# Patient Record
Sex: MALE
[De-identification: names, ages, dates, MRNs, and addresses within clinical notes are randomized per-mention and may not be internally consistent; named-entity substitution may affect disease eponyms.]

---

## 2017-10-07 NOTE — RAD
PROCEDURE:  Right small finger radiographs.



HISTORY:

pain s/p fall



COMPARISON:

None.



TECHNIQUE:

AP radiograph of the right hand, as well as spot oblique and lateral 

images of small finger were obtained.



FINDINGS:



RIGHT SMALL FINGER:

Age-indeterminate fracture deformity of the metacarpal, suspected 

chronic. Remainder of the right hand (as seen on the AP view) grossly 

unremarkable.



JOINTS:

No dislocation. 



SOFT TISSUES:

Mild soft tissue swelling.  No evidence of radiopaque foreign body. 



OTHER FINDINGS:

None.



IMPRESSION:

Age-indeterminate fracture deformity of the 5th metacarpal, suspected 

chronic.  Correlate with physical exam. 



Mild soft tissue swelling. 



Study has been marked for PA review.

## 2018-04-13 NOTE — ED PDOC
HPI: Abdomen


Time Seen by Provider: 04/13/18 18:32


Chief Complaint (Nursing): GI Problem


Chief Complaint (Provider): HEMATEMESIS


History Per: Patient (36 Y/O MALE HERE FOR EVALUATION OF DARK STOOLS X 2 DAYS 

ASSOCIATED WITH HEMATEMESIS WITH BLOOD CLOTS TODAY.  PARTNER NOTES H/O DAILY 

BEER DRINKING.  PATIENT DENIES ANY ULCER HX BUT HAS HAD H/O ABD PAIN 

INTERMITTENTLT)





Past Medical History


Reviewed: Historical Data, Nursing Documentation, Vital Signs


Vital Signs: 


 Last Vital Signs











Temp  97.7 F   04/13/18 18:00


 


Pulse  120 H  04/13/18 19:14


 


Resp  16   04/13/18 18:00


 


BP  87/52 L  04/13/18 18:00


 


Pulse Ox  100   04/13/18 19:18














- Medical History


PMH: Back Problems





- Family History


Family History: States: Unknown Family Hx





- Immunization History


Hx Tetanus Toxoid Vaccination: No


Hx Influenza Vaccination: No


Hx Pneumococcal Vaccination: No





- Allergies


Allergies/Adverse Reactions: 


 Allergies











Allergy/AdvReac Type Severity Reaction Status Date / Time


 


Penicillins Allergy  RASH Verified 10/06/17 17:48














Review of Systems


ROS Statement: Except As Marked, All Systems Reviewed And Found Negative





Physical Exam





- Reviewed


Nursing Documentation Reviewed: Yes


Vital Signs Reviewed: Yes





- Physical Exam


Appears: Positive for: Well, Non-toxic, No Acute Distress


Head Exam: Positive for: ATRAUMATIC, NORMAL INSPECTION, NORMOCEPHALIC


Skin: Positive for: Normal Color, Warm, DRY


Eye Exam: Positive for: Normal appearance, EOMI, PERRL, Other (CONJUNCTIVAL 

PALLOR)


ENT: Positive for: Normal ENT Inspection


Neck: Positive for: Normal, Painless ROM


Cardiovascular/Chest: Positive for: Regular Rate, Rhythm


Respiratory: Positive for: CNT, Normal Breath Sounds


Gastrointestinal/Abdominal: Positive for: Normal Exam, Soft


Back: Positive for: Normal Inspection


Rectal: Positive for: Black Stool


Extremity: Positive for: Normal ROM


Neurologic/Psych: Positive for: Alert, Oriented





- Laboratory Results


Result Diagrams: 


 04/13/18 18:40





 04/13/18 18:40





- ECG


O2 Sat by Pulse Oximetry: 100





- Progress


ED Course And Treament: 





PROTONIX 80 MG IV X 1 DOSE


ZOFRAN 4 MG IV X 1 DOSE





NS 2 LITERS WIDE OPEN





TYPE AND CROSS 2 UNITS PACKED RBCS





HGB 6.1





D/W DR. MURRAY





D/W DR. CRUZ ICU





2 UNITS FFP/ 20 MG VITAMIN K IV X 1 DOSE





D/W DR. PRETTY. DDAVP 21 MCG IV X 1 DOSE; PROTONIX 8 MG/HR RECOMMENDED BY HIM











Disposition





- Clinical Impression


Clinical Impression: 


 GI bleed








- Disposition


Disposition Time: 19:37


Condition: FAIR


Forms:  [facility] (English)





- Pt Status Changed To:


Hospital Disposition Of: Inpatient





- Admit Certification


Admit to Inpatient:: After my assessment, the patient will require 

hospitalization for at least two midnights.  This is because of the severity of 

symptoms shown, intensity of services needed, and/or the medical risk in this 

patient being treated as an outpatient.

## 2018-04-13 NOTE — CP.PCM.CON
History of Present Illness





- History of Present Illness


History of Present Illness: 





CC: vomiting clots, black stool





HPI: 37 year old male PMH daily ETOH use presents to the ED today with a 

several day history of moderate black stools, associated with several episodes 

of emesis with blood clots. Patient was anorexic for two days. Afebrile, 

initial BP 87/52, -135, saturating 100% RA. No acute distress or further 

episodes of bleeding in ED. INR 1.8, H/H 6.1/20.0, pt received 2 liters NS, vit 

K 20 x1, ddavp x1, 2 prbc/2ffp (still transfusing), octreotide drip, and 

protonix drip. GI consulted Dr. Owen. Pt stable at this time, admit to ICU 

for close monitoring, HD stability. 





ROS: per HPI all other systems reviewed and negative.





PMSH: daily ETOH


FH: Denies


SH: daily ETOH 1-2 beers, cigarettes 1/2 ppd 17 years


No home meds


PCN - adverse rxn, diarrhea.





Past Patient History





- Past Social History


Smoking Status: Never Smoked





- PSYCHIATRIC


Hx Substance Use: No





- SURGICAL HISTORY


Hx Surgeries: No





Meds


Allergies/Adverse Reactions: 


 Allergies











Allergy/AdvReac Type Severity Reaction Status Date / Time


 


Penicillins Allergy  RASH Verified 10/06/17 17:48














- Medications


Medications: 


 Current Medications





Pantoprazole Sodium 40 mg/ (Sodium Chloride)  100 mls @ 20 mls/hr IVPB Q5H JUAN LUIS


   PRN Reason: 8 MG/HR


   Last Admin: 04/13/18 21:15 Dose:  20 mls/hr


Octreotide Acetate (Sandostatin)  50 mcg SC Q8 [facility]











Physical Exam





- Constitutional


Appears: Non-toxic, No Acute Distress





- Head Exam


Head Exam: ATRAUMATIC, NORMOCEPHALIC





- Eye Exam


Eye Exam: EOMI, Normal appearance, PERRL


Pupil Exam: NORMAL ACCOMODATION





- ENT Exam


ENT Exam: Mucous Membranes Moist, Normal Exam





- Neck Exam


Neck exam: Positive for: Full Rom, Normal Inspection





- Respiratory Exam


Respiratory Exam: Clear to Auscultation Bilateral, NORMAL BREATHING PATTERN





- Cardiovascular Exam


Cardiovascular Exam: RRR, +S1, +S2





- GI/Abdominal Exam


GI & Abdominal Exam: Normal Bowel Sounds, Soft.  absent: Mass, Tenderness





- Extremities Exam


Extremities exam: Positive for: normal capillary refill, pedal pulses present





- Back Exam


Back exam: absent: CVA tenderness (L), CVA tenderness (R)





- Neurological Exam


Neurological exam: Alert, Oriented x3





- Psychiatric Exam


Psychiatric exam: Normal Affect, Normal Mood





- Skin


Skin Exam: Dry, Warm





Results





- Vital Signs


Recent Vital Signs: 


 Last Vital Signs











Temp  97.7 F   04/13/18 18:00


 


Pulse  127 H  04/13/18 19:58


 


Resp  18   04/13/18 19:58


 


BP  118/60   04/13/18 19:58


 


Pulse Ox  100   04/13/18 20:06














- Labs


Result Diagrams: 


 04/13/18 18:40





 04/13/18 18:40


Labs: 


 Laboratory Results - last 24 hr











  04/13/18 04/13/18 04/13/18





  18:40 18:40 18:40


 


WBC    16.6 H


 


RBC    2.29 L


 


Hgb    6.1 L*


 


Hct    20.0 L


 


MCV    87.4


 


MCH    26.6 L


 


MCHC    30.5 L


 


RDW    15.1 H


 


Plt Count    187


 


MPV    10.4


 


Neut % (Auto)    69.4


 


Lymph % (Auto)    25.2


 


Mono % (Auto)    4.7


 


Eos % (Auto)    0.0


 


Baso % (Auto)    0.7


 


Neut # (Auto)    11.5 H


 


Lymph # (Auto)    4.2


 


Mono # (Auto)    0.8


 


Eos # (Auto)    0.0


 


Baso # (Auto)    0.1


 


PT   


 


INR   


 


APTT   


 


Sodium   144 


 


Potassium   4.5 


 


Chloride   105 


 


Carbon Dioxide   15 L 


 


Anion Gap   29 H 


 


BUN   37 H 


 


Creatinine   1.2 


 


Est GFR ( Amer)   > 60 


 


Est GFR (Non-Af Amer)   > 60 


 


Random Glucose   161 H 


 


Calcium   8.6 


 


Total Bilirubin   1.0 


 


AST   81 H 


 


ALT   40 


 


Alkaline Phosphatase   66 


 


Total Protein   7.6 


 


Albumin   3.6 


 


Globulin   4.1 H 


 


Albumin/Globulin Ratio   0.9 L 


 


Lipase   78 


 


Stool Occult Blood   


 


Alcohol, Quantitative   < 10 


 


Blood Type  O POSITIVE  


 


Blood Type Confirm   


 


Antibody Screen  Negative  


 


Crossmatch  See Detail  


 


BBK History Checked  No verified bt  














  04/13/18 04/13/18 04/13/18





  18:40 18:40 19:27


 


WBC   


 


RBC   


 


Hgb   


 


Hct   


 


MCV   


 


MCH   


 


MCHC   


 


RDW   


 


Plt Count   


 


MPV   


 


Neut % (Auto)   


 


Lymph % (Auto)   


 


Mono % (Auto)   


 


Eos % (Auto)   


 


Baso % (Auto)   


 


Neut # (Auto)   


 


Lymph # (Auto)   


 


Mono # (Auto)   


 


Eos # (Auto)   


 


Baso # (Auto)   


 


PT   20.6 H 


 


INR   1.8 H 


 


APTT   32.0 


 


Sodium   


 


Potassium   


 


Chloride   


 


Carbon Dioxide   


 


Anion Gap   


 


BUN   


 


Creatinine   


 


Est GFR ( Amer)   


 


Est GFR (Non-Af Amer)   


 


Random Glucose   


 


Calcium   


 


Total Bilirubin   


 


AST   


 


ALT   


 


Alkaline Phosphatase   


 


Total Protein   


 


Albumin   


 


Globulin   


 


Albumin/Globulin Ratio   


 


Lipase   


 


Stool Occult Blood  Positive H  


 


Alcohol, Quantitative   


 


Blood Type   


 


Blood Type Confirm    O POSITIVE


 


Antibody Screen   


 


Crossmatch   


 


BBK History Checked   














Assessment & Plan





- Assessment and Plan (Free Text)


Plan: 





37 year old male PMH daily ETOH use presents to the ED today with a several day 

history of moderate black stools, associated with several episodes of emesis 

with blood clots. Patient was anorexic for two days. Afebrile, initial BP 87/52

, -135, saturating 100% RA. No acute distress or further episodes of 

bleeding in ED. INR 1.8, H/H 6.1/20.0, pt received 2 liters NS, vit K 20 x1, 

ddavp x1, 2 prbc/2ffp (still transfusing), octreotide drip, and protonix drip. 

GI consulted Dr. Owen. Pt stable at this time, admit to ICU for close 

monitoring, HD stability. 








GIB


Acute Blood loss anemia


Daily ETOH


Coagulopathy, elevated INR


Leukocytosis, likely reactive to bleed





- currently HD stable, BP now 115/63, however tachycardic 108-127, continue 

close monitoring


- if continues to be stable tomorrow, consider downgrading to tele


- receiving 2 prbc/2ffp


- received DDAVP 


- received Vit K x1


- continue protonix drip and octreotide


- GI consult appreciated with Dr. Owen, likely EGD when stable


- counseling on etoh cessation, states he drinks 1-2 beers daily, AST mild 

elevation, otherwise nl transaminases


- hold LOVENOX VTE, SCDs

## 2018-04-14 NOTE — CP.PCM.CON
History of Present Illness





- History of Present Illness


History of Present Illness: 





38 yo male with h/o daily Etoh use admitted with melena, hematemesis, and 

anemia. Patient denies ASA or NSAID use. Noticed black stools at home for 2-3 

days. Currently without evidence of active bleed.





Review of Systems





- Constitutional


Constitutional: absent: Chills





- EENT


Eyes: absent: Blurred Vision


Ears: absent: Ear Pain


Nose/Mouth/Throat: absent: Epistaxis





- Cardiovascular


Cardiovascular: absent: Chest Pain





- Respiratory


Respiratory: absent: Cough





- Gastrointestinal


Gastrointestinal: absent: Abdominal Pain





- Genitourinary


Genitourinary: absent: Change in Urinary Stream





Past Patient History





- Past Social History


Smoking Status: Never Smoked





- CARDIAC


Hx Cardiac Disorders: No





- PULMONARY


Hx Respiratory Disorders: No





- NEUROLOGICAL


Hx Neurological Disorder: No





- HEENT


Hx HEENT Problems: No





- RENAL


Hx Chronic Kidney Disease: No





- ENDOCRINE/METABOLIC


Hx Endocrine Disorders: No





- HEMATOLOGICAL/ONCOLOGICAL


Hx Blood Disorders: No





- INTEGUMENTARY


Hx Dermatological Problems: No





- MUSCULOSKELETAL/RHEUMATOLOGICAL


Hx Musculoskeletal Disorders: Yes


Hx Back Pain: Yes


Hx Falls: No





- GASTROINTESTINAL


Hx Gastrointestinal Disorders: No





- GENITOURINARY/GYNECOLOGICAL


Hx Genitourinary Disorders: No





- PSYCHIATRIC


Hx Substance Use: No





- SURGICAL HISTORY


Hx Surgeries: No





- ANESTHESIA


Hx Anesthesia: No


Hx Anesthesia Reactions: No


Hx Malignant Hyperthermia: No


Has any member of the family had a problem w/ anesthesia?: No





Meds


Allergies/Adverse Reactions: 


 Allergies











Allergy/AdvReac Type Severity Reaction Status Date / Time


 


Penicillins Allergy  RASH Verified 10/06/17 17:48


 


shellfish derived Allergy  ITCHING Verified 04/14/18 18:20














- Medications


Medications: 


 Current Medications





Pantoprazole Sodium 40 mg/ (Sodium Chloride)  100 mls @ 20 mls/hr IVPB Q5H JUAN LUIS


   PRN Reason: 8 MG/HR


   Last Admin: 04/14/18 21:42 Dose:  20 mls/hr











Physical Exam





- Constitutional


Appears: No Acute Distress





- Head Exam


Head Exam: ATRAUMATIC





- Eye Exam


Eye Exam: Normal appearance





- ENT Exam


ENT Exam: Mucous Membranes Moist





- Respiratory Exam


Respiratory Exam: Clear to Auscultation Bilateral, NORMAL BREATHING PATTERN





- Cardiovascular Exam


Cardiovascular Exam: REGULAR RHYTHM, +S1, +S2





- GI/Abdominal Exam


GI & Abdominal Exam: Normal Bowel Sounds, Soft.  absent: Tenderness





Results





- Vital Signs


Recent Vital Signs: 


 Last Vital Signs











Temp  98.8 F   04/14/18 20:00


 


Pulse  80   04/14/18 22:00


 


Resp  25 H  04/14/18 22:00


 


BP  125/75   04/14/18 22:00


 


Pulse Ox  100   04/14/18 22:00














- Labs


Result Diagrams: 


 04/14/18 15:16





 04/14/18 15:16


Labs: 


 Laboratory Results - last 24 hr











  04/13/18 04/14/18 04/14/18





  18:40 15:16 15:16


 


WBC   17.9 H 


 


RBC   2.08 L 


 


Hgb   5.8 L* 


 


Hct   17.9 L 


 


MCV   86.4 


 


MCH   27.8 


 


MCHC   32.2 L 


 


RDW   15.3 H 


 


Plt Count   106 L D 


 


Sodium    144


 


Potassium    3.5 L


 


Chloride    109 H


 


Carbon Dioxide    23


 


Anion Gap    16


 


BUN    33 H


 


Creatinine    0.9


 


Est GFR ( Amer)    > 60


 


Est GFR (Non-Af Amer)    > 60


 


Random Glucose    104


 


Calcium    8.2 L


 


Phosphorus    3.5


 


Magnesium    1.9


 


Blood Type  O POSITIVE  


 


Antibody Screen  Negative  


 


Crossmatch  See Detail  


 


BBK History Checked  No verified bt  














Assessment & Plan


(1) GI bleed


Assessment and Plan: 


Likely upper bleed r/o  PUD, Neeta Campos, gastritis, and esophagitis. . 

Currently patient feeling well, BP and pulse have stabilized, and no further 

BMs or vomiting. Transfuse to Hgb 8 . Continue IV protonix. Upper endoscopy 

Monday. So far patient has received FFP, PRBC, DDAVP, and octreotide 


Status: Acute

## 2018-04-14 NOTE — CT
PROCEDURE:  CT Abdomen and Pelvis without intravenous contrast



HISTORY:

GI BLEED



COMPARISON:

None.



TECHNIQUE:

Technique. 



Contrast Dose: 



Radiation dose: 



Total exam DLP = 



Total exam DLP =  mGy-cm.



This CT exam was performed using one or more of the following dose 

reduction techniques: Automated exposure control, adjustment of the 

mA and/or kV according to patient size, and/or use of iterative 

reconstruction technique.



FINDINGS:



LOWER THORAX:

Unremarkable. 



LIVER:

Unremarkable. No gross lesion or ductal dilatation.  



GALLBLADDER AND BILE DUCTS:

Unremarkable. 



PANCREAS:

Unremarkable. No gross lesion or ductal dilatation.



SPLEEN:

Unremarkable. 



ADRENALS:

Unremarkable. No mass. 



KIDNEYS AND URETERS:

Unremarkable. No hydronephrosis. No solid mass. 



VASCULATURE:

Unremarkable. No aortic aneurysm. 



BOWEL:

Unremarkable. No obstruction. No gross mural thickening. 



APPENDIX:

Not identified. 



PERITONEUM:

Extensive infiltration in the right lower quadrant mesenteric fat 

extending along the right pericolic gutter possibly representing an 

inflammatory process. 



LYMPH NODES:

Unremarkable. No enlarged lymph nodes. 



BLADDER:

Unremarkable. 



REPRODUCTIVE:

Unremarkable. 



BONES:

No acute fracture. 



OTHER FINDINGS:

None.



IMPRESSION:

Extensive infiltration in the right lower quadrant mesenteric fat 

extending along the right pericolic gutter possibly representing an 

inflammatory process. Appendix not identified. Pancreatitis is not 

excluded.

## 2018-04-14 NOTE — PN
DATE:  04/14/2018



CRITICAL CARE PROGRESS NOTE



LOCATION:  The patient in ICU, bed 432.



Time spent 35 minutes.



SUBJECTIVE:  The patient is seen and evaluated at the bedside side.  Past

medical, surgical, family, and social history reviewed.  A 37-year-old male

with history of EtOH dependence as per patient's partner, admitted with

hematemesis and melena.  Initial hemoglobin 6.1.  Received 2 liters normal

saline, vitamin K 20 mg x2, DDAVP 21 mcg x1, 2 units of packed red blood

cells.  Remains alert, awake with no further episodes of vomiting. 

Hemodynamically stable overnight.



PHYSICAL EXAMINATION:

GENERAL:  This morning, alert and awake, follows commands appropriate.

VITAL SIGNS:  Temperature 98.9, heart rate 80 regular, blood pressure

125/76, mean arterial pressure 92, respiratory rate of 17, saturating 100%.

Intake 800, output 700.  Positive balance of 100.  Weight 180 pounds.

HEAD, EYE, EAR, NOSE AND THROAT:  Pupils are reactive.  Conjunctivae pale. 

Sclerae are white.

NECK:  Supple.  Trachea central.

CHEST:  Bilateral breath sounds, clear to auscultation.

HEART:  Rhythm regular.  S1 and S2 normal.  No audible murmur.

ABDOMEN:  Bowel sounds present.  Soft.  Liver and spleen not palpable. 

Bladder not distended.

EXTREMITIES:  No clubbing, cyanosis or edema.

NEUROLOGIC EXAMINATION:  Nonfocal.



CURRENT MEDICATIONS:  Protonix 40 mg in 100 mL IV q. 5 hours., Sandostatin

50 mcg subcu q. 8 hours.



LABORATORY DATA:  WBC 16.6, hemoglobin 6.1, hematocrit 20, platelet count

of 187.  PT 20.6, INR 1.8, PTT 32.0.  SMA-7:  Sodium 144, potassium 4.5,

chloride 105, CO2 15, blood urea nitrogen 37, creatinine 1.2, random

glucose 161, calcium 8.6, total bilirubin 1, AST 81, ALT 40, alkaline

phosphatase 66, total protein 7.6, albumin 3.6, lipase 78.  Stool occult

blood positive.  Alcohol level less than 10.  Microbiology, none reported. 

Chest x-ray:  No acute infiltrate.  EKG shows normal sinus rhythm, normal

electrical axis, nonspecific ST abnormality.





IMPRESSION:

Neuro:  Alert and oriented to name, place and time.  History of EtOH

dependence, but no encephalopathy or withdrawal noted.

Pulmonary:  Stable.  No acute issues.

Cardiac:  Sinus tachycardia secondary to hypovolemia from acute blood loss.

Gastrointestinal:  GI bleeding probably related to gastritis, EtOH

dependence, rule out other causes.

Renal:  No mild prerenal azotemia secondary to hypovolemia and/or due to

protein load in the colon.

Infectious Disease:  No acute issues and awaiting for gastrointestinal

evaluation.  Hold anticoagulation.  Monitor hemoglobin.  Transfuse as

needed to maintain hemoglobin close to 10.  Continue Sandostatin and the

proton pump inhibitor.





__________________________________________

Gilbert Mckeon MD



DD:  04/14/2018 13:13:25

DT:  04/14/2018 13:20:54

Job # [id number]

MTDD

## 2018-04-14 NOTE — HP
HISTORY OF PRESENT ILLNESS:  Mr. Davila is a 37-year-old male who was

admitted via the emergency room because of dark tarry stools associated

with several episodes of emesis and blood clots.  He has not been able to

eat food for about 2 days prior to presentation.  His blood pressure was

found to be 87/52 and was tachycardic in the emergency room. His

wife indicates that he drinks alcohol heavily at home (beer).  Denies

smoking.  Denies drug use, even though the wife indicates he smokes

half-a-pack-a-day for about 17 years.



FAMILY HISTORY:  Noncontributory.



SOCIAL HISTORY:  He drinks alcohol heavily and smoke cigarettes.



REVIEW OF SYSTEMS:  Remarkable for dark tarry stools for several weeks.



PHYSICAL EXAMINATION:

GENERAL:  The patient is alert, oriented, and appears to be much more

comfortable at present.

VITAL SIGNS:  Blood pressure on admission is 87/52 with a pulse of 135,

respiratory rate 18, he is afebrile, and O2 sat 100% on room air.

SKIN:  Shows fair turgor.

HEENT:  Pupils are equal and reactive to light and accommodation.  Mouth

shows fair hygiene.

NECK:  JVP flat.

LUNGS:  Clear.

HEART:  Regular.  No murmurs or gallops.

ABDOMEN:  Soft with mild midepigastric tenderness.

EXTREMITIES:  Shows no edema or cyanosis.  Central nervous system exam

grossly intact.



LABORATORY DATA:  WBC 16.6, hemoglobin 6.1, and platelet count of 157,000. 

Sodium 144, potassium 4.5, BUN 37, creatinine 1.2, AST 81, and ALT 40. 

Lipase 78.  EKG, sinus tachycardia, nonspecific ST-T changes.



IMPRESSION:  Acute gastrointestinal bleeding, one has to rule out peptic

ulcer disease, alcoholic liver disease, severe anemia, and history of

chronic alcohol use.



PLAN:  Gastroenterology evaluation, transfusion of packed red blood cells,

IV hydration, monitor the patient in the ICU, _____ H2 blockers, and

further therapy will depend on findings.  The patient advised to stop

drinking alcohol.





__________________________________________

Ap Dukes MD





DD:  04/14/2018 10:41:03

DT:  04/14/2018 10:45:43

Job # [id number]

MTDD

## 2018-04-14 NOTE — RAD
PROCEDURE:  CHEST RADIOGRAPH, 1 VIEW



HISTORY:

TACHYCARDIA



COMPARISON:

None available.



FINDINGS:



LUNGS:

Clear.



PLEURA:

No pneumothorax or pleural fluid seen.



CARDIOVASCULAR:

Normal.



OSSEOUS STRUCTURES:

No significant abnormalities.



VISUALIZED UPPER ABDOMEN:

Normal.



OTHER FINDINGS:

None. 



IMPRESSION:

No active disease.

## 2018-04-14 NOTE — CARD
--------------- APPROVED REPORT --------------





EKG Measurement

Heart [id number]

KS [id number]

[id number]

[id number]

[id number]



<Conclusion>

Sinus tachycardia

Nonspecific ST abnormality

Abnormal ECG

## 2018-04-15 NOTE — PN
DATE:  04/15/2018



SUBJECTIVE:  The patient in ICU, bed 432.  Time spent 35 minutes.  The

patient is seen and evaluated at the bedside.



Past medical, surgical, family and social history reviewed.



A 37-year-old male with history of EtOH dependence as per the patient's

partner, admitted with hematemesis and melena.  Initial hemoglobin 6.1,

received 3 units of packed red blood cells overnight.  Vitamin D 20 mg x2. 

DDAVP 21 mcg x1.  Repeat hemoglobin increased to 7, hematocrit of 20.7. 

Overnight normotensive, afebrile.  This morning alert, awake, follows

commands appropriate.  Denies further hematemesis.  Has not had any active

melanotic bowel movement.



OBJECTIVE:

VITAL SIGNS:  Temperature 98.6, heart rate 69-80 and regular, blood

pressure 115/65 to 106/58, respiratory rate 17, saturating 100% on room

air.

INTAKE AND OUTPUT:  Intake 1305, output 1200, positive balance 105.  Weight

190 pounds.

HEENT:  Pupils are reactive.  Conjunctivae pale.  Sclerae are white.

NECK:  Supple.  Trachea central.

CHEST:  Bilateral breath sounds clear to auscultation.

HEART:  Rhythm regular.  S1 and S2 normal.

ABDOMEN:  Bowel sounds present.  Soft.  Liver and spleen not palpable. 

Bladder not distended.

EXTREMITIES:  No tremor.

NEUROLOGIC:  Nonfocal.



CURRENT MEDICATIONS:  Protonix 40 at 8 mg per hour.



LABORATORY DATA:  WBC 8.5, hemoglobin 7, hematocrit 20.7, and platelet

count 91,000.  PT 20.6 and INR 1.8.  SMA-7; sodium 143, potassium 3.7,

chloride 107, CO2 24, blood urea nitrogen 23, creatinine 0.8, random

glucose 118, and calcium 8.1.  Stool occult blood positive.  Alcohol level

less than 10.  Microbiology, nasal smear MRSA negative.



DIAGNOSTIC STUDIES:  Chest x-ray:  No active disease noted.  CAT scan of

the abdomen and pelvis extensive infiltration in the right lower quadrant,

eccentric fat extending along the right pericolic gutter possibly

representing inflammatory process.  Appendix is not identified. 

Pancreatitis is not excluded.









IMPRESSION AND PLAN:

1.  Neuro:  Alert and oriented to name, place, and time.  Sumeet Coma

Scale 15.

2.  Pulmonary:  Stable.  No acute issues.  Chest x-ray normal.

3.  Cardiac:  Sinus tachycardia, resolved secondary to hypovolemia and

acute blood loss.

4.  Gastrointestinal:  Possibly related to gastritis and EtOH dependence. 

CT abdomen shows inflammatory changes involving the right lower quadrant. 

We will start on Cipro .

5.  Infectious Disease:  Suspected colitis right lower quadrant.  Hold

anticoagulation.  Monitor hemoglobin.  Transfuse.  Initiate Venofer

injection.  Continue Protonix drip.  Hold the anticoagulation.





__________________________________________

Gilbert Mckeon MD





DD:  04/15/2018 11:47:45

DT:  04/15/2018 11:52:28

Job # [id number]



MTDD

## 2018-04-15 NOTE — CP.PCM.PN
Subjective





- Date & Time of Evaluation


Date of Evaluation: 04/15/18


Time of Evaluation: 12:37





- Subjective


Subjective: 





DENIES ABDOMINAL PAINS/NAUSEA/VOMITING


TOLERATING CLEAR LIQUIDS


NO BM





Objective





- Vital Signs/Intake and Output


Vital Signs (last 24 hours): 


 











Temp Pulse Resp BP Pulse Ox


 


 98.6 F   80   17   106/58 L  100 


 


 04/15/18 08:00  04/15/18 10:00  04/15/18 08:00  04/15/18 10:00  04/15/18 08:00








Intake and Output: 


 











 04/15/18 04/15/18





 06:59 18:59


 


Intake Total 765 640


 


Output Total  600


 


Balance 765 40














- Medications


Medications: 


 Current Medications





Pantoprazole Sodium 40 mg/ (Sodium Chloride)  100 mls @ 20 mls/hr IVPB Q5H [facility]UAN LUIS


   PRN Reason: 8 MG/HR


   Last Admin: 04/15/18 11:04 Dose:  20 mls/hr


Ciprofloxacin (Cipro 400mg/200ml Dsw)  400 mg in 200 mls @ 200 mls/hr IVPB Q12 

JUAN LUIS


   PRN Reason: Protocol


Iron Sucrose 100 mg/ Sodium (Chloride)  105 mls @ 105 mls/hr IVPB DAILY JUAN LUIS











- Labs


Labs: 


 





 04/15/18 09:00 





 04/15/18 09:00 





 











PT  20.6 Seconds (9.8-13.1)  H  04/13/18  18:40    


 


INR  1.8  (0.9-1.2)  H  04/13/18  18:40    


 


APTT  32.0 Seconds (25.6-37.1)   04/13/18  18:40    














- Constitutional


Appears: No Acute Distress





- Head Exam


Head Exam: ATRAUMATIC, NORMAL INSPECTION, NORMOCEPHALIC





- Eye Exam


Eye Exam: EOMI, Normal appearance, PERRL


Pupil Exam: NORMAL ACCOMODATION, PERRL





- ENT Exam


ENT Exam: Mucous Membranes Moist, Normal Exam





- Neck Exam


Neck Exam: Full ROM, Normal Inspection.  absent: Lymphadenopathy





- Respiratory Exam


Respiratory Exam: Clear to Ausculation Bilateral, NORMAL BREATHING PATTERN





- Cardiovascular Exam


Cardiovascular Exam: REGULAR RHYTHM, +S1, +S2.  absent: Murmur





- GI/Abdominal Exam


GI & Abdominal Exam: Soft, Normal Bowel Sounds.  absent: Tenderness





- Rectal Exam


Rectal Exam: NORMAL INSPECTION





- Extremities Exam


Extremities Exam: Full ROM, Normal Capillary Refill, Normal Inspection.  absent

: Joint Swelling, Pedal Edema





- Back Exam


Back Exam: NORMAL INSPECTION





- Neurological Exam


Neurological Exam: Alert, Awake, CN II-XII Intact, Normal Gait, Oriented x3





- Psychiatric Exam


Psychiatric exam: Normal Affect, Normal Mood





- Skin


Skin Exam: Dry, Intact, Normal Color, Warm


Additional comments: 





CT SCAN OF ABD RESULTS REVIEWED--?PANCREATITIS/INFLAMATION





Assessment and Plan





- Assessment and Plan (Free Text)


Assessment: 





SEVERE ANEMIA


INTRA-ABDOMINAL INFLAMATION


GI BLEED


Plan: 





TRANSFUSE 2 MORE UNITS OF PRBS


FOR EGD IN AM

## 2018-04-15 NOTE — CP.PCM.PN
Subjective





- Date & Time of Evaluation


Date of Evaluation: 04/15/18


Time of Evaluation: 19:01





- Subjective


Subjective: 





Patient somewhat restless today and given ativan. No evidence of bleeding and 

no bowel movements.





Objective





- Vital Signs/Intake and Output


Vital Signs (last 24 hours): 


 











Temp Pulse Resp BP Pulse Ox


 


 97.4 F L  72   12   111/56 L  100 


 


 04/15/18 17:53  04/15/18 17:53  04/15/18 16:00  04/15/18 17:53  04/15/18 16:00








Intake and Output: 


 











 04/15/18 04/16/18





 18:59 06:59


 


Intake Total 1555 


 


Output Total 1100 


 


Balance 455 














- Medications


Medications: 


 Current Medications





Folic Acid (Folic Acid)  1 mg PO DAILY [facility]


   Last Admin: 04/15/18 17:00 Dose:  1 mg


Pantoprazole Sodium 40 mg/ (Sodium Chloride)  100 mls @ 20 mls/hr IVPB Q5H [facility]UAN LUIS


   PRN Reason: 8 MG/HR


   Last Admin: 04/15/18 15:43 Dose:  20 mls/hr


Ciprofloxacin (Cipro 400mg/200ml Dsw)  400 mg in 200 mls @ 200 mls/hr IVPB Q12 

JUAN LUIS


   PRN Reason: Protocol


   Last Admin: 04/15/18 15:33 Dose:  Not Given


Iron Sucrose 100 mg/ Sodium (Chloride)  105 mls @ 105 mls/hr IVPB DAILY JUAN LUIS


Thiamine HCl (Vitamin B1 Tab)  100 mg PO DAILY [facility]


   Last Admin: 04/15/18 17:00 Dose:  100 mg











- Labs


Labs: 


 





 04/15/18 09:00 





 04/15/18 09:00 





 











PT  20.6 Seconds (9.8-13.1)  H  04/13/18  18:40    


 


INR  1.8  (0.9-1.2)  H  04/13/18  18:40    


 


APTT  32.0 Seconds (25.6-37.1)   04/13/18  18:40    














- Head Exam


Head Exam: ATRAUMATIC





- Eye Exam


Eye Exam: Normal appearance





- ENT Exam


ENT Exam: Normal Exam





- Neck Exam


Neck Exam: Full ROM





- Respiratory Exam


Respiratory Exam: Clear to Ausculation Bilateral





- Cardiovascular Exam


Cardiovascular Exam: +S1, +S2





- GI/Abdominal Exam


GI & Abdominal Exam: Soft, Normal Bowel Sounds.  absent: Tenderness





Assessment and Plan


(1) GI bleed


Assessment & Plan: 


No evidence of ongoing bleeding. Upper endoscopy tomorrow.


Status: Acute

## 2018-04-16 NOTE — CP.CCUPN
CCU Subjective





- Physician Review


Events Since Last Encounter (Free Text): 





04/16/18 


The patient was Seen/interviewed and examined by me at the bedside during ICU 

round, 


Medical records reviewed and Management issues were discussed and formulated 

with the house staff.


Events reviewed 


Pt AAO x3. Alert, follows some commands


Denies any chest pain, SOB or Palpitations  


Afebrile, NSR on the monitor 


No Evidance of active bleeding


Scheduled fop Endoscopy today 

















CCU Objective





- Vital Signs / Intake & Output


Vital Signs (Last 4 hours): 


Vital Signs











  Temp Pulse Resp BP Pulse Ox


 


 04/16/18 06:00   60  16  114/77  99


 


 04/16/18 04:00  98.7 F  69  17  110/77  100











Intake and Output (Last 8hrs): 


 Intake & Output











 04/15/18 04/16/18 04/16/18





 22:59 06:59 14:59


 


Intake Total 785 160 


 


Output Total 400  


 


Balance 385 160 


 


Intake:   


 


   160 


 


  Intake, Piggyback 300  


 


  Blood Product 325  


 


Output:   


 


  Urine 400  


 


    Urine, Voided 400  


 


Other:   


 


  # Voids   


 


    Urine, Voided 1  














- Physical Exam


Head: Positive for: Atraumatic, Normocephalic


Pupils: Positive for: PERRL


Extroacular Muscles: Positive for: EOMI


Conjunctiva: Positive for: Normal


Mouth: Positive for: Moist Mucous Membranes


Neck: Positive for: Normal Range of Motion, Trachea Midline.  Negative for: 

Meningeal Signs, MIDLINE TENDERNESS, Paraspinal Tenderness, JVD, Lymphadenopathy

, Bruit, Other


Respiratory/Chest: Positive for: Clear to Auscultation, Good Air Exchange.  

Negative for: Respiratory Distress, Accessory Muscle Use, Wheezes, Decreased 

Breath Sounds, Rales, Rhonchi


Cardiovascular: Positive for: Regular Rate and Rhythm, Normal S1, S2, Peripheal 

Pulses Present.  Negative for: Murmurs, Irregular Rhythm, Tachycardic, 

Bradycardic


Abdomen: Positive for: Distention, Normal Bowel Sounds.  Negative for: 

Tenderness


Neurological: Positive for: GCS=15, CN II-XII Intact, Speech Normal, Motor Func 

Grossly Intact, Normal Sensory Function


Psychiatric: Positive for: Alert, Oriented x 3





- Medications


Active Medications: 


Active Medications











Generic Name Dose Route Start Last Admin





  Trade Name Freq  PRN Reason Stop Dose Admin


 


Chlordiazepoxide  25 mg  04/15/18 22:00  04/16/18 04:50





  Librium  PO   Not Given





  Q6 JUAN LUIS   


 


Folic Acid  1 mg  04/15/18 15:30  04/15/18 17:00





  Folic Acid  PO   1 mg





  DAILY JUAN LUIS   Administration


 


Pantoprazole Sodium 40 mg/  100 mls @ 20 mls/hr  04/13/18 19:45  04/16/18 02:16





  Sodium Chloride  IVPB   20 mls/hr





  Q5H JUAN LUIS   Administration





  8 MG/HR   


 


Ciprofloxacin  400 mg in 200 mls @ 200 mls/hr  04/15/18 11:45  04/15/18 21:28





  Cipro 400mg/200ml Dsw  IVPB   200 mls/hr





  Q12 JUAN LUIS   Administration





  Protocol   


 


Iron Sucrose 100 mg/ Sodium  105 mls @ 105 mls/hr  04/15/18 12:00  04/15/18 20:

56





  Chloride  IVPB   105 mls/hr





  DAILY JUAN LUIS   Administration


 


Thiamine HCl  100 mg  04/15/18 15:30  04/15/18 17:00





  Vitamin B1 Tab  PO   100 mg





  DAILY JUAN LUIS   Administration














- Patient Studies


Lab Studies: 


 Microbiology Studies











 04/13/18 10:30 MRSA Culture (Admit) - Final





 Naris    MRSA NOT DETECTED








 Lab Studies











  04/16/18 04/16/18 04/15/18 Range/Units





  04:20 04:20 20:29 


 


WBC   6.4  7.7  (4.8-10.8)  K/uL


 


RBC   2.92 L  2.98 L  (4.40-5.90)  Mil/uL


 


Hgb   8.7 L  9.0 L D  (12.0-18.0)  g/dL


 


Hct   26.3 L  26.7 L  (35.0-51.0)  %


 


MCV   90.0  89.4  (80.0-94.0)  fl


 


MCH   29.9  30.3  (27.0-31.0)  pg


 


MCHC   33.2  33.9  (33.0-37.0)  g/dL


 


RDW   14.4  14.4  (11.5-14.5)  %


 


Plt Count   85 L  89 L  (130-400)  K/uL


 


Sodium  145    (132-148)  mmol/l


 


Potassium  3.6    (3.6-5.0)  MMOL/L


 


Chloride  109 H    ()  mmol/L


 


Carbon Dioxide  20 L    (22-30)  mmol/L


 


Anion Gap  20    (10-20)  


 


BUN  16    (9-20)  mg/dl


 


Creatinine  0.7 L    (0.8-1.5)  mg/dl


 


Est GFR (African Amer)  > 60    


 


Est GFR (Non-Af Amer)  > 60    


 


Random Glucose  92    ()  mg/dL


 


Calcium  8.3 L    (8.4-10.2)  mg/dL


 


Blood Type     


 


Antibody Screen     


 


Crossmatch     


 


BBK History Checked     














  04/15/18 04/15/18 04/13/18 Range/Units





  09:00 09:00 18:40 


 


WBC   8.5  D   (4.8-10.8)  K/uL


 


RBC   2.35 L   (4.40-5.90)  Mil/uL


 


Hgb   7.0 L   (12.0-18.0)  g/dL


 


Hct   20.7 L   (35.0-51.0)  %


 


MCV   88.1   (80.0-94.0)  fl


 


MCH   29.9   (27.0-31.0)  pg


 


MCHC   33.9   (33.0-37.0)  g/dL


 


RDW   14.9 H   (11.5-14.5)  %


 


Plt Count   91 L   (130-400)  K/uL


 


Sodium  143    (132-148)  mmol/l


 


Potassium  3.7    (3.6-5.0)  MMOL/L


 


Chloride  107    ()  mmol/L


 


Carbon Dioxide  24    (22-30)  mmol/L


 


Anion Gap  16    (10-20)  


 


BUN  23 H    (9-20)  mg/dl


 


Creatinine  0.8    (0.8-1.5)  mg/dl


 


Est GFR (African Amer)  > 60    


 


Est GFR (Non-Af Amer)  > 60    


 


Random Glucose  118 H    ()  mg/dL


 


Calcium  8.1 L    (8.4-10.2)  mg/dL


 


Blood Type    O POSITIVE  


 


Antibody Screen    Negative  


 


Crossmatch    See Detail  


 


BBK History Checked    No verified bt  








 Laboratory Results - last 24 hr











  04/13/18 04/15/18 04/15/18





  18:40 09:00 09:00


 


WBC   8.5  D 


 


RBC   2.35 L 


 


Hgb   7.0 L 


 


Hct   20.7 L 


 


MCV   88.1 


 


MCH   29.9 


 


MCHC   33.9 


 


RDW   14.9 H 


 


Plt Count   91 L 


 


Sodium    143


 


Potassium    3.7


 


Chloride    107


 


Carbon Dioxide    24


 


Anion Gap    16


 


BUN    23 H


 


Creatinine    0.8


 


Est GFR ( Amer)    > 60


 


Est GFR (Non-Af Amer)    > 60


 


Random Glucose    118 H


 


Calcium    8.1 L


 


Blood Type  O POSITIVE  


 


Antibody Screen  Negative  


 


Crossmatch  See Detail  


 


BBK History Checked  No verified bt  














  04/15/18 04/16/18 04/16/18





  20:29 04:20 04:20


 


WBC  7.7  6.4 


 


RBC  2.98 L  2.92 L 


 


Hgb  9.0 L D  8.7 L 


 


Hct  26.7 L  26.3 L 


 


MCV  89.4  90.0 


 


MCH  30.3  29.9 


 


MCHC  33.9  33.2 


 


RDW  14.4  14.4 


 


Plt Count  89 L  85 L 


 


Sodium    145


 


Potassium    3.6


 


Chloride    109 H


 


Carbon Dioxide    20 L


 


Anion Gap    20


 


BUN    16


 


Creatinine    0.7 L


 


Est GFR ( Amer)    > 60


 


Est GFR (Non-Af Amer)    > 60


 


Random Glucose    92


 


Calcium    8.3 L


 


Blood Type   


 


Antibody Screen   


 


Crossmatch   


 


BBK History Checked   














Review of Systems





- Cardiovascular


Cardiovascular: absent: Chest Pain, Chest Pain at Rest, Chest Pain with Activity

, Claudication





- Respiratory


Respiratory: absent: Cough, Dyspnea, Hemoptysis, Dyspnea on Exertion, Wheezing, 

Excessive Mucous Production





- Gastrointestinal


Gastrointestinal: Abdominal Pain.  absent: Change in Stool Character, Coffee 

Ground Emesis, Melena, Nausea, Vomiting





Critical Care Progress Note





- Nutrition


Nutrition: 


 Nutrition











 Category Date Time Status


 


 NPO Diet [DIET] Diets  04/15/18 Dinner Active














Assessment/Plan


(1) GI bleed


Current Visit: Yes   Status: Acute   Comment: 


Scheduled fop Endoscopy today 


Two large bore peripheral catheters


PANTOPRAZOLE drip


OCTREOTIDE drip


Suplemental O2


NPO


Volume resuscitation   





(2) Acute blood loss anemia


Current Visit: Yes   Status: Acute   Comment: 


Serial CBCs q 8 /HR


Receiving 6U pRBC and 2U FFP


Received DDAVP and Vit K   





(3) Alcohol withdrawal


Current Visit: Yes   Status: Acute   





(4) Chronic alcoholism


Current Visit: Yes   Status: Acute

## 2018-04-16 NOTE — CP.PCM.PN
Subjective





- Date & Time of Evaluation


Date of Evaluation: 04/16/18


Time of Evaluation: 08:33





- Subjective


Subjective: 





EVENTS OF LAST NIGHT NOTED


NO RECURRENCE OF MELENA


DROWSY FROM MEDS


VSS





Objective





- Vital Signs/Intake and Output


Vital Signs (last 24 hours): 


 











Temp Pulse Resp BP Pulse Ox


 


 98.7 F   60   16   114/77   99 


 


 04/16/18 04:00  04/16/18 06:00  04/16/18 06:00  04/16/18 06:00  04/16/18 06:00








Intake and Output: 


 











 04/16/18 04/16/18





 06:59 18:59


 


Intake Total 540 20


 


Output Total 400 


 


Balance 140 20














- Medications


Medications: 


 Current Medications





Chlordiazepoxide (Librium)  25 mg PO Q6 [facility]


   Last Admin: 04/16/18 04:50 Dose:  Not Given


Folic Acid (Folic Acid)  1 mg PO DAILY [facility]


   Last Admin: 04/16/18 08:08 Dose:  Not Given


Pantoprazole Sodium 40 mg/ (Sodium Chloride)  100 mls @ 20 mls/hr IVPB Q5H [facility]


   PRN Reason: 8 MG/HR


   Last Admin: 04/16/18 08:08 Dose:  20 mls/hr


Ciprofloxacin (Cipro 400mg/200ml Dsw)  400 mg in 200 mls @ 200 mls/hr IVPB Q12 

JUAN LUIS


   PRN Reason: Protocol


   Last Admin: 04/16/18 08:10 Dose:  200 mls/hr


Iron Sucrose 100 mg/ Sodium (Chloride)  105 mls @ 105 mls/hr IVPB [facility]ILY [facility]


   Last Admin: 04/15/18 20:56 Dose:  105 mls/hr


Thiamine HCl (Vitamin B1 Tab)  100 mg PO DAILY [facility]


   Last Admin: 04/16/18 08:09 Dose:  Not Given











- Labs


Labs: 


 





 04/16/18 04:20 





 04/16/18 04:20 





 











PT  20.6 Seconds (9.8-13.1)  H  04/13/18  18:40    


 


INR  1.8  (0.9-1.2)  H  04/13/18  18:40    


 


APTT  32.0 Seconds (25.6-37.1)   04/13/18  18:40    














- Constitutional


Appears: No Acute Distress





- Head Exam


Head Exam: ATRAUMATIC, NORMAL INSPECTION, NORMOCEPHALIC





- Eye Exam


Eye Exam: EOMI, Normal appearance, PERRL


Pupil Exam: NORMAL ACCOMODATION, PERRL





- ENT Exam


ENT Exam: Mucous Membranes Moist, Normal Exam





- Neck Exam


Neck Exam: Full ROM, Normal Inspection.  absent: Lymphadenopathy





- Respiratory Exam


Respiratory Exam: Clear to Ausculation Bilateral, NORMAL BREATHING PATTERN





- Cardiovascular Exam


Cardiovascular Exam: REGULAR RHYTHM, +S1, +S2.  absent: Murmur





- GI/Abdominal Exam


GI & Abdominal Exam: Soft, Normal Bowel Sounds.  absent: Tenderness





- Rectal Exam


Rectal Exam: NORMAL INSPECTION





- Extremities Exam


Extremities Exam: Full ROM, Normal Capillary Refill, Normal Inspection.  absent

: Joint Swelling, Pedal Edema





- Back Exam


Back Exam: NORMAL INSPECTION





- Neurological Exam


Neurological Exam: Alert, CN II-XII Intact, Normal Gait, Oriented x3


Additional comments: 





EASILY AROUSABLE





- Psychiatric Exam


Psychiatric exam: Normal Affect, Normal Mood





- Skin


Skin Exam: Dry, Intact, Normal Color, Warm





Assessment and Plan





- Assessment and Plan (Free Text)


Assessment: 





ACUTE GASTROINTESTINAL BLEEDING


R/O PEPTIC ULCER DZ


SEVERE ANEMIA--DUE TO ACUTE BLOOD LOSS


ALCOHOL WITHDRAWAL SYNDROME


CHRONIC ALCOHOLISM


INTRA-ABDOMINAL INFLAMMATION





Plan: 





ALCOHOL WITHDRAWAL PROTOCOL


FOR ENDOSCOPY TODAY

## 2018-04-17 NOTE — CP.PCM.DIS
Provider





- Provider


Date of Admission: 


04/13/18 19:07





Attending physician: 


Ap Dukes MD





Time Spent in preparation of Discharge (in minutes): 30





Diagnosis





- Discharge Diagnosis


(1) Acute blood loss anemia


Status: Acute   





(2) Alcohol withdrawal


Status: Acute   





(3) Chronic alcoholism


Status: Acute   





(4) GI bleed


Status: Acute   





Hospital Course





- Lab Results


Lab Results: 


 Micro Results





04/13/18 10:30   Naris   MRSA Culture (Admit) - Final


                            MRSA NOT DETECTED





 Most Recent Lab Values











WBC  7.4 K/uL (4.8-10.8)   04/17/18  04:30    


 


RBC  2.97 Mil/uL (4.40-5.90)  L  04/17/18  04:30    


 


Hgb  8.6 g/dL (12.0-18.0)  L  04/17/18  04:30    


 


Hct  26.4 % (35.0-51.0)  L  04/17/18  04:30    


 


MCV  89.2 fl (80.0-94.0)   04/17/18  04:30    


 


MCH  29.1 pg (27.0-31.0)   04/17/18  04:30    


 


MCHC  32.7 g/dL (33.0-37.0)  L  04/17/18  04:30    


 


RDW  14.7 % (11.5-14.5)  H  04/17/18  04:30    


 


Plt Count  93 K/uL (130-400)  L  04/17/18  04:30    


 


MPV  10.4 fl (7.2-11.7)   04/13/18  18:40    


 


Neut % (Auto)  69.4 % (50.0-75.0)   04/13/18  18:40    


 


Lymph % (Auto)  25.2 % (20.0-40.0)   04/13/18  18:40    


 


Mono % (Auto)  4.7 % (0.0-10.0)   04/13/18  18:40    


 


Eos % (Auto)  0.0 % (0.0-4.0)   04/13/18  18:40    


 


Baso % (Auto)  0.7 % (0.0-2.0)   04/13/18  18:40    


 


Neut # (Auto)  11.5 K/uL (1.8-7.0)  H  04/13/18  18:40    


 


Lymph # (Auto)  4.2 K/uL (1.0-4.3)   04/13/18  18:40    


 


Mono # (Auto)  0.8 K/uL (0.0-0.8)   04/13/18  18:40    


 


Eos # (Auto)  0.0 K/uL (0.0-0.7)   04/13/18  18:40    


 


Baso # (Auto)  0.1 K/uL (0.0-0.2)   04/13/18  18:40    


 


PT  20.6 Seconds (9.8-13.1)  H  04/13/18  18:40    


 


INR  1.8  (0.9-1.2)  H  04/13/18  18:40    


 


APTT  32.0 Seconds (25.6-37.1)   04/13/18  18:40    


 


Sodium  143 mmol/l (132-148)   04/17/18  04:30    


 


Potassium  3.5 MMOL/L (3.6-5.0)  L  04/17/18  04:30    


 


Chloride  105 mmol/L ()   04/17/18  04:30    


 


Carbon Dioxide  23 mmol/L (22-30)   04/17/18  04:30    


 


Anion Gap  19  (10-20)   04/17/18  04:30    


 


BUN  11 mg/dl (9-20)   04/17/18  04:30    


 


Creatinine  0.8 mg/dl (0.8-1.5)   04/17/18  04:30    


 


Est GFR ( Amer)  > 60   04/17/18  04:30    


 


Est GFR (Non-Af Amer)  > 60   04/17/18  04:30    


 


Random Glucose  113 mg/dL ()  H  04/17/18  04:30    


 


Calcium  8.4 mg/dL (8.4-10.2)   04/17/18  04:30    


 


Phosphorus  3.5 mg/dl (2.5-4.5)   04/14/18  15:16    


 


Magnesium  1.9 MG/DL (1.6-2.3)   04/14/18  15:16    


 


Total Bilirubin  1.0 mg/dl (0.2-1.3)   04/13/18  18:40    


 


AST  81 U/L (17-59)  H  04/13/18  18:40    


 


ALT  40 U/L (21-72)   04/13/18  18:40    


 


Alkaline Phosphatase  66 U/L ()   04/13/18  18:40    


 


Total Protein  7.6 G/DL (6.3-8.2)   04/13/18  18:40    


 


Albumin  3.6 g/dL (3.5-5.0)   04/13/18  18:40    


 


Globulin  4.1 gm/dL (2.2-3.9)  H  04/13/18  18:40    


 


Albumin/Globulin Ratio  0.9  (1.0-2.1)  L  04/13/18  18:40    


 


Lipase  78 U/L ()   04/13/18  18:40    


 


Stool Occult Blood  Positive  (NEGATIVE)  H  04/13/18  18:40    


 


Alcohol, Quantitative  < 10 mg/dl (0-10)   04/13/18  18:40    


 


Blood Type  O POSITIVE   04/13/18  18:40    


 


Blood Type Confirm  O POSITIVE   04/13/18  19:27    


 


Antibody Screen  Negative   04/13/18  18:40    


 


Crossmatch  See Detail   04/13/18  18:40    


 


BBK History Checked  No verified bt   04/13/18  18:40    














- Hospital Course


Hospital Course: 





CLINICALLY IMPROVED


GI BLEED RESOLVED


VSS





Discharge Exam





- Head Exam


Head Exam: ATRAUMATIC, NORMAL INSPECTION, NORMOCEPHALIC





- Eye Exam


Eye Exam: EOMI, Normal appearance, PERRL


Pupil Exam: NORMAL ACCOMODATION, PERRL





- GI/Abdominal Exam


GI & Abdominal Exam: Normal Bowel Sounds





- Rectal Exam


Rectal Exam: NORMAL INSPECTION





- Neurological Exam


Neurological exam: Alert, CN II-XII Intact, Normal Gait, Oriented x3, Reflexes 

Normal





- Psychiatric Exam


Psychiatric exam: Normal Affect, Normal Mood





- Skin


Skin Exam: Dry, Intact, Normal Color, Warm





Discharge Plan





- Follow Up Plan


Condition: FAIR


Disposition: HOME/ ROUTINE


Patient education suggested?: Yes


Additional Instructions: 


DISCHARGE TODAY


FOLLOWUP WITH PMD


ALCOHOL AVOIDANCE


SMOKING CESATION

## 2018-04-17 NOTE — CP.CCUPN
CCU Subjective





- Physician Review


Subjective (Free Text): 





04/17/18 12:22


The patient was Seen/interviewed and examined by me at the bedside during ICU 

round, 


Medical records reviewed and Management issues were discussed and formulated 

with the house staff.


Events reviewed 


Pt AAO x3. Alert, follows some commands


Denies any chest pain, SOB or Palpitations  


Afebrile, NSR on the monitor 


No Evidance of active bleeding


S/P Endoscopy yesterday, acute gastritis 


Tolerating PO diet 





CCU Objective





- Vital Signs / Intake & Output


Vital Signs (Last 4 hours): 


Vital Signs











  Temp Pulse Resp BP Pulse Ox


 


 04/17/18 06:00   73  11 L  112/69  99


 


 04/17/18 04:00  97.7 F  88  19  122/70  95











Intake and Output (Last 8hrs): 


 Intake & Output











 04/16/18 04/17/18 04/17/18





 22:59 06:59 14:59


 


Intake Total 900  


 


Output Total 500 1000 


 


Balance 400 -1000 


 


Intake:   


 


  Intake, Piggyback 200  


 


  Oral 700  


 


Output:   


 


  Urine 500 1000 


 


    Urine, Voided 500 1000 














- Physical Exam


Head: Positive for: Atraumatic, Normocephalic


Pupils: Positive for: PERRL


Extroacular Muscles: Positive for: EOMI


Conjunctiva: Positive for: Normal


Mouth: Positive for: Moist Mucous Membranes


Neck: Positive for: Normal Range of Motion, Trachea Midline.  Negative for: 

Meningeal Signs, MIDLINE TENDERNESS, Paraspinal Tenderness, JVD, Lymphadenopathy

, Bruit, Other


Respiratory/Chest: Positive for: Clear to Auscultation, Good Air Exchange.  

Negative for: Respiratory Distress, Accessory Muscle Use, Wheezes, Decreased 

Breath Sounds, Rales, Rhonchi


Cardiovascular: Positive for: Regular Rate and Rhythm, Normal S1, S2, Peripheal 

Pulses Present.  Negative for: Murmurs, Irregular Rhythm, Tachycardic, 

Bradycardic


Abdomen: Positive for: Distention, Normal Bowel Sounds.  Negative for: 

Tenderness


Neurological: Positive for: GCS=15, CN II-XII Intact, Speech Normal, Motor Func 

Grossly Intact, Normal Sensory Function


Psychiatric: Positive for: Alert, Oriented x 3





- Medications


Active Medications: 


Active Medications











Generic Name Dose Route Start Last Admin





  Trade Name Freq  PRN Reason Stop Dose Admin


 


Chlordiazepoxide  25 mg  04/15/18 22:00  04/17/18 04:28





  Librium  PO   25 mg





  Q6 JUAN LUIS   Administration


 


Folic Acid  1 mg  04/15/18 15:30  04/16/18 08:08





  Folic Acid  PO   Not Given





  DAILY JUAN LUIS   


 


Ciprofloxacin  400 mg in 200 mls @ 200 mls/hr  04/15/18 11:45  04/16/18 21:23





  Cipro 400mg/200ml Dsw  IVPB   200 mls/hr





  Q12 JUAN LUIS   Administration





  Protocol   


 


Iron Sucrose 100 mg/ Sodium  105 mls @ 105 mls/hr  04/15/18 12:00  04/16/18 11:

15





  Chloride  IVPB   105 mls/hr





  DAILY JUAN LUIS   Administration


 


Pantoprazole Sodium  40 mg  04/16/18 13:15  04/16/18 16:07





  Protonix Ec Tab  PO   40 mg





  DAILY JUAN LUIS   Administration


 


Thiamine HCl  100 mg  04/15/18 15:30  04/16/18 08:09





  Vitamin B1 Tab  PO   Not Given





  DAILY JUAN LUIS   














- Patient Studies


Lab Studies: 


 Lab Studies











  04/17/18 04/17/18 Range/Units





  04:30 04:30 


 


WBC   7.4  (4.8-10.8)  K/uL


 


RBC   2.97 L  (4.40-5.90)  Mil/uL


 


Hgb   8.6 L  (12.0-18.0)  g/dL


 


Hct   26.4 L  (35.0-51.0)  %


 


MCV   89.2  (80.0-94.0)  fl


 


MCH   29.1  (27.0-31.0)  pg


 


MCHC   32.7 L  (33.0-37.0)  g/dL


 


RDW   14.7 H  (11.5-14.5)  %


 


Plt Count   93 L  (130-400)  K/uL


 


Sodium  143   (132-148)  mmol/l


 


Potassium  3.5 L   (3.6-5.0)  MMOL/L


 


Chloride  105   ()  mmol/L


 


Carbon Dioxide  23   (22-30)  mmol/L


 


Anion Gap  19   (10-20)  


 


BUN  11   (9-20)  mg/dl


 


Creatinine  0.8   (0.8-1.5)  mg/dl


 


Est GFR (African Amer)  > 60   


 


Est GFR (Non-Af Amer)  > 60   


 


Random Glucose  113 H   ()  mg/dL


 


Calcium  8.4   (8.4-10.2)  mg/dL








 Laboratory Results - last 24 hr











  04/17/18 04/17/18





  04:30 04:30


 


WBC  7.4 


 


RBC  2.97 L 


 


Hgb  8.6 L 


 


Hct  26.4 L 


 


MCV  89.2 


 


MCH  29.1 


 


MCHC  32.7 L 


 


RDW  14.7 H 


 


Plt Count  93 L 


 


Sodium   143


 


Potassium   3.5 L


 


Chloride   105


 


Carbon Dioxide   23


 


Anion Gap   19


 


BUN   11


 


Creatinine   0.8


 


Est GFR ( Amer)   > 60


 


Est GFR (Non-Af Amer)   > 60


 


Random Glucose   113 H


 


Calcium   8.4














Critical Care Progress Note





- Extremities/Vascular


Does the Patient have a Central Venous Catheter?: No


Does the Patient need a Central Venous Catheter?: No


Does the Patient have a Burger Catheter?: No


Does the Patient need a Burger Catheter?: No





- Nutrition


Nutrition: 


 Nutrition











 Category Date Time Status


 


 Regular Diet [DIET] Diets  04/16/18 Lunch Active














Assessment/Plan


(1) GI bleed


Status: Acute   Comment: 


S/P Endoscopy yesterday, acute gastritis 


Two large bore peripheral catheters


Switched PANTOPRAZOLE drip to PO


Discontinued OCTREOTIDE drip


Suplemental O2


Advance diet as tolerate   





(2) Acute blood loss anemia


Status: Acute   Comment: 


CBCs Stable


Receiving 6U pRBC and 2U FFP


Received DDAVP and Vit K   





(3) Alcohol withdrawal


Status: Acute   Comment: 


PO Librium   





(4) Chronic alcoholism


Status: Acute   Comment: 


Couselling performed

## 2018-04-19 NOTE — CP.PCM.HP
History of Present Illness





- History of Present Illness


History of Present Illness: 


CC: UGIB





HPI: This is a 38 y/o male with MHx significant for EtOH abuse who presents to 

the ED acutely intoxicated and having a large volume of hematemesis. Apparently 

patient's family at home called police when he started vomiting blood earlier 

this evening. Patient was agitated and combative and did had previously pulled 

out IV. Patient received sedation, and he is currently calm, but unable to 

provide much history. He was discharged from the hospital just 2 days ago after 

coming in with intoxication and agitation.





ROS: Cannot obtain, patient sedated





MHx/SHx: EtOH abuse; otherwise cannot obtain





Allergies: PCN, shellfish





Medications: Per med rec





Family Hx: Cannot obtain





Social Hx: Lives with or near family, heavy EtOH use, unknown tobacco





Present on Admission





- Present on Admission


Any Indicators Present on Admission: No





Past Patient History





- Past Social History


Alcohol: > 2 Drinks/Day





- CARDIAC


Hx Cardiac Disorders: No





- PULMONARY


Hx Respiratory Disorders: No





- NEUROLOGICAL


Hx Neurological Disorder: No





- HEENT


Hx HEENT Problems: No





- RENAL


Hx Chronic Kidney Disease: Yes





- ENDOCRINE/METABOLIC


Hx Endocrine Disorders: No





- HEMATOLOGICAL/ONCOLOGICAL


Hx Blood Disorders: No





- INTEGUMENTARY


Hx Dermatological Problems: No





- MUSCULOSKELETAL/RHEUMATOLOGICAL


Hx Musculoskeletal Disorders: Yes


Hx Back Pain: Yes


Hx Falls: No





- GASTROINTESTINAL


Hx Gastrointestinal Disorders: No





- GENITOURINARY/GYNECOLOGICAL


Hx Genitourinary Disorders: No





- PSYCHIATRIC


Hx Psychophysiologic Disorder: No


Hx Substance Use: No





- SURGICAL HISTORY


Hx Surgeries: No





- ANESTHESIA


Hx Anesthesia: No


Hx Anesthesia Reactions: No


Hx Malignant Hyperthermia: No





Meds


Allergies/Adverse Reactions: 


 Allergies











Allergy/AdvReac Type Severity Reaction Status Date / Time


 


Penicillins Allergy  RASH Verified 10/06/17 17:48


 


shellfish derived Allergy  ITCHING Verified 04/14/18 18:20














Physical Exam





- Constitutional


Appears: Confused





- Head Exam


Head Exam: ATRAUMATIC, NORMOCEPHALIC





- Eye Exam


Eye Exam: EOMI, PERRL





- ENT Exam


ENT Exam: Mucous Membranes Dry





- Neck Exam


Neck exam: Positive for: Full Rom





- Respiratory Exam


Respiratory Exam: Clear to Auscultation Bilateral, NORMAL BREATHING PATTERN





- Cardiovascular Exam


Cardiovascular Exam: REGULAR RHYTHM, +S1, +S2





- GI/Abdominal Exam


GI & Abdominal Exam: Normal Bowel Sounds, Soft





- Extremities Exam


Extremities exam: Positive for: full ROM, normal inspection





- Neurological Exam


Neurological exam: CN II-XII Intact


Additional comments: 





sedated, but rousable





- Skin


Skin Exam: Dry, Warm





Results





- Vital Signs


Recent Vital Signs: 





 Last Vital Signs











Temp  96.8 F L  04/19/18 21:13


 


Pulse  130 H  04/19/18 21:13


 


Resp  18   04/19/18 21:13


 


BP  103/73   04/19/18 21:13


 


Pulse Ox  100   04/19/18 22:57














- Labs


Result Diagrams: 


 04/19/18 22:15





 04/19/18 22:15


Labs: 





 Laboratory Results - last 24 hr











  04/19/18 04/19/18 04/19/18





  22:15 22:15 22:15


 


WBC  24.7 H D  


 


RBC  2.07 L  


 


Hgb  6.1 L* D  


 


Hct  19.8 L  


 


MCV  95.7 H D  


 


MCH  29.7  


 


MCHC  31.1 L  


 


RDW  18.4 H  


 


Plt Count  225  D  


 


MPV  11.1  


 


Neut % (Auto)  67.1  


 


Lymph % (Auto)  22.6  


 


Mono % (Auto)  8.9  


 


Eos % (Auto)  0.2  


 


Baso % (Auto)  1.2  


 


Neut # (Auto)  16.5 H  


 


Lymph # (Auto)  5.6 H  


 


Mono # (Auto)  2.2 H  


 


Eos # (Auto)  0.1  


 


Baso # (Auto)  0.3 H  


 


PT    19.4 H


 


INR    1.7 H


 


APTT    32.8


 


POC Glucose (mg/dL)   


 


Troponin I   0.0200 














  04/19/18





  22:18


 


WBC 


 


RBC 


 


Hgb 


 


Hct 


 


MCV 


 


MCH 


 


MCHC 


 


RDW 


 


Plt Count 


 


MPV 


 


Neut % (Auto) 


 


Lymph % (Auto) 


 


Mono % (Auto) 


 


Eos % (Auto) 


 


Baso % (Auto) 


 


Neut # (Auto) 


 


Lymph # (Auto) 


 


Mono # (Auto) 


 


Eos # (Auto) 


 


Baso # (Auto) 


 


PT 


 


INR 


 


APTT 


 


POC Glucose (mg/dL)  158 H


 


Troponin I 














Assessment & Plan


(1) Acute blood loss anemia


Assessment and Plan: 


38 y/o male p/w acute blood loss anemia in setting of UGIB related to EtOH 

abuse.


-Admit ICU


-NPO, IVF


-Protonix gtt started


-Will start cipro  mg q12h for ppx in setting of hematemesis due to (

possible) variceal bleeding


-Reglan IV PRN for n/v


-Patient to receive PRBCs per ER


-GI consult in AM


-SCDs for DVT PPx


Status: Acute   





(2) Chronic alcoholism


Status: Acute   





(3) GI bleed


Status: Acute

## 2018-04-19 NOTE — ED PDOC
HPI: General Adult





<Markie Holloway III - Last Filed: 04/19/18 23:44>


Chief Complaint (Provider): etoh, vomiting blood 


History Per: EMS





<Lynn Chavez - Last Filed: 04/19/18 23:59>


Time Seen by Provider: 04/19/18 21:22


Chief Complaint (Nursing): GI Problem


Additional Complaint(s): 


37-year-old male with history of alcohol abuse presents to emergency department 

hematemesis and altered mental status. Patient's family called police when they 

noticed that he started to vomit blood at home. Medics administered saline and 

Zofran but patient ripped out IV prior to arrival to ED. Upon arrival patient 

is combative and as per EMS also used PCP this evening.  Patient was discharged 

from this hospital 2 days ago after being admitted for GI bleed.   (Lynn Chavez)





Past Medical History





<Markie Holloway III - Last Filed: 04/19/18 23:44>


Reviewed: Historical Data, Nursing Documentation, Vital Signs





- Medical History


PMH: Back Problems, Chronic Kidney Disease





- Family History


Family History: States: No Known Family Hx





- Living Arrangements


Living Arrangements: With Family





- Social History


Alcohol: > 2 Drinks/Day





<Lynn Chavez - Last Filed: 04/19/18 23:59>


Vital Signs: 


 Last Vital Signs











Temp  97.2 F L  04/19/18 23:49


 


Pulse  115 H  04/19/18 23:49


 


Resp  16   04/19/18 23:49


 


BP  113/62   04/19/18 23:49


 


Pulse Ox  100   04/19/18 23:51














- Home Medications


Home Medications: 


 Ambulatory Orders











 Medication  Instructions  Recorded


 


Ferrous Sulfate [Feosol] 325 mg PO BID #60 tab 04/17/18


 


Folic Acid 1 mg PO DAILY #30 tab 04/17/18


 


Pantoprazole [Protonix EC Tab] 40 mg PO DAILY #30 ect 04/17/18


 


Thiamine [Vitamin B1 Tab] 100 mg PO DAILY #30 tab 04/17/18














- Allergies


Allergies/Adverse Reactions: 


 Allergies











Allergy/AdvReac Type Severity Reaction Status Date / Time


 


Penicillins Allergy  RASH Verified 10/06/17 17:48


 


shellfish derived Allergy  ITCHING Verified 04/14/18 18:20














Review of Systems


ROS Statement: Except As Marked, All Systems Reviewed And Found Negative


Gastrointestinal: Positive for: Hematemesis


Psych: Positive for: Other (etoh and substance abuse)





<Lynn Chavez - Last Filed: 04/19/18 23:59>





Physical Exam





- Reviewed


Nursing Documentation Reviewed: Yes


Vital Signs Reviewed: Yes





- Physical Exam


Appears: Positive for: Well, Non-toxic, No Acute Distress


Skin: Negative for: Rash


Eye Exam: Positive for: Normal appearance


ENT: Positive for: Other (oral mucosa very dry)


Cardiovascular/Chest: Positive for: Regular Rate, Rhythm, Tachycardia


Respiratory: Positive for: Normal Breath Sounds.  Negative for: Wheezing, 

Respiratory Distress


Gastrointestinal/Abdominal: Positive for: Tenderness (diffuse).  Negative for: 

Distended, Guarding, Rebound


Extremity: Positive for: Normal ROM


Neurologic/Psych: Positive for: Other (intoxicated, combative)





<Lynn Chavez - Last Filed: 04/19/18 23:59>





- Laboratory Results


Result Diagrams: 


 04/19/18 22:15





 04/19/18 22:15





<Markie Holloway III - Last Filed: 04/19/18 23:44>





- Laboratory Results


Result Diagrams: 


 04/19/18 22:15





 04/19/18 22:15





- ECG


O2 Sat by Pulse Oximetry: 100


Pulse Ox Interpretation: Normal





- Other Rad


  ** CXR


X-Ray: Interpreted by Me, Viewed By Me


X-Ray Interpretation: no acute finding, no interval change





- Critical Care


Total Time (In Min): 45





<Lynn Chavez - Last Filed: 04/19/18 23:59>





- ECG


Interpretation Of ECG: 


Sinus tach 144, LVH, reviewed by PA and ED attending.   (Lynn Chvaez)





- Critical Care


Comments: 


Acute anemia, HB of 6.1, 2 units packed RBC ordered emergently as patient 

unable to sign for consent.  Immediate transfusion necessary secondary to 

acutely low HB and tachycardia, protonix drip, Vit K, ICU admit (Lynn Chavez)





Medical Decision Making





<Markie Holloway III - Last Filed: 04/19/18 23:44>





<Lynn Chavez - Last Filed: 04/19/18 23:59>


Medical Decision Making: 





attending note-


pt seen/examined


38yo male recently discharged after GI bleed, now returns w agitation possible 

PCP use, tachycardia and pallor


Patient required medications for relief of agitation and ability to obtain 

diagnostics and stabilization


labs reveal worsening anemia from prior, elev WBC


BP maintaining but tachycardia on arrival- possibly multifactorial- blood loss/ 

PCP intoxication


ONeg blood x1, PRBC crossmatch and Vit K ordered, additional IVF to be obtained


PRBC is potentially life saving and patient unable to give consent for PRBC 

transfusion given AMS/ agitation but did receive blood transfusion last 

admission. 


Additional IVF ordered


D/w Brayden who scoped patient last visit, found to have gastritis on EGD, no 

varicies per Dr Owen hence hold octreotide


Admitted ICU Dr Smalls aware 








critical care time 45min 


 (Markie Holloway III)


37-year-old male with hematemesis and AMS





9:30 pm - Patient is acutely combative upon arrival, will not allow nurse to 

insert IV. Patient was placed in 4 point restraints for his safety and safety 

of ED staff. He was medicated with 2 mg IM Ativan and 5 mg IM Haldol in order 

to stabilize patient and obtain diagnostic tests. 





Plan:


1:1 observation


CBC


CMP


PT/PTT


BAL


UDS


UA


CXR


EKG


IVF 


T&S





10:45 pm: Hemoglobin is 6.1, 2 units packed red blood cells ordered along with 

type and cross. Case discussed with Dr. Holloway and hospitalist Dr. Smalls. Patient 

will be admitted to ICU. Protonix drip ordered, Vit K.  Dr. Holloway spoke with 

Dr. Owen, GI.  He states he will see patient in AM and states to hold 

octreotide at this time.   (Lynn Chavez)





Disposition





<Markie Holloway III - Last Filed: 04/19/18 23:44>





- Patient ED Disposition


Is Patient to be Admitted: Yes





- Disposition


Disposition Time: 22:48





- Pt Status Changed To:


Hospital Disposition Of: Inpatient





- Admit Certification


Admit to Inpatient:: After my assessment, the patient will require 

hospitalization for at least two midnights.  This is because of the severity of 

symptoms shown, intensity of services needed, and/or the medical risk in this 

patient being treated as an outpatient.





- POA


Present On Arrival: None





<Lynn Chavez - Last Filed: 04/19/18 23:59>





- Clinical Impression


Clinical Impression: 


 Acute blood loss anemia, GI bleed, Alcohol abuse








- Disposition


Condition: CRITICAL


Forms:  [facility] (English)





Results





<Markie Holloway III - Last Filed: 04/19/18 23:44>





<Lynn Chavez - Last Filed: 04/19/18 23:59>





- Lab Results


Lab Results: 














  04/19/18 04/19/18 04/19/18





  22:40 22:40 22:18


 


WBC   


 


RBC   


 


Hgb   


 


Hct   


 


MCV   


 


MCH   


 


MCHC   


 


RDW   


 


Plt Count   


 


MPV   


 


Neut % (Auto)   


 


Lymph % (Auto)   


 


Mono % (Auto)   


 


Eos % (Auto)   


 


Baso % (Auto)   


 


Neut # (Auto)   


 


Lymph # (Auto)   


 


Mono # (Auto)   


 


Eos # (Auto)   


 


Baso # (Auto)   


 


PT   


 


INR   


 


APTT   


 


Sodium   


 


Potassium   


 


Chloride   


 


Carbon Dioxide   


 


Anion Gap   


 


BUN   


 


Creatinine   


 


Est GFR ( Amer)   


 


Est GFR (Non-Af Amer)   


 


POC Glucose (mg/dL)    158 H


 


Random Glucose   


 


Calcium   


 


Total Bilirubin   


 


AST   


 


ALT   


 


Alkaline Phosphatase   


 


Troponin I   


 


Total Protein   


 


Albumin   


 


Globulin   


 


Albumin/Globulin Ratio   


 


Lipase   216 


 


Alcohol, Quantitative   


 


Blood Type  Pending  


 


Antibody Screen  Pending  


 


BBK History Checked  Patient has bt  














  04/19/18 04/19/18 04/19/18





  22:15 22:15 22:15


 


WBC    24.7 H D


 


RBC    2.07 L


 


Hgb    6.1 L* D


 


Hct    19.8 L


 


MCV    95.7 H D


 


MCH    29.7


 


MCHC    31.1 L


 


RDW    18.4 H


 


Plt Count    225  D


 


MPV    11.1


 


Neut % (Auto)    67.1


 


Lymph % (Auto)    22.6


 


Mono % (Auto)    8.9


 


Eos % (Auto)    0.2


 


Baso % (Auto)    1.2


 


Neut # (Auto)    16.5 H


 


Lymph # (Auto)    5.6 H


 


Mono # (Auto)    2.2 H


 


Eos # (Auto)    0.1


 


Baso # (Auto)    0.3 H


 


PT  19.4 H  


 


INR  1.7 H  


 


APTT  32.8  


 


Sodium   144 


 


Potassium   4.3 


 


Chloride   109 H 


 


Carbon Dioxide   15 L 


 


Anion Gap   24 H 


 


BUN   23 H 


 


Creatinine   0.8 


 


Est GFR ( Amer)   > 60 


 


Est GFR (Non-Af Amer)   > 60 


 


POC Glucose (mg/dL)   


 


Random Glucose   156 H 


 


Calcium   8.5 


 


Total Bilirubin   0.9 


 


AST   102 H D 


 


ALT   66 


 


Alkaline Phosphatase   75 


 


Troponin I   0.0200 


 


Total Protein   7.0 


 


Albumin   3.2 L 


 


Globulin   3.8 


 


Albumin/Globulin Ratio   0.9 L 


 


Lipase   


 


Alcohol, Quantitative   < 10 


 


Blood Type   


 


Antibody Screen   


 


BBK History Checked

## 2018-04-20 NOTE — CP.CCUPN
CCU Subjective





- Physician Review


Subjective (Free Text): 





37M re-admitted to ICU after being discharged from hospital 2 days ago for UGIB 

and found to have diffuse gastric inflammation with friability on EGD done 4/16/ 18.  Had recurrence of bloody emesis, no shock state, ETOH and UDS negative, 

but agitated and trying to get OOB and appears confused. On 1: 1 supervision, 

asking for food. He did receive 2 units of PRBCs with admission HGB 6.1. He is 

an unreliable historian, but denies any new abdominal symptoms now, nor any 

recent fevers or chills. Seen by GI, for repeat endoscopy when hgb more stable. 





Other VS and I/Os reviewed. No fever spikes on admission nor overnight.  





ROS:  No other pertinent negs or positives on 10+  system review





Allergies: Penicillin, shell fish





Home Meds: Feosol, Folate, Protonix, Thiamine





PMSFH:    All other Nursing and physician documentation reviewed to date; no 

new pertinent info noted relevant to current medical problems.








EXAM-


HEENT: no icterus, no gaze preference, pupils equal and reactive, no icterus


NECK: No JVD, supple, carotids equal upstroke bilat/no bruits


CHEST: decreased BS bases, no wheezes audible


HEART:  regular tachy, distant, S1S2, no rubs.


ABD:  soft, no distention, no tympany, no palp tenderness, BS hypoactive.


EXT: No peripheral/ digital cyanosis, no calf tenderness or palpable cords, 

distal pulses intact and symmetrical. RUE PICC. 


NEURO:  no focal motor deficits, no obvious tremors / uncooperative, unable to 

assess for asterixes


SKIN:   no rashes,  warm and dry.











LABS:  admission -





WBC= 24.7


HGB= 6.1


PLTs=  225 K





INR= 1.7





Na= 144


K= 4.3


CL= 109


HCO3= 15


BUN/Cr= 23/0.8


BS= 156





Lipase = 216








IMPRESSION / MAJOR PROBLEMS NOW:


1. Recurrent UGIB, r/o gastritis, PUD, other Variceal bleeding


2.  Acute Anemia 2 blood loss; and mild Coagulopathy


3.  AKA: suspect binge ETOH use after last hospital discharge. 


4.  ETOH Withdrawal / Delirium


5.  Azotemia / Dehydration








PLAN:


1.  Repeat CBC pending.  FFP ordered now. 


2.  PPI drip.


3.  Librium ATC, Ativan IV Prn.


4.  Timing of endoscopy as per GI.


5.  IVF hydration; check serum ketones. 


6.  Thiamine / Folate supplements.


7.  Empiric Cipro, follow WBCs.


8.  If continued gross red, hematemesis, would give Vit K and more FFP. 








CCU Objective





- Vital Signs / Intake & Output


Intake and Output (Last 8hrs): 


 Intake & Output











 04/19/18 04/20/18 04/20/18





 22:59 06:59 14:59


 


Intake Total  3775 


 


Output Total  1350 


 


Balance  2425 


 


Weight 170 lb  


 


Intake:   


 


  IV  3375 


 


  Intake, Piggyback  250 


 


  Blood Product  150 


 


Output:   


 


  Urine  1350 


 


    Urethral (Burger)  1350 


 


Other:   


 


  # Voids   


 


    Urethral (Burger)  0

## 2018-04-20 NOTE — CP.PCM.PN
Subjective





- Date & Time of Evaluation


Date of Evaluation: 04/20/18


Time of Evaluation: 09:15





- Subjective


Subjective: 





Pt is   confused, agitated - tries to get out of bed


1:1 obs ordered for pt's safety


no further hematemesis


no melena


noted to be tremulous








Objective





- Vital Signs/Intake and Output


Vital Signs (last 24 hours): 


 











Temp Pulse Resp BP Pulse Ox


 


 97.8 F   116 H  20   104/64   100 


 


 04/20/18 08:00  04/20/18 08:00  04/20/18 08:00  04/20/18 08:00  04/20/18 08:00








Intake and Output: 


 











 04/20/18 04/20/18





 06:59 18:59


 


Intake Total 3775 


 


Output Total 1350 


 


Balance 2425 














- Medications


Medications: 


 Current Medications





Pantoprazole Sodium 40 mg/ (Sodium Chloride)  100 mls @ 20 mls/hr IVPB Q5H [facility]


   PRN Reason: 8 MG/HR


   Last Admin: 04/20/18 08:47 Dose:  20 mls/hr


Ciprofloxacin (Cipro 400mg/200ml Dsw)  400 mg in 200 mls @ 200 mls/hr IVPB Q12 

[facility] LUIS


   PRN Reason: Protocol


   Last Admin: 04/19/18 23:59 Dose:  200 mls/hr


Lactated Ringer's (Lactated Ringer's)  1,000 mls @ 125 mls/hr IV .Q8H [facility] LUIS


   Stop: 04/20/18 14:59


   Last Admin: 04/20/18 08:46 Dose:  125 mls/hr


Lorazepam (Ativan)  2 mg IVP Q6 PRN


   PRN Reason: withdrawal symptoms


Metoclopramide HCl (Reglan)  10 mg IVP Q6 PRN


   PRN Reason: Nausea/Vomiting


   Last Admin: 04/20/18 08:49 Dose:  10 mg











- Labs


Labs: 


 





 04/19/18 22:15 





 04/19/18 22:15 





 











PT  19.4 Seconds (9.8-13.1)  H  04/19/18  22:15    


 


INR  1.7  (0.9-1.2)  H  04/19/18  22:15    


 


APTT  32.8 Seconds (25.6-37.1)   04/19/18  22:15    














- Constitutional


Appears: Unkempt, Older Than Stated Age, Agitated, Confused





- Head Exam


Head Exam: NORMAL INSPECTION, NORMOCEPHALIC





- Eye Exam


Eye Exam: EOMI, Normal appearance


Pupil Exam: NORMAL ACCOMODATION





- ENT Exam


ENT Exam: Mucous Membranes Dry, Normal External Ear Exam





- Neck Exam


Neck Exam: Full ROM.  absent: Meningismus





- Respiratory Exam


Respiratory Exam: NORMAL BREATHING PATTERN.  absent: Respiratory Distress





- Cardiovascular Exam


Cardiovascular Exam: REGULAR RHYTHM, +S1, +S2





- GI/Abdominal Exam


GI & Abdominal Exam: Soft, Normal Bowel Sounds.  absent: Tenderness





- Extremities Exam


Extremities Exam: Full ROM, Normal Capillary Refill.  absent: Pedal Edema





- Back Exam


Back Exam: Full ROM





- Neurological Exam


Additional comments: 





sedated


oriented to person








- Psychiatric Exam


Psychiatric exam: Agitated





- Skin


Skin Exam: Dry, Warm





Assessment and Plan


(1) Acute blood loss anemia


Status: Acute   





(2) GI bleed


Status: Acute   





(3) Alcohol withdrawal


Status: Acute   





(4) Alcohol abuse


Status: Chronic   





(5) Coagulopathy


Status: Acute   





(6) Transaminitis


Status: Acute   





- Assessment and Plan (Free Text)


Assessment: 








36 y/o gent , known hx of Alcoholism and recent GI bleed, was brought in 

because of hematemesis and alteration in   mental status.


Pt was recently discharged from this hospital  after an episode of GI bleed .  

EGD done on 4/16 did not show any active bleed.  The patient was discharge home 

and again started drinking Alcohol.   On day of admission, he again had 

hematemesis and was noted to be confused and agitated.





(1) Acute blood loss anemia sec to GI Bleed


Status: Acute   


Pt admitted to ICU for close monitoring


Hgb=6.1 - transfused 2units PRBC


cont Protonix drip


GI consulted- Dr Owen - pt had recent EGD  w/c did not show any active 

bleeding, no plan for rpt EGD for now 


cont IV Cipro








(2) GI bleed


Status: Acute   


as above





(3) Alcohol withdrawal


Status: Acute   


Pt is confused


start 1:1 Obs for safety as he is trying to get out of bed


start RTC Librium, Ativan prn


cont Thiamine and FA


IVF hydration





(4) Alcohol abuse


Status: Chronic   








5. Coagulopathy prob sec to Liver dis from alcoholism


Pt received Vit K





6. Transaminitis sec to ETOH


momitor





DVT proph


- SCD


no antcoag sec to GIB and coagulopathy

## 2018-04-20 NOTE — CP.PCM.CON
<Viktoria Pichardo - Last Filed: 04/20/18 10:23>





History of Present Illness





- History of Present Illness


History of Present Illness: 


GI Fellow PGY 4 Consult Note





This is a 37y male with pmhx of EtOH abuse who presents to the ED acutely 

intoxicated with complaints of hematemesis. Patient's family called 911 when he 

started vomiting blood. Per records, patient was agitated and combative and 

pulled out his IV in the ER. Patient received ativan and haldol in the ER. He 

was discharged from the hospital just 2 days ago after coming in with 

intoxication, anemia and complaints of black stool. Pt underwent an EGD with 

Dr. Owen 4/16/2018 which was negative for any clear source of bleed, maybe 

2/2 Neeta jain tear vs bleeding ulcer that resolved while on PPI drip. On 

discharge this week pt's Hgb was 8.6 and on this admission Hgb 6.1 on 

presentation. At the time of evaluation in ER, pt is drowsy, not answering 

questions appropriately and getting out of bed. Per nursing received 2U PRBCs 

with no active bleeding noted. 





ROS: A 12pt ROS was unable to be obtained 2/2 AMS 


PMHx: unable to be obtained 2/2 AMS 


PSHx: unable to be obtained 2/2 AMS 


FHx: unable to be obtained 2/2 AMS 


SHx: heavy EtOH use, unknown tobacco











Past Patient History





- Past Medical History & Family History


Past Medical History?: Yes





- Past Social History


Smoking Status: Unknown If Ever Smoked





- CARDIAC


Hx Cardiac Disorders: No





- PULMONARY


Hx Respiratory Disorders: No





- NEUROLOGICAL


Hx Neurological Disorder: No





- HEENT


Hx HEENT Problems: No


Hx Blind: No


Hx Cataracts: No


Hx Deafness: No


Hx Difficulty Chewing: No


Hx Epistaxis: No


Hx Glaucoma: No


Hx Macular Degeneration: No


Hx Sinusitis: No





- RENAL


Hx Chronic Kidney Disease: Yes


Hx Dialysis: No


Hx Kidney Stones: No


Hx Neurogenic Bladder: No


Hx Pyelonephritis: No


Hx Renal (Kidney) Cancer: No


Hx Renal Failure: Yes


Other/Comment: Chronic kidney disease





- ENDOCRINE/METABOLIC


Hx Endocrine Disorders: No


Hx Adrenal Cancer: No


Hx Diabetes Insipidus: No


Hx Diabetes Mellitus Type 1: No


Hx Diabetes Mellitus Type 2: No


Hx Hyperthyroidism: No


Hx Hypothyroidism: No


Hx Systemic Lupus Erythematosus: No





- HEMATOLOGICAL/ONCOLOGICAL


Hx Blood Disorders: Yes


Hx AIDS: No (unknown)


Hx Human Immunodeficiency Virus (HIV): No ([place])





- INTEGUMENTARY


Hx Dermatological Problems: No





- MUSCULOSKELETAL/RHEUMATOLOGICAL


Hx Musculoskeletal Disorders: Yes


Hx Back Pain: Yes


Hx Falls: No





- GASTROINTESTINAL


Hx Gastrointestinal Disorders: No





- GENITOURINARY/GYNECOLOGICAL


Hx Genitourinary Disorders: No





- PSYCHIATRIC


Hx Psychophysiologic Disorder: No


Hx Substance Use: No





- SURGICAL HISTORY


Hx Surgeries: No


Hx Herniorrhaphy: No


Hx Hysterectomy: No


Hx Joint Replacement: No


Hx Kidney Transplant: No


Hx Liver Transplant: No


Hx Mastectomy: No


Hx Musculoskeletal Surgery: No


Hx Open Heart Surgery: No


Hx Open Reduction Internal Fixation: No


Hx Orthopedic Surgery: No


Hx Parathyroidectomy: No


Hx Penile Implant: No


Hx Pulmonary Surgery: No


Hx Splenectomy: No


Hx Thyroidectomy: No


Hx Tonsillectomy: No


Hx Tubal Ligation: No


Hx Valve Replacement: No


Hx Vascular Surgery: No


Hx Vascular Access Device: No





- ANESTHESIA


Hx Anesthesia: No


Hx Anesthesia Reactions: No


Hx Malignant Hyperthermia: No


Has any member of the family had a problem w/ anesthesia?:  (Unknown)





Meds


Allergies/Adverse Reactions: 


 Allergies











Allergy/AdvReac Type Severity Reaction Status Date / Time


 


Penicillins Allergy  RASH Verified 10/06/17 17:48


 


shellfish derived Allergy  ITCHING Verified 04/14/18 18:20














- Medications


Medications: 


 Current Medications





Pantoprazole Sodium 40 mg/ (Sodium Chloride)  100 mls @ 20 mls/hr IVPB Q5H [facility]


   PRN Reason: 8 MG/HR


   Last Admin: 04/20/18 08:47 Dose:  20 mls/hr


Ciprofloxacin (Cipro 400mg/200ml Dsw)  400 mg in 200 mls @ 200 mls/hr IVPB Q12 

[facility] LUIS


   PRN Reason: Protocol


   Last Admin: 04/19/18 23:59 Dose:  200 mls/hr


Lactated Ringer's (Lactated Ringer's)  1,000 mls @ 125 mls/hr IV .Q8H [facility]


   Stop: 04/20/18 14:59


   Last Admin: 04/20/18 08:46 Dose:  125 mls/hr


Lorazepam (Ativan)  2 mg IVP Q6 PRN


   PRN Reason: withdrawal symptoms


Metoclopramide HCl (Reglan)  10 mg IVP Q6 PRN


   PRN Reason: Nausea/Vomiting


   Last Admin: 04/20/18 08:49 Dose:  10 mg











Physical Exam





- Constitutional


Appears: Confused





- Head Exam


Head Exam: ATRAUMATIC, NORMAL INSPECTION, NORMOCEPHALIC





- Eye Exam


Eye Exam: EOMI





- ENT Exam


ENT Exam: Mucous Membranes Dry





- Neck Exam


Neck exam: Positive for: Normal Inspection





- Respiratory Exam


Respiratory Exam: Clear to Auscultation Bilateral, NORMAL BREATHING PATTERN





- Cardiovascular Exam


Cardiovascular Exam: Tachycardia





- GI/Abdominal Exam


GI & Abdominal Exam: Normal Bowel Sounds, Soft.  absent: Distended, Firm, 

Guarding, Organomegaly, Tenderness





- Rectal Exam


Rectal Exam: Deferred





- Extremities Exam


Extremities exam: Positive for: full ROM, normal inspection





- Back Exam


Back exam: NORMAL INSPECTION





- Psychiatric Exam


Psychiatric exam: Depressed





- Skin


Skin Exam: Dry, Intact, Normal Color, Warm





Results





- Vital Signs


Recent Vital Signs: 


 Last Vital Signs











Temp  97.8 F   04/20/18 08:00


 


Pulse  116 H  04/20/18 08:00


 


Resp  20   04/20/18 08:00


 


BP  104/64   04/20/18 08:00


 


Pulse Ox  100   04/20/18 08:00














- Labs


Result Diagrams: 


 04/19/18 22:15





 04/19/18 22:15


Labs: 


 Laboratory Results - last 24 hr











  04/19/18 04/19/18 04/19/18





  22:15 22:15 22:15


 


WBC  24.7 H D  


 


RBC  2.07 L  


 


Hgb  6.1 L* D  


 


Hct  19.8 L  


 


MCV  95.7 H D  


 


MCH  29.7  


 


MCHC  31.1 L  


 


RDW  18.4 H  


 


Plt Count  225  D  


 


MPV  11.1  


 


Neut % (Auto)  67.1  


 


Lymph % (Auto)  22.6  


 


Mono % (Auto)  8.9  


 


Eos % (Auto)  0.2  


 


Baso % (Auto)  1.2  


 


Neut # (Auto)  16.5 H  


 


Lymph # (Auto)  5.6 H  


 


Mono # (Auto)  2.2 H  


 


Eos # (Auto)  0.1  


 


Baso # (Auto)  0.3 H  


 


PT    19.4 H


 


INR    1.7 H


 


APTT    32.8


 


Sodium   144 


 


Potassium   4.3 


 


Chloride   109 H 


 


Carbon Dioxide   15 L 


 


Anion Gap   24 H 


 


BUN   23 H 


 


Creatinine   0.8 


 


Est GFR ( Amer)   > 60 


 


Est GFR (Non-Af Amer)   > 60 


 


POC Glucose (mg/dL)   


 


Random Glucose   156 H 


 


Calcium   8.5 


 


Total Bilirubin   0.9 


 


AST   102 H D 


 


ALT   66 


 


Alkaline Phosphatase   75 


 


Troponin I   0.0200 


 


Total Protein   7.0 


 


Albumin   3.2 L 


 


Globulin   3.8 


 


Albumin/Globulin Ratio   0.9 L 


 


Lipase   


 


Urine Color   


 


Urine Clarity   


 


Urine pH   


 


Ur Specific Gravity   


 


Urine Protein   


 


Urine Glucose (UA)   


 


Urine Ketones   


 


Urine Blood   


 


Urine Nitrate   


 


Urine Bilirubin   


 


Urine Urobilinogen   


 


Ur Leukocyte Esterase   


 


Urine RBC (Auto)   


 


Urine Microscopic WBC   


 


Urine Bacteria   


 


Urine Opiates Screen   


 


Urine Methadone Screen   


 


Ur Barbiturates Screen   


 


Ur Phencyclidine Scrn   


 


Ur Amphetamines Screen   


 


U Benzodiazepines Scrn   


 


U Oth Cocaine Metabols   


 


U Cannabinoids Screen   


 


Alcohol, Quantitative   < 10 


 


Blood Type   


 


Antibody Screen   


 


Crossmatch   


 


BBK History Checked   














  04/19/18 04/19/18 04/19/18





  22:18 22:40 22:40


 


WBC   


 


RBC   


 


Hgb   


 


Hct   


 


MCV   


 


MCH   


 


MCHC   


 


RDW   


 


Plt Count   


 


MPV   


 


Neut % (Auto)   


 


Lymph % (Auto)   


 


Mono % (Auto)   


 


Eos % (Auto)   


 


Baso % (Auto)   


 


Neut # (Auto)   


 


Lymph # (Auto)   


 


Mono # (Auto)   


 


Eos # (Auto)   


 


Baso # (Auto)   


 


PT   


 


INR   


 


APTT   


 


Sodium   


 


Potassium   


 


Chloride   


 


Carbon Dioxide   


 


Anion Gap   


 


BUN   


 


Creatinine   


 


Est GFR ( Amer)   


 


Est GFR (Non-Af Amer)   


 


POC Glucose (mg/dL)  158 H  


 


Random Glucose   


 


Calcium   


 


Total Bilirubin   


 


AST   


 


ALT   


 


Alkaline Phosphatase   


 


Troponin I   


 


Total Protein   


 


Albumin   


 


Globulin   


 


Albumin/Globulin Ratio   


 


Lipase   216 


 


Urine Color   


 


Urine Clarity   


 


Urine pH   


 


Ur Specific Gravity   


 


Urine Protein   


 


Urine Glucose (UA)   


 


Urine Ketones   


 


Urine Blood   


 


Urine Nitrate   


 


Urine Bilirubin   


 


Urine Urobilinogen   


 


Ur Leukocyte Esterase   


 


Urine RBC (Auto)   


 


Urine Microscopic WBC   


 


Urine Bacteria   


 


Urine Opiates Screen   


 


Urine Methadone Screen   


 


Ur Barbiturates Screen   


 


Ur Phencyclidine Scrn   


 


Ur Amphetamines Screen   


 


U Benzodiazepines Scrn   


 


U Oth Cocaine Metabols   


 


U Cannabinoids Screen   


 


Alcohol, Quantitative   


 


Blood Type    O POSITIVE


 


Antibody Screen    Negative


 


Crossmatch    See Detail


 


BBK History Checked    Patient has bt














  04/19/18 04/19/18





  23:58 23:58


 


WBC  


 


RBC  


 


Hgb  


 


Hct  


 


MCV  


 


MCH  


 


MCHC  


 


RDW  


 


Plt Count  


 


MPV  


 


Neut % (Auto)  


 


Lymph % (Auto)  


 


Mono % (Auto)  


 


Eos % (Auto)  


 


Baso % (Auto)  


 


Neut # (Auto)  


 


Lymph # (Auto)  


 


Mono # (Auto)  


 


Eos # (Auto)  


 


Baso # (Auto)  


 


PT  


 


INR  


 


APTT  


 


Sodium  


 


Potassium  


 


Chloride  


 


Carbon Dioxide  


 


Anion Gap  


 


BUN  


 


Creatinine  


 


Est GFR ( Amer)  


 


Est GFR (Non-Af Amer)  


 


POC Glucose (mg/dL)  


 


Random Glucose  


 


Calcium  


 


Total Bilirubin  


 


AST  


 


ALT  


 


Alkaline Phosphatase  


 


Troponin I  


 


Total Protein  


 


Albumin  


 


Globulin  


 


Albumin/Globulin Ratio  


 


Lipase  


 


Urine Color   Yellow


 


Urine Clarity   Slighty-cloudy


 


Urine pH   5.0


 


Ur Specific Gravity   1.020


 


Urine Protein   Negative


 


Urine Glucose (UA)   Neg


 


Urine Ketones   Negative


 


Urine Blood   Negative


 


Urine Nitrate   Negative


 


Urine Bilirubin   Negative


 


Urine Urobilinogen   4.0


 


Ur Leukocyte Esterase   Neg


 


Urine RBC (Auto)   1


 


Urine Microscopic WBC   1


 


Urine Bacteria   Rare


 


Urine Opiates Screen  Negative 


 


Urine Methadone Screen  Negative 


 


Ur Barbiturates Screen  Negative 


 


Ur Phencyclidine Scrn  Negative 


 


Ur Amphetamines Screen  Negative 


 


U Benzodiazepines Scrn  Positive 


 


U Oth Cocaine Metabols  Negative 


 


U Cannabinoids Screen  Negative 


 


Alcohol, Quantitative  


 


Blood Type  


 


Antibody Screen  


 


Crossmatch  


 


BBK History Checked  














Assessment & Plan





- Assessment and Plan (Free Text)


Assessment: 


This is a 37yM presenitng intixicated with complaints of hematemesis. 


1. Acute Anemia


2. Hematemesis


3. Etoh intoxication


4. Hx of etoh abuse 





Plan: 


-Continue supportive care


-Monitor for withdrawal symptoms


-Thiamine and folate supplementation


-Acute anemia with no active bleeding at this time, hemodynamically stable, s/p 

2U PRBCs, monitor H/H and transfuse as needed


-IV PPI BID


-s/p EGD 4/16 with no varices, no PUD-this maybe 2/2 neeta jain tear, path 

positive for gastritis neg H.pylori


-Continue to monitor, if starts acute G bleeding will consider repeat 

endoscopic evaluation


-Will continue to follow closely


Case discussed with  and he agrees with above mentioned plan of 

care. 








<Dwight Owen - Last Filed: 04/20/18 13:59>





Meds





- Medications


Medications: 


 Current Medications





Chlordiazepoxide (Librium)  50 mg PO Q8 [facility]


   Last Admin: 04/20/18 10:54 Dose:  50 mg


Folic Acid (Folic Acid)  1 mg PO DAILY [facility] LUIS


   Last Admin: 04/20/18 12:44 Dose:  1 mg


Pantoprazole Sodium 40 mg/ (Sodium Chloride)  100 mls @ 20 mls/hr IVPB Q5H [facility] LUIS


   PRN Reason: 8 MG/HR


   Last Admin: 04/20/18 08:47 Dose:  20 mls/hr


Ciprofloxacin (Cipro 400mg/200ml Dsw)  400 mg in 200 mls @ 200 mls/hr IVPB Q12 

JUAN LUIS


   PRN Reason: Protocol


   Last Admin: 04/20/18 10:54 Dose:  200 mls/hr


Lactated Ringer's (Lactated Ringer's)  1,000 mls @ 125 mls/hr IV .Q8H [facility]UAN LUIS


   Stop: 04/20/18 14:59


   Last Admin: 04/20/18 08:46 Dose:  125 mls/hr


Lorazepam (Ativan)  2 mg IVP Q6 PRN


   PRN Reason: withdrawal symptoms


   Last Admin: 04/20/18 12:43 Dose:  2 mg


Metoclopramide HCl (Reglan)  10 mg IVP Q6 PRN


   PRN Reason: Nausea/Vomiting


   Last Admin: 04/20/18 08:49 Dose:  10 mg


Thiamine HCl (Vitamin B1 Tab)  100 mg PO DAILY [facility]


   Last Admin: 04/20/18 12:44 Dose:  100 mg











Results





- Vital Signs


Recent Vital Signs: 


 Last Vital Signs











Temp  97.8 F   04/20/18 08:00


 


Pulse  116 H  04/20/18 08:00


 


Resp  20   04/20/18 08:00


 


BP  104/64   04/20/18 08:00


 


Pulse Ox  100   04/20/18 08:00














- Labs


Result Diagrams: 


 04/20/18 12:27





 04/19/18 22:15


Labs: 


 Laboratory Results - last 24 hr











  04/19/18 04/19/18 04/19/18





  22:15 22:15 22:15


 


WBC  24.7 H D  


 


RBC  2.07 L  


 


Hgb  6.1 L* D  


 


Hct  19.8 L  


 


MCV  95.7 H D  


 


MCH  29.7  


 


MCHC  31.1 L  


 


RDW  18.4 H  


 


Plt Count  225  D  


 


MPV  11.1  


 


Neut % (Auto)  67.1  


 


Lymph % (Auto)  22.6  


 


Mono % (Auto)  8.9  


 


Eos % (Auto)  0.2  


 


Baso % (Auto)  1.2  


 


Neut # (Auto)  16.5 H  


 


Lymph # (Auto)  5.6 H  


 


Mono # (Auto)  2.2 H  


 


Eos # (Auto)  0.1  


 


Baso # (Auto)  0.3 H  


 


PT    19.4 H


 


INR    1.7 H


 


APTT    32.8


 


Sodium   144 


 


Potassium   4.3 


 


Chloride   109 H 


 


Carbon Dioxide   15 L 


 


Anion Gap   24 H 


 


BUN   23 H 


 


Creatinine   0.8 


 


Est GFR ( Amer)   > 60 


 


Est GFR (Non-Af Amer)   > 60 


 


POC Glucose (mg/dL)   


 


Random Glucose   156 H 


 


Calcium   8.5 


 


Total Bilirubin   0.9 


 


AST   102 H D 


 


ALT   66 


 


Alkaline Phosphatase   75 


 


Troponin I   0.0200 


 


Total Protein   7.0 


 


Albumin   3.2 L 


 


Globulin   3.8 


 


Albumin/Globulin Ratio   0.9 L 


 


Lipase   


 


Urine Color   


 


Urine Clarity   


 


Urine pH   


 


Ur Specific Gravity   


 


Urine Protein   


 


Urine Glucose (UA)   


 


Urine Ketones   


 


Urine Blood   


 


Urine Nitrate   


 


Urine Bilirubin   


 


Urine Urobilinogen   


 


Ur Leukocyte Esterase   


 


Urine RBC (Auto)   


 


Urine Microscopic WBC   


 


Urine Bacteria   


 


Urine Opiates Screen   


 


Urine Methadone Screen   


 


Ur Barbiturates Screen   


 


Ur Phencyclidine Scrn   


 


Ur Amphetamines Screen   


 


U Benzodiazepines Scrn   


 


U Oth Cocaine Metabols   


 


U Cannabinoids Screen   


 


Alcohol, Quantitative   < 10 


 


Blood Type   


 


Antibody Screen   


 


Crossmatch   


 


BBK History Checked   














  04/19/18 04/19/18 04/19/18





  22:18 22:40 22:40


 


WBC   


 


RBC   


 


Hgb   


 


Hct   


 


MCV   


 


MCH   


 


MCHC   


 


RDW   


 


Plt Count   


 


MPV   


 


Neut % (Auto)   


 


Lymph % (Auto)   


 


Mono % (Auto)   


 


Eos % (Auto)   


 


Baso % (Auto)   


 


Neut # (Auto)   


 


Lymph # (Auto)   


 


Mono # (Auto)   


 


Eos # (Auto)   


 


Baso # (Auto)   


 


PT   


 


INR   


 


APTT   


 


Sodium   


 


Potassium   


 


Chloride   


 


Carbon Dioxide   


 


Anion Gap   


 


BUN   


 


Creatinine   


 


Est GFR ( Amer)   


 


Est GFR (Non-Af Amer)   


 


POC Glucose (mg/dL)  158 H  


 


Random Glucose   


 


Calcium   


 


Total Bilirubin   


 


AST   


 


ALT   


 


Alkaline Phosphatase   


 


Troponin I   


 


Total Protein   


 


Albumin   


 


Globulin   


 


Albumin/Globulin Ratio   


 


Lipase   216 


 


Urine Color   


 


Urine Clarity   


 


Urine pH   


 


Ur Specific Gravity   


 


Urine Protein   


 


Urine Glucose (UA)   


 


Urine Ketones   


 


Urine Blood   


 


Urine Nitrate   


 


Urine Bilirubin   


 


Urine Urobilinogen   


 


Ur Leukocyte Esterase   


 


Urine RBC (Auto)   


 


Urine Microscopic WBC   


 


Urine Bacteria   


 


Urine Opiates Screen   


 


Urine Methadone Screen   


 


Ur Barbiturates Screen   


 


Ur Phencyclidine Scrn   


 


Ur Amphetamines Screen   


 


U Benzodiazepines Scrn   


 


U Oth Cocaine Metabols   


 


U Cannabinoids Screen   


 


Alcohol, Quantitative   


 


Blood Type    O POSITIVE


 


Antibody Screen    Negative


 


Crossmatch    See Detail


 


BBK History Checked    Patient has bt














  04/19/18 04/19/18 04/20/18





  23:58 23:58 12:27


 


WBC    12.5 H


 


RBC    2.09 L


 


Hgb    6.1 L*


 


Hct    18.8 L


 


MCV    90.0  D


 


MCH    29.3


 


MCHC    32.6 L


 


RDW    17.1 H


 


Plt Count    115 L D


 


MPV   


 


Neut % (Auto)   


 


Lymph % (Auto)   


 


Mono % (Auto)   


 


Eos % (Auto)   


 


Baso % (Auto)   


 


Neut # (Auto)   


 


Lymph # (Auto)   


 


Mono # (Auto)   


 


Eos # (Auto)   


 


Baso # (Auto)   


 


PT   


 


INR   


 


APTT   


 


Sodium   


 


Potassium   


 


Chloride   


 


Carbon Dioxide   


 


Anion Gap   


 


BUN   


 


Creatinine   


 


Est GFR ( Amer)   


 


Est GFR (Non-Af Amer)   


 


POC Glucose (mg/dL)   


 


Random Glucose   


 


Calcium   


 


Total Bilirubin   


 


AST   


 


ALT   


 


Alkaline Phosphatase   


 


Troponin I   


 


Total Protein   


 


Albumin   


 


Globulin   


 


Albumin/Globulin Ratio   


 


Lipase   


 


Urine Color   Yellow 


 


Urine Clarity   Slighty-cloudy 


 


Urine pH   5.0 


 


Ur Specific Gravity   1.020 


 


Urine Protein   Negative 


 


Urine Glucose (UA)   Neg 


 


Urine Ketones   Negative 


 


Urine Blood   Negative 


 


Urine Nitrate   Negative 


 


Urine Bilirubin   Negative 


 


Urine Urobilinogen   4.0 


 


Ur Leukocyte Esterase   Neg 


 


Urine RBC (Auto)   1 


 


Urine Microscopic WBC   1 


 


Urine Bacteria   Rare 


 


Urine Opiates Screen  Negative  


 


Urine Methadone Screen  Negative  


 


Ur Barbiturates Screen  Negative  


 


Ur Phencyclidine Scrn  Negative  


 


Ur Amphetamines Screen  Negative  


 


U Benzodiazepines Scrn  Positive  


 


U Oth Cocaine Metabols  Negative  


 


U Cannabinoids Screen  Negative  


 


Alcohol, Quantitative   


 


Blood Type   


 


Antibody Screen   


 


Crossmatch   


 


BBK History Checked   














Assessment & Plan





- Assessment and Plan (Free Text)


Plan: 





Patient seen by me and discussed with Dr. Pichardo. Agree with assessment and plan

## 2018-04-20 NOTE — RAD
HISTORY:

clearance  



COMPARISON:

Chest radiograph dated 04/14/2018 



FINDINGS:



LUNGS:

Increased prominence of the interstitial markings.  No focal 

consolidation.



PLEURA:

No significant pleural effusion identified, no pneumothorax apparent.



CARDIOVASCULAR:

Normal.



OSSEOUS STRUCTURES:

No significant abnormalities.



VISUALIZED UPPER ABDOMEN:

Normal.



OTHER FINDINGS:

None.



IMPRESSION:

Increased prominence of interstitial markings. No focal 

consolidation.

## 2018-04-21 NOTE — CP.CCUPN
CCU Subjective





- Physician Review


Events Since Last Encounter (Free Text): 





04/21/18 08:08


Patient awake, no distress, no fever, no vomiting, no pressors, events reviewed





CCU Objective





- Vital Signs / Intake & Output


Vital Signs (Last 4 hours): 


Vital Signs











  Pulse Resp BP Pulse Ox


 


 04/21/18 06:00  98 H  22  129/78  100











Intake and Output (Last 8hrs): 


 Intake & Output











 04/20/18 04/21/18 04/21/18





 22:59 06:59 14:59


 


Intake Total 2770 1195 


 


Output Total 850 300 


 


Balance 1920 895 


 


Intake:   


 


   275 


 


  Intake, Piggyback 570  


 


  Oral 650 270 


 


  Blood Product 1190 650 


 


Output:   


 


  Urine 850 300 


 


    Urethral (Burger) 850 300 














- Physical Exam


Head: Positive for: Atraumatic, Normocephalic


Pupils: Positive for: PERRL


Extroacular Muscles: Positive for: EOMI


Conjunctiva: Positive for: Normal


Mouth: Positive for: Moist Mucous Membranes


Neck: Positive for: Normal Range of Motion


Respiratory/Chest: Positive for: Clear to Auscultation


Cardiovascular: Positive for: Regular Rate and Rhythm


Abdomen: Positive for: Normal Bowel Sounds


Upper Extremity: Positive for: Normal Inspection


Lower Extremity: Positive for: Normal Inspection


Neurological: Positive for: Speech Normal


Psychiatric: Positive for: Alert





- Medications


Active Medications: 


Active Medications











Generic Name Dose Route Start Last Admin





  Trade Name Freq  PRN Reason Stop Dose Admin


 


Chlordiazepoxide  50 mg  04/20/18 10:45  04/21/18 01:18





  Librium  PO   50 mg





  Q8 JUAN LUIS   Administration


 


Folic Acid  1 mg  04/20/18 11:15  04/20/18 12:44





  Folic Acid  PO   1 mg





  DAILY JUAN LUIS   Administration


 


Pantoprazole Sodium 40 mg/  100 mls @ 20 mls/hr  04/19/18 23:00  04/21/18 06:46





  Sodium Chloride  IVPB   20 mls/hr





  Q5H JUAN LUIS   Administration





  8 MG/HR   


 


Ciprofloxacin  400 mg in 200 mls @ 200 mls/hr  04/19/18 23:00  04/20/18 20:52





  Cipro 400mg/200ml Dsw  IVPB   200 mls/hr





  Q12 JUAN LUIS   Administration





  Protocol   


 


Lorazepam  2 mg  04/19/18 22:50  04/21/18 07:52





  Ativan  IVP   2 mg





  Q6 PRN   Administration





  withdrawal symptoms   


 


Metoclopramide HCl  10 mg  04/19/18 22:52  04/20/18 08:49





  Reglan  IVP   10 mg





  Q6 PRN   Administration





  Nausea/Vomiting   


 


Thiamine HCl  100 mg  04/20/18 11:15  04/20/18 12:44





  Vitamin B1 Tab  PO   100 mg





  DAILY JUAN LUIS   Administration














- Patient Studies


Lab Studies: 


 Lab Studies











  04/21/18 04/21/18 04/20/18 Range/Units





  05:30 05:30 13:40 


 


WBC   8.3   (4.8-10.8)  K/uL


 


RBC   2.56 L   (4.40-5.90)  Mil/uL


 


Hgb   7.8 L   (12.0-18.0)  g/dL


 


Hct   23.2 L   (35.0-51.0)  %


 


MCV   90.5   (80.0-94.0)  fl


 


MCH   30.3   (27.0-31.0)  pg


 


MCHC   33.5   (33.0-37.0)  g/dL


 


RDW   16.8 H   (11.5-14.5)  %


 


Plt Count   107 L   (130-400)  K/uL


 


Sodium  146   146  (132-148)  mmol/l


 


Potassium  3.5 L   3.7  (3.6-5.0)  MMOL/L


 


Chloride  113 H   113 H  ()  mmol/L


 


Carbon Dioxide  22   20 L  (22-30)  mmol/L


 


Anion Gap  15   17  (10-20)  


 


BUN  15   17  (9-20)  mg/dl


 


Creatinine  0.8   0.8  (0.8-1.5)  mg/dl


 


Est GFR (African Amer)  > 60   > 60  


 


Est GFR (Non-Af Amer)  > 60   > 60  


 


Random Glucose  94   135 H  ()  mg/dL


 


Calcium  8.0 L   7.7 L  (8.4-10.2)  mg/dL


 


Total Bilirubin  1.6 H   1.1  (0.2-1.3)  mg/dl


 


AST  75 H   77 H D  (17-59)  U/L


 


ALT  56   48  (21-72)  U/L


 


Alkaline Phosphatase  70   51  ()  U/L


 


Total Protein  6.0 L   5.5 L  (6.3-8.2)  G/DL


 


Albumin  2.8 L   2.4 L D  (3.5-5.0)  g/dL


 


Globulin  3.3   3.1  (2.2-3.9)  gm/dL


 


Albumin/Globulin Ratio  0.8 L   0.8 L  (1.0-2.1)  


 


Serum Ketones    Negative  (NEGATIVE)  


 


Blood Type     


 


Antibody Screen     


 


Crossmatch     


 


BBK History Checked     














  04/20/18 04/19/18 Range/Units





  12:27 22:40 


 


WBC  12.5 H   (4.8-10.8)  K/uL


 


RBC  2.09 L   (4.40-5.90)  Mil/uL


 


Hgb  6.1 L*   (12.0-18.0)  g/dL


 


Hct  18.8 L   (35.0-51.0)  %


 


MCV  90.0  D   (80.0-94.0)  fl


 


MCH  29.3   (27.0-31.0)  pg


 


MCHC  32.6 L   (33.0-37.0)  g/dL


 


RDW  17.1 H   (11.5-14.5)  %


 


Plt Count  115 L D   (130-400)  K/uL


 


Sodium    (132-148)  mmol/l


 


Potassium    (3.6-5.0)  MMOL/L


 


Chloride    ()  mmol/L


 


Carbon Dioxide    (22-30)  mmol/L


 


Anion Gap    (10-20)  


 


BUN    (9-20)  mg/dl


 


Creatinine    (0.8-1.5)  mg/dl


 


Est GFR (African Amer)    


 


Est GFR (Non-Af Amer)    


 


Random Glucose    ()  mg/dL


 


Calcium    (8.4-10.2)  mg/dL


 


Total Bilirubin    (0.2-1.3)  mg/dl


 


AST    (17-59)  U/L


 


ALT    (21-72)  U/L


 


Alkaline Phosphatase    ()  U/L


 


Total Protein    (6.3-8.2)  G/DL


 


Albumin    (3.5-5.0)  g/dL


 


Globulin    (2.2-3.9)  gm/dL


 


Albumin/Globulin Ratio    (1.0-2.1)  


 


Serum Ketones    (NEGATIVE)  


 


Blood Type   O POSITIVE  


 


Antibody Screen   Negative  


 


Crossmatch   See Detail  


 


BBK History Checked   Patient has bt  








 Laboratory Results - last 24 hr











  04/19/18 04/20/18 04/20/18





  22:40 12:27 13:40


 


WBC   12.5 H 


 


RBC   2.09 L 


 


Hgb   6.1 L* 


 


Hct   18.8 L 


 


MCV   90.0  D 


 


MCH   29.3 


 


MCHC   32.6 L 


 


RDW   17.1 H 


 


Plt Count   115 L D 


 


Sodium    146


 


Potassium    3.7


 


Chloride    113 H


 


Carbon Dioxide    20 L


 


Anion Gap    17


 


BUN    17


 


Creatinine    0.8


 


Est GFR ( Amer)    > 60


 


Est GFR (Non-Af Amer)    > 60


 


Random Glucose    135 H


 


Calcium    7.7 L


 


Total Bilirubin    1.1


 


AST    77 H D


 


ALT    48


 


Alkaline Phosphatase    51


 


Total Protein    5.5 L


 


Albumin    2.4 L D


 


Globulin    3.1


 


Albumin/Globulin Ratio    0.8 L


 


Serum Ketones    Negative


 


Blood Type  O POSITIVE  


 


Antibody Screen  Negative  


 


Crossmatch  See Detail  


 


BBK History Checked  Patient has bt  














  04/21/18 04/21/18





  05:30 05:30


 


WBC  8.3 


 


RBC  2.56 L 


 


Hgb  7.8 L 


 


Hct  23.2 L 


 


MCV  90.5 


 


MCH  30.3 


 


MCHC  33.5 


 


RDW  16.8 H 


 


Plt Count  107 L 


 


Sodium   146


 


Potassium   3.5 L


 


Chloride   113 H


 


Carbon Dioxide   22


 


Anion Gap   15


 


BUN   15


 


Creatinine   0.8


 


Est GFR ( Amer)   > 60


 


Est GFR (Non-Af Amer)   > 60


 


Random Glucose   94


 


Calcium   8.0 L


 


Total Bilirubin   1.6 H


 


AST   75 H


 


ALT   56


 


Alkaline Phosphatase   70


 


Total Protein   6.0 L


 


Albumin   2.8 L


 


Globulin   3.3


 


Albumin/Globulin Ratio   0.8 L


 


Serum Ketones  


 


Blood Type  


 


Antibody Screen  


 


Crossmatch  


 


BBK History Checked  











Fingerstick Blood Sugar Results: 158





Critical Care Progress Note





- Nutrition


Nutrition: 


 Nutrition











 Category Date Time Status


 


 Liquid Diet [DIET] Diets  04/20/18 Breakfast Active














Assessment/Plan





- Assessment and Plan (Free Text)


Assessment: 





A/p





GI bleeding, anemia, ETOH withdrawal/delirium, renal insufficiency





- Follow CBC


- Transfusion as needed


- GI follow up


- Continue meds

## 2018-04-21 NOTE — CARD
--------------- APPROVED REPORT --------------





EKG Measurement

Heart [id number]

WA [id number]

[id number]

[id number]

[id number]



<Conclusion>

Sinus tachycardia

Possible Left atrial enlargement

Left ventricular hypertrophy

Nonspecific ST abnormality

Abnormal ECG

## 2018-04-22 NOTE — CP.CCUPN
CCU Subjective





- Physician Review


Events Since Last Encounter (Free Text): 





04/22/18 07:47


No distress, no fever, no vomiting, no pressors, events reviewed








CCU Objective





- Vital Signs / Intake & Output


Vital Signs (Last 4 hours): 


Vital Signs











  Temp Pulse Resp BP Pulse Ox


 


 04/22/18 04:00  98.1 F  101 H  22  116/61  100











Intake and Output (Last 8hrs): 


 Intake & Output











 04/21/18 04/22/18 04/22/18





 22:59 06:59 14:59


 


Intake Total 830 620 


 


Output Total 700 150 


 


Balance 130 470 


 


Intake:   


 


   420 


 


  Oral 20 200 


 


  Blood Product 330  


 


Output:   


 


  Urine 700 150 


 


    Urethral (Burger) 700 150 














- Physical Exam


Head: Positive for: Atraumatic, Normocephalic


Pupils: Positive for: PERRL


Extroacular Muscles: Positive for: EOMI


Conjunctiva: Positive for: Normal


Mouth: Positive for: Moist Mucous Membranes


Neck: Positive for: Normal Range of Motion


Respiratory/Chest: Positive for: Clear to Auscultation


Cardiovascular: Positive for: Regular Rate and Rhythm


Abdomen: Positive for: Normal Bowel Sounds


Upper Extremity: Positive for: Normal Inspection


Lower Extremity: Positive for: Normal Inspection


Neurological: Positive for: Speech Normal


Psychiatric: Positive for: Alert





- Medications


Active Medications: 


Active Medications











Generic Name Dose Route Start Last Admin





  Trade Name Freq  PRN Reason Stop Dose Admin


 


Chlordiazepoxide  50 mg  04/20/18 10:45  04/22/18 01:11





  Librium  PO   50 mg





  Q8 JUAN LUIS   Administration


 


Folic Acid  1 mg  04/20/18 11:15  04/21/18 09:26





  Folic Acid  PO   1 mg





  DAILY JUAN LUIS   Administration


 


Pantoprazole Sodium 40 mg/  100 mls @ 20 mls/hr  04/19/18 23:00  04/22/18 06:53





  Sodium Chloride  IVPB   20 mls/hr





  Q5H JUAN LUIS   Administration





  8 MG/HR   


 


Ciprofloxacin  400 mg in 200 mls @ 200 mls/hr  04/19/18 23:00  04/21/18 20:34





  Cipro 400mg/200ml Dsw  IVPB   200 mls/hr





  Q12 JUAN LUIS   Administration





  Protocol   


 


Sodium Chloride  1,000 mls @ 50 mls/hr  04/21/18 08:30  04/21/18 08:20





  Sodium Chloride 0.9%  IV  04/22/18 08:16  50 mls/hr





  .Q20H JUAN LUIS   Administration


 


Lorazepam  2 mg  04/19/18 22:50  04/21/18 20:31





  Ativan  IVP   2 mg





  Q6 PRN   Administration





  withdrawal symptoms   


 


Metoclopramide HCl  10 mg  04/19/18 22:52  04/20/18 08:49





  Reglan  IVP   10 mg





  Q6 PRN   Administration





  Nausea/Vomiting   


 


Thiamine HCl  100 mg  04/20/18 11:15  04/21/18 09:27





  Vitamin B1 Tab  PO   100 mg





  DAILY JUAN LUIS   Administration














- Patient Studies


Lab Studies: 


 Microbiology Studies











 04/20/18 06:16 MRSA Culture (Admit) - Final





 Nose    MRSA NOT DETECTED








 Lab Studies











  04/22/18 04/19/18 Range/Units





  05:20 22:40 


 


Sodium  145   (132-148)  mmol/l


 


Potassium  4.0   (3.6-5.0)  MMOL/L


 


Chloride  114 H   ()  mmol/L


 


Carbon Dioxide  18 L   (22-30)  mmol/L


 


Anion Gap  17   (10-20)  


 


BUN  15   (9-20)  mg/dl


 


Creatinine  0.9   (0.8-1.5)  mg/dl


 


Est GFR (African Amer)  > 60   


 


Est GFR (Non-Af Amer)  > 60   


 


Random Glucose  145 H   ()  mg/dL


 


Calcium  7.7 L   (8.4-10.2)  mg/dL


 


Blood Type   O POSITIVE  


 


Antibody Screen   Negative  


 


Crossmatch   See Detail  


 


BBK History Checked   Patient has bt  








 Laboratory Results - last 24 hr











  04/19/18 04/22/18





  22:40 05:20


 


Sodium   145


 


Potassium   4.0


 


Chloride   114 H


 


Carbon Dioxide   18 L


 


Anion Gap   17


 


BUN   15


 


Creatinine   0.9


 


Est GFR ( Amer)   > 60


 


Est GFR (Non-Af Amer)   > 60


 


Random Glucose   145 H


 


Calcium   7.7 L


 


Blood Type  O POSITIVE 


 


Antibody Screen  Negative 


 


Crossmatch  See Detail 


 


BBK History Checked  Patient has bt 











Fingerstick Blood Sugar Results: 158





Critical Care Progress Note





- Nutrition


Nutrition: 


 Nutrition











 Category Date Time Status


 


 Liquid Diet [DIET] Diets  04/20/18 Breakfast Active














Assessment/Plan





- Assessment and Plan (Free Text)


Assessment: 





A/p





GI bleeding, anemia, ETOH withdrawal/delirium, renal insufficiency





- Follow CBC


- Transfusion as needed


- GI follow up


- Continue meds

## 2018-04-22 NOTE — CP.PCM.PN
Subjective





- Date & Time of Evaluation


Date of Evaluation: 04/22/18


Time of Evaluation: 10:15





- Subjective


Subjective: 





Patient had episode of red then maroon bloody stools with drop in Hgb and 

tachycardia this morning. Over the past hour and a half no further bowel 

movements seen.





Objective





- Vital Signs/Intake and Output


Vital Signs (last 24 hours): 


 











Temp Pulse Resp BP Pulse Ox


 


 98.2 F   131 H  27 H  111/68   100 


 


 04/22/18 08:00  04/22/18 08:00  04/22/18 08:00  04/22/18 08:00  04/22/18 08:00








Intake and Output: 


 











 04/22/18 04/22/18





 06:59 18:59


 


Intake Total 900 140


 


Output Total 250 


 


Balance 650 140














- Medications


Medications: 


 Current Medications





Chlordiazepoxide (Librium)  50 mg PO Q8 [facility]


   Last Admin: 04/22/18 01:11 Dose:  50 mg


Folic Acid (Folic Acid)  1 mg PO DAILY [facility]


   Last Admin: 04/21/18 09:26 Dose:  1 mg


Pantoprazole Sodium 40 mg/ (Sodium Chloride)  100 mls @ 20 mls/hr IVPB Q5H [facility]


   PRN Reason: 8 MG/HR


   Last Admin: 04/22/18 06:53 Dose:  20 mls/hr


Desmopressin Acetate 21 mcg/ (Sodium Chloride)  55.25 mls @ 110.5 mls/hr IV 

ONCE ONE


   Stop: 04/22/18 12:29


Ciprofloxacin (Cipro 400mg/200ml Dsw)  400 mg in 200 mls @ 200 mls/hr IVPB Q12 

JUAN LUIS


   PRN Reason: Protocol


Lorazepam (Ativan)  2 mg IVP Q6 PRN


   PRN Reason: withdrawal symptoms


   Last Admin: 04/21/18 20:31 Dose:  2 mg


Metoclopramide HCl (Reglan)  10 mg IVP Q6 PRN


   PRN Reason: Nausea/Vomiting


   Last Admin: 04/20/18 08:49 Dose:  10 mg


Thiamine HCl (Vitamin B1 Tab)  100 mg PO DAILY [facility]


   Last Admin: 04/21/18 09:27 Dose:  100 mg











- Labs


Labs: 


 





 04/22/18 07:00 





 04/22/18 05:20 





 











PT  19.4 Seconds (9.8-13.1)  H  04/19/18  22:15    


 


INR  1.7  (0.9-1.2)  H  04/19/18  22:15    


 


APTT  32.8 Seconds (25.6-37.1)   04/19/18  22:15    














- Head Exam


Head Exam: ATRAUMATIC





- Eye Exam


Eye Exam: EOMI


Pupil Exam: PERRL





- Respiratory Exam


Respiratory Exam: Clear to Ausculation Bilateral





- Cardiovascular Exam


Cardiovascular Exam: +S1, +S2





- GI/Abdominal Exam


GI & Abdominal Exam: Distended, Soft


Additional comments: 





Bowel sounds present





Assessment and Plan


(1) GI bleed


Assessment & Plan: 


Patient deteriorated clinically  this morning with bloody stools , drop in Hgb, 

distended abdomen , and diminished mental status. Will transfuse 2 units PRBC 

and FFP, DDAVP given, stat ammonia level, and KUB/cxr. If no metabolic cause of 

altered mental status found he will need neurological w/u. Possible endoscopy 

if bleeding continues.


Status: Acute

## 2018-04-22 NOTE — RAD
HISTORY:

post intubation  



COMPARISON:

Chest radiograph performed approximately 5 hours prior 



FINDINGS:



LUNGS:

No active pulmonary disease.



PLEURA:

No significant pleural effusion identified, no pneumothorax apparent.



CARDIOVASCULAR:

Normal.



OSSEOUS STRUCTURES:

Unchanged.



VISUALIZED UPPER ABDOMEN:

Normal.



OTHER FINDINGS:

New endotracheal tube with tip at the level of the clavicular heads. 

New enteric tube with tip in the stomach.



IMPRESSION:

New endotracheal and enteric tubes in satisfactory position.  No 

other significant interval change.

## 2018-04-22 NOTE — CT
PROCEDURE:  CT HEAD WITHOUT CONTRAST.



HISTORY:

r/o bleed



COMPARISON:

None available. 



TECHNIQUE:

Axial computed tomography images were obtained through the head/brain 

without intravenous contrast.  



Radiation dose:



Total exam DLP = 792.3 mGy-cm.



This CT exam was performed using one or more of the following dose 

reduction techniques: Automated exposure control, adjustment of the 

mA and/or kV according to patient size, and/or use of iterative 

reconstruction technique.



FINDINGS:



HEMORRHAGE:

No intracranial hemorrhage. 



BRAIN:

No mass effect or edema.  No atrophy or chronic microvascular 

ischemic changes.



VENTRICLES:

Unremarkable. No hydrocephalus. 



CALVARIUM:

Unremarkable.



PARANASAL SINUSES:

Unremarkable as visualized. No significant inflammatory changes.



MASTOID AIR CELLS:

Unremarkable as visualized. No inflammatory changes.



OTHER FINDINGS:

None.



IMPRESSION:

No acute intracranial pathology.

## 2018-04-22 NOTE — CP.PCM.PN
Subjective





- Date & Time of Evaluation


Date of Evaluation: 04/22/18


Time of Evaluation: 11:00





- Subjective


Subjective: 





Pt is obtunded today


not responsive to pain, moves all extremities


VS stable and saturation normal


noted some melena with clots after Lactulose 


also noted to vomit blood    during intubation while being suctioned








Objective





- Vital Signs/Intake and Output


Vital Signs (last 24 hours): 


 











Temp Pulse Resp BP Pulse Ox


 


 98.2 F   127 H  26 H  114/63   100 


 


 04/22/18 08:00  04/22/18 10:00  04/22/18 10:00  04/22/18 10:00  04/22/18 10:00








Intake and Output: 


 











 04/22/18 04/22/18





 06:59 18:59


 


Intake Total 900 240


 


Output Total 250 


 


Balance 650 240














- Medications


Medications: 


 Current Medications





Chlordiazepoxide (Librium)  50 mg PO Q8 [facility]


   Last Admin: 04/22/18 01:11 Dose:  50 mg


Folic Acid (Folic Acid)  1 mg PO DAILY [facility]


   Last Admin: 04/21/18 09:26 Dose:  1 mg


Pantoprazole Sodium 40 mg/ (Sodium Chloride)  100 mls @ 20 mls/hr IVPB Q5H JUAN LUIS


   PRN Reason: 8 MG/HR


   Last Admin: 04/22/18 06:53 Dose:  20 mls/hr


Desmopressin Acetate 21 mcg/ (Sodium Chloride)  55.25 mls @ 110.5 mls/hr IV 

ONCE ONE


   Stop: 04/22/18 12:29


Ciprofloxacin (Cipro 400mg/200ml Dsw)  400 mg in 200 mls @ 200 mls/hr IVPB Q12 

JUAN LUIS


   PRN Reason: Protocol


Lorazepam (Ativan)  2 mg IVP Q6 PRN


   PRN Reason: withdrawal symptoms


   Last Admin: 04/21/18 20:31 Dose:  2 mg


Metoclopramide HCl (Reglan)  10 mg IVP Q6 PRN


   PRN Reason: Nausea/Vomiting


   Last Admin: 04/20/18 08:49 Dose:  10 mg


Thiamine HCl (Vitamin B1 Tab)  100 mg PO DAILY [facility]


   Last Admin: 04/21/18 09:27 Dose:  100 mg











- Labs


Labs: 


 





 04/22/18 07:00 





 04/22/18 05:20 





 











PT  19.4 Seconds (9.8-13.1)  H  04/19/18  22:15    


 


INR  1.7  (0.9-1.2)  H  04/19/18  22:15    


 


APTT  32.8 Seconds (25.6-37.1)   04/19/18  22:15    














- Constitutional


Appears: Unkempt, Older Than Stated Age, unresponsive even to pain





- Head Exam


Head Exam: NORMAL INSPECTION, NORMOCEPHALIC





- Eye Exam


Eye Exam: EOMI, Normal appearance


Pupil Exam: NORMAL ACCOMODATION





- ENT Exam


ENT Exam: Mucous Membranes Dry, Normal External Ear Exam





- Neck Exam


Neck Exam: Full ROM.  absent: Meningismus





- Respiratory Exam


Respiratory Exam: NORMAL BREATHING PATTERN.  absent: Respiratory Distress





- Cardiovascular Exam


Cardiovascular Exam: REGULAR RHYTHM, +S1, +S2





- GI/Abdominal Exam


GI & Abdominal Exam: Soft, Normal Bowel Sounds.  absent: Tenderness





- Extremities Exam


Extremities Exam: moves all extremities, Normal Capillary Refill.  absent: 

Pedal Edema





- Back Exam


Back Exam: Full ROM





- Neurological Exam


unresponsive even to pain  however moves all extremities








- Psychiatric Exam


Psychiatric exam:  unresponsive 





- Skin


Skin Exam: Dry, Warm








Assessment and Plan


(1) Acute blood loss anemia


Status: Acute   





(2) GI bleed


Status: Acute   





(3) Alcohol withdrawal


Status: Acute   





(4) Alcohol abuse


Status: Chronic   





(5) Coagulopathy


Status: Acute   





(6) Transaminitis


Status: Acute   





- Assessment and Plan (Free Text)


Assessment: 

















38 y/o gent , known hx of Alcoholism and recent GI bleed, was brought in 

because of hematemesis and alteration in   mental status.


Pt was recently discharged from this hospital  after an episode of GI bleed .  

EGD done on 4/16 did not show any active bleed.  The patient was discharge home 

and again started drinking Alcohol.   On day of admission, he again had 

hematemesis and was noted to be confused and agitated.


4/22: pt noted to be obtunded, Ammonia level elevated.  Pt intubated for airway 

protection








(1) Acute blood loss anemia sec to GI Bleed


Status: Acute   


Pt admitted to ICU for close monitoring


Hgb=6.1 - transfused 6 units PRBC- Hgb pos transfusion is still 6.8 


FFP also transfused


cont Protonix drip


GI consulted- Dr Owen - pt had recent EGD  w/c  w/c did not show active 

bleeding


cont IV Cipro


Abd Xray : neg








(2) GI bleed


Status: Acute   


as above





(3) Alcohol withdrawal


Status: Acute   


Pt is confused however better today, tremulous 


cont  1:1 Obs for safety as he is trying to get out of bed


start RTC Librium, Ativan prn


cont Thiamine and FA


IVF hydration





(4) Alcohol abuse


Status: Chronic   








5.  Hepatic Encephalopathy


Ammonia level markedly elevated 


Lactulose enema started


CT of the head : no bleed








6. Coagulopathy prob sec to Liver dis from alcoholism


Pt received Vit K


FFP transfusion given however pt developed rigors after transfusion- will check 

for transfusion reaction








7.  Transaminitis sec to ETOH


monitor





8.  Thrombocytopenia sec to ETOH liver dis


Plt normal today





DVT proph


- SCD


no anticoag sec to GIB and coagulopathy & low Platelet

## 2018-04-22 NOTE — CP.CCUPN
CCU Subjective





- Physician Review


Events Since Last Encounter (Free Text): 





04/22/18 15:20


Patient more lethargic, no response to verbal stimuli, was given lactulose  for 

elevated ammonia level, seen by GI. Patient intubated for airway protection and 

started on mechanical ventilation GI called and informed. Repeat CBC ordered








CCU Objective





- Vital Signs / Intake & Output


Intake and Output (Last 8hrs): 


 Intake & Output











 04/22/18 04/22/18 04/22/18





 06:59 14:59 22:59


 


Intake Total 620 240 


 


Output Total 150  


 


Balance 470 240 


 


Intake:   


 


   200 


 


  Intake, Piggyback  40 


 


  Oral 200  


 


Output:   


 


  Urine 150  


 


    Urethral (Burger) 150  


 


Other:   


 


  # Bowel Movements  1 














- Physical Exam


Head: Positive for: Atraumatic, Normocephalic


Pupils: Positive for: PERRL


Extroacular Muscles: Positive for: EOMI


Conjunctiva: Positive for: Normal


Mouth: Positive for: Moist Mucous Membranes


Neck: Positive for: Normal Range of Motion


Respiratory/Chest: Positive for: Clear to Auscultation


Cardiovascular: Positive for: Regular Rate and Rhythm


Abdomen: Positive for: Normal Bowel Sounds


Upper Extremity: Positive for: Normal Inspection


Lower Extremity: Positive for: Normal Inspection


Neurological: Positive for: Speech Normal


Psychiatric: Positive for: Alert





- Medications


Active Medications: 


Active Medications











Generic Name Dose Route Start Last Admin





  Trade Name Freq  PRN Reason Stop Dose Admin


 


Chlordiazepoxide  10 mg  04/22/18 16:00  





  Librium  PO   





  Q6 JUAN LUIS   


 


Folic Acid  1 mg  04/20/18 11:15  04/22/18 09:00





  Folic Acid  PO   Not Given





  DAILY JUAN LUIS   


 


Pantoprazole Sodium 40 mg/  100 mls @ 20 mls/hr  04/19/18 23:00  04/22/18 06:53





  Sodium Chloride  IVPB   20 mls/hr





  Q5H JUAN LUIS   Administration





  8 MG/HR   


 


Ciprofloxacin  400 mg in 200 mls @ 200 mls/hr  04/22/18 09:45  





  Cipro 400mg/200ml Dsw  IVPB   





  Q12 JUAN LUIS   





  Protocol   


 


Lactulose  200 gm  04/22/18 16:00  





  Generlac  MI   





  Q6 JUAN LUIS   


 


Lorazepam  2 mg  04/19/18 22:50  04/21/18 20:31





  Ativan  IVP   2 mg





  Q6 PRN   Administration





  withdrawal symptoms   


 


Metoclopramide HCl  10 mg  04/19/18 22:52  04/20/18 08:49





  Reglan  IVP   10 mg





  Q6 PRN   Administration





  Nausea/Vomiting   


 


Thiamine HCl  100 mg  04/20/18 11:15  04/22/18 09:00





  Vitamin B1 Tab  PO   Not Given





  DAILY JUAN LUIS   














- Patient Studies


Lab Studies: 


 Microbiology Studies











 04/20/18 06:16 MRSA Culture (Admit) - Final





 Nose    MRSA NOT DETECTED








 Lab Studies











  04/22/18 04/22/18 04/22/18 Range/Units





  12:17 11:00 07:30 


 


WBC     (4.8-10.8)  K/uL


 


RBC     (4.40-5.90)  Mil/uL


 


Hgb     (12.0-18.0)  g/dL


 


Hct     (35.0-51.0)  %


 


MCV     (80.0-94.0)  fl


 


MCH     (27.0-31.0)  pg


 


MCHC     (33.0-37.0)  g/dL


 


RDW     (11.5-14.5)  %


 


Plt Count     (130-400)  K/uL


 


pCO2  29 L    (35-45)  mm/Hg


 


pO2  84    ()  mm/Hg


 


HCO3  23.7    (21-28)  mmol/L


 


ABG pH  7.48 H    (7.35-7.45)  


 


ABG Total CO2  22.5    (22-28)  mmol/L


 


ABG O2 Saturation  99.3 H    (95-98)  %


 


ABG O2 Content  9.3 L    (15-23)  ML/dL


 


ABG Base Excess  -1.6    (-2.0-3.0)  mmol/L


 


ABG Hemoglobin  6.7 L    (11.7-17.4)  g/dL


 


ABG Carboxyhemoglobin  1.8 H    (0.5-1.5)  %


 


POC ABG HHb (Measured)  0.7    (0.0-5.0)  %


 


ABG Methemoglobin  0.8    (0.0-3.0)  %


 


ABG O2 Capacity  9.4 L    (16-24)  mL/dL


 


Anthony Test  Yes    


 


A-a O2 Difference  29.0    mm/Hg


 


Hgb O2 Saturation  96.7    (95.0-98.0)  %


 


FiO2  21.0    %


 


Sodium     (132-148)  mmol/l


 


Potassium     (3.6-5.0)  MMOL/L


 


Chloride     ()  mmol/L


 


Carbon Dioxide     (22-30)  mmol/L


 


Anion Gap     (10-20)  


 


BUN     (9-20)  mg/dl


 


Creatinine     (0.8-1.5)  mg/dl


 


Est GFR (African Amer)     


 


Est GFR (Non-Af Amer)     


 


Random Glucose     ()  mg/dL


 


Calcium     (8.4-10.2)  mg/dL


 


Ammonia   235 H*   (16-60)  umo/L


 


Blood Type    O POSITIVE  


 


Antibody Screen    Negative  


 


Crossmatch    See Detail  


 


BBK History Checked    Patient has bt  














  04/22/18 04/22/18 04/19/18 Range/Units





  07:00 05:20 22:40 


 


WBC  18.4 H D    (4.8-10.8)  K/uL


 


RBC  2.16 L    (4.40-5.90)  Mil/uL


 


Hgb  6.7 L    (12.0-18.0)  g/dL


 


Hct  20.1 L    (35.0-51.0)  %


 


MCV  93.0  D    (80.0-94.0)  fl


 


MCH  31.1 H    (27.0-31.0)  pg


 


MCHC  33.5    (33.0-37.0)  g/dL


 


RDW  16.3 H    (11.5-14.5)  %


 


Plt Count  190    (130-400)  K/uL


 


pCO2     (35-45)  mm/Hg


 


pO2     ()  mm/Hg


 


HCO3     (21-28)  mmol/L


 


ABG pH     (7.35-7.45)  


 


ABG Total CO2     (22-28)  mmol/L


 


ABG O2 Saturation     (95-98)  %


 


ABG O2 Content     (15-23)  ML/dL


 


ABG Base Excess     (-2.0-3.0)  mmol/L


 


ABG Hemoglobin     (11.7-17.4)  g/dL


 


ABG Carboxyhemoglobin     (0.5-1.5)  %


 


POC ABG HHb (Measured)     (0.0-5.0)  %


 


ABG Methemoglobin     (0.0-3.0)  %


 


ABG O2 Capacity     (16-24)  mL/dL


 


Anthony Test     


 


A-a O2 Difference     mm/Hg


 


Hgb O2 Saturation     (95.0-98.0)  %


 


FiO2     %


 


Sodium   145   (132-148)  mmol/l


 


Potassium   4.0   (3.6-5.0)  MMOL/L


 


Chloride   114 H   ()  mmol/L


 


Carbon Dioxide   18 L   (22-30)  mmol/L


 


Anion Gap   17   (10-20)  


 


BUN   15   (9-20)  mg/dl


 


Creatinine   0.9   (0.8-1.5)  mg/dl


 


Est GFR (African Amer)   > 60   


 


Est GFR (Non-Af Amer)   > 60   


 


Random Glucose   145 H   ()  mg/dL


 


Calcium   7.7 L   (8.4-10.2)  mg/dL


 


Ammonia     (16-60)  umo/L


 


Blood Type    O POSITIVE  


 


Antibody Screen    Negative  


 


Crossmatch    See Detail  


 


BBK History Checked    Patient has bt  








 Laboratory Results - last 24 hr











  04/19/18 04/22/18 04/22/18





  22:40 05:20 07:00


 


WBC    18.4 H D


 


RBC    2.16 L


 


Hgb    6.7 L


 


Hct    20.1 L


 


MCV    93.0  D


 


MCH    31.1 H


 


MCHC    33.5


 


RDW    16.3 H


 


Plt Count    190


 


pCO2   


 


pO2   


 


HCO3   


 


ABG pH   


 


ABG Total CO2   


 


ABG O2 Saturation   


 


ABG O2 Content   


 


ABG Base Excess   


 


ABG Hemoglobin   


 


ABG Carboxyhemoglobin   


 


POC ABG HHb (Measured)   


 


ABG Methemoglobin   


 


ABG O2 Capacity   


 


Anthony Test   


 


A-a O2 Difference   


 


Hgb O2 Saturation   


 


FiO2   


 


Sodium   145 


 


Potassium   4.0 


 


Chloride   114 H 


 


Carbon Dioxide   18 L 


 


Anion Gap   17 


 


BUN   15 


 


Creatinine   0.9 


 


Est GFR ( Amer)   > 60 


 


Est GFR (Non-Af Amer)   > 60 


 


Random Glucose   145 H 


 


Calcium   7.7 L 


 


Ammonia   


 


Blood Type  O POSITIVE  


 


Antibody Screen  Negative  


 


Crossmatch  See Detail  


 


BBK History Checked  Patient has bt  














  04/22/18 04/22/18 04/22/18





  07:30 11:00 12:17


 


WBC   


 


RBC   


 


Hgb   


 


Hct   


 


MCV   


 


MCH   


 


MCHC   


 


RDW   


 


Plt Count   


 


pCO2    29 L


 


pO2    84


 


HCO3    23.7


 


ABG pH    7.48 H


 


ABG Total CO2    22.5


 


ABG O2 Saturation    99.3 H


 


ABG O2 Content    9.3 L


 


ABG Base Excess    -1.6


 


ABG Hemoglobin    6.7 L


 


ABG Carboxyhemoglobin    1.8 H


 


POC ABG HHb (Measured)    0.7


 


ABG Methemoglobin    0.8


 


ABG O2 Capacity    9.4 L


 


Anthony Test    Yes


 


A-a O2 Difference    29.0


 


Hgb O2 Saturation    96.7


 


FiO2    21.0


 


Sodium   


 


Potassium   


 


Chloride   


 


Carbon Dioxide   


 


Anion Gap   


 


BUN   


 


Creatinine   


 


Est GFR ( Amer)   


 


Est GFR (Non-Af Amer)   


 


Random Glucose   


 


Calcium   


 


Ammonia   235 H* 


 


Blood Type  O POSITIVE  


 


Antibody Screen  Negative  


 


Crossmatch  See Detail  


 


BBK History Checked  Patient has bt  











Fingerstick Blood Sugar Results: 158





Critical Care Progress Note





- Nutrition


Nutrition: 


 Nutrition











 Category Date Time Status


 


 Liquid Diet [DIET] Diets  04/20/18 Breakfast Active

## 2018-04-22 NOTE — RAD
HISTORY:

GI bleed/abdominal distention  



COMPARISON:

CT scan of the abdomen and pelvis dated 04/14/2018.



FINDINGS:



BOWEL:

No obstruction. No gross intraperitoneal free air. 



BONES:

Normal.



OTHER FINDINGS:

None.



IMPRESSION:

No active disease.

## 2018-04-22 NOTE — RAD
PROCEDURE:  CHEST RADIOGRAPH, 1 VIEW



HISTORY:

r/o Pneumonia



COMPARISON:

Chest radiograph dated 04/19/2018.



FINDINGS:

Patient rotation limits evaluation. 



LUNGS:

Clear.



PLEURA:

No pneumothorax or pleural fluid seen.



CARDIOVASCULAR:

Normal.



OSSEOUS STRUCTURES:

No significant abnormalities.



VISUALIZED UPPER ABDOMEN:

Normal.



OTHER FINDINGS:

None. 



IMPRESSION:

No active disease within the study limitations.

## 2018-04-22 NOTE — CP.PCM.PN
Subjective





- Date & Time of Evaluation


Date of Evaluation: 04/22/18


Time of Evaluation: 17:31





- Subjective


Subjective: 





Patient intubated due to altered mental status and blood was seen during 

intubation. Blood was also seen when patient given lactulose enemas. NG tube 

was placed and just small amount of blood was seen.





Objective





- Vital Signs/Intake and Output


Vital Signs (last 24 hours): 


 











Temp Pulse Resp BP Pulse Ox


 


 101.8 F H  122 H  12   93/52 L  100 


 


 04/22/18 16:00  04/22/18 16:00  04/22/18 16:00  04/22/18 16:00  04/22/18 16:00








Intake and Output: 


 











 04/22/18 04/22/18





 06:59 18:59


 


Intake Total 900 240


 


Output Total 250 


 


Balance 650 240














- Medications


Medications: 


 Current Medications





Chlordiazepoxide (Librium)  10 mg PO Q6 [facility]


   Last Admin: 04/22/18 17:04 Dose:  Not Given


Folic Acid (Folic Acid)  1 mg PO DAILY [facility]


   Last Admin: 04/22/18 09:00 Dose:  Not Given


Pantoprazole Sodium 40 mg/ (Sodium Chloride)  100 mls @ 20 mls/hr IVPB Q5H JUAN LUIS


   PRN Reason: 8 MG/HR


   Last Admin: 04/22/18 06:53 Dose:  20 mls/hr


Ciprofloxacin (Cipro 400mg/200ml Dsw)  400 mg in 200 mls @ 200 mls/hr IVPB Q12 

JUAN LUIS


   PRN Reason: Protocol


   Last Admin: 04/22/18 10:00 Dose:  200 mls/hr


Lactulose (Generlac)  200 gm GA Q6 JUAN LUIS


Lorazepam (Ativan)  2 mg IVP Q6 PRN


   PRN Reason: withdrawal symptoms


   Last Admin: 04/21/18 20:31 Dose:  2 mg


Metoclopramide HCl (Reglan)  10 mg IVP Q6 PRN


   PRN Reason: Nausea/Vomiting


   Last Admin: 04/20/18 08:49 Dose:  10 mg


Thiamine HCl (Vitamin B1 Tab)  100 mg PO DAILY [facility]


   Last Admin: 04/22/18 09:00 Dose:  Not Given











- Labs


Labs: 


 





 04/22/18 15:34 





 04/22/18 05:20 





 











PT  18.8 Seconds (9.8-13.1)  H  04/22/18  15:34    


 


INR  1.7  (0.9-1.2)  H  04/22/18  15:34    


 


APTT  32.8 Seconds (25.6-37.1)   04/19/18  22:15    














- Head Exam


Head Exam: ATRAUMATIC





- Eye Exam


Pupil Exam: PERRL





- Neck Exam


Neck Exam: Full ROM





- Respiratory Exam


Respiratory Exam: Clear to Ausculation Bilateral





- Cardiovascular Exam


Cardiovascular Exam: REGULAR RHYTHM





- GI/Abdominal Exam


GI & Abdominal Exam: Distended





Assessment and Plan


(1) GI bleed


Assessment & Plan: 


Patient received 1 unit of FFP. 2nd unit not given due to chills during first 

unit. 1 unit PRBC given so far. Patient now intubated and sedated and benefits 

of upper endoscopy of identifying source of bleeding outweigh the risks. This 

was discussed with patient's father and he agreed to the procedure. Will do 

endoscopy today.


Status: Acute

## 2018-04-23 NOTE — NM
PROCEDURE:  Nuclear medicine gastrointestinal bleeding scan.



Celexa.  Music



HISTORY:

active GIB



COMPARISON:

None available. 



TECHNIQUE:

4 cc of patient blood was withdrawn and mixed with 21 mCi of 

technetium ultra tagged. Images of the abdomen and pelvis were 

obtained in the anterior and posterior projection at 1 min intervals 

over a period of 45 min.



FINDINGS:

No abnormal extravasation of tracer was observed throughout the exam 

to indicate active bleeding within or outside the gastrointestinal 

tract.   



Physiologic activity was seen in the heart, liver, spleen and blood 

vessels.



IMPRESSION:

No evidence of active gastrointestinal bleeding.

## 2018-04-23 NOTE — CP.PCM.PN
Subjective





- Date & Time of Evaluation


Date of Evaluation: 04/23/18


Time of Evaluation: 11:34





- Subjective


Subjective: 





pt itnubated and sedated, stable


does not appear to be in any distress


received 2 units PRBC overnight


EGD yesterday no findings


no hematochezia or melena from flexiseal


hd stable


nad





Objective





- Vital Signs/Intake and Output


Vital Signs (last 24 hours): 


 











Temp Pulse Resp BP Pulse Ox


 


 98.5 F   77   16   100/51 L  100 


 


 04/23/18 08:00  04/23/18 08:15  04/23/18 08:15  04/23/18 08:15  04/23/18 08:15








Intake and Output: 


 











 04/23/18 04/23/18





 06:59 18:59


 


Intake Total 1730 25


 


Output Total 600 


 


Balance 1130 25














- Medications


Medications: 


 Current Medications





Chlordiazepoxide (Librium)  10 mg PO Q6 JUAN LUIS


   Last Admin: 04/23/18 10:32 Dose:  Not Given


Folic Acid (Folic Acid)  1 mg PO DAILY JUAN LUIS


   Last Admin: 04/23/18 10:32 Dose:  Not Given


Pantoprazole Sodium 40 mg/ (Sodium Chloride)  100 mls @ 20 mls/hr IVPB Q5H JUAN LUIS


   PRN Reason: 8 MG/HR


   Last Admin: 04/23/18 10:33 Dose:  20 mls/hr


Ciprofloxacin (Cipro 400mg/200ml Dsw)  400 mg in 200 mls @ 200 mls/hr IVPB Q12 

JUAN LUIS


   PRN Reason: Protocol


   Last Admin: 04/22/18 21:00 Dose:  200 mls/hr


Octreotide Acetate 1,250 mcg/ (Sodium Chloride)  252.5 mls @ 10.1 mls/hr IV 

.Q24H JUAN LUIS


   PRN Reason: 50 MCG/HR


   Last Admin: 04/22/18 21:54 Dose:  10.1 mls/hr


Propofol (Diprivan)  1,000 mg in 100 mls @ 2.313 mls/hr IV .Q24H JUAN LUIS; 5 MCG/KG/

MIN


   PRN Reason: Protocol


   Stop: 04/23/18 20:10


   Last Titration: 04/23/18 10:30 Dose:  35 mcg/kg/min, 16.193 mls/hr


Multivitamins/Vitamin C 10 ml/Thiamine HCl 100 mg/ Folic Acid 1 mg/ Dextrose/

Sodium Chloride  1,011.2 mls @ 100 mls/hr IV .Q10H7M ONE


   Stop: 04/23/18 21:09


Lactulose (Generlac)  200 gm SD Q6 JUAN LUIS


   Last Admin: 04/23/18 05:01 Dose:  Not Given


Lorazepam (Ativan)  2 mg IVP Q6 PRN


   PRN Reason: withdrawal symptoms


   Last Admin: 04/21/18 20:31 Dose:  2 mg


Metoclopramide HCl (Reglan)  10 mg IVP Q6 PRN


   PRN Reason: Nausea/Vomiting


   Last Admin: 04/20/18 08:49 Dose:  10 mg


Thiamine HCl (Vitamin B1 Tab)  100 mg PO DAILY JUAN LUIS


   Last Admin: 04/22/18 09:00 Dose:  Not Given











- Labs


Labs: 


 





 04/23/18 06:25 





 04/23/18 06:25 





 











PT  17.1 Seconds (9.8-13.1)  H  04/23/18  06:25    


 


INR  1.5  (0.9-1.2)  H  04/23/18  06:25    


 


APTT  35.3 Seconds (25.6-37.1)   04/23/18  06:25    














- Additional Findings


Additional findings: 





General: intubated, sedated


HEENT: NCAT, PERRL, EOMI


HEART: RRR, S1, S2 no MRG


LUNG: CTAB, no WRR


ABD: soft, NT, ND, no mass, no HSM


EXT: warm, well perfused


NEURO: reflexes normal


SKIN: warm, dry


PSYCH: sedated 





Assessment and Plan





- Assessment and Plan (Free Text)


Plan: 








38 y/o gent , known hx of Alcoholism and recent GI bleed, was brought in 

because of hematemesis and alteration in   mental status.


Pt was recently discharged from this hospital  after an episode of GI bleed .  

EGD done on 4/16 did not show any active bleed.  The patient was discharge home 

and again started drinking Alcohol.   On day of admission, he again had 

hematemesis and was noted to be confused and agitated.


4/22: pt noted to be obtunded, Ammonia level elevated.  Pt intubated for airway 

protection. 








(1) Acute blood loss anemia sec to GI Bleed


Status: Acute   


Pt admitted to ICU for close monitoring


Hgb=6.1 - transfused 6 units PRBC- Hgb pos transfusion 8.1 this morning


FFP also transfused


cont Protonix drip


GI consulted- Dr Owen - pt had recent EGD  w/c  w/c did not show active 

bleeding, given ddavp


cont IV Cipro


Abd Xray : neg








(2) GI bleed


Status: Acute   


as above





(3) Alcohol withdrawal


Status: Acute   


Pt is confused however better today, tremulous 


cont  1:1 Obs for safety as he is trying to get out of bed


start RTC Librium, Ativan prn


cont Thiamine and FA


IVF hydration





(4) Alcohol abuse


Status: Chronic   








5.  Hepatic Encephalopathy


Ammonia level markedly elevated 


Lactulose enema started


CT of the head : no bleed








6. Coagulopathy prob sec to Liver dis from alcoholism


Pt received Vit K


FFP transfusion given however pt developed rigors after transfusion- will check 

for transfusion reaction








7.  Transaminitis sec to ETOH


monitor





8.  Thrombocytopenia sec to ETOH liver dis


Plt normal today





DVT proph


- SCD


no anticoag sec to GIB and coagulopathy & low Platelet

## 2018-04-23 NOTE — RAD
HISTORY:

intubated reevaluate  



COMPARISON:

Portable chest 04/22/2018. 



FINDINGS:

Endotracheal and nasogastric tubes are unchanged in position. 



LUNGS:

Limited airspace disease in the right apex with linear atelectasis at 

the left apex. Cardiac silhouette stable. No pulmonary vascular 

derangement appreciated.



PLEURA:

No significant pleural effusion identified, no pneumothorax apparent.



CARDIOVASCULAR:

Normal.



OSSEOUS STRUCTURES:

No significant abnormalities.



VISUALIZED UPPER ABDOMEN:

Normal.



OTHER FINDINGS:

None.



IMPRESSION:

Limited airspace disease right apex medially. Stable ET and 

nasogastric tube placements.

## 2018-04-23 NOTE — CP.CCUPN
CCU Subjective





- Physician Review


Events Since Last Encounter (Free Text): 





04/23/18 19:03


The patient was Seen/interviewed and examined by me at the bedside during ICU 

round, 


Medical records reviewed and Management issues were discussed and formulated 

with the house staff.


Events reviewed 


Pt Sedaed and orally intubated


Afebrile, NSR on the monitor 


No Evidance of active bleeding thta we see, However Continue to have H/H drop 

despite multiple transfusion


S/P Endoscopy 4/15 (on prioe admission) revealed only acute gastritis 


Schedule for bleeding scan


Critical Care Time Spent (in minutes): 42





CCU Objective





- Vital Signs / Intake & Output


Vital Signs (Last 4 hours): 


Vital Signs











  Pulse Resp BP Pulse Ox


 


 04/23/18 16:30  69  16  116/63  99


 


 04/23/18 15:00  83  17  117/78  98


 


 04/23/18 14:30  69  16  103/58 L  100


 


 04/23/18 14:00  70  16  101/50 L  98











Intake and Output (Last 8hrs): 


 Intake & Output











 04/23/18 04/23/18 04/23/18





 06:59 14:59 22:59


 


Intake Total 1655 1120 


 


Output Total 600  440


 


Balance 1055 1120 -440


 


Intake:   


 


   1000 


 


  Intake, Piggyback 200 120 


 


  Blood Product 650  


 


Output:   


 


  Urine 300  440


 


    Urethral (Burger) 300  440


 


  Stool 300  














- Physical Exam


Head: Positive for: Atraumatic, Normocephalic


Pupils: Positive for: PERRL.  Negative for: Sluggish, Non-Reactive, Pinpoint


Extroacular Muscles: Positive for: EOMI


Conjunctiva: Positive for: Normal.  Negative for: Injected, Icteric


Mouth: Positive for: Moist Mucous Membranes, Dry


Pharnyx: Positive for: Normal.  Negative for: ERYTHEMA


Neck: Positive for: Normal Range of Motion


Respiratory/Chest: Positive for: Clear to Auscultation


Cardiovascular: Positive for: Regular Rate and Rhythm


Abdomen: Positive for: Distention, Normal Bowel Sounds.  Negative for: 

Tenderness


Upper Extremity: Positive for: Normal Inspection


Lower Extremity: Positive for: Normal Inspection


Neurological: Positive for: Speech Normal


Psychiatric: Positive for: Alert





- Medications


Active Medications: 


Active Medications











Generic Name Dose Route Start Last Admin





  Trade Name Freq  PRN Reason Stop Dose Admin


 


Chlordiazepoxide  10 mg  04/22/18 16:00  04/23/18 15:07





  Librium  PO   Not Given





  Q6 JUAN LUIS   


 


Folic Acid  1 mg  04/20/18 11:15  04/23/18 10:32





  Folic Acid  PO   Not Given





  DAILY JUAN LUIS   


 


Pantoprazole Sodium 40 mg/  100 mls @ 20 mls/hr  04/19/18 23:00  04/23/18 15:07





  Sodium Chloride  IVPB   20 mls/hr





  Q5H JUAN LUIS   Administration





  8 MG/HR   


 


Ciprofloxacin  400 mg in 200 mls @ 200 mls/hr  04/22/18 09:45  04/23/18 12:43





  Cipro 400mg/200ml Dsw  IVPB   200 mls/hr





  Q12 JUAN LUIS   Administration





  Protocol   


 


Octreotide Acetate 1,250 mcg/  252.5 mls @ 10.1 mls/hr  04/22/18 18:45  04/22/ 18 21:54





  Sodium Chloride  IV   10.1 mls/hr





  .Q24H JUAN LUIS   Administration





  50 MCG/HR   


 


Propofol  1,000 mg in 100 mls @ 2.313 mls/hr  04/22/18 15:10  04/23/18 15:10





  Diprivan  IV  04/23/18 20:10  30 mcg/kg/min





  .Q24H JUAN LUIS   13.88 mls/hr





  Protocol   Administration





  5 MCG/KG/MIN   


 


Multivitamins/Vitamin C 10 ml/  1,011.2 mls @ 100 mls/hr  04/23/18 11:03  04/23/ 18 12:45





Thiamine HCl 100 mg/ Folic  IV  04/23/18 21:09  100 mls/hr





Acid 1 mg/ Dextrose/Sodium  .Q10H7M ONE   Administration





Chloride     


 


Lactulose  200 gm  04/22/18 16:00  04/23/18 12:46





  Generlac  PA   Not Given





  Q6 JUAN LUIS   


 


Lorazepam  2 mg  04/19/18 22:50  04/21/18 20:31





  Ativan  IVP   2 mg





  Q6 PRN   Administration





  withdrawal symptoms   


 


Metoclopramide HCl  10 mg  04/19/18 22:52  04/20/18 08:49





  Reglan  IVP   10 mg





  Q6 PRN   Administration





  Nausea/Vomiting   


 


Thiamine HCl  100 mg  04/20/18 11:15  04/22/18 09:00





  Vitamin B1 Tab  PO   Not Given





  DAILY JUAN LUIS   














- Patient Studies


Lab Studies: 


 Lab Studies











  04/23/18 04/23/18 04/23/18 Range/Units





  13:02 07:00 06:25 


 


WBC  10.8    (4.8-10.8)  K/uL


 


RBC  2.49 L    (4.40-5.90)  Mil/uL


 


Hgb  7.6 L    (12.0-18.0)  g/dL


 


Hct  22.3 L    (35.0-51.0)  %


 


MCV  89.6  D    (80.0-94.0)  fl


 


MCH  30.6    (27.0-31.0)  pg


 


MCHC  34.1    (33.0-37.0)  g/dL


 


RDW  16.8 H    (11.5-14.5)  %


 


Plt Count  110 L    (130-400)  K/uL


 


PT     (9.8-13.1)  Seconds


 


INR     (0.9-1.2)  


 


APTT     (25.6-37.1)  Seconds


 


pCO2     (35-45)  mm/Hg


 


pO2     ()  mm/Hg


 


HCO3     (21-28)  mmol/L


 


ABG pH     (7.35-7.45)  


 


ABG Total CO2     (22-28)  mmol/L


 


ABG O2 Saturation     (95-98)  %


 


ABG Base Excess     (-2.0-3.0)  mmol/L


 


Anthony Test     


 


ABG Potassium     (3.6-5.2)  mmol/L


 


A-a O2 Difference     mm/Hg


 


Sodium    148  (132-148)  mmol/L


 


Chloride    116 H  ()  mmol/L


 


Glucose     ()  mg/dL


 


Lactate     (0.7-2.1)  mmol/L


 


Vent Mode     


 


Mechanical Rate     


 


FiO2     %


 


Tidal Volume     


 


PEEP     


 


Potassium    3.8  (3.6-5.0)  MMOL/L


 


Carbon Dioxide    19 L  (22-30)  mmol/L


 


Anion Gap    17  (10-20)  


 


BUN    22 H  (9-20)  mg/dl


 


Creatinine    1.3  (0.8-1.5)  mg/dl


 


Est GFR (African Amer)    > 60  


 


Est GFR (Non-Af Amer)    > 60  


 


Random Glucose    101  ()  mg/dL


 


Calcium    7.7 L  (8.4-10.2)  mg/dL


 


Total Bilirubin    1.1  (0.2-1.3)  mg/dl


 


AST    71 H  (17-59)  U/L


 


ALT    46  (21-72)  U/L


 


Alkaline Phosphatase    64  ()  U/L


 


Ammonia   42  D   (16-60)  umo/L


 


Total Protein    5.6 L  (6.3-8.2)  G/DL


 


Albumin    2.4 L  (3.5-5.0)  g/dL


 


Globulin    3.3  (2.2-3.9)  gm/dL


 


Albumin/Globulin Ratio    0.7 L  (1.0-2.1)  


 


Arterial Blood Potassium     (3.6-5.2)  mmol/L


 


Blood Type     


 


Antibody Screen     


 


Crossmatch     


 


Reaction Clerical Check     (NO DISCREPA)  


 


Pre-Trans Blood Type     


 


Pre-Trans ELVIA     (NEGATIVE)  


 


Post-Trans Blood Type     


 


Post-Trans ELVIA     (NEGATIVE)  


 


BBK History Checked     














  04/23/18 04/23/18 04/23/18 Range/Units





  06:25 06:25 04:42 


 


WBC   11.6 H   (4.8-10.8)  K/uL


 


RBC   2.65 L   (4.40-5.90)  Mil/uL


 


Hgb   8.1 L   (12.0-18.0)  g/dL


 


Hct   24.4 L   (35.0-51.0)  %


 


MCV   92.2   (80.0-94.0)  fl


 


MCH   30.7   (27.0-31.0)  pg


 


MCHC   33.3   (33.0-37.0)  g/dL


 


RDW   16.3 H   (11.5-14.5)  %


 


Plt Count   103 L   (130-400)  K/uL


 


PT  17.1 H    (9.8-13.1)  Seconds


 


INR  1.5 H    (0.9-1.2)  


 


APTT  35.3    (25.6-37.1)  Seconds


 


pCO2    28 L  (35-45)  mm/Hg


 


pO2    206 H  ()  mm/Hg


 


HCO3    22.5  (21-28)  mmol/L


 


ABG pH    7.45  (7.35-7.45)  


 


ABG Total CO2    20.4 L  (22-28)  mmol/L


 


ABG O2 Saturation    100.1 H  (95-98)  %


 


ABG Base Excess    -3.2 L  (-2.0-3.0)  mmol/L


 


Anthony Test    Yes  


 


ABG Potassium    3.8  (3.6-5.2)  mmol/L


 


A-a O2 Difference    116.0  mm/Hg


 


Sodium    143.0  (132-148)  mmol/L


 


Chloride    119.0 H  ()  mmol/L


 


Glucose    102  ()  mg/dL


 


Lactate    1.3  (0.7-2.1)  mmol/L


 


Vent Mode    A/c  


 


Mechanical Rate    16  


 


FiO2    50.0  %


 


Tidal Volume    500  


 


PEEP    5  


 


Potassium     (3.6-5.0)  MMOL/L


 


Carbon Dioxide     (22-30)  mmol/L


 


Anion Gap     (10-20)  


 


BUN     (9-20)  mg/dl


 


Creatinine     (0.8-1.5)  mg/dl


 


Est GFR (African Amer)     


 


Est GFR (Non-Af Amer)     


 


Random Glucose     ()  mg/dL


 


Calcium     (8.4-10.2)  mg/dL


 


Total Bilirubin     (0.2-1.3)  mg/dl


 


AST     (17-59)  U/L


 


ALT     (21-72)  U/L


 


Alkaline Phosphatase     ()  U/L


 


Ammonia     (16-60)  umo/L


 


Total Protein     (6.3-8.2)  G/DL


 


Albumin     (3.5-5.0)  g/dL


 


Globulin     (2.2-3.9)  gm/dL


 


Albumin/Globulin Ratio     (1.0-2.1)  


 


Arterial Blood Potassium    3.8  (3.6-5.2)  mmol/L


 


Blood Type     


 


Antibody Screen     


 


Crossmatch     


 


Reaction Clerical Check     (NO DISCREPA)  


 


Pre-Trans Blood Type     


 


Pre-Trans ELVIA     (NEGATIVE)  


 


Post-Trans Blood Type     


 


Post-Trans ELVIA     (NEGATIVE)  


 


BBK History Checked     














  04/23/18 04/22/18 04/22/18 Range/Units





  01:35 16:31 15:34 


 


WBC  12.4 H    (4.8-10.8)  K/uL


 


RBC  2.09 L    (4.40-5.90)  Mil/uL


 


Hgb  6.2 L*    (12.0-18.0)  g/dL


 


Hct  19.3 L    (35.0-51.0)  %


 


MCV  92.4  D    (80.0-94.0)  fl


 


MCH  29.9    (27.0-31.0)  pg


 


MCHC  32.4 L    (33.0-37.0)  g/dL


 


RDW  16.7 H    (11.5-14.5)  %


 


Plt Count  116 L   119 L D  (130-400)  K/uL


 


PT     (9.8-13.1)  Seconds


 


INR     (0.9-1.2)  


 


APTT     (25.6-37.1)  Seconds


 


pCO2     (35-45)  mm/Hg


 


pO2     ()  mm/Hg


 


HCO3     (21-28)  mmol/L


 


ABG pH     (7.35-7.45)  


 


ABG Total CO2     (22-28)  mmol/L


 


ABG O2 Saturation     (95-98)  %


 


ABG Base Excess     (-2.0-3.0)  mmol/L


 


Anthony Test     


 


ABG Potassium     (3.6-5.2)  mmol/L


 


A-a O2 Difference     mm/Hg


 


Sodium     (132-148)  mmol/L


 


Chloride     ()  mmol/L


 


Glucose     ()  mg/dL


 


Lactate     (0.7-2.1)  mmol/L


 


Vent Mode     


 


Mechanical Rate     


 


FiO2     %


 


Tidal Volume     


 


PEEP     


 


Potassium     (3.6-5.0)  MMOL/L


 


Carbon Dioxide     (22-30)  mmol/L


 


Anion Gap     (10-20)  


 


BUN     (9-20)  mg/dl


 


Creatinine     (0.8-1.5)  mg/dl


 


Est GFR (African Amer)     


 


Est GFR (Non-Af Amer)     


 


Random Glucose     ()  mg/dL


 


Calcium     (8.4-10.2)  mg/dL


 


Total Bilirubin     (0.2-1.3)  mg/dl


 


AST     (17-59)  U/L


 


ALT     (21-72)  U/L


 


Alkaline Phosphatase     ()  U/L


 


Ammonia     (16-60)  umo/L


 


Total Protein     (6.3-8.2)  G/DL


 


Albumin     (3.5-5.0)  g/dL


 


Globulin     (2.2-3.9)  gm/dL


 


Albumin/Globulin Ratio     (1.0-2.1)  


 


Arterial Blood Potassium     (3.6-5.2)  mmol/L


 


Blood Type     


 


Antibody Screen     


 


Crossmatch     


 


Reaction Clerical Check   No discrepancy   (NO DISCREPA)  


 


Pre-Trans Blood Type   O POSITIVE   


 


Pre-Trans ELVIA   Negative   (NEGATIVE)  


 


Post-Trans Blood Type   O POSITIVE   


 


Post-Trans ELVIA   Negative   (NEGATIVE)  


 


BBK History Checked     














  04/22/18 Range/Units





  07:30 


 


WBC   (4.8-10.8)  K/uL


 


RBC   (4.40-5.90)  Mil/uL


 


Hgb   (12.0-18.0)  g/dL


 


Hct   (35.0-51.0)  %


 


MCV   (80.0-94.0)  fl


 


MCH   (27.0-31.0)  pg


 


MCHC   (33.0-37.0)  g/dL


 


RDW   (11.5-14.5)  %


 


Plt Count   (130-400)  K/uL


 


PT   (9.8-13.1)  Seconds


 


INR   (0.9-1.2)  


 


APTT   (25.6-37.1)  Seconds


 


pCO2   (35-45)  mm/Hg


 


pO2   ()  mm/Hg


 


HCO3   (21-28)  mmol/L


 


ABG pH   (7.35-7.45)  


 


ABG Total CO2   (22-28)  mmol/L


 


ABG O2 Saturation   (95-98)  %


 


ABG Base Excess   (-2.0-3.0)  mmol/L


 


Anthony Test   


 


ABG Potassium   (3.6-5.2)  mmol/L


 


A-a O2 Difference   mm/Hg


 


Sodium   (132-148)  mmol/L


 


Chloride   ()  mmol/L


 


Glucose   ()  mg/dL


 


Lactate   (0.7-2.1)  mmol/L


 


Vent Mode   


 


Mechanical Rate   


 


FiO2   %


 


Tidal Volume   


 


PEEP   


 


Potassium   (3.6-5.0)  MMOL/L


 


Carbon Dioxide   (22-30)  mmol/L


 


Anion Gap   (10-20)  


 


BUN   (9-20)  mg/dl


 


Creatinine   (0.8-1.5)  mg/dl


 


Est GFR (African Amer)   


 


Est GFR (Non-Af Amer)   


 


Random Glucose   ()  mg/dL


 


Calcium   (8.4-10.2)  mg/dL


 


Total Bilirubin   (0.2-1.3)  mg/dl


 


AST   (17-59)  U/L


 


ALT   (21-72)  U/L


 


Alkaline Phosphatase   ()  U/L


 


Ammonia   (16-60)  umo/L


 


Total Protein   (6.3-8.2)  G/DL


 


Albumin   (3.5-5.0)  g/dL


 


Globulin   (2.2-3.9)  gm/dL


 


Albumin/Globulin Ratio   (1.0-2.1)  


 


Arterial Blood Potassium   (3.6-5.2)  mmol/L


 


Blood Type  O POSITIVE  


 


Antibody Screen  Negative  


 


Crossmatch  See Detail  


 


Reaction Clerical Check   (NO DISCREPA)  


 


Pre-Trans Blood Type   


 


Pre-Trans ELVIA   (NEGATIVE)  


 


Post-Trans Blood Type   


 


Post-Trans ELVIA   (NEGATIVE)  


 


BBK History Checked  Patient has bt  








 Laboratory Results - last 24 hr











  04/22/18 04/22/18 04/22/18





  07:30 15:34 16:31


 


WBC   


 


RBC   


 


Hgb   


 


Hct   


 


MCV   


 


MCH   


 


MCHC   


 


RDW   


 


Plt Count   119 L D 


 


PT   


 


INR   


 


APTT   


 


pCO2   


 


pO2   


 


HCO3   


 


ABG pH   


 


ABG Total CO2   


 


ABG O2 Saturation   


 


ABG Base Excess   


 


Anthony Test   


 


ABG Potassium   


 


A-a O2 Difference   


 


Sodium   


 


Chloride   


 


Glucose   


 


Lactate   


 


Vent Mode   


 


Mechanical Rate   


 


FiO2   


 


Tidal Volume   


 


PEEP   


 


Potassium   


 


Carbon Dioxide   


 


Anion Gap   


 


BUN   


 


Creatinine   


 


Est GFR ( Amer)   


 


Est GFR (Non-Af Amer)   


 


Random Glucose   


 


Calcium   


 


Total Bilirubin   


 


AST   


 


ALT   


 


Alkaline Phosphatase   


 


Ammonia   


 


Total Protein   


 


Albumin   


 


Globulin   


 


Albumin/Globulin Ratio   


 


Arterial Blood Potassium   


 


Blood Type  O POSITIVE  


 


Antibody Screen  Negative  


 


Crossmatch  See Detail  


 


Reaction Clerical Check    No discrepancy


 


Pre-Trans Blood Type    O POSITIVE


 


Pre-Trans ELVIA    Negative


 


Post-Trans Blood Type    O POSITIVE


 


Post-Trans ELVIA    Negative


 


BBK History Checked  Patient has bt  














  04/23/18 04/23/18 04/23/18





  01:35 04:42 06:25


 


WBC  12.4 H   11.6 H


 


RBC  2.09 L   2.65 L


 


Hgb  6.2 L*   8.1 L


 


Hct  19.3 L   24.4 L


 


MCV  92.4  D   92.2


 


MCH  29.9   30.7


 


MCHC  32.4 L   33.3


 


RDW  16.7 H   16.3 H


 


Plt Count  116 L   103 L


 


PT   


 


INR   


 


APTT   


 


pCO2   28 L 


 


pO2   206 H 


 


HCO3   22.5 


 


ABG pH   7.45 


 


ABG Total CO2   20.4 L 


 


ABG O2 Saturation   100.1 H 


 


ABG Base Excess   -3.2 L 


 


Anthony Test   Yes 


 


ABG Potassium   3.8 


 


A-a O2 Difference   116.0 


 


Sodium   143.0 


 


Chloride   119.0 H 


 


Glucose   102 


 


Lactate   1.3 


 


Vent Mode   A/c 


 


Mechanical Rate   16 


 


FiO2   50.0 


 


Tidal Volume   500 


 


PEEP   5 


 


Potassium   


 


Carbon Dioxide   


 


Anion Gap   


 


BUN   


 


Creatinine   


 


Est GFR ( Amer)   


 


Est GFR (Non-Af Amer)   


 


Random Glucose   


 


Calcium   


 


Total Bilirubin   


 


AST   


 


ALT   


 


Alkaline Phosphatase   


 


Ammonia   


 


Total Protein   


 


Albumin   


 


Globulin   


 


Albumin/Globulin Ratio   


 


Arterial Blood Potassium   3.8 


 


Blood Type   


 


Antibody Screen   


 


Crossmatch   


 


Reaction Clerical Check   


 


Pre-Trans Blood Type   


 


Pre-Trans ELVIA   


 


Post-Trans Blood Type   


 


Post-Trans ELVIA   


 


BBK History Checked   














  04/23/18 04/23/18 04/23/18





  06:25 06:25 07:00


 


WBC   


 


RBC   


 


Hgb   


 


Hct   


 


MCV   


 


MCH   


 


MCHC   


 


RDW   


 


Plt Count   


 


PT  17.1 H  


 


INR  1.5 H  


 


APTT  35.3  


 


pCO2   


 


pO2   


 


HCO3   


 


ABG pH   


 


ABG Total CO2   


 


ABG O2 Saturation   


 


ABG Base Excess   


 


Anthony Test   


 


ABG Potassium   


 


A-a O2 Difference   


 


Sodium   148 


 


Chloride   116 H 


 


Glucose   


 


Lactate   


 


Vent Mode   


 


Mechanical Rate   


 


FiO2   


 


Tidal Volume   


 


PEEP   


 


Potassium   3.8 


 


Carbon Dioxide   19 L 


 


Anion Gap   17 


 


BUN   22 H 


 


Creatinine   1.3 


 


Est GFR ( Amer)   > 60 


 


Est GFR (Non-Af Amer)   > 60 


 


Random Glucose   101 


 


Calcium   7.7 L 


 


Total Bilirubin   1.1 


 


AST   71 H 


 


ALT   46 


 


Alkaline Phosphatase   64 


 


Ammonia    42  D


 


Total Protein   5.6 L 


 


Albumin   2.4 L 


 


Globulin   3.3 


 


Albumin/Globulin Ratio   0.7 L 


 


Arterial Blood Potassium   


 


Blood Type   


 


Antibody Screen   


 


Crossmatch   


 


Reaction Clerical Check   


 


Pre-Trans Blood Type   


 


Pre-Trans ELVIA   


 


Post-Trans Blood Type   


 


Post-Trans ELVIA   


 


BBK History Checked   














  04/23/18





  13:02


 


WBC  10.8


 


RBC  2.49 L


 


Hgb  7.6 L


 


Hct  22.3 L


 


MCV  89.6  D


 


MCH  30.6


 


MCHC  34.1


 


RDW  16.8 H


 


Plt Count  110 L


 


PT 


 


INR 


 


APTT 


 


pCO2 


 


pO2 


 


HCO3 


 


ABG pH 


 


ABG Total CO2 


 


ABG O2 Saturation 


 


ABG Base Excess 


 


Anthony Test 


 


ABG Potassium 


 


A-a O2 Difference 


 


Sodium 


 


Chloride 


 


Glucose 


 


Lactate 


 


Vent Mode 


 


Mechanical Rate 


 


FiO2 


 


Tidal Volume 


 


PEEP 


 


Potassium 


 


Carbon Dioxide 


 


Anion Gap 


 


BUN 


 


Creatinine 


 


Est GFR ( Amer) 


 


Est GFR (Non-Af Amer) 


 


Random Glucose 


 


Calcium 


 


Total Bilirubin 


 


AST 


 


ALT 


 


Alkaline Phosphatase 


 


Ammonia 


 


Total Protein 


 


Albumin 


 


Globulin 


 


Albumin/Globulin Ratio 


 


Arterial Blood Potassium 


 


Blood Type 


 


Antibody Screen 


 


Crossmatch 


 


Reaction Clerical Check 


 


Pre-Trans Blood Type 


 


Pre-Trans ELVIA 


 


Post-Trans Blood Type 


 


Post-Trans ELVIA 


 


BBK History Checked 











Fingerstick Blood Sugar Results: 158





Review of Systems





- Review of Systems


Systems not reviewed;Unavailable: Intubated





Critical Care Progress Note





- Ventilator Checklist


Head of Bed 30 Degrees: Yes


Daily Sedation Vacation: Yes


Daily Assessment of Readiness to Wean: Yes


Daily Spontaneous Breathing Trial: Yes


PUD Prophalyxis: Yes


DVT Prophylaxis: Yes


Oral Care with Chlorhexidine Gluconate {CHG}: Yes





- Nutrition


Nutrition: 


 Nutrition











 Category Date Time Status


 


 NPO Diet [DIET] Diets  04/22/18 Breakfast Active














Assessment/Plan


(1) Acute respiratory failure


Current Visit: Yes   Status: Acute   Comment: 


Full vent support for now till bleeding under control


PRN Ativan


fall/aspiration/seizure precautions     





(2) Acute blood loss anemia


Current Visit: Yes   Status: Acute   Comment: 


CBCs unstable


Receiving pRBC and FFP


Received DDAVP and Vit K


Scheduled for bleeding scsn   





(3) GI bleed


Current Visit: Yes   Status: Acute   Comment: 


S/P Endoscopy yesterday, no acute bleeding


Two large bore peripheral catheters


PANTOPRAZOLE drip 


OCTREOTIDE drip


Vent support


NPO   





(4) Hepatic encephalopathy


Current Visit: Yes   Status: Acute   Comment: 


Ammonia level trending down


Continue Lactulose, switch enema to OG tube


CT of the head: no bleed   





(5) Chronic alcoholism


Current Visit: No   Status: Acute   Comment: 


Banana Bag then Intravenous thiamine and Folate


PRN Ativan


fall/aspiration/seizure precautions

## 2018-04-24 NOTE — CP.CCUPN
CCU Subjective





- Physician Review


Events Since Last Encounter (Free Text): 





04/24/18 13:07


The patient was Seen/interviewed and examined by me at the bedside during ICU 

round, 


Medical records reviewed and Management issues were discussed and formulated 

with the house staff.


Events reviewed 


Pt Sedaed and orally intubated


Afebrile, NSR on the monitor Received 2 more unites packed RBC and 1U FFP 

overnight 


No Evidance of active bleeding overnight





Afebrile 


NSR on the monitor, BP stable 


Remains NPO, on IV Fluids 


Remains on pantoprazole and octreotide drip


Bleeding scan done yesterday 04/23 and was negative


Received total of 11U packed RBC and 6U FFP since admission 


However Continue to have H/H drop despite multiple transfusion


S/P Endoscopy 4/15 (on prior admission) and revealed only acute gastritis 


OG tube placement placed to suction with light pink color return, patient is 

not tachcardic or hypotensive. 


If any further bleeding with repeat bleeding scan





Critical Care Time Spent (in minutes): 39





CCU Objective





- Vital Signs / Intake & Output


Vital Signs (Last 4 hours): 


Vital Signs











  Temp Pulse Resp BP Pulse Ox


 


 04/24/18 12:00  98.2 F  58 L  16  116/73  100


 


 04/24/18 10:00   68  13  112/72  98











Intake and Output (Last 8hrs): 


 Intake & Output











 04/23/18 04/24/18 04/24/18





 22:59 06:59 14:59


 


Intake Total 460 1050 580


 


Output Total 440  120


 


Balance 20 1050 460


 


Intake:   


 


   200 480


 


  Intake, Piggyback 60 200 100


 


  Blood Product  650 


 


Output:   


 


  Urine 440  120


 


    Urethral (Burger) 440  120














- Physical Exam


Head: Positive for: Atraumatic, Normocephalic


Pupils: Positive for: PERRL.  Negative for: Sluggish, Non-Reactive, Pinpoint


Extroacular Muscles: Positive for: EOMI


Conjunctiva: Positive for: Normal.  Negative for: Injected, Icteric


Mouth: Positive for: Moist Mucous Membranes, Dry


Pharnyx: Positive for: Normal.  Negative for: ERYTHEMA


Neck: Positive for: Normal Range of Motion


Respiratory/Chest: Positive for: Clear to Auscultation, Good Air Exchange.  

Negative for: Respiratory Distress, Accessory Muscle Use, Wheezes, Rhonchi


Cardiovascular: Positive for: Regular Rate and Rhythm, Normal S1, S2, Peripheal 

Pulses Present.  Negative for: Murmurs, Irregular Rhythm, Tachycardic, 

Bradycardic


Abdomen: Positive for: Distention, Normal Bowel Sounds.  Negative for: 

Tenderness


Upper Extremity: Positive for: Normal Inspection.  Negative for: Edema


Lower Extremity: Positive for: Normal Inspection.  Negative for: Edema


Neurological: Positive for: Speech Normal


Psychiatric: Positive for: Alert





- Medications


Active Medications: 


Active Medications











Generic Name Dose Route Start Last Admin





  Trade Name Freq  PRN Reason Stop Dose Admin


 


Chlordiazepoxide  10 mg  04/22/18 16:00  04/24/18 09:00





  Librium  PO   10 mg





  Q6 JUAN LUIS   Administration


 


Folic Acid  1 mg  04/20/18 11:15  04/24/18 08:48





  Folic Acid  PO   1 mg





  DAILY JUAN LUIS   Administration


 


Pantoprazole Sodium 40 mg/  100 mls @ 20 mls/hr  04/19/18 23:00  04/24/18 12:14





  Sodium Chloride  IVPB   20 mls/hr





  Q5H JUAN LUIS   Administration





  8 MG/HR   


 


Ciprofloxacin  400 mg in 200 mls @ 200 mls/hr  04/22/18 09:45  04/24/18 08:36





  Cipro 400mg/200ml Dsw  IVPB   200 mls/hr





  Q12 JUAN LUIS   Administration





  Protocol   


 


Octreotide Acetate 1,250 mcg/  252.5 mls @ 10.1 mls/hr  04/22/18 18:45  04/24/ 18 00:52





  Sodium Chloride  IV   10.1 mls/hr





  .Q24H JUAN LUIS   Administration





  50 MCG/HR   


 


Propofol  1,000 mg in 100 mls @ 13.88 mls/hr  04/24/18 00:28  04/24/18 07:21





  Diprivan  IV  04/24/18 23:28  20 mcg/kg/min





  .Q7H13M JUAN LUIS   9.253 mls/hr





  Protocol   Administration





  30 MCG/KG/MIN   


 


Lactulose  20 gm  04/23/18 22:00  04/24/18 09:00





  Enulose  PO   20 gm





  Q6 JUAN LUIS   Administration


 


Metoclopramide HCl  10 mg  04/19/18 22:52  04/20/18 08:49





  Reglan  IVP   10 mg





  Q6 PRN   Administration





  Nausea/Vomiting   


 


Thiamine HCl  100 mg  04/20/18 11:15  04/24/18 08:47





  Vitamin B1 Tab  PO   100 mg





  DAILY JUAN LUIS   Administration














- Patient Studies


Lab Studies: 


 Lab Studies











  04/24/18 04/23/18 04/22/18 Range/Units





  05:24 13:02 07:30 


 


WBC   10.8   (4.8-10.8)  K/uL


 


RBC   2.49 L   (4.40-5.90)  Mil/uL


 


Hgb   7.6 L   (12.0-18.0)  g/dL


 


Hct   22.3 L   (35.0-51.0)  %


 


MCV   89.6  D   (80.0-94.0)  fl


 


MCH   30.6   (27.0-31.0)  pg


 


MCHC   34.1   (33.0-37.0)  g/dL


 


RDW   16.8 H   (11.5-14.5)  %


 


Plt Count   110 L   (130-400)  K/uL


 


pCO2  33 L    (35-45)  mm/Hg


 


pO2  110 H    ()  mm/Hg


 


HCO3  22.9    (21-28)  mmol/L


 


ABG pH  7.42    (7.35-7.45)  


 


ABG Total CO2  22.4    (22-28)  mmol/L


 


ABG O2 Saturation  99.5 H    (95-98)  %


 


ABG O2 Content  13.1 L    (15-23)  ML/dL


 


ABG Base Excess  -2.6 L    (-2.0-3.0)  mmol/L


 


ABG Hemoglobin  9.4 L    (11.7-17.4)  g/dL


 


ABG Carboxyhemoglobin  1.4    (0.5-1.5)  %


 


POC ABG HHb (Measured)  0.5    (0.0-5.0)  %


 


ABG Methemoglobin  0.6    (0.0-3.0)  %


 


ABG O2 Capacity  13.2 L    (16-24)  mL/dL


 


Anthony Test  Yes    


 


A-a O2 Difference  134.0    mm/Hg


 


Hgb O2 Saturation  97.4    (95.0-98.0)  %


 


Vent Mode  A/c    


 


Mechanical Rate  16    


 


FiO2  40.0    %


 


Tidal Volume  500    


 


PEEP  5    


 


Blood Type    O POSITIVE  


 


Antibody Screen    Negative  


 


Crossmatch    See Detail  


 


BBK History Checked    Patient has bt  








 Laboratory Results - last 24 hr











  04/22/18 04/23/18 04/24/18





  07:30 13:02 05:24


 


WBC   10.8 


 


RBC   2.49 L 


 


Hgb   7.6 L 


 


Hct   22.3 L 


 


MCV   89.6  D 


 


MCH   30.6 


 


MCHC   34.1 


 


RDW   16.8 H 


 


Plt Count   110 L 


 


pCO2    33 L


 


pO2    110 H


 


HCO3    22.9


 


ABG pH    7.42


 


ABG Total CO2    22.4


 


ABG O2 Saturation    99.5 H


 


ABG O2 Content    13.1 L


 


ABG Base Excess    -2.6 L


 


ABG Hemoglobin    9.4 L


 


ABG Carboxyhemoglobin    1.4


 


POC ABG HHb (Measured)    0.5


 


ABG Methemoglobin    0.6


 


ABG O2 Capacity    13.2 L


 


Anthony Test    Yes


 


A-a O2 Difference    134.0


 


Hgb O2 Saturation    97.4


 


Vent Mode    A/c


 


Mechanical Rate    16


 


FiO2    40.0


 


Tidal Volume    500


 


PEEP    5


 


Blood Type  O POSITIVE  


 


Antibody Screen  Negative  


 


Crossmatch  See Detail  


 


BBK History Checked  Patient has bt  











Fingerstick Blood Sugar Results: 158





Review of Systems





- Review of Systems


Systems not reviewed;Unavailable: Intubated





Critical Care Progress Note





- Ventilator Checklist


Head of Bed 30 Degrees: Yes


Daily Sedation Vacation: Yes


Daily Assessment of Readiness to Wean: Yes


Daily Spontaneous Breathing Trial: Yes


PUD Prophalyxis: Yes


DVT Prophylaxis: Yes


Oral Care with Chlorhexidine Gluconate {CHG}: Yes





- Nutrition


Nutrition: 


 Nutrition











 Category Date Time Status


 


 NPO Diet [DIET] Diets  04/22/18 Breakfast Active














Assessment/Plan


(1) Acute respiratory failure


Current Visit: Yes   Status: Acute   Comment: 


Full vent support for now till bleeding under control


PRN Ativan, Librium 


fall/aspiration/seizure precautions  


Wean off FIO2   





(2) Acute blood loss anemia


Current Visit: Yes   Status: Acute   Comment: 


CBCs unstable


Receiving pRBC and FFP


Received DDAVP and Vit K


Scheduled for bleeding scsn   





(3) GI bleed


Current Visit: Yes   Status: Acute   Comment: 


S/P Endoscopy yesterday, no acute bleeding


Two large bore peripheral catheters


PANTOPRAZOLE drip 


OCTREOTIDE drip


Vent support


NPO   





(4) Hepatic encephalopathy


Current Visit: Yes   Status: Acute   Comment: 


Ammonia level trending down


Continue Lactulose, switch enema to OG tube


CT of the head: no bleed   





(5) Chronic alcoholism


Current Visit: No   Status: Acute   Comment: 


Banana Bag then Intravenous thiamine and Folate


Continue Librium 


PRN Ativan


fall/aspiration/seizure precautions

## 2018-04-24 NOTE — CP.PCM.PN
Subjective





- Date & Time of Evaluation


Date of Evaluation: 04/24/18


Time of Evaluation: 10:53





- Subjective


Subjective: 





pt intubated sedating


hd stable , sedation vacation+


receiving ffp this morning, completed 2 units PRBC overnight


repeat H/H improved 9.1 / 28.9 


will repeat this evening for eval of bleeding


also changed Lactulose to OGT to encourage BM none yesterday, unk if bleeding








Objective





- Vital Signs/Intake and Output


Vital Signs (last 24 hours): 


 











Temp Pulse Resp BP Pulse Ox


 


 98.2 F   68   13   112/72   98 


 


 04/24/18 09:00  04/24/18 10:00  04/24/18 10:00  04/24/18 10:00  04/24/18 10:00








Intake and Output: 


 











 04/24/18 04/24/18





 06:59 18:59


 


Intake Total 1050 580


 


Output Total  120


 


Balance 1050 460








General: intubated sedated


HEENT: NCAT, PERRL, EOMI


HEART: RRR, S1, S2 no MRG


LUNG: CTAB, no WRR


ABD: soft, NT, ND, no mass, no HSM


EXT: warm, well perfused


NEURO: sedated, reflexes normal


SKIN: warm, dry


PSYCH: sedated, unable to assess





- Medications


Medications: 


 Current Medications





Chlordiazepoxide (Librium)  10 mg PO Q6 [facility]


   Last Admin: 04/24/18 09:00 Dose:  10 mg


Folic Acid (Folic Acid)  1 mg PO DAILY JUAN LUIS


   Last Admin: 04/24/18 08:48 Dose:  1 mg


Pantoprazole Sodium 40 mg/ (Sodium Chloride)  100 mls @ 20 mls/hr IVPB Q5H JUAN LUIS


   PRN Reason: 8 MG/HR


   Last Admin: 04/24/18 05:22 Dose:  20 mls/hr


Ciprofloxacin (Cipro 400mg/200ml Dsw)  400 mg in 200 mls @ 200 mls/hr IVPB Q12 

JUAN LUIS


   PRN Reason: Protocol


   Last Admin: 04/24/18 08:36 Dose:  200 mls/hr


Octreotide Acetate 1,250 mcg/ (Sodium Chloride)  252.5 mls @ 10.1 mls/hr IV 

.Q24H JUAN LUIS


   PRN Reason: 50 MCG/HR


   Last Admin: 04/24/18 00:52 Dose:  10.1 mls/hr


Propofol (Diprivan)  1,000 mg in 100 mls @ 13.88 mls/hr IV .Q7H13M JUAN LUIS; 30 MCG/

KG/MIN


   PRN Reason: Protocol


   Stop: 04/24/18 23:28


   Last Admin: 04/24/18 07:21 Dose:  20 mcg/kg/min, 9.253 mls/hr


Lactulose (Enulose)  20 gm PO Q6 [facility]


   Last Admin: 04/24/18 09:00 Dose:  20 gm


Metoclopramide HCl (Reglan)  10 mg IVP Q6 PRN


   PRN Reason: Nausea/Vomiting


   Last Admin: 04/20/18 08:49 Dose:  10 mg


Thiamine HCl (Vitamin B1 Tab)  100 mg PO DAILY [facility]


   Last Admin: 04/24/18 08:47 Dose:  100 mg











- Labs


Labs: 


 





 04/23/18 13:02 





 04/23/18 06:25 





 











PT  17.1 Seconds (9.8-13.1)  H  04/23/18  06:25    


 


INR  1.5  (0.9-1.2)  H  04/23/18  06:25    


 


APTT  35.3 Seconds (25.6-37.1)   04/23/18  06:25    














Assessment and Plan





- Assessment and Plan (Free Text)


Plan: 





8 y/o gent , known hx of Alcoholism and recent GI bleed, was brought in because 

of hematemesis and alteration in   mental status.


Pt was recently discharged from this hospital  after an episode of GI bleed .  

EGD done on 4/16 did not show any active bleed.  The patient was discharge home 

and again started drinking Alcohol.   On day of admission, he again had 

hematemesis and was noted to be confused and agitated.


4/22: pt noted to be obtunded, Ammonia level elevated.  Pt intubated for airway 

protection. 


4/23: pt post transfusion H/H was stable however 4 hour rpeat showed a drop in H

/H. Bleeding scan ordered and 2 units PRBC and 1 FFP ordered. Lactulose changed 

to OGT, no output from flexiseal. 


4/24: transfusions were completed this morning, Bleeding Scan was negative. 

Post transfusion H/H 9.1/28.9. Repeat in evening to evaluate. 





(1) Acute blood loss anemia sec to GI Bleed


Status: Acute   


Pt admitted to ICU for close monitoring


Total 11 units PRBC 6 FFP transfused (2 prbc/1 ffp overnight)


hematocrit up to 28.9 post transfusion, will repeat H/H this evening to monitor 

for bleeding, lactulose to encourage BM


cont Protonix drip


GI consulted- Dr Owen - pt had recent EGD  w/c  w/c did not show active 

bleeding, given ddavp


Bleeding scan was negative


cont IV Cipro


Abd Xray : neg








(2) GI bleed


Status: Acute   


as above





(3) Alcohol withdrawal


Status: Acute   


Pt is confused however better today, tremulous 


cont  1:1 Obs for safety as he is trying to get out of bed


start RTC Librium, Ativan prn


cont Thiamine and FA


IVF hydration





(4) Alcohol abuse


Status: Chronic   








5.  Hepatic Encephalopathy


Ammonia level markedly elevated 


Lactulose enema started


CT of the head : no bleed








6. Coagulopathy prob sec to Liver dis from alcoholism


Pt received Vit K


FFP transfusion given however pt developed rigors after transfusion- will check 

for transfusion reaction








7.  Transaminitis sec to ETOH


monitor





8.  Thrombocytopenia sec to ETOH liver dis


Plt normal today





DVT proph


- SCD


no anticoag sec to GIB and coagulopathy & low Platelet

## 2018-04-24 NOTE — CP.PCM.PN
Subjective





- Date & Time of Evaluation


Date of Evaluation: 04/24/18


Time of Evaluation: 09:00





- Subjective


Subjective: 





Patient with stable BP and heart rate. Bleeding scan yesterday negative.





Objective





- Vital Signs/Intake and Output


Vital Signs (last 24 hours): 


 











Temp Pulse Resp BP Pulse Ox


 


 98.2 F   58 L  16   116/73   100 


 


 04/24/18 12:00  04/24/18 12:00  04/24/18 12:00  04/24/18 12:00  04/24/18 12:00








Intake and Output: 


 











 04/24/18 04/24/18





 06:59 18:59


 


Intake Total 1050 580


 


Output Total  120


 


Balance 1050 460














- Medications


Medications: 


 Current Medications





Chlordiazepoxide (Librium)  10 mg PO Q6 JUAN LUIS


   Last Admin: 04/24/18 09:00 Dose:  10 mg


Folic Acid (Folic Acid)  1 mg PO DAILY JUAN LUIS


   Last Admin: 04/24/18 08:48 Dose:  1 mg


Pantoprazole Sodium 40 mg/ (Sodium Chloride)  100 mls @ 20 mls/hr IVPB Q5H JUAN LUIS


   PRN Reason: 8 MG/HR


   Last Admin: 04/24/18 12:14 Dose:  20 mls/hr


Ciprofloxacin (Cipro 400mg/200ml Dsw)  400 mg in 200 mls @ 200 mls/hr IVPB Q12 

JUAN LUIS


   PRN Reason: Protocol


   Last Admin: 04/24/18 08:36 Dose:  200 mls/hr


Octreotide Acetate 1,250 mcg/ (Sodium Chloride)  252.5 mls @ 10.1 mls/hr IV 

.Q24H JUAN LUIS


   PRN Reason: 50 MCG/HR


   Last Admin: 04/24/18 00:52 Dose:  10.1 mls/hr


Propofol (Diprivan)  1,000 mg in 100 mls @ 13.88 mls/hr IV .Q7H13M JUAN LUIS; 30 MCG/

KG/MIN


   PRN Reason: Protocol


   Stop: 04/24/18 23:28


   Last Admin: 04/24/18 07:21 Dose:  20 mcg/kg/min, 9.253 mls/hr


Lactulose (Enulose)  20 gm PO Q6 JUAN LUIS


   Last Admin: 04/24/18 09:00 Dose:  20 gm


Metoclopramide HCl (Reglan)  10 mg IVP Q6 PRN


   PRN Reason: Nausea/Vomiting


   Last Admin: 04/20/18 08:49 Dose:  10 mg


Thiamine HCl (Vitamin B1 Tab)  100 mg PO DAILY JUAN LUIS


   Last Admin: 04/24/18 08:47 Dose:  100 mg











- Labs


Labs: 


 





 04/24/18 13:17 





 04/24/18 13:17 





 











PT  17.1 Seconds (9.8-13.1)  H  04/23/18  06:25    


 


INR  1.5  (0.9-1.2)  H  04/23/18  06:25    


 


APTT  35.3 Seconds (25.6-37.1)   04/23/18  06:25    














- Head Exam


Head Exam: ATRAUMATIC





- Eye Exam


Eye Exam: Normal appearance


Pupil Exam: PERRL





- ENT Exam


ENT Exam: Normal Exam





- Neck Exam


Neck Exam: Normal Inspection





- Respiratory Exam


Respiratory Exam: Clear to Ausculation Bilateral





- Cardiovascular Exam


Cardiovascular Exam: REGULAR RHYTHM





- GI/Abdominal Exam


GI & Abdominal Exam: Distended, Soft





Assessment and Plan


(1) GI bleed


Assessment & Plan: 


No evidence of ongoing active bleeding at this time. Patient over the past 3 

days has received DDAVP, FFP and vitamin K as well as PRBC. Ammonia level now 

normal. Continue current treatment. 


Status: Acute

## 2018-04-24 NOTE — RAD
HISTORY:

Intubated.  



COMPARISON:

04/23/2018 



FINDINGS:

The endotracheal tube terminates 2.2 cm proximal to the carole.  The 

nasogastric tube terminates in the stomach. 



LUNGS:

There are low lung volumes. The lungs are clear.



PLEURA:

No significant pleural effusion identified, no pneumothorax apparent.



CARDIOVASCULAR:

Normal.



OSSEOUS STRUCTURES:

No significant abnormalities.



VISUALIZED UPPER ABDOMEN:

Normal.



OTHER FINDINGS:

None.



IMPRESSION:

No acute findings. 



Stable position of endotracheal and nasogastric tubes.

## 2018-04-25 NOTE — CP.PCM.PN
Subjective





- Date & Time of Evaluation


Date of Evaluation: 04/25/18


Time of Evaluation: 13:00





- Subjective


Subjective: 





Still with small amount of dark red blood suction from NGT


no melena


Sedated on Propofol


[facility]


accdg to RN, pt became restless when sedation dose was decreased


on NGT feeding








Objective





- Vital Signs/Intake and Output


Vital Signs (last 24 hours): 


 











Temp Pulse Resp BP Pulse Ox


 


 98.1 F   590 H  14   110/65   100 


 


 04/25/18 12:00  04/25/18 12:00  04/25/18 12:00  04/25/18 12:00  04/25/18 12:00








Intake and Output: 


 











 04/25/18 04/25/18





 06:59 18:59


 


Intake Total 100 560


 


Output Total 700 200


 


Balance -600 360














- Medications


Medications: 


 Current Medications





Chlordiazepoxide (Librium)  25 mg PO Q6 [facility]


   Last Admin: 04/25/18 10:12 Dose:  25 mg


Folic Acid (Folic Acid)  1 mg PO DAILY [facility]


   Last Admin: 04/25/18 10:13 Dose:  1 mg


Ciprofloxacin (Cipro 400mg/200ml Dsw)  400 mg in 200 mls @ 200 mls/hr IVPB Q12 

JUAN LUIS


   PRN Reason: Protocol


   Last Admin: 04/25/18 08:16 Dose:  200 mls/hr


Propofol (Diprivan)  1,000 mg in 100 mls @ 16.193 mls/hr IV .Q6H11M JUAN LUIS; 35 MCG/

KG/MIN


   PRN Reason: Protocol


   Stop: 04/26/18 02:46


   Last Admin: 04/25/18 08:10 Dose:  35 mcg/kg/min, 16.193 mls/hr


Potassium Chloride/Dextrose/Sod Cl (Potassium Chl 20 Meq In D5-1/2ns)  1,000 

mls @ 100 mls/hr IV .Q10H [facility]


   Stop: 04/26/18 08:42


   Last Admin: 04/25/18 10:58 Dose:  100 mls/hr


Metoclopramide HCl (Reglan)  10 mg IVP Q6 PRN


   PRN Reason: Nausea/Vomiting


   Last Admin: 04/20/18 08:49 Dose:  10 mg


Pantoprazole Sodium (Protonix Inj)  40 mg IVP DAILY [facility]


Thiamine HCl (Vitamin B1 Tab)  100 mg PO DAILY [facility]


   Last Admin: 04/25/18 10:13 Dose:  100 mg











- Labs


Labs: 


 





 04/25/18 04:35 





 04/25/18 04:35 





 











PT  14.7 Seconds (9.8-13.1)  H  04/24/18  16:49    


 


INR  1.3  (0.9-1.2)  H  04/24/18  16:49    


 


APTT  37.9 Seconds (25.6-37.1)  H  04/24/18  16:49    














- Constitutional


Appears: Other (Intubated on [facility])





- Head Exam


Head Exam: NORMAL INSPECTION, NORMOCEPHALIC





- Eye Exam


Eye Exam: Normal appearance, PERRL





- ENT Exam


ENT Exam: Mucous Membranes Dry, Normal External Ear Exam


Additional comments: 





NGT in place





- Respiratory Exam


Respiratory Exam: Rales.  absent: Wheezes


Additional comments: 





Intubated on Vent





- Cardiovascular Exam


Cardiovascular Exam: REGULAR RHYTHM, +S1, +S2





- GI/Abdominal Exam


GI & Abdominal Exam: Distended, Normal Bowel Sounds





- Rectal Exam


Additional comments: 





Rectal Tube in place





- Extremities Exam


Extremities Exam: Normal Capillary Refill





- Neurological Exam


Additional comments: 





Sedated





- Skin


Skin Exam: Dry, Normal Color, Warm





Assessment and Plan


(1) Acute blood loss anemia


Status: Acute   





(2) GI bleed


Status: Acute   





(3) Alcohol withdrawal


Status: Acute   





(4) Alcohol abuse


Status: Chronic   





(5) Coagulopathy


Status: Acute   





(6) Transaminitis


Status: Acute   





- Assessment and Plan (Free Text)


Assessment: 





8 y/o gent , known hx of Alcoholism and recent GI bleed, was brought in because 

of hematemesis and alteration in   mental status.


Pt was recently discharged from this hospital  after an episode of GI bleed .  

EGD done on 4/16 did not show any active bleed.  The patient was discharge home 

and again started drinking Alcohol.   On day of admission, he again had 

hematemesis and was noted to be confused and agitated.


4/22: pt noted to be obtunded, Ammonia level elevated.  Pt intubated for airway 

protection. 


4/24: Bleeding Scan was negative. 





(1) Acute blood loss anemia sec to GI Bleed prob sec to  Gastritis or 

intermittent bleed form  Esophageal Varices


Status: Acute   


Pt admitted to ICU for close monitoring


Total 11 units PRBC 6 FFP transfused (2 prbc/1 ffp overnight)


 Protonix drip changed today to 40mg IV daily


GI consulted- Dr Owen - emergent EGD  done  w/c  w/c did not show active 

bleeding, given ddavp


Bleeding scan was negative


cont IV Cipro


Abd Xray : neg


Octreotide drip d/c 


hgb=6.1 on admission - now 9.4








2. Intubated for Airway Protection


pt Intubated , on Mech Vent








(3) Alcohol withdrawal


Status: Acute  


Pt became obtunded   and was intubated for airway protection


RTC Librium


Pt on Propofiol drip 


cont Thiamine and FA


IVF hydration





(4) Alcohol abuse


Status: Chronic   








5.  Hepatic Encephalopathy


Ammonia level markedly elevated - now normalized


Lactulose enema started - now d/c 


CT of the head : no bleed








6. Coagulopathy prob sec to Liver dis from alcoholism


Pt received Vit K


FFP transfusion x 6 


pt developed rigors after transfusion of 1sr FFP - checked for transfusion 

reaction


DDAVP given








7.  Transaminitis sec to ETOH


monitor





8.  Thrombocytopenia sec to ETOH liver dis








DVT proph


- SCD


no anticoag sec to GIB and coagulopathy & low Platelet

## 2018-04-25 NOTE — CP.CCUPN
CCU Subjective





- Physician Review


Subjective (Free Text): 


Sedated on Propofol, RASS-3, OGT on regular suction, no new bleeding noted, 

remains intubated since 4/22. Breathing 20 on AC 16, SPO2 100% on 40% oxygen. 





Other VS and I/Os reviewed. No fever spikes since 4/22 at 101.8F. 





ROS:  No other pertinent negs or positives on 10+  system review obtainable 

from intubated and sedated patient. 





Allergies: Penicillin, shell fish





PMSFH:    All other Nursing and physician documentation reviewed to date; no 

new pertinent info noted relevant to current medical problems.








EXAM-


HEENT: no icterus, no gaze preference, pupils equal and reactive, no icterus


NECK: No JVD, supple, carotids equal upstroke bilat/no bruits


CHEST: decreased BS bases, no wheezes audible


HEART:  regular tachy, distant, S1S2, no rubs.


ABD:  soft, ++ distention, no tympany, no palp tenderness, BS hypoactive.


EXT: No peripheral/ digital cyanosis, no calf tenderness or palpable cords, 

distal pulses intact and symmetrical. 


NEURO:  no focal motor deficits, withdraws to deep pain.


SKIN:   no rashes,  warm and dry.











LABS:  admission -





WBC= 8.7


HGB= 9.4


PLTs=  104 K





INR= 1.3 yesterday





Na= 142


K= 3.6


CL= 113


HCO3= 20


BUN/Cr= 11/0.9


BS= 84





7.47/32/122


CXR: ETT and OGT positions OK, decreased R lung volume, R hilar interstitial 

changes (my interp). 





IMPRESSION / MAJOR PROBLEMS NOW:


1. Recurrent UGIB, r/o gastritis, PUD, other occult, intermittent Variceal 

bleeding?


2.  Acute Anemia 2 blood loss; and mild Coagulopathy


3.  AKA: suspect binge ETOH use after last hospital discharge. 


4.  ETOH Withdrawal / Delirium


5.  Azotemia / Dehydration








PLAN:


1.  IVF hydration if patient is not ready for tube feeds.


2.  Sputum C&S (none since intubation), had fever spike on 4/22.  On empiric 

Cipro.


3.  Decrease Propofol, increase Librium.


4.  Decrease AC to 12.


5.  Check AXR, r/o ileus


6.  NGT / OGT to LIS for now.


7.  Stop Octreotide drip, discussed with GI.  Cancel any Bleeding Scan for 

today unless obvious bleeding is evident.


8.  See orders. 











CCU Objective





- Vital Signs / Intake & Output


Vital Signs (Last 4 hours): 





Afebrile, HR 90, /76, RR 20 on MV, SPO2 99% on 40% oxygen.


Intake and Output (Last 8hrs): 


 Intake & Output











 04/24/18 04/25/18 04/25/18





 22:59 06:59 14:59


 


Intake Total 260 100 100


 


Output Total 620 700 


 


Balance -360 -600 100


 


Intake:   


 


   100 100


 


  Intake, Piggyback 80  


 


Output:   


 


  Urine 320 400 


 


    Urethral (Burger) 320 400 


 


  Stool 300 300

## 2018-04-25 NOTE — RAD
HISTORY:

Intubated.  



COMPARISON:

Portable chest 04/24/2018, 4:26 a.m.. 



FINDINGS:

Endotracheal and nasogastric tubes are unchanged in position. 



LUNGS:

Right apical airspace disease is nearly completely resolved resolved 

likely reflecting atelectasis previously. Trace right apical pleural 

thickening or atelectasis remains.



PLEURA:

No significant pleural effusion identified, no pneumothorax apparent.



CARDIOVASCULAR:

Cardiac size appears stable. No definite pulmonary vascular 

derangement appreciated.



OSSEOUS STRUCTURES:

No significant abnormalities.



VISUALIZED UPPER ABDOMEN:

Normal.



OTHER FINDINGS:

None.



IMPRESSION:

Near-complete resolution right apical airspace disease. No pleural 

effusion bilaterally.  Tubes and catheters unchanged in position.

## 2018-04-25 NOTE — CP.PCM.PN
Subjective





- Date & Time of Evaluation


Date of Evaluation: 04/25/18


Time of Evaluation: 09:42





- Subjective


Subjective: 





Patient had some bleeding yesterday with reddish NG output and maroon stool. 

During this time BP and puse were normal and this morning's Hgb just slightly 

lower than yesterday. NG aspirate now contains just small amount of dark fluid.





Objective





- Vital Signs/Intake and Output


Vital Signs (last 24 hours): 


 











Temp Pulse Resp BP Pulse Ox


 


 98.9 F   61   16   110/71   100 


 


 04/25/18 04:00  04/25/18 06:00  04/25/18 06:00  04/25/18 06:00  04/25/18 06:00








Intake and Output: 


 











 04/25/18 04/25/18





 06:59 18:59


 


Intake Total 100 100


 


Output Total 700 


 


Balance -600 100














- Medications


Medications: 


 Current Medications





Chlordiazepoxide (Librium)  25 mg PO Q6 JUAN LUIS


Folic Acid (Folic Acid)  1 mg PO DAILY [facility]


   Last Admin: 04/24/18 08:48 Dose:  1 mg


Ciprofloxacin (Cipro 400mg/200ml Dsw)  400 mg in 200 mls @ 200 mls/hr IVPB Q12 

JUAN LUIS


   PRN Reason: Protocol


   Last Admin: 04/25/18 08:16 Dose:  200 mls/hr


Propofol (Diprivan)  1,000 mg in 100 mls @ 16.193 mls/hr IV .Q6H11M JUAN LUIS; 35 MCG/

KG/MIN


   PRN Reason: Protocol


   Stop: 04/26/18 02:46


   Last Admin: 04/25/18 08:10 Dose:  35 mcg/kg/min, 16.193 mls/hr


Potassium Chloride/Dextrose/Sod Cl (Potassium Chl 20 Meq In D5-1/2ns)  1,000 

mls @ 100 mls/hr IV .Q10H [facility]


   Stop: 04/26/18 08:42


Metoclopramide HCl (Reglan)  10 mg IVP Q6 PRN


   PRN Reason: Nausea/Vomiting


   Last Admin: 04/20/18 08:49 Dose:  10 mg


Pantoprazole Sodium (Protonix Inj)  40 mg IVP DAILY JUAN LUIS


Thiamine HCl (Vitamin B1 Tab)  100 mg PO DAILY [facility]


   Last Admin: 04/24/18 08:47 Dose:  100 mg











- Labs


Labs: 


 





 04/25/18 04:35 





 04/25/18 04:35 





 











PT  14.7 Seconds (9.8-13.1)  H  04/24/18  16:49    


 


INR  1.3  (0.9-1.2)  H  04/24/18  16:49    


 


APTT  37.9 Seconds (25.6-37.1)  H  04/24/18  16:49    














- Head Exam


Head Exam: ATRAUMATIC





- Neck Exam


Neck Exam: Full ROM





- Respiratory Exam


Respiratory Exam: Clear to Ausculation Bilateral





- Cardiovascular Exam


Cardiovascular Exam: REGULAR RHYTHM, +S1, +S2





- GI/Abdominal Exam


GI & Abdominal Exam: Distended, Normal Bowel Sounds





Assessment and Plan


(1) GI bleed


Assessment & Plan: 


Minor bleeding yesterday possibly from  NG tube irritation. Will stop 

octreotide and change protonix to 40 mg IVP daily. Ammonia level went up Sunday 

during bleed and since has been normal so will stop lactulose.


Status: Acute

## 2018-04-25 NOTE — RAD
HISTORY:

r/o ileus / obstruction  



COMPARISON:

Comparison made with concurrent chest radiograph and prior abdominal 

radiograph dated 04/22/2018.



FINDINGS:

In situ NGT which is coiled upon itself in the left upper quadrant of 

the abdomen with tip located seen on earlier chest x-ray located near 

the fundus of the stomach seen on the prior chest radiograph 



BOWEL:

Normal. No obstruction. No free air. 



BONES:

Normal.



OTHER FINDINGS:

None.



IMPRESSION:

No evidence of acute mechanical bowel obstruction.  In situ NGT as 

described above.

## 2018-04-26 NOTE — RAD
HISTORY:

INTUBATED  



COMPARISON:

Portable chest 04/25/2018. 



FINDINGS:

 Endotracheal and nasogastric tubes do not appear significantly 

changed in position.



LUNGS:

Right pleural effusion is increased at the right apex and unchanged 

at the right base with none identified at the left.  There is no 

pneumothorax bilaterally. Limited patchy atelectasis or infiltrate is 

seen at the right infrahilar space with none on the left. Cardiac 

silhouette appears stable. No definite pulmonary vascular derangement 

identified.



PLEURA:

As above.



CARDIOVASCULAR:

As above peer



OSSEOUS STRUCTURES:

No significant abnormalities.



VISUALIZED UPPER ABDOMEN:

Normal.



OTHER FINDINGS:

None.



IMPRESSION:

Interval increased right apical pleural effusion with trace right 

pleural effusion evident.  Limited right infrahilar infiltrate or 

atelectasis.  Left chest remains clear.

## 2018-04-26 NOTE — CP.PCM.PN
Subjective





- Date & Time of Evaluation


Date of Evaluation: 04/26/18


Time of Evaluation: 13:00





- Subjective


Subjective: 





Pt remains intubated on Vent - AC $0% FiO2, 12/500/5


Failed weaning trial


about 100 ml of soft dark stool in Rectal tube bag


Sedated on Propofol


no fever


spouse at bedside , treatment plan discussed








Objective





- Vital Signs/Intake and Output


Vital Signs (last 24 hours): 


 











Temp Pulse Resp BP Pulse Ox


 


 99.0 F   97 H  21   114/68   100 


 


 04/26/18 12:00  04/26/18 12:00  04/26/18 12:00  04/26/18 12:00  04/26/18 12:00








Intake and Output: 


 











 04/26/18 04/26/18





 06:59 18:59


 


Intake Total 2453 350


 


Output Total 800 375


 


Balance 1653 -25














- Medications


Medications: 


 Current Medications





Chlordiazepoxide (Librium)  50 mg PO Q8 JUAN LUIS


Folic Acid (Folic Acid)  1 mg PO DAILY [facility]


   Last Admin: 04/26/18 09:14 Dose:  1 mg


Ciprofloxacin (Cipro 400mg/200ml Dsw)  400 mg in 200 mls @ 200 mls/hr IVPB Q12 

JUAN LUIS


   PRN Reason: Protocol


   Last Admin: 04/26/18 09:04 Dose:  200 mls/hr


Potassium Chloride/Dextrose/Sod Cl (Potassium Chl 20 Meq In D5-1/2ns)  1,000 

mls @ 100 mls/hr IV .Q10H [facility]


   Last Admin: 04/26/18 08:15 Dose:  100 mls/hr


Propofol (Diprivan)  1,000 mg in 100 mls @ 16.193 mls/hr IV .Q6H11M JUAN LUIS; 35 MCG/

KG/MIN


   PRN Reason: Protocol


   Stop: 04/27/18 05:08


   Last Titration: 04/26/18 08:00 Dose:  17.5 mcg/kg/min, 8.097 mls/hr


Lactulose (Enulose)  20 gm PO BID PRN


   PRN Reason: Constipation


   Last Admin: 04/26/18 09:24 Dose:  20 gm


Metoclopramide HCl (Reglan)  10 mg IVP Q6 PRN


   PRN Reason: Nausea/Vomiting


   Last Admin: 04/20/18 08:49 Dose:  10 mg


Pantoprazole Sodium (Protonix Inj)  40 mg IVP DAILY [facility]


   Last Admin: 04/26/18 09:06 Dose:  40 mg


Thiamine HCl (Vitamin B1 Tab)  100 mg PO DAILY JUAN LUIS


   Last Admin: 04/26/18 09:14 Dose:  100 mg











- Labs


Labs: 


 





 04/26/18 04:40 





 04/26/18 04:40 





 











PT  15.6 Seconds (9.8-13.1)  H  04/26/18  04:40    


 


INR  1.4  (0.9-1.2)  H  04/26/18  04:40    


 


APTT  41.8 Seconds (25.6-37.1)  H  04/26/18  04:40    

















- Constitutional


Appears: Other (Intubated on Mech Vent)





- Head Exam


Head Exam: NORMAL INSPECTION, NORMOCEPHALIC





- Eye Exam


Eye Exam: Normal appearance, PERRL





- ENT Exam


ENT Exam: Mucous Membranes Dry, Normal External Ear Exam


Additional comments: 


NGT in place





- Respiratory Exam


Respiratory Exam: Rales.  absent: Wheezes


Additional comments: 


Intubated on Vent





- Cardiovascular Exam


Cardiovascular Exam: REGULAR RHYTHM, +S1, +S2





- GI/Abdominal Exam


GI & Abdominal Exam: Distended, Normal Bowel Sounds





- Rectal Exam


Additional comments: 


Rectal Tube in place- soft dark stool in bag





- Extremities Exam


Extremities Exam: Normal Capillary Refill





- Neurological Exam


Additional comments: 


Sedated





- Skin


Skin Exam: Dry, Normal Color, Warm





Assessment and Plan


(1) Acute blood loss anemia


Status: Acute   





(2) GI bleed


Status: Acute   





(3) Alcohol withdrawal


Status: Acute   





(4) Alcohol abuse


Status: Chronic   





(5) Coagulopathy


Status: Acute   





(6) Transaminitis


Status: Acute   





- Assessment and Plan (Free Text)


Assessment: 








38 y/o gent , known hx of Alcoholism and recent GI bleed, was brought in 

because of hematemesis and alteration in   mental status.


Pt was recently discharged from this hospital  after an episode of GI bleed .  

EGD done on 4/16 did not show any active bleed.  The patient was discharge home 

and again started drinking Alcohol.   On day of admission, he again had 

hematemesis and was noted to be confused and agitated.


4/22: pt noted to be obtunded, Ammonia level elevated.  Pt intubated for airway 

protection. 


4/24: Bleeding Scan was negative. 





(1) Acute blood loss anemia sec to GI Bleed prob sec to  Gastritis or 

intermittent bleed form  Esophageal Varices


Status: Acute   


Pt admitted to ICU for close monitoring


Total 11 units PRBC 6 FFP transfused 


Pt also received DDAVP


 Protonix drip changed  to 40mg IV daily


GI consulted- Dr Owen - emergent EGD  done  w/c  did not show active 

bleeding


Bleeding scan was negative


cont IV Cipro


Abd Xray : neg


Octreotide drip d/c 


hgb=6.1 on admission - now 9.5








2. Intubated for Airway Protection


pt Intubated , [facility]


attempted weaning today however failed , will continue to attempt daily 





(3) Alcohol withdrawal


Status: Acute  


Pt became obtunded   and was intubated for airway protection


RTC Librium- dose increased


Pt on Propofol drip - will slowly taper 


cont Thiamine and FA


IVF hydration





(4) Alcohol abuse


Status: Chronic   








5.  Hepatic Encephalopathy


Ammonia level markedly elevated - now normalized


Lactulose enema started - now d/c 


CT of the head : no bleed


discussed with Dr Owen - start Lactulose once daily if Ammonia level goes 

up








6. Coagulopathy prob sec to Liver dis from alcoholism


Pt received Vit K


FFP transfusion x 6 


pt developed rigors after transfusion of 1sr FFP - checked for transfusion 

reaction


DDAVP given








7.  Transaminitis sec to ETOH


monitor





8.  Thrombocytopenia sec to ETOH liver dis








DVT proph


- SCD


no anticoag sec to GIB and coagulopathy & low Platelet

## 2018-04-26 NOTE — CP.CCUPN
CCU Subjective





- Physician Review


Subjective (Free Text): 


Still sedated on Propfol, Nursing reports difficulty in weaning Propofol off, 

no new bleeding noted, remains intubated since 4/22. Breathing 20 on AC 16, 

SPO2 100% on 40% oxygen. Tolerated initiation of tube feeds. 





Other VS and I/Os reviewed. No fever spikes since 4/22 at 101.8F. 





ROS:  No other pertinent negs or positives on 10+  system review obtainable 

from intubated and sedated patient. 











PMSFH:    All other Nursing and physician documentation reviewed to date; no 

new pertinent info noted relevant to current medical problems.








EXAM-


HEENT: no icterus, no gaze preference, pupils equal and reactive, no icterus


NECK: No JVD, supple, carotids equal upstroke bilat/no bruits


CHEST: decreased BS bases, no wheezes audible


HEART:  regular tachy, distant, S1S2, no rubs.


ABD:  soft, ++ distention, no tympany, no palp tenderness, BS hypoactive.


EXT: No peripheral/ digital cyanosis, no calf tenderness or palpable cords, 

distal pulses intact and symmetrical. 


NEURO:  no focal motor deficits, withdraws to deep pain.


SKIN:   no rashes,  warm and dry.











LABS: 





WBC= 9.6


HGB= 9.5


PLTs=  118 K





INR= 1.4, PTT elevated. 





Na= 141


K= 3.9


CL= 110


HCO3= 21


BUN/Cr= 8/0.8


BS= 128





7.43/35/78





CXR: ETT and OGT positions OK, more loss of R lung volume due to atelectasis (

my interp). 





IMPRESSION / MAJOR PROBLEMS NOW:


1. Recurrent UGIB, r/o gastritis, PUD, other occult, intermittent Variceal 

bleeding?


2.  Acute Anemia 2 blood loss; and mild Coagulopathy


3.  AKA: suspect binge ETOH use after last hospital discharge. 


4.  ETOH Withdrawal / Delirium


5.  Azotemia / Dehydration








PLAN:


1.  Librium increased. 


2.  On empiric Cipro; sputum C&S sent / ordered yesterday.


3.  Resume Lactulose


4.  HGB levels stable, on IV PPi.


5.  MV day # 4, more aggressive suctioning, try SBT if Propofol can be weaned 

further. 


6.  See orders for other supportive care. 











CCU Objective





- Vital Signs / Intake & Output


Vital Signs (Last 4 hours): 


Vital Signs











  Temp Pulse Resp BP Pulse Ox


 


 04/26/18 06:39   86  14  112/57 L  100


 


 04/26/18 06:00   86  14  99/43 L  100


 


 04/26/18 05:00   82  14  120/70  100


 


 04/26/18 04:00  98 F  84  22  109/69  100











Intake and Output (Last 8hrs): 


 Intake & Output











 04/25/18 04/26/18 04/26/18





 22:59 06:59 14:59


 


Intake Total 1406 1587 


 


Output Total 250 800 


 


Balance 1156 787 


 


Weight  203 lb 


 


Intake:   


 


   1057 


 


  Intake, Piggyback 240  


 


  Oral 100  


 


  Tube Feeding 70 380 


 


  Free Water Flush 150 150 


 


Output:   


 


  Urine 200 800 


 


    Urethral (Burger) 200 800 


 


  Stool 50  


 


Other:   


 


  # Bowel Movements  100 














Critical Care Progress Note





- Nutrition


Nutrition: 


 Nutrition











 Category Date Time Status


 


 NPO Diet [DIET] Diets  04/22/18 Breakfast Active

## 2018-04-27 NOTE — CT
PROCEDURE:  CT Angiography Abdomen, Pelvis and Lower Extremity with 

Contrast



HISTORY:

GI Bleed



COMPARISON:

None.



TECHNIQUE:

Technique: CT angiography of the abdomen, pelvis and bilateral lower 

extremities performed in the arterial phase of enhancement. Coronal 

and sagittal reformats, and well as rotating MIP images of the 

vessels generated at the workstation.



Intravenous contrast dose: 99 mL Visipaque 320



Radiation dose:



Total exam DLP = 2436.5 mGy-cm.



This CT exam was performed using one or more of the following dose 

reduction techniques: Automated exposure control, adjustment of the 

mA and/or kV according to patient size, and/or use of iterative 

reconstruction technique.



FINDINGS:



LOWER THORAX:

Small bilateral pleural effusions, right slightly larger than left, 

with subjacent atelectasis. 



LIVER:

Unremarkable. No gross lesion or ductal dilatation. 



GALLBLADDER AND BILE DUCTS:

Unremarkable. 



PANCREAS:

Unremarkable. No gross lesion or ductal dilatation.



SPLEEN:

Unremarkable. 



ADRENALS:

Unremarkable. No mass. 



KIDNEYS AND URETERS:

Unremarkable. No hydronephrosis. No solid mass. 



STOMACH AND BOWEL:

Enteric tube with tip in the stomach. Nonspecific high-density fluid 

within the small and large bowel which progresses from the pre to 

postcontrast enhanced phases. Rectal tube in place. No obstruction. 

No gross mural thickening. 



APPENDIX:

Not visualized. 



PERITONEUM:

Moderate ascites. Nonspecific rounded area of high density in the 

pelvis measuring approximately 5.9 x 4.8 cm, anterior to the sigmoid. 

No free air. 



LYMPH NODES:

Unremarkable. No enlarged lymph nodes. 



BLADDER:

Relatively collapsed around a Burger catheter with a small amount of 

anti dependent air, likely related to Burger insertion. 



REPRODUCTIVE:

Unremarkable. 



BONES:

No acute fracture. 



OTHER FINDINGS:

Small varices seen in the gastrohepatic ligament. No active arterial 

contrast extravasation. Right common femoral vein central venous 

catheter with tip in the right common femoral vein.



IMPRESSION:

No evidence of active arterial contrast extravasation.



Nonspecific high-density fluid within the small and large bowel which 

progresses from the pre to post contrast-enhanced phases.  This may 

be related to a subacute small bowel hemorrhage versus recent 

ingested high-density liquid.



Nonspecific rounded area of high density in the pelvis measuring 

approximately 5.9 x 4.8 cm, anterior to the sigmoid. This may 

represent an area of focal, contained hemorrhage persists collapsed 

small bowel.



Moderate simple fluid density ascites.



Additional findings as above.

## 2018-04-27 NOTE — CP.PCM.CON
History of Present Illness





- History of Present Illness


History of Present Illness: 





Patient seen and examined. Chart reviewed. Full consult to follow. 





Repeat ABG in am, vent changes were made in FiO2 only. O2 Sat remained 100% 














Past Patient History





- Past Medical History & Family History


Past Medical History?: Yes





- Past Social History


Smoking Status: Unknown If Ever Smoked





- CARDIAC


Hx Cardiac Disorders: No





- PULMONARY


Hx Respiratory Disorders: No





- NEUROLOGICAL


Hx Neurological Disorder: No





- HEENT


Hx HEENT Problems: No


Hx Blind: No


Hx Cataracts: No


Hx Deafness: No


Hx Difficulty Chewing: No


Hx Epistaxis: No


Hx Glaucoma: No


Hx Macular Degeneration: No


Hx Sinusitis: No





- RENAL


Hx Chronic Kidney Disease: Yes


Hx Dialysis: No


Hx Kidney Stones: No


Hx Neurogenic Bladder: No


Hx Pyelonephritis: No


Hx Renal (Kidney) Cancer: No


Hx Renal Failure: Yes


Other/Comment: Chronic kidney disease





- ENDOCRINE/METABOLIC


Hx Endocrine Disorders: No


Hx Adrenal Cancer: No


Hx Diabetes Insipidus: No


Hx Diabetes Mellitus Type 1: No


Hx Diabetes Mellitus Type 2: No


Hx Hyperthyroidism: No


Hx Hypothyroidism: No


Hx Systemic Lupus Erythematosus: No





- HEMATOLOGICAL/ONCOLOGICAL


Hx Blood Disorders: Yes


Hx AIDS: No (unknown)


Hx Human Immunodeficiency Virus (HIV): No ([place])





- INTEGUMENTARY


Hx Dermatological Problems: No





- MUSCULOSKELETAL/RHEUMATOLOGICAL


Hx Musculoskeletal Disorders: Yes


Hx Back Pain: Yes


Hx Falls: No





- GASTROINTESTINAL


Hx Gastrointestinal Disorders: No





- GENITOURINARY/GYNECOLOGICAL


Hx Genitourinary Disorders: No





- PSYCHIATRIC


Hx Psychophysiologic Disorder: No


Hx Substance Use: No





- SURGICAL HISTORY


Hx Surgeries: No


Hx Herniorrhaphy: No


Hx Hysterectomy: No


Hx Joint Replacement: No


Hx Kidney Transplant: No


Hx Liver Transplant: No


Hx Mastectomy: No


Hx Musculoskeletal Surgery: No


Hx Open Heart Surgery: No


Hx Open Reduction Internal Fixation: No


Hx Orthopedic Surgery: No


Hx Parathyroidectomy: No


Hx Penile Implant: No


Hx Pulmonary Surgery: No


Hx Splenectomy: No


Hx Thyroidectomy: No


Hx Tonsillectomy: No


Hx Tubal Ligation: No


Hx Valve Replacement: No


Hx Vascular Surgery: No


Hx Vascular Access Device: No





- ANESTHESIA


Hx Anesthesia: No


Hx Anesthesia Reactions: No


Hx Malignant Hyperthermia: No


Has any member of the family had a problem w/ anesthesia?:  (Unknown)





Meds


Allergies/Adverse Reactions: 


 Allergies











Allergy/AdvReac Type Severity Reaction Status Date / Time


 


Penicillins Allergy  RASH Verified 10/06/17 17:48


 


shellfish derived Allergy  ITCHING Verified 04/14/18 18:20














- Medications


Medications: 


 Current Medications





Acetaminophen (Tylenol 325mg/10.15ml Ud)  325 mg NG Q6 PRN


   PRN Reason: Temperature


   Last Admin: 04/27/18 10:43 Dose:  325 mg


Chlordiazepoxide (Librium)  50 mg PO Q8 [facility]


   Last Admin: 04/27/18 08:49 Dose:  50 mg


Folic Acid (Folic Acid)  1 mg PO DAILY [facility]


   Last Admin: 04/27/18 08:09 Dose:  1 mg


Hydromorphone HCl (Dilaudid)  1 mg IVP Q4 PRN


   PRN Reason: Pain, moderate (4-7)


   Last Admin: 04/27/18 11:04 Dose:  0.5 mg


Ciprofloxacin (Cipro 400mg/200ml Dsw)  400 mg in 200 mls @ 200 mls/hr IVPB Q12 

[facility] LUIS


   PRN Reason: Protocol


   Last Admin: 04/27/18 08:07 Dose:  200 mls/hr


Vancomycin HCl 1 gm/ Sodium (Chloride)  250 mls @ 125 mls/hr IVPB DAILY [facility]


   PRN Reason: Protocol


   Last Admin: 04/27/18 10:18 Dose:  125 mls/hr


Pantoprazole Sodium 40 mg/ (Sodium Chloride)  100 mls @ 20 mls/hr IVPB Q5H [facility]


   PRN Reason: 8 MG/HR


   Last Admin: 04/27/18 22:47 Dose:  20 mls/hr


Octreotide Acetate 1,250 mcg/ (Sodium Chloride)  252.5 mls @ 10.1 mls/hr IV 

.Q24H JUAN LUIS


   PRN Reason: 50 MCG/HR


   Last Admin: 04/27/18 13:40 Dose:  10.1 mls/hr


Norepinephrine Bitartrate 4 mg (/ Dextrose)  254 mls @ 9.52 mls/hr IV .Q24H JUAN LUIS

; 2.5 MCG/MIN


   PRN Reason: Protocol


   Last Admin: 04/27/18 15:05 Dose:  2.5 mcg/min, 9.52 mls/hr


Dexmedetomidine HCl 400 mcg/ (Sodium Chloride)  100 mls @ 11.16 mls/hr IV 

.Q8H58M JUAN LUIS; 0.5 MCG/KG/HR


   PRN Reason: Protocol


   Last Admin: 04/27/18 22:24 Dose:  0.5 mcg/kg/hr, 11.16 mls/hr


Lorazepam (Ativan)  2 mg IVP Q6 PRN


   PRN Reason: Agitation


   Last Admin: 04/27/18 23:08 Dose:  2 mg


Metoclopramide HCl (Reglan)  10 mg IVP Q6 PRN


   PRN Reason: Nausea/Vomiting


   Last Admin: 04/20/18 08:49 Dose:  10 mg


Thiamine HCl (Vitamin B1 Tab)  100 mg PO DAILY [facility]


   Last Admin: 04/27/18 08:09 Dose:  100 mg











Results





- Vital Signs


Recent Vital Signs: 


 Last Vital Signs











Temp  99.5 F   04/27/18 20:00


 


Pulse  76   04/27/18 22:00


 


Resp  15   04/27/18 22:00


 


BP  107/74   04/27/18 22:00


 


Pulse Ox  96   04/27/18 22:00














- Labs


Result Diagrams: 


 04/27/18 14:43





 04/27/18 04:50


Labs: 


 Laboratory Results - last 24 hr











  04/27/18 04/27/18 04/27/18





  04:50 04:50 04:50


 


WBC  13.2 H  


 


RBC  2.39 L  


 


Hgb  7.0 L D  


 


Hct  21.6 L  


 


MCV  90.4  


 


MCH  29.5  


 


MCHC  32.6 L  


 


RDW  15.4 H  


 


Plt Count  127 L  


 


MPV  10.1  


 


Neut % (Auto)  61.3  


 


Lymph % (Auto)  24.3  


 


Mono % (Auto)  12.1 H  


 


Eos % (Auto)  1.2  


 


Baso % (Auto)  1.1  


 


Neut # (Auto)  8.1 H  


 


Lymph # (Auto)  3.2  


 


Mono # (Auto)  1.6 H  


 


Eos # (Auto)  0.2  


 


Baso # (Auto)  0.1  


 


PT   


 


INR   


 


APTT   


 


pCO2   


 


pO2   


 


HCO3   


 


ABG pH   


 


ABG Total CO2   


 


ABG O2 Saturation   


 


ABG O2 Content   


 


ABG Base Excess   


 


ABG Hemoglobin   


 


ABG Carboxyhemoglobin   


 


POC ABG HHb (Measured)   


 


ABG Methemoglobin   


 


ABG O2 Capacity   


 


Anthony Test   


 


VBG pH   


 


VBG pCO2   


 


VBG HCO3   


 


VBG Total CO2   


 


VBG O2 Sat (Calc)   


 


VBG Base Excess   


 


VBG Potassium   


 


A-a O2 Difference   


 


Hgb O2 Saturation   


 


Glucose   


 


Lactate   


 


Vent Mode   


 


Mechanical Rate   


 


FiO2   


 


Tidal Volume   


 


PEEP   


 


Sodium    142


 


Potassium    4.2


 


Chloride    109 H


 


Carbon Dioxide    23


 


Anion Gap    14


 


BUN    13


 


Creatinine    0.9


 


Est GFR ( Amer)    > 60


 


Est GFR (Non-Af Amer)    > 60


 


Random Glucose    120 H


 


Lactic Acid   


 


Calcium    7.6 L


 


Total Bilirubin    1.5 H


 


AST    160 H D


 


ALT    71


 


Alkaline Phosphatase    105


 


Ammonia   


 


Total Protein    5.8 L


 


Albumin    2.4 L


 


Globulin    3.4


 


Albumin/Globulin Ratio    0.7 L


 


Lipase    182


 


Procalcitonin   1.75 H 


 


Venous Blood Potassium   


 


Blood Type   


 


Antibody Screen   


 


Crossmatch   


 


BBK History Checked   














  04/27/18 04/27/18 04/27/18





  04:50 04:50 05:43


 


WBC   


 


RBC   


 


Hgb   


 


Hct   


 


MCV   


 


MCH   


 


MCHC   


 


RDW   


 


Plt Count   


 


MPV   


 


Neut % (Auto)   


 


Lymph % (Auto)   


 


Mono % (Auto)   


 


Eos % (Auto)   


 


Baso % (Auto)   


 


Neut # (Auto)   


 


Lymph # (Auto)   


 


Mono # (Auto)   


 


Eos # (Auto)   


 


Baso # (Auto)   


 


PT   


 


INR   


 


APTT   


 


pCO2    35


 


pO2    120 H


 


HCO3    25.3


 


ABG pH    7.45


 


ABG Total CO2    25.4


 


ABG O2 Saturation    100.5 H


 


ABG O2 Content    10.4 L


 


ABG Base Excess    0.4


 


ABG Hemoglobin    7.4 L


 


ABG Carboxyhemoglobin    1.9 H


 


POC ABG HHb (Measured)    -0.5 L


 


ABG Methemoglobin    1.0


 


ABG O2 Capacity    10.3 L


 


Anthony Test    Yes


 


VBG pH   


 


VBG pCO2   


 


VBG HCO3   


 


VBG Total CO2   


 


VBG O2 Sat (Calc)   


 


VBG Base Excess   


 


VBG Potassium   


 


A-a O2 Difference    121.0


 


Hgb O2 Saturation    97.7


 


Glucose   


 


Lactate   


 


Vent Mode    A/c


 


Mechanical Rate    12


 


FiO2    40.0


 


Tidal Volume    500


 


PEEP    5


 


Sodium   


 


Potassium   


 


Chloride   


 


Carbon Dioxide   


 


Anion Gap   


 


BUN   


 


Creatinine   


 


Est GFR ( Amer)   


 


Est GFR (Non-Af Amer)   


 


Random Glucose   


 


Lactic Acid  1.0  


 


Calcium   


 


Total Bilirubin   


 


AST   


 


ALT   


 


Alkaline Phosphatase   


 


Ammonia   73 H 


 


Total Protein   


 


Albumin   


 


Globulin   


 


Albumin/Globulin Ratio   


 


Lipase   


 


Procalcitonin   


 


Venous Blood Potassium   


 


Blood Type   


 


Antibody Screen   


 


Crossmatch   


 


BBK History Checked   














  04/27/18 04/27/18 04/27/18





  07:10 07:10 14:43


 


WBC   13.3 H  11.5 H


 


RBC   2.44 L  2.52 L


 


Hgb   7.0 L  7.5 L


 


Hct   21.9 L  22.5 L


 


MCV   90.0  89.1


 


MCH   28.8  29.8


 


MCHC   32.0 L  33.5


 


RDW   15.7 H  15.4 H


 


Plt Count   128 L  125 L


 


MPV   9.4 


 


Neut % (Auto)   64.0 


 


Lymph % (Auto)   22.2 


 


Mono % (Auto)   11.2 H 


 


Eos % (Auto)   1.4 


 


Baso % (Auto)   1.2 


 


Neut # (Auto)   8.5 H 


 


Lymph # (Auto)   2.9 


 


Mono # (Auto)   1.5 H 


 


Eos # (Auto)   0.2 


 


Baso # (Auto)   0.2 


 


PT   


 


INR   


 


APTT   


 


pCO2   


 


pO2   


 


HCO3   


 


ABG pH   


 


ABG Total CO2   


 


ABG O2 Saturation   


 


ABG O2 Content   


 


ABG Base Excess   


 


ABG Hemoglobin   


 


ABG Carboxyhemoglobin   


 


POC ABG HHb (Measured)   


 


ABG Methemoglobin   


 


ABG O2 Capacity   


 


Anthony Test   


 


VBG pH   


 


VBG pCO2   


 


VBG HCO3   


 


VBG Total CO2   


 


VBG O2 Sat (Calc)   


 


VBG Base Excess   


 


VBG Potassium   


 


A-a O2 Difference   


 


Hgb O2 Saturation   


 


Glucose   


 


Lactate   


 


Vent Mode   


 


Mechanical Rate   


 


FiO2   


 


Tidal Volume   


 


PEEP   


 


Sodium   


 


Potassium   


 


Chloride   


 


Carbon Dioxide   


 


Anion Gap   


 


BUN   


 


Creatinine   


 


Est GFR ( Amer)   


 


Est GFR (Non-Af Amer)   


 


Random Glucose   


 


Lactic Acid   


 


Calcium   


 


Total Bilirubin   


 


AST   


 


ALT   


 


Alkaline Phosphatase   


 


Ammonia   


 


Total Protein   


 


Albumin   


 


Globulin   


 


Albumin/Globulin Ratio   


 


Lipase   


 


Procalcitonin   


 


Venous Blood Potassium   


 


Blood Type  O POSITIVE  


 


Antibody Screen  Negative  


 


Crossmatch  See Detail  


 


BBK History Checked  Patient has bt  














  04/27/18 04/27/18 04/27/18





  14:43 14:43 15:21


 


WBC   


 


RBC   


 


Hgb   


 


Hct   


 


MCV   


 


MCH   


 


MCHC   


 


RDW   


 


Plt Count   


 


MPV   


 


Neut % (Auto)   


 


Lymph % (Auto)   


 


Mono % (Auto)   


 


Eos % (Auto)   


 


Baso % (Auto)   


 


Neut # (Auto)   


 


Lymph # (Auto)   


 


Mono # (Auto)   


 


Eos # (Auto)   


 


Baso # (Auto)   


 


PT   19.9 H 


 


INR   1.8 H 


 


APTT   39.1 H 


 


pCO2   


 


pO2    31


 


HCO3   


 


ABG pH   


 


ABG Total CO2   


 


ABG O2 Saturation   


 


ABG O2 Content   


 


ABG Base Excess   


 


ABG Hemoglobin   


 


ABG Carboxyhemoglobin   


 


POC ABG HHb (Measured)   


 


ABG Methemoglobin   


 


ABG O2 Capacity   


 


Anthony Test   


 


VBG pH    7.33


 


VBG pCO2    49


 


VBG HCO3    23.2


 


VBG Total CO2    27.3


 


VBG O2 Sat (Calc)    65.2 H


 


VBG Base Excess    -0.7 L


 


VBG Potassium    5.3 H


 


A-a O2 Difference   


 


Hgb O2 Saturation   


 


Glucose    109


 


Lactate    1.5


 


Vent Mode   


 


Mechanical Rate   


 


FiO2    21.0


 


Tidal Volume   


 


PEEP   


 


Sodium    137.0


 


Potassium   


 


Chloride    113.0 H


 


Carbon Dioxide   


 


Anion Gap   


 


BUN   


 


Creatinine   


 


Est GFR ( Amer)   


 


Est GFR (Non-Af Amer)   


 


Random Glucose   


 


Lactic Acid  1.5  


 


Calcium   


 


Total Bilirubin   


 


AST   


 


ALT   


 


Alkaline Phosphatase   


 


Ammonia   


 


Total Protein   


 


Albumin   


 


Globulin   


 


Albumin/Globulin Ratio   


 


Lipase   


 


Procalcitonin   


 


Venous Blood Potassium    5.3 H


 


Blood Type   


 


Antibody Screen   


 


Crossmatch   


 


BBK History Checked

## 2018-04-27 NOTE — CP.PCM.PN
Subjective





- Date & Time of Evaluation


Date of Evaluation: 04/27/18


Time of Evaluation: 09:38





- Subjective


Subjective: 





Overnight had 2.4  point drop in Hgb. Maroon output via rectal tube. NG 

aspirate is clear





Objective





- Vital Signs/Intake and Output


Vital Signs (last 24 hours): 


 











Temp Pulse Resp BP Pulse Ox


 


 99.5 F   98 H  17   113/56 L  100 


 


 04/27/18 08:00  04/27/18 08:00  04/27/18 08:00  04/27/18 08:00  04/27/18 08:00








Intake and Output: 


 











 04/27/18 04/27/18





 06:59 18:59


 


Intake Total 1215 


 


Output Total 800 


 


Balance 415 














- Medications


Medications: 


 Current Medications





Chlordiazepoxide (Librium)  50 mg PO Q8 [facility]


   Last Admin: 04/27/18 08:49 Dose:  50 mg


Folic Acid (Folic Acid)  1 mg PO DAILY [facility]


   Last Admin: 04/27/18 08:09 Dose:  1 mg


Ciprofloxacin (Cipro 400mg/200ml Dsw)  400 mg in 200 mls @ 200 mls/hr IVPB Q12 

JUAN LUIS


   PRN Reason: Protocol


   Last Admin: 04/27/18 08:07 Dose:  200 mls/hr


Vancomycin HCl 1 gm/ Sodium (Chloride)  250 mls @ 125 mls/hr IVPB DAILY JUAN LUIS


   PRN Reason: Protocol


Lactulose (Enulose)  20 gm NG Q8 [facility]UAN LUIS


Lorazepam (Ativan)  2 mg IVP Q6 PRN


   PRN Reason: Agitation


Metoclopramide HCl (Reglan)  10 mg IVP Q6 PRN


   PRN Reason: Nausea/Vomiting


   Last Admin: 04/20/18 08:49 Dose:  10 mg


Pantoprazole Sodium (Protonix Inj)  40 mg IVP DAILY [facility]UAN [facility]IS


   Last Admin: 04/27/18 08:09 Dose:  40 mg


Thiamine HCl (Vitamin B1 Tab)  100 mg PO DAILY JUAN LUIS


   Last Admin: 04/27/18 08:09 Dose:  100 mg











- Labs


Labs: 


 





 04/27/18 07:10 





 04/27/18 04:50 





 











PT  15.6 Seconds (9.8-13.1)  H  04/26/18  04:40    


 


INR  1.4  (0.9-1.2)  H  04/26/18  04:40    


 


APTT  41.8 Seconds (25.6-37.1)  H  04/26/18  04:40    














- Head Exam


Head Exam: ATRAUMATIC





- Eye Exam


Eye Exam: Normal appearance


Pupil Exam: PERRL





- Neck Exam


Neck Exam: Full ROM





- Cardiovascular Exam


Cardiovascular Exam: REGULAR RHYTHM, +S1, +S2





- GI/Abdominal Exam


GI & Abdominal Exam: Distended





Assessment and Plan


(1) GI bleed


Assessment & Plan: 


Acutely bleeding now. NG aspirate is clear. Bleeding scan now. Tranfuse 2 units 

PRBC, and increase lactulose.


Status: Acute

## 2018-04-27 NOTE — RAD
HISTORY:

r/o ileus  



COMPARISON:

Abdominal radiograph dated 04/25/2018



FINDINGS:



BOWEL:

No evidence of obstruction. Gaseous distention of colon. 



BONES:

Normal.



OTHER FINDINGS:

Partially imaged enteric tube, unchanged.



IMPRESSION:

No evidence of obstruction.  Gaseous distention of colon.

## 2018-04-27 NOTE — PCM.PROC
Procedures


Attestation:: I certify that I have explained the specified Operation(s) or 

Procedure(s), risks, benefits and reasonable alternatives to the Patient and/or 

other person responsible. The opportunity was given to ask questions and all 

questions answered





- Central Line Placement


  ** Right Femoral Triple Lumen Catheter


Aseptic technique was employed throughout the procedure: Full sterile barriers (

mask, hair cover, sterile gown, sterile gloves), Chloraprep Antiseptic: 2 

minute prep for Femoral


CVP Time Out Performed: Yes


Pt. Placed on Pulse Ox Monitor: Yes


Central Line Prep: Chlorhexidine-Alcohol Combination


Local Anesthesia Used: Lidocaine 1%


Amount of Anesthesia Used (mls): 5


Ultrasound Used for Placement: No


Central Line Lumen Inserted: triple


Central Line Length: 20 cm


Post Procedure: Sutured in Place, Good Blood Return, All Ports Aspirated, 

Flushed, Capped, Sterile Dressing Applied


Secured by: Suture


Post procedure dressing: Chlorhexidine disc (Biopatch)


Post Procedure X-Ray: No


Patient Tolerated Procedure: Well


Immediate Complications: None


Additional Comments: 





Procedure done under emergent conditions in ICU for infusion of blood products 

and poor peripheral IV access die to local extremity edema and swelling.





R Femoral site chosen due to existing coagulopathy and low platelets

## 2018-04-27 NOTE — CP.PCM.CON
History of Present Illness





- History of Present Illness


History of Present Illness: 





General Surgery Consult for Dr. Bell


Reason for Consult: GI bleed





37 M with PMH of EtOH Abuse originially was brought in by EMS to [facility] for 

hematemesis. Patient was admitted on 4/20 and found to have anemia with Hgb of 

6.1. At that time, patient was intoxicated and agitated. GI was consulted and 

patient was transfused multiple units of PRBC. He was discharged from the 

hospital just 2 days prior for GI bleed, anemia and melena. Patient had EGD 

with Dr. Owen 4/16 which did not show any source of bleeding but did show 

gastritis. Patient was intubated on 4/23. That same day, patient had a negative 

bleeding scan. The patient's Hgb remained stable until 4/27 when it dropped 

from 9.5 to 7.0. CT angiography was completed and showed no active arterial 

contrast extravasation but showed fluid filled bowel, ascites and fluid 

collection near sigmoid colon (see full report). During entire admission, 

patient received 13 units of PRBC and 8 units of FFP. He is currently still 

intubated and on protonix, octreotide, desmopressin, levophed, and sedation. 

ROS unobtainable due to intubation and clinical condition.








PMH: EtOH abuse


Meds: As per EMR


Allergy: PCN, shellfish


PSH: unknown


FH: unknown


Social: heavy EtOH use, unknown tobacco/illicit drug use, lives with wife





Review of Systems





- Review of Systems


Systems not reviewed;Unavailable: Acuity of Condition, Altered Mental Status, 

Intubated





Past Patient History





- Past Medical History & Family History


Past Medical History?: Yes





- Past Social History


Smoking Status: Unknown If Ever Smoked





- CARDIAC


Hx Cardiac Disorders: No





- PULMONARY


Hx Respiratory Disorders: No





- NEUROLOGICAL


Hx Neurological Disorder: No





- HEENT


Hx HEENT Problems: No


Hx Blind: No


Hx Cataracts: No


Hx Deafness: No


Hx Difficulty Chewing: No


Hx Epistaxis: No


Hx Glaucoma: No


Hx Macular Degeneration: No


Hx Sinusitis: No





- RENAL


Hx Chronic Kidney Disease: Yes


Hx Dialysis: No


Hx Kidney Stones: No


Hx Neurogenic Bladder: No


Hx Pyelonephritis: No


Hx Renal (Kidney) Cancer: No


Hx Renal Failure: Yes


Other/Comment: Chronic kidney disease





- ENDOCRINE/METABOLIC


Hx Endocrine Disorders: No


Hx Adrenal Cancer: No


Hx Diabetes Insipidus: No


Hx Diabetes Mellitus Type 1: No


Hx Diabetes Mellitus Type 2: No


Hx Hyperthyroidism: No


Hx Hypothyroidism: No


Hx Systemic Lupus Erythematosus: No





- HEMATOLOGICAL/ONCOLOGICAL


Hx Blood Disorders: Yes


Hx AIDS: No (unknown)


Hx Human Immunodeficiency Virus (HIV): No ([place])





- INTEGUMENTARY


Hx Dermatological Problems: No





- MUSCULOSKELETAL/RHEUMATOLOGICAL


Hx Musculoskeletal Disorders: Yes


Hx Back Pain: Yes


Hx Falls: No





- GASTROINTESTINAL


Hx Gastrointestinal Disorders: No





- GENITOURINARY/GYNECOLOGICAL


Hx Genitourinary Disorders: No





- PSYCHIATRIC


Hx Psychophysiologic Disorder: No


Hx Substance Use: No





- SURGICAL HISTORY


Hx Surgeries: No


Hx Herniorrhaphy: No


Hx Hysterectomy: No


Hx Joint Replacement: No


Hx Kidney Transplant: No


Hx Liver Transplant: No


Hx Mastectomy: No


Hx Musculoskeletal Surgery: No


Hx Open Heart Surgery: No


Hx Open Reduction Internal Fixation: No


Hx Orthopedic Surgery: No


Hx Parathyroidectomy: No


Hx Penile Implant: No


Hx Pulmonary Surgery: No


Hx Splenectomy: No


Hx Thyroidectomy: No


Hx Tonsillectomy: No


Hx Tubal Ligation: No


Hx Valve Replacement: No


Hx Vascular Surgery: No


Hx Vascular Access Device: No





- ANESTHESIA


Hx Anesthesia: No


Hx Anesthesia Reactions: No


Hx Malignant Hyperthermia: No


Has any member of the family had a problem w/ anesthesia?:  (Unknown)





Meds


Allergies/Adverse Reactions: 


 Allergies











Allergy/AdvReac Type Severity Reaction Status Date / Time


 


Penicillins Allergy  RASH Verified 10/06/17 17:48


 


shellfish derived Allergy  ITCHING Verified 04/14/18 18:20














- Medications


Medications: 


 Current Medications





Acetaminophen (Tylenol 325mg/10.15ml Ud)  325 mg NG Q6 PRN


   PRN Reason: Temperature


   Last Admin: 04/27/18 10:43 Dose:  325 mg


Chlordiazepoxide (Librium)  50 mg PO Q8 [facility]


   Last Admin: 04/27/18 08:49 Dose:  50 mg


Folic Acid (Folic Acid)  1 mg PO DAILY [facility]


   Last Admin: 04/27/18 08:09 Dose:  1 mg


Hydromorphone HCl (Dilaudid)  1 mg IVP Q4 PRN


   PRN Reason: Pain, moderate (4-7)


   Last Admin: 04/27/18 11:04 Dose:  0.5 mg


Ciprofloxacin (Cipro 400mg/200ml Dsw)  400 mg in 200 mls @ 200 mls/hr IVPB Q12 

[facility]


   PRN Reason: Protocol


   Last Admin: 04/27/18 08:07 Dose:  200 mls/hr


Vancomycin HCl 1 gm/ Sodium (Chloride)  250 mls @ 125 mls/hr IVPB DAILY [facility]


   PRN Reason: Protocol


   Last Admin: 04/27/18 10:18 Dose:  125 mls/hr


Pantoprazole Sodium 40 mg/ (Sodium Chloride)  100 mls @ 20 mls/hr IVPB Q5H [facility]


   PRN Reason: 8 MG/HR


   Last Admin: 04/27/18 13:36 Dose:  20 mls/hr


Octreotide Acetate 1,250 mcg/ (Sodium Chloride)  252.5 mls @ 10.1 mls/hr IV 

.Q24H [facility]


   PRN Reason: 50 MCG/HR


   Last Admin: 04/27/18 13:40 Dose:  10.1 mls/hr


Norepinephrine Bitartrate 4 mg (/ Dextrose)  254 mls @ 9.52 mls/hr IV .Q24H [facility]

; 2.5 MCG/MIN


   PRN Reason: Protocol


Lactulose (Enulose)  20 gm NG Q8 [facility]


Lorazepam (Ativan)  2 mg IVP Q6 PRN


   PRN Reason: Agitation


   Last Admin: 04/27/18 09:30 Dose:  2 mg


Metoclopramide HCl (Reglan)  10 mg IVP Q6 PRN


   PRN Reason: Nausea/Vomiting


   Last Admin: 04/20/18 08:49 Dose:  10 mg


Thiamine HCl (Vitamin B1 Tab)  100 mg PO DAILY [facility]


   Last Admin: 04/27/18 08:09 Dose:  100 mg











Physical Exam





- Constitutional


Appears: No Acute Distress





- Head Exam


Head Exam: ATRAUMATIC, NORMOCEPHALIC





- Eye Exam


Eye Exam: PERRL





- ENT Exam


ENT Exam: Mucous Membranes Moist


Additional comments: 





ET tube in place


NG Tube in place





- Respiratory Exam


Additional comments: 





intubated and on vent support (500, 5, , )





- Cardiovascular Exam


Cardiovascular Exam: REGULAR RHYTHM





- GI/Abdominal Exam


GI & Abdominal Exam: Distended, Hypoactive Bowel Sounds.  absent: Rigid





- Rectal Exam


Additional comments: 





rectal tube place





-  Exam


Additional comments: 





dunn catheter in place





- Extremities Exam


Extremities exam: Positive for: normal capillary refill, pedal pulses present


Additional comments: 





R femoral Central line





- Neurological Exam


Neurological exam: Altered





- Psychiatric Exam


Psychiatric exam: Flat Affect





- Skin


Skin Exam: Dry, Intact, Warm





Results





- Vital Signs


Recent Vital Signs: 


 Last Vital Signs











Temp  100.5 F H  04/27/18 12:00


 


Pulse  78   04/27/18 13:00


 


Resp  27 H  04/27/18 13:00


 


BP  102/44 L  04/27/18 13:00


 


Pulse Ox  94 L  04/27/18 13:00














- Labs


Result Diagrams: 


 04/27/18 14:43





 04/27/18 04:50


Labs: 


 Laboratory Results - last 24 hr











  04/27/18 04/27/18 04/27/18





  04:50 04:50 04:50


 


WBC  13.2 H  


 


RBC  2.39 L  


 


Hgb  7.0 L D  


 


Hct  21.6 L  


 


MCV  90.4  


 


MCH  29.5  


 


MCHC  32.6 L  


 


RDW  15.4 H  


 


Plt Count  127 L  


 


MPV  10.1  


 


Neut % (Auto)  61.3  


 


Lymph % (Auto)  24.3  


 


Mono % (Auto)  12.1 H  


 


Eos % (Auto)  1.2  


 


Baso % (Auto)  1.1  


 


Neut # (Auto)  8.1 H  


 


Lymph # (Auto)  3.2  


 


Mono # (Auto)  1.6 H  


 


Eos # (Auto)  0.2  


 


Baso # (Auto)  0.1  


 


pCO2   


 


pO2   


 


HCO3   


 


ABG pH   


 


ABG Total CO2   


 


ABG O2 Saturation   


 


ABG O2 Content   


 


ABG Base Excess   


 


ABG Hemoglobin   


 


ABG Carboxyhemoglobin   


 


POC ABG HHb (Measured)   


 


ABG Methemoglobin   


 


ABG O2 Capacity   


 


Anthony Test   


 


A-a O2 Difference   


 


Hgb O2 Saturation   


 


Vent Mode   


 


Mechanical Rate   


 


FiO2   


 


Tidal Volume   


 


PEEP   


 


Sodium    142


 


Potassium    4.2


 


Chloride    109 H


 


Carbon Dioxide    23


 


Anion Gap    14


 


BUN    13


 


Creatinine    0.9


 


Est GFR ( Amer)    > 60


 


Est GFR (Non-Af Amer)    > 60


 


Random Glucose    120 H


 


Lactic Acid   


 


Calcium    7.6 L


 


Total Bilirubin    1.5 H


 


AST    160 H D


 


ALT    71


 


Alkaline Phosphatase    105


 


Ammonia   


 


Total Protein    5.8 L


 


Albumin    2.4 L


 


Globulin    3.4


 


Albumin/Globulin Ratio    0.7 L


 


Lipase    182


 


Procalcitonin   1.75 H 


 


Blood Type   


 


Antibody Screen   


 


Crossmatch   


 


BBK History Checked   














  04/27/18 04/27/18 04/27/18





  04:50 04:50 05:43


 


WBC   


 


RBC   


 


Hgb   


 


Hct   


 


MCV   


 


MCH   


 


MCHC   


 


RDW   


 


Plt Count   


 


MPV   


 


Neut % (Auto)   


 


Lymph % (Auto)   


 


Mono % (Auto)   


 


Eos % (Auto)   


 


Baso % (Auto)   


 


Neut # (Auto)   


 


Lymph # (Auto)   


 


Mono # (Auto)   


 


Eos # (Auto)   


 


Baso # (Auto)   


 


pCO2    35


 


pO2    120 H


 


HCO3    25.3


 


ABG pH    7.45


 


ABG Total CO2    25.4


 


ABG O2 Saturation    100.5 H


 


ABG O2 Content    10.4 L


 


ABG Base Excess    0.4


 


ABG Hemoglobin    7.4 L


 


ABG Carboxyhemoglobin    1.9 H


 


POC ABG HHb (Measured)    -0.5 L


 


ABG Methemoglobin    1.0


 


ABG O2 Capacity    10.3 L


 


Anthony Test    Yes


 


A-a O2 Difference    121.0


 


Hgb O2 Saturation    97.7


 


Vent Mode    A/c


 


Mechanical Rate    12


 


FiO2    40.0


 


Tidal Volume    500


 


PEEP    5


 


Sodium   


 


Potassium   


 


Chloride   


 


Carbon Dioxide   


 


Anion Gap   


 


BUN   


 


Creatinine   


 


Est GFR ( Amer)   


 


Est GFR (Non-Af Amer)   


 


Random Glucose   


 


Lactic Acid  1.0  


 


Calcium   


 


Total Bilirubin   


 


AST   


 


ALT   


 


Alkaline Phosphatase   


 


Ammonia   73 H 


 


Total Protein   


 


Albumin   


 


Globulin   


 


Albumin/Globulin Ratio   


 


Lipase   


 


Procalcitonin   


 


Blood Type   


 


Antibody Screen   


 


Crossmatch   


 


BBK History Checked   














  04/27/18 04/27/18





  07:10 07:10


 


WBC   13.3 H


 


RBC   2.44 L


 


Hgb   7.0 L


 


Hct   21.9 L


 


MCV   90.0


 


MCH   28.8


 


MCHC   32.0 L


 


RDW   15.7 H


 


Plt Count   128 L


 


MPV   9.4


 


Neut % (Auto)   64.0


 


Lymph % (Auto)   22.2


 


Mono % (Auto)   11.2 H


 


Eos % (Auto)   1.4


 


Baso % (Auto)   1.2


 


Neut # (Auto)   8.5 H


 


Lymph # (Auto)   2.9


 


Mono # (Auto)   1.5 H


 


Eos # (Auto)   0.2


 


Baso # (Auto)   0.2


 


pCO2  


 


pO2  


 


HCO3  


 


ABG pH  


 


ABG Total CO2  


 


ABG O2 Saturation  


 


ABG O2 Content  


 


ABG Base Excess  


 


ABG Hemoglobin  


 


ABG Carboxyhemoglobin  


 


POC ABG HHb (Measured)  


 


ABG Methemoglobin  


 


ABG O2 Capacity  


 


Anthony Test  


 


A-a O2 Difference  


 


Hgb O2 Saturation  


 


Vent Mode  


 


Mechanical Rate  


 


FiO2  


 


Tidal Volume  


 


PEEP  


 


Sodium  


 


Potassium  


 


Chloride  


 


Carbon Dioxide  


 


Anion Gap  


 


BUN  


 


Creatinine  


 


Est GFR ( Amer)  


 


Est GFR (Non-Af Amer)  


 


Random Glucose  


 


Lactic Acid  


 


Calcium  


 


Total Bilirubin  


 


AST  


 


ALT  


 


Alkaline Phosphatase  


 


Ammonia  


 


Total Protein  


 


Albumin  


 


Globulin  


 


Albumin/Globulin Ratio  


 


Lipase  


 


Procalcitonin  


 


Blood Type  O POSITIVE 


 


Antibody Screen  Negative 


 


Crossmatch  See Detail 


 


BBK History Checked  Patient has bt 














Assessment & Plan





- Assessment and Plan (Free Text)


Assessment: 





37 M with GI bleed


Plan: 





-Monitor Hgb


-Transfuse PRN


-Calcium supplementation due to number of transfusions


-Consider TXA


-Strict I's & O's


-Recommend EGD/Colonoscopy


-Recommend Mesenteric angiography


-Medical management as per ICU


-Will follow closely


-Discussed with Dr. Arabella Reeves PGY1





- Date & Time


Date: 04/27/18


Time: 14:00

## 2018-04-27 NOTE — CP.PCM.PN
Subjective





- Date & Time of Evaluation


Date of Evaluation: 04/27/18


Time of Evaluation: 09:00





- Subjective


Subjective: 





Pt noted to have a 2.5 drop in Hgb


Melena from rectal tube approx 400-500 ml


RN aspirated NGT tube for residuals and noted some bright red blood.


Pt now hypotensive


Remains intubated on Mech Vent


On Precedex drip





Objective





- Vital Signs/Intake and Output


Vital Signs (last 24 hours): 


 











Temp Pulse Resp BP Pulse Ox


 


 99.5 F   98 H  17   113/56 L  100 


 


 04/27/18 08:00  04/27/18 08:00  04/27/18 08:00  04/27/18 08:00  04/27/18 08:00








Intake and Output: 


 











 04/27/18 04/27/18





 06:59 18:59


 


Intake Total 1215 


 


Output Total 800 


 


Balance 415 














- Medications


Medications: 


 Current Medications





Chlordiazepoxide (Librium)  50 mg PO Q8 [facility]


   Last Admin: 04/27/18 08:49 Dose:  50 mg


Folic Acid (Folic Acid)  1 mg PO DAILY [facility]


   Last Admin: 04/27/18 08:09 Dose:  1 mg


Ciprofloxacin (Cipro 400mg/200ml Dsw)  400 mg in 200 mls @ 200 mls/hr IVPB Q12 

[facility]


   PRN Reason: Protocol


   Last Admin: 04/27/18 08:07 Dose:  200 mls/hr


Vancomycin HCl 1 gm/ Sodium (Chloride)  250 mls @ 166.667 mls/hr IVPB DAILY [facility]


   PRN Reason: Protocol


Lactulose (Enulose)  20 gm NG DAILY [facility]


   Last Admin: 04/27/18 08:08 Dose:  20 gm


Metoclopramide HCl (Reglan)  10 mg IVP Q6 PRN


   PRN Reason: Nausea/Vomiting


   Last Admin: 04/20/18 08:49 Dose:  10 mg


Pantoprazole Sodium (Protonix Inj)  40 mg IVP DAILY [facility]


   Last Admin: 04/27/18 08:09 Dose:  40 mg


Thiamine HCl (Vitamin B1 Tab)  100 mg PO DAILY [facility]


   Last Admin: 04/27/18 08:09 Dose:  100 mg











- Labs


Labs: 


 





 04/27/18 07:10 





 04/27/18 04:50 





 











PT  15.6 Seconds (9.8-13.1)  H  04/26/18  04:40    


 


INR  1.4  (0.9-1.2)  H  04/26/18  04:40    


 


APTT  41.8 Seconds (25.6-37.1)  H  04/26/18  04:40    























- Constitutional


Appears: Other (Intubated on Mech Vent)





- Head Exam


Head Exam: NORMAL INSPECTION, NORMOCEPHALIC





- Eye Exam


Eye Exam: Normal appearance, PERRL





- ENT Exam


ENT Exam: Mucous Membranes Dry, Normal External Ear Exam


Additional comments: 


NGT in place





- Respiratory Exam


Respiratory Exam: Rales.  absent: Wheezes


Additional comments: 


Intubated on Vent





- Cardiovascular Exam


Cardiovascular Exam: REGULAR RHYTHM, +S1, +S2





- GI/Abdominal Exam


GI & Abdominal Exam: Distended, Normal BS





- Rectal Exam


Additional comments: 


Rectal Tube in place- dark l maroon colored liquid in rectal tube 





- Extremities Exam


Extremities Exam: Normal Capillary Refill





- Neurological Exam


Additional comments: 


Sedated





- Skin


Skin Exam: Dry, Normal Color, Warm





Assessment and Plan


(1) Acute blood loss anemia


Status: Acute   





(2) GI bleed


Status: Acute   





(3) Alcohol withdrawal


Status: Acute   





(4) Alcohol abuse


Status: Chronic   





(5) Coagulopathy


Status: Acute   





(6) Transaminitis


Status: Acute   





- Assessment and Plan (Free Text)


Assessment: 











38 y/o gent , known hx of Alcoholism and recent GI bleed, was brought in 

because of hematemesis and alteration in   mental status.


Pt was recently discharged from this hospital  after an episode of GI bleed .  

EGD done on 4/16 did not show any active bleed.  The patient was discharge home 

and again started drinking Alcohol.   On day of admission, he again had 

hematemesis and was noted to be confused and agitated.  HGB=6.1


4/22: pt noted to be obtunded, Ammonia level elevated.  Pt intubated for airway 

protection.  Emergent EGD done- no active bleeding noted


4/24: Bleeding Scan was negative.


4/27 : noted bright red blood from NGT and melna on rectal tube, pt became 

hypotensive, drop in hgb from 9.5 yesterday to 7.0 





(1) Acute blood loss anemia sec to GI Bleed prob sec   ? Bleeding  Esophageal 

Varices


Status: Acute   


Pt admitted to ICU for close monitoring


Total 11 units PRBC 6 FFP transfused 


Pt also received DDAVP


Bleeding scan : neg


Hgb dropped 2.5 points from yesterday and pt hypotensive today, noted blood in 

NGT , melena 


Will transfuse 2 more units PRBC, will also transfuse FFP and Platelet, will 

give DDAVP, VIT K


Restart Protonix drip after bolus, restart Octreotide drip with bolus


GI consulted- Dr Owen - emergent EGD  done  on 4/22  w/c  did not show 

active bleeding, again called Dr Owen today due to signs of active bleed - 

rec  to rpt Bleeding Scan and IR consult for Angio and poss Embolization


CT Angio of Abdomen  :Small varices seen in the gastrohepatic ligament. No 

active arterial contrast extravasation. Right common femoral vein central 

venous catheter with tip in the right common femoral vein.


Nonspecific high-density fluid within the small and large bowel which 

progresses from the pre to post contrast-enhanced phases.  This may be related 

to a subacute small bowel hemorrhage versus recent ingested high-density liquid.


Nonspecific rounded area of high density in the pelvis measuring approximately 

5.9 x 4.8 cm, anterior to the sigmoid. This may represent an area of focal, 

contained hemorrhage persists collapsed small bowel.


Moderate simple fluid density ascites.


Discussed case with Dr Hannon  ( IR)  


cont IV Cipro


Surgery also consulted in event that pt may need Lap due to bleeding








2.   Pneumonia vs Atelectasis , Pleural Effusion


Pt febrile , CXR shows ? Infiltrate vs Atelectasis


cont IV Cipro, IV Vanco added today





3. Intubated for Airway Protection


pt Intubated , on Mech Vent


attempted weaning when feasible


Pulmonary consult





(4) Alcohol withdrawal, hx of Alcohol Abuse 


Status: Acute  


Pt became obtunded   and was intubated for airway protection


RTC Librium- dose increased


Pt on Precedex drip 


cont Thiamine and FA


IVF hydration





5.  Hepatic Encephalopathy


Ammonia level elevated


, expect to go higher due to active bleed


will increase Lactulose dose to TID


CT of the head : no bleed





6. Coagulopathy sec to Liver dis from alcoholism


Pt received Vit K, DDAVP


FFP transfusion x 6 , will also transfuse Platelet and more FFP 


pt developed rigors after transfusion of 1st FFP - checked for transfusion 

reaction





7.  Cirrhosis with ascites , coagulopathy, thrombocytopenia sec to ETOH





DVT proph


- SCD


no anticoag sec to GIB and coagulopathy & low Platelet

## 2018-04-27 NOTE — RAD
HISTORY:

intubated  



COMPARISON:

Chest radiograph dated 04/26/2018. 



FINDINGS:



LUNGS:

Right upper lobe collapse.



PLEURA:

No significant pleural effusion identified, no pneumothorax apparent.



CARDIOVASCULAR:

Normal.



OSSEOUS STRUCTURES:

No significant abnormalities.



VISUALIZED UPPER ABDOMEN:

Normal.



OTHER FINDINGS:

Endotracheal, enteric tubes, unchanged.



IMPRESSION:

Right upper lobe collapse. Stable tubes and lines.

## 2018-04-27 NOTE — CP.CCUPN
CCU Subjective





- Physician Review


Subjective (Free Text): 


Still sedated on Propofol , able to weaned to 5 mcg/kg/min.  Had approx 500ml 

liquid melanotic BMs via flexiseal rectal tube. 





Other VS and I/Os reviewed. Recurrent fever spike to 101.8F last 24H. 





ROS:  No other pertinent negs or positives on 10+  system review obtainable 

from intubated and sedated patient. 











PMSFH:    All other Nursing and physician documentation reviewed to date; no 

new pertinent info noted relevant to current medical problems.








EXAM-


HEENT: no icterus, no gaze preference, pupils equal and reactive, no icterus


NECK: No JVD, supple, carotids equal upstroke bilat/no bruits


CHEST: decreased BS bases, no wheezes audible


HEART:  regular tachy, distant, S1S2, no rubs.


ABD:  soft, ++ distention, no tympany, no palp tenderness, BS hypoactive.


EXT: No peripheral/ digital cyanosis, no calf tenderness or palpable cords, 

distal pulses intact and symmetrical. 


NEURO:  no focal motor deficits, withdraws to deep pain.


SKIN:   no rashes,  warm and dry.











LABS: 





WBC= 13.2


HGB= 7.0


PLTs=  127 K





INR= 1.4, PTT elevated. 





Na= 142


K= 4.2


CL= 109


HCO3= 23


BUN/Cr= 13/0.9


BS= 120





7.45 /35 /120





CXR: ETT and OGT positions OK, loss of R lung volume due to atelectasis RUL  (

my interp). 


AXR:  predominant small bowel pattern, no free air, no air fluid levels. 





IMPRESSION / MAJOR PROBLEMS NOW:


1.  Recurrent UGIB, r/o gastritis, PUD, other occult, intermittent Variceal 

bleeding?


2.  Acute Anemia 2 blood loss; and mild Coagulopathy


3.  AKA: suspect binge ETOH use after last hospital discharge. 


4.  ETOH Withdrawal / Delirium


5.  Azotemia / Dehydration








PLAN:


1.   Repeat CBC, transfuse FFP now, transfuse PRBCs if repeat HGb confirmed to 

be below or near 7.0.  No other signs of active bleeding. Consider nuclear 

bleeding scan; GI f/u. 


2.   Small bowel ileus looks evident, but he is tolerating tube feeds.


3.   Lactulose resumed.


4.   Anxiolytics with Propofol changed over to Dexemedetdine


5.   MV day #5, continue aggressive suctioning, SBT trial as tolerated. 


6.     On empiric Cipro; sputum C&S sent / ordered yesterday. Re-culture today. 

Consider broadening empiric coverage; e.g. add Vanco, Consider ID eval. 


7.     See orders for other supportive care. 

















CCU Objective





- Vital Signs / Intake & Output


Vital Signs (Last 4 hours): 


Vital Signs











  Temp Pulse Resp BP Pulse Ox


 


 04/27/18 08:00  99.5 F  98 H  17  113/56 L  100


 


 04/27/18 07:00   101 H  24  103/48 L  100


 


 04/27/18 06:00   105 H  24  108/54 L  100


 


 04/27/18 05:00   106 H  24  103/55 L  100











Intake and Output (Last 8hrs): 


 Intake & Output











 04/26/18 04/27/18 04/27/18





 22:59 06:59 14:59


 


Intake Total 2785 690 


 


Output Total 675 800 


 


Balance 2110 -110 


 


Weight  197 lb 


 


Intake:   


 


  IV 1110 100 


 


  Intake, Piggyback 400  


 


  Tube Feeding 825 440 


 


  Free Water Flush 450 150 


 


Output:   


 


  Urine 475 300 


 


    Urethral (Burger) 475 300 


 


  Stool 200 500

## 2018-04-28 NOTE — CP.PCM.PN
Subjective





- Date & Time of Evaluation


Date of Evaluation: 04/28/18


Time of Evaluation: 08:30





- Subjective


Subjective: 


Patient seen and examined bedside. Intubated on MV PRVC AC mode 12/500/5/30 % 

ABG 38/85/27/7.47,sedated on Presedex drip 


At present BP stable 112/64 while on levophed  drip @  2.5 mcg


Afebrile saturating 98 % 


On prpotonix and Octreotide drip


With nomore episodes of bleeding overnight


s/p total 15 unit PRBC transfusion and 13 unit FFP transfusion





Objective





- Vital Signs/Intake and Output


Vital Signs (last 24 hours): 


 











Temp Pulse Resp BP Pulse Ox


 


 98.7 F   74   16   115/63   98 


 


 04/28/18 08:00  04/28/18 09:00  04/28/18 09:00  04/28/18 09:00  04/28/18 09:00








Intake and Output: 


 











 04/28/18 04/28/18





 06:59 18:59


 


Intake Total 1919 931


 


Output Total 1100 


 


Balance 819 931














- Medications


Medications: 


 Current Medications





Acetaminophen (Tylenol 325mg/10.15ml Ud)  325 mg NG Q6 PRN


   PRN Reason: Temperature


   Last Admin: 04/27/18 10:43 Dose:  325 mg


Chlordiazepoxide (Librium)  50 mg PO Q8 [facility]


   Last Admin: 04/27/18 08:49 Dose:  50 mg


Folic Acid (Folic Acid)  1 mg PO DAILY [facility]


   Last Admin: 04/27/18 08:09 Dose:  1 mg


Hydromorphone HCl (Dilaudid)  1 mg IVP Q4 PRN


   PRN Reason: Pain, moderate (4-7)


   Last Admin: 04/27/18 11:04 Dose:  0.5 mg


Ciprofloxacin (Cipro 400mg/200ml Dsw)  400 mg in 200 mls @ 200 mls/hr IVPB Q12 

[facility]


   PRN Reason: Protocol


   Last Admin: 04/28/18 08:09 Dose:  200 mls/hr


Vancomycin HCl 1 gm/ Sodium (Chloride)  250 mls @ 125 mls/hr IVPB DAILY [facility]


   PRN Reason: Protocol


   Last Admin: 04/28/18 08:11 Dose:  125 mls/hr


Pantoprazole Sodium 40 mg/ (Sodium Chloride)  100 mls @ 20 mls/hr IVPB Q5H [facility]


   PRN Reason: 8 MG/HR


   Last Admin: 04/28/18 08:43 Dose:  20 mls/hr


Octreotide Acetate 1,250 mcg/ (Sodium Chloride)  252.5 mls @ 10.1 mls/hr IV 

.Q24H [facility]


   PRN Reason: 50 MCG/HR


   Last Admin: 04/27/18 13:40 Dose:  10.1 mls/hr


Norepinephrine Bitartrate 4 mg (/ Dextrose)  254 mls @ 9.52 mls/hr IV .Q24H [facility]

; 2.5 MCG/MIN


   PRN Reason: Protocol


   Last Titration: 04/28/18 01:00 Dose:  2.5 mcg/min, 9.52 mls/hr


Dexmedetomidine HCl 400 mcg/ (Sodium Chloride)  100 mls @ 11.16 mls/hr IV 

.Q8H58M [facility]; 0.5 MCG/KG/HR


   PRN Reason: Protocol


   Last Admin: 04/28/18 06:26 Dose:  0.5 mcg/kg/hr, 11.16 mls/hr


Lorazepam (Ativan)  2 mg IVP Q6 PRN


   PRN Reason: Agitation


   Last Admin: 04/28/18 08:00 Dose:  2 mg


Metoclopramide HCl (Reglan)  10 mg IVP Q6 PRN


   PRN Reason: Nausea/Vomiting


   Last Admin: 04/20/18 08:49 Dose:  10 mg


Thiamine HCl (Vitamin B1 Tab)  100 mg PO DAILY [facility]


   Last Admin: 04/27/18 08:09 Dose:  100 mg











- Labs


Labs: 


 





 04/28/18 08:50 





 04/27/18 04:50 





 











PT  15.8 Seconds (9.8-13.1)  H  04/28/18  09:00    


 


INR  1.4  (0.9-1.2)  H  04/28/18  09:00    


 


APTT  36.6 Seconds (25.6-37.1)   04/28/18  09:00    














- Constitutional


Appears: Chronically Ill, Other (intubated on MV, sedated on Presedex drip)





- Head Exam


Head Exam: NORMOCEPHALIC





- Eye Exam


Eye Exam: EOMI





- ENT Exam


ENT Exam: Mucous Membranes Dry, Normal Exam





- Neck Exam


Neck Exam: Normal Inspection





- Respiratory Exam


Respiratory Exam: Clear to Ausculation Bilateral, NORMAL BREATHING PATTERN.  

absent: Rhonchi, Wheezes





- Cardiovascular Exam


Cardiovascular Exam: REGULAR RHYTHM, RRR, +S1, +S2.  absent: JVD





- GI/Abdominal Exam


GI & Abdominal Exam: Distended (with ascites ), Soft, Normal Bowel Sounds.  

absent: Guarding, Tenderness, Rebound





- Rectal Exam


Rectal Exam: Deferred





- Extremities Exam


Extremities Exam: Full ROM, Normal Capillary Refill, Normal Inspection.  absent

: Pedal Edema





- Neurological Exam


Additional comments: 





sedated 





- Skin


Skin Exam: Dry, Warm





Assessment and Plan





- Assessment and Plan (Free Text)


Assessment: 


38 y/o male with  known history  of Alcoholism and recent GI bleed, was brought 

in because of hematemesis and alteration in   mental status.


He was recently discharged from this hospital  after an episode of GI bleed .  

EGD done on 4/16 did not show any active bleed.  The patient was discharged 

home and again started drinking Alcohol.   On day of admission, he  had 

hematemesis and was noted to be confused and agitated.  HGB=6.1.He was 

transfused with PRBC and FFP, intubated for airway protection. EGD showed no 

active bleeding. Since Hgb continued  to drop bleeding scan was performed that 

showed no active bleeding source.He was started on Levophed drip 4/27 for 

hypotension and underwent CTA of abdomen by IR that showed no active 

bleeding.Transfused totally with 15 unit PRBC and 13 unit FFP 


At presenty intubated on MV PRVC / AC mode , sedated on Presedex drip , on 

levophed drip , Protonix and Octreotide drip


Hgb this AM 7.3 . No more episodes of bleeding overnight











1.Acute blood loss anemia sec to GI Bleed prob secondary  to Bleeding  

Esophageal Varices


Acute   


s/p total 14 unit PBC and 13 unit FFP transfusion.


No more bleeding episodes overnight and Hgb this AM 7.3


EGD and CTA abdomen failed to show any source of bleeding 


Pt also received DDAVP, Vitamin K 


Continue Protonix and Octreotide drip


On Levophed drip low dose due to low BP 


GI , IR and surgery on consult 


Continue ICU monitoring . Will try to turner Levophed drip off and wean off 

vent 


cont IV Cipro


Monitor H& H and transfuse as needed


NPO for now








2. Pneumonia 


CXR showed right apex residual consolidation 


procalcitonin elevated


cont IV Cipro and  IV Vanco





3. Intubated for Airway Protection


on vent PRVC AC mode 12/500/5/30 % 


Will attempt to wean off the vent 


Pulmonary consulted 





4. Alcohol withdrawal, hx of Alcohol Abuse 


Acute  


on Presedex drip for sedation, Ativa and librium 


cont Thiamine and FA


IVF hydration





5. Hepatic Encephalopathy


Ammonia level elevated


on Lactulose TID


CT of the head : no bleed








6. Coagulopathy sec to Liver dis from alcoholism


Pt received Vit K, DDAVP


s/p 13 unit FFP transfusion 


INR 1.4 





7.  Cirrhosis with ascites , coagulopathy, thrombocytopenia sec to ETOH abuse 


may need abdominal parasenthesis





8.DVT proph


- SCD


no anticoag sec to GIB and coagulopathy & low Platelet

## 2018-04-28 NOTE — CP.PCM.PN
Subjective





- Date & Time of Evaluation


Date of Evaluation: 04/28/18


Time of Evaluation: 06:40





- Subjective


Subjective: 





General Surgery Note for Dr. Bell





Patient seen and examined at bedside. Patient received 8 units FFP and 4 units 

PRBC over last 24 hrs. Hgb continues to drop. Last night hgb was 8.0 and this 

morning it is 7.3. Rectal tube was removed. He is still intubated and on vent 

support. FiO2 was dropped to 30%. ROS unobtainable due to sedation.





Objective





- Vital Signs/Intake and Output


Vital Signs (last 24 hours): 


 











Temp Pulse Resp BP Pulse Ox


 


 98.7 F   87   23   100/46 L  100 


 


 04/28/18 08:00  04/28/18 08:00  04/28/18 08:00  04/28/18 08:00  04/28/18 08:00








Intake and Output: 


 











 04/28/18 04/28/18





 06:59 18:59


 


Intake Total 1919 931


 


Output Total 1100 


 


Balance 819 931














- Medications


Medications: 


 Current Medications





Acetaminophen (Tylenol 325mg/10.15ml Ud)  325 mg NG Q6 PRN


   PRN Reason: Temperature


   Last Admin: 04/27/18 10:43 Dose:  325 mg


Chlordiazepoxide (Librium)  50 mg PO Q8 [facility]


   Last Admin: 04/27/18 08:49 Dose:  50 mg


Folic Acid (Folic Acid)  1 mg PO DAILY [facility]


   Last Admin: 04/27/18 08:09 Dose:  1 mg


Hydromorphone HCl (Dilaudid)  1 mg IVP Q4 PRN


   PRN Reason: Pain, moderate (4-7)


   Last Admin: 04/27/18 11:04 Dose:  0.5 mg


Ciprofloxacin (Cipro 400mg/200ml Dsw)  400 mg in 200 mls @ 200 mls/hr IVPB Q12 

[facility]


   PRN Reason: Protocol


   Last Admin: 04/28/18 08:09 Dose:  200 mls/hr


Vancomycin HCl 1 gm/ Sodium (Chloride)  250 mls @ 125 mls/hr IVPB DAILY [facility]


   PRN Reason: Protocol


   Last Admin: 04/28/18 08:11 Dose:  125 mls/hr


Pantoprazole Sodium 40 mg/ (Sodium Chloride)  100 mls @ 20 mls/hr IVPB Q5H [facility]


   PRN Reason: 8 MG/HR


   Last Admin: 04/28/18 08:43 Dose:  20 mls/hr


Octreotide Acetate 1,250 mcg/ (Sodium Chloride)  252.5 mls @ 10.1 mls/hr IV 

.Q24H [facility]


   PRN Reason: 50 MCG/HR


   Last Admin: 04/27/18 13:40 Dose:  10.1 mls/hr


Norepinephrine Bitartrate 4 mg (/ Dextrose)  254 mls @ 9.52 mls/hr IV .Q24H [facility]

; 2.5 MCG/MIN


   PRN Reason: Protocol


   Last Titration: 04/28/18 01:00 Dose:  2.5 mcg/min, 9.52 mls/hr


Dexmedetomidine HCl 400 mcg/ (Sodium Chloride)  100 mls @ 11.16 mls/hr IV 

.Q8H58M [facility]; 0.5 MCG/KG/HR


   PRN Reason: Protocol


   Last Admin: 04/28/18 06:26 Dose:  0.5 mcg/kg/hr, 11.16 mls/hr


Lorazepam (Ativan)  2 mg IVP Q6 PRN


   PRN Reason: Agitation


   Last Admin: 04/28/18 08:00 Dose:  2 mg


Metoclopramide HCl (Reglan)  10 mg IVP Q6 PRN


   PRN Reason: Nausea/Vomiting


   Last Admin: 04/20/18 08:49 Dose:  10 mg


Thiamine HCl (Vitamin B1 Tab)  100 mg PO DAILY [facility]


   Last Admin: 04/27/18 08:09 Dose:  100 mg











- Labs


Labs: 


 





 04/27/18 23:45 





 04/27/18 04:50 





 











PT  19.9 Seconds (9.8-13.1)  H  04/27/18  14:43    


 


INR  1.8  (0.9-1.2)  H  04/27/18  14:43    


 


APTT  39.1 Seconds (25.6-37.1)  H  04/27/18  14:43    














- Constitutional


Appears: No Acute Distress





- Head Exam


Head Exam: ATRAUMATIC, NORMOCEPHALIC





- Eye Exam


Pupil Exam: PERRL





- ENT Exam


ENT Exam: Mucous Membranes Moist


Additional comments: 





OGT in place


ET tube in place





- Respiratory Exam


Additional comments: 





intubated and on vent support





- Cardiovascular Exam


Cardiovascular Exam: REGULAR RHYTHM





- GI/Abdominal Exam


GI & Abdominal Exam: Distended


Additional comments: 





(+) fluid shift





-  Exam


Additional comments: 





dunn catheter in place





- Extremities Exam


Extremities Exam: Normal Capillary Refill





- Neurological Exam


Neurological Exam: Altered





- Psychiatric Exam


Psychiatric exam: Flat Affect





- Skin


Skin Exam: Dry, Intact, Warm





Assessment and Plan





- Assessment and Plan (Free Text)


Plan: 


37 M with GI bleed





-Monitor Hgb


-Transfuse PRN


-Strict I's & O's


-Recommend EGD/Colonoscopy


-Recommend Mesenteric angiography


-Medical management as per ICU


-Discussed with Dr. Arabella Reeves PGY1

## 2018-04-28 NOTE — RAD
HISTORY:

intubated  



COMPARISON:

04/27/2018. 



FINDINGS:

The endotracheal tube terminates 1.3 cm proximal to the carole.  The 

nasogastric tube terminates in the stomach. 



LUNGS:

There is interval improved aeration in the right apex and a new 

subsegmental atelectasis in the upper lobe.  There is moderate 

pulmonary venous congestion. No focal consolidation.



PLEURA:

No significant pleural effusion identified, no pneumothorax apparent.



CARDIOVASCULAR:

Normal.



OSSEOUS STRUCTURES:

No significant abnormalities.



VISUALIZED UPPER ABDOMEN:

Normal.



OTHER FINDINGS:

None.



IMPRESSION:

Interval improved aeration in the right apex with residual 

consolidation.  New subsegmental atelectasis in the right upper lobe.



Mild pulmonary venous congestion.



Stable position of endotracheal tube.

## 2018-04-28 NOTE — CP.CCUPN
CCU Subjective





- Physician Review


Subjective (Free Text): 


After noting acute change in OGT output with BRB, repeat CBC showed 

conformation of low HGb and PRBCs and FFP replacement ordered. So far over the 

past 24H, has recd 8 units FFP, 2+2 units PRBCs, and 1 unit Platelets. Rectal 

tube out as patient keeps on extruding it, but no BRBPR incontinence so far and 

no further BRB from OGT. Levophed started yesterday and now on 2.5 mcg/hr, 

Precedex at 0.4 mcg dose. He is intermittently agitated and required IV Ativan 

as Librium via enteral route has been placed on hold. Last Hgb was 8.0 last 

midnite.  Otherwise, seen by Pulm, FiO2 down to 30%, breathing 21 on AC 12, TV 

500ml, Peep 5. No SBTs anticipated today pending stability and cessation of GI 

bleeding. 





Other VS and I/Os reviewed. Recurrent fever spike to 100.9F max  last 24H.  He 

is +3.2L fluid balance. 





ROS:  No other pertinent negs or positives on 10+  system review obtainable 

from intubated and sedated patient. 











PMSFH:    All other Nursing and physician documentation reviewed to date; no 

new pertinent info noted relevant to current medical problems.








EXAM-


HEENT: no icterus, no gaze preference, pupils equal and reactive, no icterus


NECK: No JVD, supple, carotids equal upstroke bilat/no bruits


CHEST: decreased BS bases, no wheezes audible


HEART:  regular tachy, distant, S1S2, no rubs.


ABD:  soft, ++ distention, no tympany, no palp tenderness, BS hypoactive.


EXT: No peripheral/ digital cyanosis, no calf tenderness or palpable cords, 

distal pulses intact and symmetrical. 


GENIT:  +scrotal and penile edema


NEURO:  no focal motor deficits, withdraws to deep pain.


SKIN:   no rashes,  warm and dry.











LABS: 





WBC= 11.1


HGB= 7.3    


PLTs=  122K





INR= 1.4, PTT normal. 





Lactate= 1.7





Aj=666


K= 4.4


RN=162


HCO3= 28


BUN/Cr= 19/0.9


DW=715





7.46/38/85





CXR: ETT and OGT positions OK, increase in interstitial markings, edy on R; 

loss of R lung volume due to atelectasis RUL  (my interp). 








IMPRESSION / MAJOR PROBLEMS NOW:


1.  Recurrent UGIB, r/o gastritis, PUD, other occult, intermittent Variceal 

bleeding?


2.  Acute Anemia 2 blood loss; and mild Coagulopathy


3.  AKA on admission: suspect binge ETOH use after last hospital discharge. 


4.  ETOH Withdrawal / Delirium


5.  Azotemia / Dehydration








PLAN:





1.    Hopefully GI bleeding has subsided, will repeat CTA AP over any bleeding 

scan if active bleeding evident again. If CTA is positive this should lead to 

embolization by IR.  GI f/u for appropriateness of repeat Endoscopy. 


2.    Serial monitoring of Hgb, coags. May need more blood products today. 


3.    Resume lactulose. 


4.    Will wean off Precedex if bleeding truly has subsided and work towards 

extubation  (MV day #6); no SBTs anticipated today pending stability and 

cessation of GI bleeding. 


5.     Serial lactates have been normal despite temps and hypotension. Empriic 

Vanco added.


6.     Lasix prn. 


7.     See orders for other supportive care.











CCU Objective





- Vital Signs / Intake & Output


Vital Signs (Last 4 hours): 


Vital Signs











  Temp Pulse Resp BP Pulse Ox


 


 04/28/18 09:00   74  16  115/63  98


 


 04/28/18 08:00  98.7 F  87  23  100/46 L  100


 


 04/28/18 06:37   69  14  102/51 L  98


 


 04/28/18 06:00   68  15  91/48 L  97











Intake and Output (Last 8hrs): 


 Intake & Output











 04/27/18 04/28/18 04/28/18





 22:59 06:59 14:59


 


Intake Total 719 1611 931


 


Output Total 700 800 


 


Balance 19 811 931


 


Weight  200 lb 8 oz 


 


Intake:   


 


   110 106


 


  Intake, Piggyback 100 200 450


 


  Tube Feeding 0  0


 


  Blood Product 198 1301 375


 


Output:   


 


  Urine 400 700 


 


    Urethral (Burger) 400 700 


 


  Stool 300 100

## 2018-04-29 NOTE — CP.CCUPN
CCU Subjective





- Physician Review


Subjective (Free Text): 








NGT output has changed again from coffee- grounds to red bloody output, rectal 

tube re=placed and showing dark brownish output. He remains on PPi drip, 

Octreotide drip and sedated on Precedex at 0.9 mcg dose. 








Other VS and I/Os reviewed. Recurrent fever spike to 101.2F max near midnite 

overnite last 24H.  He diuresed approx 2 L. urine yesterday after one dose of 

lasix. 





ROS:  No other pertinent negs or positives on 10+  system review obtainable 

from intubated and sedated patient. 











PMSFH:    All other Nursing and physician documentation reviewed to date; no 

new pertinent info noted relevant to current medical problems.








EXAM-


HEENT: no icterus, no gaze preference, pupils equal and reactive, no icterus


NECK: No JVD, supple, carotids equal upstroke bilat/no bruits


CHEST: decreased BS bases, no wheezes audible


HEART:  regular tachy, distant, S1S2, no rubs.


ABD:  soft, ++ distention, no tympany, no palp tenderness, BS hypoactive.


EXT: No peripheral/ digital cyanosis, no calf tenderness or palpable cords, 

distal pulses intact and symmetrical. 


GENIT:  +scrotal and penile edema


NEURO:  no focal motor deficits, withdraws to deep pain.


SKIN:   no rashes,  warm and dry.











LABS: 





WBC= 8.5


HGB= 7.6   


PLTs=  111K





INR= 1.4, PTT normal yesterday





Lactate= normal yesterday





Bd=893


K= 4.1


DM=418


HCO3= 27


BUN/Cr= 21/0.9


JW=537





7.48 / 39 / 78





CXR: (my interp)  none today. 








IMPRESSION / MAJOR PROBLEMS NOW:


1.  Recurrent UGIB, r/o gastritis, PUD, other occult, intermittent Variceal 

bleeding?


2.  Acute Anemia 2 blood loss; and mild Coagulopathy


3.  AKA on admission: suspect binge ETOH use after last hospital discharge. 


4.  ETOH Withdrawal / Delirium


5.  Azotemia / Dehydration








PLAN:





1.    Apperas as recurrent UGI source, will order more PRBCs today. Check coags 

agaon, lactate levels. Consider repeat CTA AP if another lower GI site is 

suspected, GI F/u. 


2.    Resume lactulose. 


3.    Will wean off Precedex if bleeding truly has subsided and work towards 

extubation  (MV day #7); no SBTs anticipated today pending stability and 

cessation of GI bleeding. 


4.     Serial lactates have been normal despite temps and hypotension. Empriic 

Vanco added.


5.     Lasix prn. 


6.     See orders for other supportive care.











CCU Objective





- Vital Signs / Intake & Output


Vital Signs (Last 4 hours): 


Vital Signs











  Temp Pulse Resp BP Pulse Ox


 


 04/29/18 06:00   62  14  88/58 L  100


 


 04/29/18 05:00   65  12  96/54 L  100


 


 04/29/18 04:00  98.3 F  67  12  91/48 L  98











Intake and Output (Last 8hrs): 


 Intake & Output











 04/28/18 04/29/18 04/29/18





 22:59 06:59 14:59


 


Intake Total 324.4 889.6 


 


Output Total 2100 450 


 


Balance -1775.6 439.6 


 


Intake:   


 


  .4 889.6 


 


  Tube Feeding 0  


 


Output:   


 


  Gastric Amount 200  


 


    Stomach 200  


 


  Urine 1600 450 


 


    Urethral (Burger) 1600 450 


 


  Stool 300  


 


Other:   


 


  # Bowel Movements 1  














- Physical Exam


Pharnyx: Positive for: Normal

## 2018-04-29 NOTE — CP.PCM.PN
Subjective





- Subjective


Subjective: 





Acute Resp Fail Type II


PNA / Sepsis


COPD








s/ Intubated and nov verbal 2/2 sedation. 





O/ VSS





Head: neg adeno 


Heart: RRR ns1s2 neg m


Lungs Clear to A & P


Abdo S, NT pos BS


Ext No C,C,E


NeuroL unable to assess 2/2 sedation. 





Labs see below.





Cont present ICU care. Monitor Serial ABG's. Cont IV Abx and monitor WBC#, Temp

, and panculutres.


Cont Neuro checks. 


Taper sedation. Trial of PSV in AM and obtain ABG


PUD and DVT PX. 


Dr. Faustin (Cell) [phone]





Objective





- Vital Signs/Intake and Output


Vital Signs (last 24 hours): 


 











Temp Pulse Resp BP Pulse Ox


 


 99.5 F   69   12   100/60   100 


 


 04/29/18 21:37  04/29/18 18:00  04/29/18 18:00  04/29/18 18:00  04/29/18 18:00








Intake and Output: 


 











 04/29/18 04/30/18





 18:59 06:59


 


Intake Total 1900 43


 


Output Total 1500 


 


Balance 400 43














- Medications


Medications: 


 Current Medications





Acetaminophen (Tylenol 325mg/10.15ml Ud)  325 mg NG Q6 PRN


   PRN Reason: Temperature


   Last Admin: 04/29/18 21:37 Dose:  325 mg


Chlordiazepoxide (Librium)  50 mg PO Q8 [facility]


   Last Admin: 04/27/18 08:49 Dose:  50 mg


Folic Acid (Folic Acid)  1 mg PO DAILY [facility]


   Last Admin: 04/27/18 08:09 Dose:  1 mg


Ciprofloxacin (Cipro 400mg/200ml Dsw)  400 mg in 200 mls @ 200 mls/hr IVPB Q12 

[facility]


   PRN Reason: Protocol


   Last Admin: 04/29/18 21:36 Dose:  200 mls/hr


Vancomycin HCl 1 gm/ Sodium (Chloride)  250 mls @ 125 mls/hr IVPB DAILY [facility]


   PRN Reason: Protocol


   Last Admin: 04/29/18 08:13 Dose:  125 mls/hr


Pantoprazole Sodium 40 mg/ (Sodium Chloride)  100 mls @ 20 mls/hr IVPB Q5H [facility]


   PRN Reason: 8 MG/HR


   Last Admin: 04/29/18 21:46 Dose:  20 mls/hr


Octreotide Acetate 1,250 mcg/ (Sodium Chloride)  252.5 mls @ 10.1 mls/hr IV 

.Q24H [facility]


   PRN Reason: 50 MCG/HR


   Last Admin: 04/29/18 17:39 Dose:  10.1 mls/hr


Dexmedetomidine HCl 400 mcg/ (Sodium Chloride)  100 mls @ 11.16 mls/hr IV 

.Q8H58M [facility]; 0.5 MCG/KG/HR


   PRN Reason: Protocol


   Last Titration: 04/29/18 20:30 Dose:  1.19 mcg/kg/hr, 26.58 mls/hr


Lorazepam (Ativan)  2 mg IVP Q6 PRN


   PRN Reason: Agitation


   Last Admin: 04/29/18 18:37 Dose:  2 mg


Metoclopramide HCl (Reglan)  10 mg IVP Q6 PRN


   PRN Reason: Nausea/Vomiting


   Last Admin: 04/20/18 08:49 Dose:  10 mg


Morphine Sulfate (Morphine)  6 mg IVP Q4H PRN


   PRN Reason: Pain scale 4-10


Thiamine HCl (Vitamin B1 Tab)  100 mg PO DAILY [facility]


   Last Admin: 04/27/18 08:09 Dose:  100 mg











- Labs


Labs: 


 





 04/29/18 17:30 





 04/29/18 04:35 





 











PT  16.0 Seconds (9.8-13.1)  H  04/29/18  08:28    


 


INR  1.4  (0.9-1.2)  H  04/29/18  08:28    


 


APTT  32.5 Seconds (25.6-37.1)   04/29/18  08:28

## 2018-04-29 NOTE — RAD
PROCEDURE:  CHEST RADIOGRAPH, 1 VIEW



HISTORY:

pneumonia



COMPARISON:

04/28/2018.



FINDINGS:

The endotracheal tube terminates 1 cm proximal to the carole.  The 

nasogastric tube terminates in the stomach. 



LUNGS:

There is interval resolution of right upper lobe collapse. There is 

airspace disease in the right lower lobe.



PLEURA:

No pneumothorax or pleural fluid seen.



CARDIOVASCULAR:

Normal.



OSSEOUS STRUCTURES:

No significant abnormalities.



VISUALIZED UPPER ABDOMEN:

Normal.



OTHER FINDINGS:

None. 



IMPRESSION:

Endotracheal tube terminates 10 mm proximal to the carole.



Interval resolution of right upper lobe collapse.



Suspect right lower lobe atelectasis/ pneumonia.

## 2018-04-29 NOTE — CP.PCM.PN
Subjective





- Date & Time of Evaluation


Date of Evaluation: 04/29/18


Time of Evaluation: 10:30





- Subjective


Subjective: 


Patient seen and examined bedside. Intubated on MV PRVC AC mode 12/500/5/30 % 

ABG 38/78/28/7.48 ,sedated on Presedex drip 


Overnight was  opening his eyes and trying to pull ET tube out 


BP 90/57 HR 70 Tmax 101.2 last 12 hours O2Sat 100 %


On protonix and Octreotide drip


s/p total 17 unit PRBC transfusion and 14 unit FFP transfusion . 2 units PRBC 

were transfused yesterday. OGT in place with coffee ground output slightly more 

reddish today 


Flexiseal in place with melanotic output


Burger in place I/o 3047/2550


Off levophed 








Objective





- Vital Signs/Intake and Output


Vital Signs (last 24 hours): 


 











Temp Pulse Resp BP Pulse Ox


 


 98.9 F   64   12   101/60   100 


 


 04/29/18 08:00  04/29/18 11:00  04/29/18 11:00  04/29/18 11:00  04/29/18 11:00








Intake and Output: 


 











 04/29/18 04/29/18





 06:59 18:59


 


Intake Total 963.0 600


 


Output Total 450 1000


 


Balance 513.0 -400














- Medications


Medications: 


 Current Medications





Acetaminophen (Tylenol 325mg/10.15ml Ud)  325 mg NG Q6 PRN


   PRN Reason: Temperature


   Last Admin: 04/27/18 10:43 Dose:  325 mg


Chlordiazepoxide (Librium)  50 mg PO Q8 [facility]


   Last Admin: 04/27/18 08:49 Dose:  50 mg


Folic Acid (Folic Acid)  1 mg PO DAILY [facility]


   Last Admin: 04/27/18 08:09 Dose:  1 mg


Hydromorphone HCl (Dilaudid)  1 mg IVP Q4 PRN


   PRN Reason: Pain, moderate (4-7)


   Last Admin: 04/29/18 01:58 Dose:  1 mg


Ciprofloxacin (Cipro 400mg/200ml Dsw)  400 mg in 200 mls @ 200 mls/hr IVPB Q12 

[facility]


   PRN Reason: Protocol


   Last Admin: 04/29/18 08:11 Dose:  200 mls/hr


Vancomycin HCl 1 gm/ Sodium (Chloride)  250 mls @ 125 mls/hr IVPB DAILY [facility]


   PRN Reason: Protocol


   Last Admin: 04/29/18 08:13 Dose:  125 mls/hr


Pantoprazole Sodium 40 mg/ (Sodium Chloride)  100 mls @ 20 mls/hr IVPB Q5H [facility]


   PRN Reason: 8 MG/HR


   Last Admin: 04/29/18 10:41 Dose:  20 mls/hr


Octreotide Acetate 1,250 mcg/ (Sodium Chloride)  252.5 mls @ 10.1 mls/hr IV 

.Q24H [facility]


   PRN Reason: 50 MCG/HR


   Last Admin: 04/28/18 13:46 Dose:  10.1 mls/hr


Dexmedetomidine HCl 400 mcg/ (Sodium Chloride)  100 mls @ 11.16 mls/hr IV 

.Q8H58M [facility]; 0.5 MCG/KG/HR


   PRN Reason: Protocol


   Last Admin: 04/29/18 06:01 Dose:  0.89 mcg/kg/hr, 20 mls/hr


Lorazepam (Ativan)  2 mg IVP Q6 PRN


   PRN Reason: Agitation


   Last Admin: 04/28/18 19:48 Dose:  2 mg


Metoclopramide HCl (Reglan)  10 mg IVP Q6 PRN


   PRN Reason: Nausea/Vomiting


   Last Admin: 04/20/18 08:49 Dose:  10 mg


Thiamine HCl (Vitamin B1 Tab)  100 mg PO DAILY [facility]


   Last Admin: 04/27/18 08:09 Dose:  100 mg











- Labs


Labs: 


 





 04/29/18 04:35 





 04/29/18 04:35 





 











PT  16.0 Seconds (9.8-13.1)  H  04/29/18  08:28    


 


INR  1.4  (0.9-1.2)  H  04/29/18  08:28    


 


APTT  32.5 Seconds (25.6-37.1)   04/29/18  08:28    














- Constitutional


Appears: Other (intubated on MD, sedated on presedex drip)





- Head Exam


Head Exam: NORMOCEPHALIC


Additional comments: 





oGT in place with dark bloody output





- Eye Exam


Eye Exam: EOMI, PERRL





- ENT Exam


ENT Exam: Mucous Membranes Dry





- Neck Exam


Neck Exam: Normal Inspection





- Respiratory Exam


Respiratory Exam: Clear to Ausculation Bilateral, NORMAL BREATHING PATTERN.  

absent: Rales, Wheezes, Respiratory Distress





- Cardiovascular Exam


Cardiovascular Exam: REGULAR RHYTHM, RRR, +S1, +S2.  absent: JVD





- GI/Abdominal Exam


GI & Abdominal Exam: Distended (ascites ), Soft, Normal Bowel Sounds.  absent: 

Guarding, Tenderness, Rebound





- Rectal Exam


Rectal Exam: Deferred





- Extremities Exam


Extremities Exam: Pedal Edema (2+)





- Neurological Exam


Additional comments: 





sedated on Presedex drip 





- Skin


Skin Exam: Dry, Warm





Assessment and Plan





- Assessment and Plan (Free Text)


Assessment: 


38 y/o male with  known history  of Alcoholism and recent GI bleed, was brought 

in because of hematemesis and alteration in   mental status.


He was recently discharged from this hospital  after an episode of GI bleed .  

EGD done on 4/16 did not show any active bleed.  The patient was discharged 

home and again started drinking.On day of admission, he  had hematemesis and 

was noted to be confused and agitated.  HGB=6.1.He was transfused with PRBC and 

FFP, intubated for airway protection. EGD showed no active bleeding. Since Hgb 

continued  to drop bleeding scan was performed that showed no active source of 

bleeding .He was started on Levophed drip 4/27 for hypotension and underwent 

CTA of abdomen by IR that showed no active bleeding.Transfused totally to date 

with 17 unit PRBC and 14 unit FFP 


At present intubated on MV PRVC / AC mode , sedated on Presedex drip , Protonix 

and Octreotide drip


OGt with dark bloody output 


HGb 7.6 today








1.Acute blood loss anemia sec to GI Bleed prob secondary  to Bleeding  

Esophageal Varices


Acute   


s/p total 17 unit PRBC and 14 unit FFP transfusion.


OGT with darkish bbloody output. Hgb 7.6


EGD and CTA abdomen failed to show any source of bleeding 


received DDAVP, Vitamin K and at present on Protonix and Octreotide drip


off Levophed drip


GI , IR and surgery on consult 


Continue ICU monitoring . 


Will transfuse 2 unit PRBC and 2 platelet today 


cont IV Cipro


Monitor H& H and transfuse as needed


NPO for now








2. Pneumonia 


CXR today showed resolution of right apex residual consolidation , new right 

lower base atelectasis vs infiltrtae


Tmax 101.2 


procalcitonin elevated


cont IV Cipro and  IV Vanco





3. Intubated for Airway Protection


on vent PRVC AC mode 12/500/5/30 % 


CXR showed possible RLL infiltrate


wityh thick respiratory secretions upon suctioning


Continue respiratory toilet 


On Cipro and Vanco IV 


Will attempt to wean off the vent 


Pulmonary on  consult 





4. Alcohol withdrawal, hx of Alcohol Abuse 


Acute  


on Presedex drip for sedation, Ativan and librium 


cont Thiamine and FA


IVF hydration





5. Hepatic Encephalopathy


Ammonia level was elevated


on Lactulose TID


CT of the head : no bleed








6. Coagulopathy sec to Liver dis from alcoholism


received Vit K, DDAVP


s/p 14 unit FFP transfusion 


INR 1.4 





7.  Cirrhosis with ascites , coagulopathy, thrombocytopenia sec to ETOH abuse 


may need abdominal parasenthesis. Ordered parasethesiss  by IR in AM


transfuse 2 more unirt PRBC and platelet 


Check INR 





8.DVT proph


SCD


no anticoag sec to GIB and coagulopathy & low Platelet

## 2018-04-29 NOTE — CP.PCM.PN
Subjective





- Date & Time of Evaluation


Date of Evaluation: 04/29/18


Time of Evaluation: 08:28





- Subjective


Subjective: 





SURGERY NOTE FOR DR. ADAMES





37M seen and examined at bedside. Currently intubated and sedated on precedex. 

Overnight nurse states NGT output turning slightly into my bright color from 

coffee ground, also the rectal tube output turning into more dark color. 





Objective





- Vital Signs/Intake and Output


Vital Signs (last 24 hours): 


 











Temp Pulse Resp BP Pulse Ox


 


 98.9 F   65   18   98/49 L  100 


 


 04/29/18 08:00  04/29/18 08:00  04/29/18 08:00  04/29/18 08:13  04/29/18 08:00








Intake and Output: 


 











 04/29/18 04/29/18





 06:59 18:59


 


Intake Total 963.0 500


 


Output Total 450 100


 


Balance 513.0 400














- Medications


Medications: 


 Current Medications





Acetaminophen (Tylenol 325mg/10.15ml Ud)  325 mg NG Q6 PRN


   PRN Reason: Temperature


   Last Admin: 04/27/18 10:43 Dose:  325 mg


Chlordiazepoxide (Librium)  50 mg PO Q8 [facility]


   Last Admin: 04/27/18 08:49 Dose:  50 mg


Folic Acid (Folic Acid)  1 mg PO DAILY [facility]


   Last Admin: 04/27/18 08:09 Dose:  1 mg


Hydromorphone HCl (Dilaudid)  1 mg IVP Q4 PRN


   PRN Reason: Pain, moderate (4-7)


   Last Admin: 04/29/18 01:58 Dose:  1 mg


Ciprofloxacin (Cipro 400mg/200ml Dsw)  400 mg in 200 mls @ 200 mls/hr IVPB Q12 

[facility]


   PRN Reason: Protocol


   Last Admin: 04/29/18 08:11 Dose:  200 mls/hr


Vancomycin HCl 1 gm/ Sodium (Chloride)  250 mls @ 125 mls/hr IVPB DAILY [facility]


   PRN Reason: Protocol


   Last Admin: 04/29/18 08:13 Dose:  125 mls/hr


Pantoprazole Sodium 40 mg/ (Sodium Chloride)  100 mls @ 20 mls/hr IVPB Q5H [facility]


   PRN Reason: 8 MG/HR


   Last Admin: 04/29/18 05:58 Dose:  20 mls/hr


Octreotide Acetate 1,250 mcg/ (Sodium Chloride)  252.5 mls @ 10.1 mls/hr IV 

.Q24H [facility]


   PRN Reason: 50 MCG/HR


   Last Admin: 04/28/18 13:46 Dose:  10.1 mls/hr


Dexmedetomidine HCl 400 mcg/ (Sodium Chloride)  100 mls @ 11.16 mls/hr IV 

.Q8H58M [facility]; 0.5 MCG/KG/HR


   PRN Reason: Protocol


   Last Admin: 04/29/18 06:01 Dose:  0.89 mcg/kg/hr, 20 mls/hr


Lorazepam (Ativan)  2 mg IVP Q6 PRN


   PRN Reason: Agitation


   Last Admin: 04/28/18 19:48 Dose:  2 mg


Metoclopramide HCl (Reglan)  10 mg IVP Q6 PRN


   PRN Reason: Nausea/Vomiting


   Last Admin: 04/20/18 08:49 Dose:  10 mg


Thiamine HCl (Vitamin B1 Tab)  100 mg PO DAILY [facility]


   Last Admin: 04/27/18 08:09 Dose:  100 mg











- Labs


Labs: 


 





 04/29/18 04:35 





 04/29/18 04:35 





 











PT  15.8 Seconds (9.8-13.1)  H  04/28/18  09:00    


 


INR  1.4  (0.9-1.2)  H  04/28/18  09:00    


 


APTT  36.6 Seconds (25.6-37.1)   04/28/18  09:00    














- Constitutional


Appears: Other (intubated and sedated)





- Respiratory Exam


Additional comments: 





intubated, pRVC





- Cardiovascular Exam


Cardiovascular Exam: REGULAR RHYTHM, +S1, +S2





- GI/Abdominal Exam


GI & Abdominal Exam: Distended, Soft.  absent: Firm, Guarding, Rigid, Tenderness

, Rebound


Additional comments: 





NGT 200cc dark red output/ 24hrs





- Rectal Exam


Additional comments: 





Rectal tube 300cc dark fluid output





- Extremities Exam


Extremities Exam: absent: Pedal Edema, Tenderness





- Skin


Skin Exam: Dry, Intact, Normal Color, Warm





Assessment and Plan





- Assessment and Plan (Free Text)


Assessment: 





37M with hx of alcohol abuse, liver cirrhosis, presents with GI bleed requiring 

massive transfusion


CTA - negative for extravasation


Plan: 





- Intubated


- PTX drip


- monitor CBC


- Transfuse as necessary


- Recommend EGD/Colonoscopy


Further recs discuss with Dr. Arabella Robbins, PGY2

## 2018-04-30 NOTE — CP.CCUPN
CCU Subjective





- Physician Review


Subjective (Free Text): 





04/30/18 17:21


The patient was Seen and examined by me at the bedside, 


Medical records reviewed and Management issues were discussed and formulated 

with the house staff.


Events reviewed 


Pt Sedaed and orally intubated


Afebrile, NSR on the monitor Received 2 more unites packed RBC and 1U FFP 

overnight 


Afebrile 


NSR on the monitor, BP stable, patient is not tachcardic or hypotensive.  





Continue to active bleeding overnight, apperent by the dark NG tube aspirate 

also flexiseal with dark bloody output


Continue to have H/H drop despite multiple transfusion


Remains NPO, on IV Fluids 


Remains on pantoprazole and octreotide drip


Continued on IV Cipro


Bleeding scan done yesterday 04/23, CT angioram done on 4/27 and both were 

negative





Received total of 19U packed RBC, 3U Plat and 14U FFP since admission 


S/P Endoscopy 4/15 no active bleeding and revealed only acute gastritis 


OG tube placement placed to suction with light pink color return


Scheduled for repeat Endoscopy tomorrow

















CCU Objective





- Vital Signs / Intake & Output


Vital Signs (Last 4 hours): 


Vital Signs











  Temp Pulse Resp BP Pulse Ox


 


 04/30/18 07:00   60  15  120/74  98


 


 04/30/18 06:00   60  12  116/77  99


 


 04/30/18 05:00   62  12  122/77  98


 


 04/30/18 04:00  98.9 F  64  12  117/76  99











Intake and Output (Last 8hrs): 


 Intake & Output











 04/29/18 04/30/18 04/30/18





 22:59 06:59 14:59


 


Intake Total 400 600 


 


Output Total 300  


 


Balance 100 600 


 


Intake:   


 


   100 


 


  Blood Product  500 


 


Output:   


 


  Gastric Amount 100  


 


    Stomach 100  


 


  Urine 200  


 


    Urethral (Burger) 200  














- Physical Exam


Head: Positive for: Atraumatic, Normocephalic


Pupils: Positive for: PERRL.  Negative for: Sluggish, Non-Reactive, Pinpoint


Extroacular Muscles: Positive for: EOMI


Conjunctiva: Positive for: Normal.  Negative for: Injected, Icteric


Mouth: Positive for: Moist Mucous Membranes, Dry


Pharnyx: Positive for: Normal


Neck: Positive for: Normal Range of Motion


Respiratory/Chest: Positive for: Clear to Auscultation, Good Air Exchange.  

Negative for: Respiratory Distress, Accessory Muscle Use, Wheezes, Rhonchi


Cardiovascular: Positive for: Regular Rate and Rhythm, Normal S1, S2, Peripheal 

Pulses Present.  Negative for: Murmurs, Irregular Rhythm, Tachycardic, 

Bradycardic


Abdomen: Positive for: Distention, Normal Bowel Sounds.  Negative for: 

Tenderness


Upper Extremity: Positive for: Normal Inspection.  Negative for: Edema


Lower Extremity: Positive for: Normal Inspection.  Negative for: Edema


Neurological: Positive for: Speech Normal


Psychiatric: Positive for: Alert





- Medications


Active Medications: 


Active Medications











Generic Name Dose Route Start Last Admin





  Trade Name Freq  PRN Reason Stop Dose Admin


 


Acetaminophen  325 mg  04/27/18 10:24  04/29/18 21:37





  Tylenol 325mg/10.15ml Ud  NG   325 mg





  Q6 PRN   Administration





  Temperature   


 


Chlordiazepoxide  50 mg  04/26/18 17:00  04/27/18 08:49





  Librium  PO   50 mg





  Q8 JUAN LUIS   Administration


 


Folic Acid  1 mg  04/20/18 11:15  04/27/18 08:09





  Folic Acid  PO   1 mg





  DAILY JUAN LUIS   Administration


 


Ciprofloxacin  400 mg in 200 mls @ 200 mls/hr  04/22/18 09:45  04/29/18 21:36





  Cipro 400mg/200ml Dsw  IVPB   200 mls/hr





  Q12 JUAN LUIS   Administration





  Protocol   


 


Vancomycin HCl 1 gm/ Sodium  250 mls @ 125 mls/hr  04/27/18 09:15  04/29/18 08:

13





  Chloride  IVPB   125 mls/hr





  DAILY JUAN LUIS   Administration





  Protocol   


 


Pantoprazole Sodium 40 mg/  100 mls @ 20 mls/hr  04/27/18 12:15  04/30/18 03:45





  Sodium Chloride  IVPB   20 mls/hr





  Q5H JUAN LUIS   Administration





  8 MG/HR   


 


Octreotide Acetate 1,250 mcg/  252.5 mls @ 10.1 mls/hr  04/27/18 12:00  04/29/ 18 17:39





  Sodium Chloride  IV   10.1 mls/hr





  .Q24H JUAN LUIS   Administration





  50 MCG/HR   


 


Dexmedetomidine HCl 400 mcg/  100 mls @ 11.16 mls/hr  04/27/18 22:11  04/30/18 

04:23





  Sodium Chloride  IV   1.19 mcg/kg/hr





  .Q8H58M JUAN LUIS   26.58 mls/hr





  Protocol   Administration





  0.5 MCG/KG/HR   


 


Lorazepam  2 mg  04/27/18 09:28  04/29/18 18:37





  Ativan  IVP   2 mg





  Q6 PRN   Administration





  Agitation   


 


Metoclopramide HCl  10 mg  04/19/18 22:52  04/20/18 08:49





  Reglan  IVP   10 mg





  Q6 PRN   Administration





  Nausea/Vomiting   


 


Morphine Sulfate  6 mg  04/29/18 19:49  





  Morphine  IVP   





  Q4H PRN   





  Pain scale 4-10   


 


Thiamine HCl  100 mg  04/20/18 11:15  04/27/18 08:09





  Vitamin B1 Tab  PO   100 mg





  DAILY JUAN LUIS   Administration














- Patient Studies


Lab Studies: 


 Microbiology Studies











 04/26/18 Unknown Blood Culture - Preliminary





 Blood-Venous    NO GROWTH AFTER 48 HOURS


 


 04/26/18 Unknown Blood Culture - Preliminary





 Blood-Venous    NO GROWTH AFTER 48 HOURS








 Lab Studies











  04/30/18 04/30/18 04/30/18 Range/Units





  05:09 04:45 04:45 


 


WBC    7.2  (4.8-10.8)  K/uL


 


RBC    2.94 L  (4.40-5.90)  Mil/uL


 


Hgb    9.0 L  (12.0-18.0)  g/dL


 


Hct    26.4 L  (35.0-51.0)  %


 


MCV    89.8  (80.0-94.0)  fl


 


MCH    30.4  (27.0-31.0)  pg


 


MCHC    33.9  (33.0-37.0)  g/dL


 


RDW    16.1 H  (11.5-14.5)  %


 


Plt Count    139  (130-400)  K/uL


 


PT     (9.8-13.1)  Seconds


 


INR     (0.9-1.2)  


 


APTT     (25.6-37.1)  Seconds


 


pCO2  42    (35-45)  mm/Hg


 


pO2  82    ()  mm/Hg


 


HCO3  28.0    (21-28)  mmol/L


 


ABG pH  7.44    (7.35-7.45)  


 


ABG Total CO2  29.8 H    (22-28)  mmol/L


 


ABG O2 Saturation  98.5 H    (95-98)  %


 


ABG O2 Content  13.1 L    (15-23)  ML/dL


 


ABG Base Excess  3.9 H    (-2.0-3.0)  mmol/L


 


ABG Hemoglobin  9.7 L    (11.7-17.4)  g/dL


 


ABG Carboxyhemoglobin  2.0 H    (0.5-1.5)  %


 


POC ABG HHb (Measured)  1.5    (0.0-5.0)  %


 


ABG Methemoglobin  0.9    (0.0-3.0)  %


 


ABG O2 Capacity  13.3 L    (16-24)  mL/dL


 


Anthony Test  Yes    


 


A-a O2 Difference  79.0    mm/Hg


 


Hgb O2 Saturation  95.5    (95.0-98.0)  %


 


Vent Mode  Prvc/ac    


 


Mechanical Rate  12    


 


FiO2  30.0    %


 


Tidal Volume  500    


 


PEEP  5    


 


Sodium   147   (132-148)  mmol/l


 


Potassium   3.9   (3.6-5.0)  MMOL/L


 


Chloride   108 H   ()  mmol/L


 


Carbon Dioxide   28   (22-30)  mmol/L


 


Anion Gap   15   (10-20)  


 


BUN   23 H   (9-20)  mg/dl


 


Creatinine   0.8   (0.8-1.5)  mg/dl


 


Est GFR (African Amer)   > 60   


 


Est GFR (Non-Af Amer)   > 60   


 


Random Glucose   115 H   ()  mg/dL


 


Lactic Acid     (0.7-2.1)  MMOL/L


 


Calcium   8.3 L   (8.4-10.2)  mg/dL


 


Blood Type     


 


Antibody Screen     


 


Crossmatch     


 


BBK History Checked     














  04/30/18 04/29/18 04/29/18 Range/Units





  04:45 17:30 08:28 


 


WBC   7.5   (4.8-10.8)  K/uL


 


RBC   2.88 L   (4.40-5.90)  Mil/uL


 


Hgb   8.4 L   (12.0-18.0)  g/dL


 


Hct   25.6 L   (35.0-51.0)  %


 


MCV   89.0   (80.0-94.0)  fl


 


MCH   29.3   (27.0-31.0)  pg


 


MCHC   32.9 L   (33.0-37.0)  g/dL


 


RDW   15.9 H   (11.5-14.5)  %


 


Plt Count   107 L   (130-400)  K/uL


 


PT  15.4 H    (9.8-13.1)  Seconds


 


INR  1.4 H    (0.9-1.2)  


 


APTT  32.6    (25.6-37.1)  Seconds


 


pCO2     (35-45)  mm/Hg


 


pO2     ()  mm/Hg


 


HCO3     (21-28)  mmol/L


 


ABG pH     (7.35-7.45)  


 


ABG Total CO2     (22-28)  mmol/L


 


ABG O2 Saturation     (95-98)  %


 


ABG O2 Content     (15-23)  ML/dL


 


ABG Base Excess     (-2.0-3.0)  mmol/L


 


ABG Hemoglobin     (11.7-17.4)  g/dL


 


ABG Carboxyhemoglobin     (0.5-1.5)  %


 


POC ABG HHb (Measured)     (0.0-5.0)  %


 


ABG Methemoglobin     (0.0-3.0)  %


 


ABG O2 Capacity     (16-24)  mL/dL


 


Anthony Test     


 


A-a O2 Difference     mm/Hg


 


Hgb O2 Saturation     (95.0-98.0)  %


 


Vent Mode     


 


Mechanical Rate     


 


FiO2     %


 


Tidal Volume     


 


PEEP     


 


Sodium     (132-148)  mmol/l


 


Potassium     (3.6-5.0)  MMOL/L


 


Chloride     ()  mmol/L


 


Carbon Dioxide     (22-30)  mmol/L


 


Anion Gap     (10-20)  


 


BUN     (9-20)  mg/dl


 


Creatinine     (0.8-1.5)  mg/dl


 


Est GFR (African Amer)     


 


Est GFR (Non-Af Amer)     


 


Random Glucose     ()  mg/dL


 


Lactic Acid    1.0  (0.7-2.1)  MMOL/L


 


Calcium     (8.4-10.2)  mg/dL


 


Blood Type     


 


Antibody Screen     


 


Crossmatch     


 


BBK History Checked     














  04/29/18 04/27/18 Range/Units





  08:28 07:10 


 


WBC    (4.8-10.8)  K/uL


 


RBC    (4.40-5.90)  Mil/uL


 


Hgb    (12.0-18.0)  g/dL


 


Hct    (35.0-51.0)  %


 


MCV    (80.0-94.0)  fl


 


MCH    (27.0-31.0)  pg


 


MCHC    (33.0-37.0)  g/dL


 


RDW    (11.5-14.5)  %


 


Plt Count    (130-400)  K/uL


 


PT  16.0 H   (9.8-13.1)  Seconds


 


INR  1.4 H   (0.9-1.2)  


 


APTT  32.5   (25.6-37.1)  Seconds


 


pCO2    (35-45)  mm/Hg


 


pO2    ()  mm/Hg


 


HCO3    (21-28)  mmol/L


 


ABG pH    (7.35-7.45)  


 


ABG Total CO2    (22-28)  mmol/L


 


ABG O2 Saturation    (95-98)  %


 


ABG O2 Content    (15-23)  ML/dL


 


ABG Base Excess    (-2.0-3.0)  mmol/L


 


ABG Hemoglobin    (11.7-17.4)  g/dL


 


ABG Carboxyhemoglobin    (0.5-1.5)  %


 


POC ABG HHb (Measured)    (0.0-5.0)  %


 


ABG Methemoglobin    (0.0-3.0)  %


 


ABG O2 Capacity    (16-24)  mL/dL


 


Anthony Test    


 


A-a O2 Difference    mm/Hg


 


Hgb O2 Saturation    (95.0-98.0)  %


 


Vent Mode    


 


Mechanical Rate    


 


FiO2    %


 


Tidal Volume    


 


PEEP    


 


Sodium    (132-148)  mmol/l


 


Potassium    (3.6-5.0)  MMOL/L


 


Chloride    ()  mmol/L


 


Carbon Dioxide    (22-30)  mmol/L


 


Anion Gap    (10-20)  


 


BUN    (9-20)  mg/dl


 


Creatinine    (0.8-1.5)  mg/dl


 


Est GFR (African Amer)    


 


Est GFR (Non-Af Amer)    


 


Random Glucose    ()  mg/dL


 


Lactic Acid    (0.7-2.1)  MMOL/L


 


Calcium    (8.4-10.2)  mg/dL


 


Blood Type   O POSITIVE  


 


Antibody Screen   Negative  


 


Crossmatch   See Detail  


 


BBK History Checked   Patient has bt  








 Laboratory Results - last 24 hr











  04/27/18 04/29/18 04/29/18





  07:10 08:28 08:28


 


WBC   


 


RBC   


 


Hgb   


 


Hct   


 


MCV   


 


MCH   


 


MCHC   


 


RDW   


 


Plt Count   


 


PT   16.0 H 


 


INR   1.4 H 


 


APTT   32.5 


 


pCO2   


 


pO2   


 


HCO3   


 


ABG pH   


 


ABG Total CO2   


 


ABG O2 Saturation   


 


ABG O2 Content   


 


ABG Base Excess   


 


ABG Hemoglobin   


 


ABG Carboxyhemoglobin   


 


POC ABG HHb (Measured)   


 


ABG Methemoglobin   


 


ABG O2 Capacity   


 


Anthony Test   


 


A-a O2 Difference   


 


Hgb O2 Saturation   


 


Vent Mode   


 


Mechanical Rate   


 


FiO2   


 


Tidal Volume   


 


PEEP   


 


Sodium   


 


Potassium   


 


Chloride   


 


Carbon Dioxide   


 


Anion Gap   


 


BUN   


 


Creatinine   


 


Est GFR ( Amer)   


 


Est GFR (Non-Af Amer)   


 


Random Glucose   


 


Lactic Acid    1.0


 


Calcium   


 


Blood Type  O POSITIVE  


 


Antibody Screen  Negative  


 


Crossmatch  See Detail  


 


BBK History Checked  Patient has bt  














  04/29/18 04/30/18 04/30/18





  17:30 04:45 04:45


 


WBC  7.5   7.2


 


RBC  2.88 L   2.94 L


 


Hgb  8.4 L   9.0 L


 


Hct  25.6 L   26.4 L


 


MCV  89.0   89.8


 


MCH  29.3   30.4


 


MCHC  32.9 L   33.9


 


RDW  15.9 H   16.1 H


 


Plt Count  107 L   139


 


PT   15.4 H 


 


INR   1.4 H 


 


APTT   32.6 


 


pCO2   


 


pO2   


 


HCO3   


 


ABG pH   


 


ABG Total CO2   


 


ABG O2 Saturation   


 


ABG O2 Content   


 


ABG Base Excess   


 


ABG Hemoglobin   


 


ABG Carboxyhemoglobin   


 


POC ABG HHb (Measured)   


 


ABG Methemoglobin   


 


ABG O2 Capacity   


 


Anthony Test   


 


A-a O2 Difference   


 


Hgb O2 Saturation   


 


Vent Mode   


 


Mechanical Rate   


 


FiO2   


 


Tidal Volume   


 


PEEP   


 


Sodium   


 


Potassium   


 


Chloride   


 


Carbon Dioxide   


 


Anion Gap   


 


BUN   


 


Creatinine   


 


Est GFR ( Amer)   


 


Est GFR (Non-Af Amer)   


 


Random Glucose   


 


Lactic Acid   


 


Calcium   


 


Blood Type   


 


Antibody Screen   


 


Crossmatch   


 


BBK History Checked   














  04/30/18 04/30/18





  04:45 05:09


 


WBC  


 


RBC  


 


Hgb  


 


Hct  


 


MCV  


 


MCH  


 


MCHC  


 


RDW  


 


Plt Count  


 


PT  


 


INR  


 


APTT  


 


pCO2   42


 


pO2   82


 


HCO3   28.0


 


ABG pH   7.44


 


ABG Total CO2   29.8 H


 


ABG O2 Saturation   98.5 H


 


ABG O2 Content   13.1 L


 


ABG Base Excess   3.9 H


 


ABG Hemoglobin   9.7 L


 


ABG Carboxyhemoglobin   2.0 H


 


POC ABG HHb (Measured)   1.5


 


ABG Methemoglobin   0.9


 


ABG O2 Capacity   13.3 L


 


Anthony Test   Yes


 


A-a O2 Difference   79.0


 


Hgb O2 Saturation   95.5


 


Vent Mode   Prvc/ac


 


Mechanical Rate   12


 


FiO2   30.0


 


Tidal Volume   500


 


PEEP   5


 


Sodium  147 


 


Potassium  3.9 


 


Chloride  108 H 


 


Carbon Dioxide  28 


 


Anion Gap  15 


 


BUN  23 H 


 


Creatinine  0.8 


 


Est GFR ( Amer)  > 60 


 


Est GFR (Non-Af Amer)  > 60 


 


Random Glucose  115 H 


 


Lactic Acid  


 


Calcium  8.3 L 


 


Blood Type  


 


Antibody Screen  


 


Crossmatch  


 


BBK History Checked  











Fingerstick Blood Sugar Results: 158





Critical Care Progress Note





- Nutrition


Nutrition: 


 Nutrition











 Category Date Time Status


 


 NPO Diet [DIET] Diets  04/22/18 Breakfast Active














Assessment/Plan


(1) Acute respiratory failure


Current Visit: Yes   Status: Acute   Comment: 


Full vent support for now till bleeding under control


PRN Ativan, Librium 


fall/aspiration/seizure precautions  


Wean off FIO2   





(2) Acute blood loss anemia


Current Visit: Yes   Status: Acute   Comment: 


CBCs unstable


Received total of 19U packed RBC, 3U Plat and 14U FFP since admission 


CT angiogram and bleeding scan did not revealed the source of bleeding 


Frequent CBC monitoring   





(3) GI bleed


Current Visit: Yes   Status: Acute   Comment: 


Scheduled for repeat Endoscopy tomorrow


Received total of 19U packed RBC, 3U Plat and 14U FFP since admission 


CT angiogram and bleeding scan did not revealed the source of bleeding 


Two large bore peripheral catheters


PANTOPRAZOLE drip 


OCTREOTIDE drip


Vent support


NPO   





(4) Hepatic encephalopathy


Current Visit: Yes   Status: Acute   Comment: 


Altered mental status sec to ETOH Withdrawal, Delirium Vs Hepatic Encephalopathy


CT of the head: no bleed


Ammonia level trending down


Continue Lactulose TID   





(5) Chronic alcoholism


Current Visit: No   Status: Acute   Comment: 


Banana Bag then Intravenous thiamine and Folate


Continue Librium 


PRN Ativan


fall/aspiration/seizure precautions

## 2018-04-30 NOTE — CP.PCM.PN
Subjective





- Date & Time of Evaluation


Date of Evaluation: 04/30/18


Time of Evaluation: 19:56





- Subjective


Subjective: 





Patient with bright red blood via NG yesterday. Now NG aspirate is dark





Objective





- Vital Signs/Intake and Output


Vital Signs (last 24 hours): 


 











Temp Pulse Resp BP Pulse Ox


 


 99 F   61   12   107/66   100 


 


 04/30/18 16:00  04/30/18 18:00  04/30/18 18:00  04/30/18 18:00  04/30/18 18:00








Intake and Output: 


 











 04/30/18 05/01/18





 18:59 06:59


 


Intake Total 1300 


 


Output Total 700 


 


Balance 600 














- Medications


Medications: 


 Current Medications





Acetaminophen (Tylenol 325mg/10.15ml Ud)  325 mg NG Q6 PRN


   PRN Reason: Temperature


   Last Admin: 04/29/18 21:37 Dose:  325 mg


Chlordiazepoxide (Librium)  50 mg PO Q8 JUAN LUIS


   Last Admin: 04/27/18 08:49 Dose:  50 mg


Folic Acid (Folic Acid)  1 mg PO DAILY JUAN LUIS


   Last Admin: 04/27/18 08:09 Dose:  1 mg


Ciprofloxacin (Cipro 400mg/200ml Dsw)  400 mg in 200 mls @ 200 mls/hr IVPB Q12 

JUAN LUIS


   PRN Reason: Protocol


   Last Admin: 04/30/18 08:08 Dose:  200 mls/hr


Vancomycin HCl 1 gm/ Sodium (Chloride)  250 mls @ 125 mls/hr IVPB DAILY JUAN LUIS


   PRN Reason: Protocol


   Last Admin: 04/30/18 08:09 Dose:  125 mls/hr


Pantoprazole Sodium 40 mg/ (Sodium Chloride)  100 mls @ 20 mls/hr IVPB Q5H JUAN LUIS


   PRN Reason: 8 MG/HR


   Last Admin: 04/30/18 13:34 Dose:  20 mls/hr


Octreotide Acetate 1,250 mcg/ (Sodium Chloride)  252.5 mls @ 10.1 mls/hr IV 

.Q24H JUAN LUIS


   PRN Reason: 50 MCG/HR


   Last Admin: 04/29/18 17:39 Dose:  10.1 mls/hr


Dexmedetomidine HCl 400 mcg/ (Sodium Chloride)  100 mls @ 11.16 mls/hr IV 

.Q8H58M JUAN LUIS; 0.5 MCG/KG/HR


   PRN Reason: Protocol


   Last Admin: 04/30/18 17:55 Dose:  1.19 mcg/kg/hr, 26.58 mls/hr


Lorazepam (Ativan)  2 mg IVP Q6 PRN


   PRN Reason: Agitation


   Last Admin: 04/29/18 18:37 Dose:  2 mg


Metoclopramide HCl (Reglan)  10 mg IVP Q6 PRN


   PRN Reason: Nausea/Vomiting


   Last Admin: 04/20/18 08:49 Dose:  10 mg


Morphine Sulfate (Morphine)  6 mg IVP Q4H PRN


   PRN Reason: Pain scale 4-10


   Last Admin: 04/30/18 07:58 Dose:  6 mg


Thiamine HCl (Vitamin B1 Tab)  100 mg PO DAILY JUAN LUIS


   Last Admin: 04/27/18 08:09 Dose:  100 mg











- Labs


Labs: 


 





 04/30/18 12:00 





 04/30/18 04:45 





 











PT  15.4 Seconds (9.8-13.1)  H  04/30/18  04:45    


 


INR  1.4  (0.9-1.2)  H  04/30/18  04:45    


 


APTT  32.6 Seconds (25.6-37.1)   04/30/18  04:45    














- Head Exam


Head Exam: ATRAUMATIC





- Eye Exam


Eye Exam: Normal appearance





- ENT Exam


ENT Exam: Normal Exam





- Neck Exam


Neck Exam: Full ROM





- Respiratory Exam


Respiratory Exam: Clear to Ausculation Bilateral





- Cardiovascular Exam


Cardiovascular Exam: REGULAR RHYTHM





- GI/Abdominal Exam


GI & Abdominal Exam: Distended, Soft





Assessment and Plan


(1) GI bleed


Assessment & Plan: 


Ongoing bleeding. So far EGD, bleeding scan, and angiogram negative. Will 

repeat endoscopy with pediatric colonoscope.


Status: Acute

## 2018-04-30 NOTE — CP.PCM.PN
Subjective





- Date & Time of Evaluation


Date of Evaluation: 04/30/18


Time of Evaluation: 10:00





- Subjective


Subjective: 


Patient seen and examined bedside. Intubated on MV PRVC AC mode 12/500/5/30 % 

ABG 42/82/28/7.4 ,sedated on Presedex drip. Opens eyes to name calling 


off levophed drip


OGT in place with bloody output . Flexiseal in place with melena


/63 HR 60 afebrile last 24 hours  


On protonix and Octreotide drip


s/p total 19 unit PRBC transfusion and 14 unit FFP transfusion . 3 platelet 

transfusion  


Hgb 9 today  INR 1.4 WBC 7.2 








Objective





- Vital Signs/Intake and Output


Vital Signs (last 24 hours): 


 











Temp Pulse Resp BP Pulse Ox


 


 99 F   62   12   97/59 L  99 


 


 04/30/18 08:00  04/30/18 09:00  04/30/18 09:00  04/30/18 09:00  04/30/18 09:00








Intake and Output: 


 











 04/30/18 04/30/18





 06:59 18:59


 


Intake Total 700 150


 


Output Total  50


 


Balance 700 100














- Medications


Medications: 


 Current Medications





Acetaminophen (Tylenol 325mg/10.15ml Ud)  325 mg NG Q6 PRN


   PRN Reason: Temperature


   Last Admin: 04/29/18 21:37 Dose:  325 mg


Chlordiazepoxide (Librium)  50 mg PO Q8 JUAN LUIS


   Last Admin: 04/27/18 08:49 Dose:  50 mg


Folic Acid (Folic Acid)  1 mg PO DAILY JUAN LUIS


   Last Admin: 04/27/18 08:09 Dose:  1 mg


Ciprofloxacin (Cipro 400mg/200ml Dsw)  400 mg in 200 mls @ 200 mls/hr IVPB Q12 

JUAN LUIS


   PRN Reason: Protocol


   Last Admin: 04/30/18 08:08 Dose:  200 mls/hr


Vancomycin HCl 1 gm/ Sodium (Chloride)  250 mls @ 125 mls/hr IVPB DAILY JUAN LUIS


   PRN Reason: Protocol


   Last Admin: 04/30/18 08:09 Dose:  125 mls/hr


Pantoprazole Sodium 40 mg/ (Sodium Chloride)  100 mls @ 20 mls/hr IVPB Q5H JUAN LUIS


   PRN Reason: 8 MG/HR


   Last Admin: 04/30/18 08:12 Dose:  20 mls/hr


Octreotide Acetate 1,250 mcg/ (Sodium Chloride)  252.5 mls @ 10.1 mls/hr IV 

.Q24H JUAN LUIS


   PRN Reason: 50 MCG/HR


   Last Admin: 04/29/18 17:39 Dose:  10.1 mls/hr


Dexmedetomidine HCl 400 mcg/ (Sodium Chloride)  100 mls @ 11.16 mls/hr IV 

.Q8H58M JUAN LUIS; 0.5 MCG/KG/HR


   PRN Reason: Protocol


   Last Admin: 04/30/18 09:08 Dose:  1.19 mcg/kg/hr, 26.58 mls/hr


Lorazepam (Ativan)  2 mg IVP Q6 PRN


   PRN Reason: Agitation


   Last Admin: 04/29/18 18:37 Dose:  2 mg


Metoclopramide HCl (Reglan)  10 mg IVP Q6 PRN


   PRN Reason: Nausea/Vomiting


   Last Admin: 04/20/18 08:49 Dose:  10 mg


Morphine Sulfate (Morphine)  6 mg IVP Q4H PRN


   PRN Reason: Pain scale 4-10


   Last Admin: 04/30/18 07:58 Dose:  6 mg


Thiamine HCl (Vitamin B1 Tab)  100 mg PO DAILY JUAN LUIS


   Last Admin: 04/27/18 08:09 Dose:  100 mg











- Labs


Labs: 


 





 04/30/18 04:45 





 04/30/18 04:45 





 











PT  15.4 Seconds (9.8-13.1)  H  04/30/18  04:45    


 


INR  1.4  (0.9-1.2)  H  04/30/18  04:45    


 


APTT  32.6 Seconds (25.6-37.1)   04/30/18  04:45    














- Constitutional


Appears: Other (intubated , sedated )





- Head Exam


Head Exam: ATRAUMATIC, NORMOCEPHALIC





- Eye Exam


Eye Exam: PERRL





- ENT Exam


ENT Exam: Mucous Membranes Dry, Normal Exam


Additional comments: 





OGT in place with bloody output





- Neck Exam


Neck Exam: Full ROM, Normal Inspection





- Respiratory Exam


Respiratory Exam: Clear to Ausculation Bilateral, NORMAL BREATHING PATTERN.  

absent: Rales, Rhonchi, Wheezes





- Cardiovascular Exam


Cardiovascular Exam: REGULAR RHYTHM, RRR, +S1, +S2.  absent: JVD





- GI/Abdominal Exam


GI & Abdominal Exam: Distended, Soft, Normal Bowel Sounds.  absent: Guarding, 

Rebound





- Rectal Exam


Rectal Exam: Black Stool





- Extremities Exam


Extremities Exam: Normal Inspection, Pedal Edema (1 +)





- Neurological Exam


Additional comments: 





sedated 


opens eyes to name calling 





- Skin


Skin Exam: Dry, Warm





Assessment and Plan





- Assessment and Plan (Free Text)


Assessment: 


36 y/o male with  known history  of Alcoholism and recent GI bleed, was brought 

in because of hematemesis and alteration in   mental status.


He was recently discharged from this hospital  after an episode of GI bleed .  

EGD done on 4/16 did not show any active bleed.  The patient was discharged 

home and again started drinking.On day of admission, he  had hematemesis and 

was noted to be confused and agitated.  HGB=6.1.He was transfused with PRBC and 

FFP, intubated for airway protection. EGD showed no active bleeding. Since Hgb 

continued  to drop bleeding scan was performed that showed no active source of 

bleeding .He was started on Levophed drip 4/27 for hypotension and underwent 

CTA of abdomen by IR that showed no active bleeding.Transfused totally to date 

with 19 unit PRBC and 14 unit FFP and 3 Platelet 


off Levophed drip 


At present intubated on MV PRVC / AC mode , sedated on Presedex drip , Protonix 

and Octreotide drip


OGt and flexiseal in place  with dark bloody output 


HGb 9 today








1.Acute blood loss anemia sec to GI Bleed prob secondary  to Bleeding  

Esophageal Varices


Acute   


s/p total 19 unit PRBC and 14 unit FFP transfusion and 3 platelet


OGT and flexiseal in place with dark bloody output. Hgb 9 today 


EGD and CTA abdomen failed to show any source of bleeding 


received DDAVP, Vitamin K and at present on Protonix and Octreotide drip


off Levophed drip


GI , IR and surgery on consult 


Continue ICU monitoring . 


Will give again Desmopressin today 


plan for repeat EGD tomorrow as per GI 


cont IV Cipro


Monitor H& H and transfuse as needed


NPO for now








2. Pneumonia 


CXR today showed resolution of right apex residual consolidation , new right 

lower base atelectasis vs infiltrate


afebrile last 24 hours 


procalcitonin elevated


cont IV Cipro and  IV Vanco





3. Intubated for Airway Protection


on vent PRVC AC mode 12/500/5/30 % 


CXR showed possible RLL infiltrate


Continue respiratory toilet 


On Cipro and Vanco IV 


Will attempt to wean off the vent 


Pulmonary on  consult 





4. Alcohol withdrawal, hx of Alcohol Abuse 


Acute  


on Presedex drip for sedation, Ativan and librium 


cont Thiamine and FA


IVF hydration





5. Hepatic Encephalopathy


Ammonia level was elevated


on Lactulose TID prn 


CT of the head : no bleed








6. Coagulopathy sec to Liver dis from alcoholism


received Vit K, DDAVP


s/p 14 unit FFP transfusion 


INR 1.4 


will give again DDAVP today 





7.  Cirrhosis with ascites , coagulopathy, thrombocytopenia sec to ETOH abuse 


s/p abdominal parasenthesis by IR today with removal 900 ml ascitic fluid 





8.DVT proph


SCD


no anticoag sec to GIB and coagulopathy & low Platelet

## 2018-04-30 NOTE — CP.PCM.PN
Subjective





- Date & Time of Evaluation


Date of Evaluation: 04/30/18


Time of Evaluation: 07:20





- Subjective


Subjective: 


General Surgery Note for Dr. Bell





Patient seen and examined at bedside. Currently intubated and mechanical 

ventilation. 





Objective





- Vital Signs/Intake and Output


Vital Signs (last 24 hours): 


 











Temp Pulse Resp BP Pulse Ox


 


 99 F   62   12   97/59 L  99 


 


 04/30/18 08:00  04/30/18 09:00  04/30/18 09:00  04/30/18 09:00  04/30/18 09:00








Intake and Output: 


 











 04/30/18 04/30/18





 06:59 18:59


 


Intake Total 700 150


 


Output Total  50


 


Balance 700 100














- Medications


Medications: 


 Current Medications





Acetaminophen (Tylenol 325mg/10.15ml Ud)  325 mg NG Q6 PRN


   PRN Reason: Temperature


   Last Admin: 04/29/18 21:37 Dose:  325 mg


Chlordiazepoxide (Librium)  50 mg PO Q8 JUAN LUIS


   Last Admin: 04/27/18 08:49 Dose:  50 mg


Folic Acid (Folic Acid)  1 mg PO DAILY JUAN LUIS


   Last Admin: 04/27/18 08:09 Dose:  1 mg


Ciprofloxacin (Cipro 400mg/200ml Dsw)  400 mg in 200 mls @ 200 mls/hr IVPB Q12 

JUAN LUIS


   PRN Reason: Protocol


   Last Admin: 04/30/18 08:08 Dose:  200 mls/hr


Vancomycin HCl 1 gm/ Sodium (Chloride)  250 mls @ 125 mls/hr IVPB DAILY JUAN LUIS


   PRN Reason: Protocol


   Last Admin: 04/30/18 08:09 Dose:  125 mls/hr


Pantoprazole Sodium 40 mg/ (Sodium Chloride)  100 mls @ 20 mls/hr IVPB Q5H JUAN LUIS


   PRN Reason: 8 MG/HR


   Last Admin: 04/30/18 08:12 Dose:  20 mls/hr


Octreotide Acetate 1,250 mcg/ (Sodium Chloride)  252.5 mls @ 10.1 mls/hr IV 

.Q24H JUAN LUIS


   PRN Reason: 50 MCG/HR


   Last Admin: 04/29/18 17:39 Dose:  10.1 mls/hr


Dexmedetomidine HCl 400 mcg/ (Sodium Chloride)  100 mls @ 11.16 mls/hr IV 

.Q8H58M JUAN LUIS; 0.5 MCG/KG/HR


   PRN Reason: Protocol


   Last Admin: 04/30/18 09:08 Dose:  1.19 mcg/kg/hr, 26.58 mls/hr


Lorazepam (Ativan)  2 mg IVP Q6 PRN


   PRN Reason: Agitation


   Last Admin: 04/29/18 18:37 Dose:  2 mg


Metoclopramide HCl (Reglan)  10 mg IVP Q6 PRN


   PRN Reason: Nausea/Vomiting


   Last Admin: 04/20/18 08:49 Dose:  10 mg


Morphine Sulfate (Morphine)  6 mg IVP Q4H PRN


   PRN Reason: Pain scale 4-10


   Last Admin: 04/30/18 07:58 Dose:  6 mg


Thiamine HCl (Vitamin B1 Tab)  100 mg PO DAILY JUAN LUIS


   Last Admin: 04/27/18 08:09 Dose:  100 mg











- Labs


Labs: 


 





 04/30/18 04:45 





 04/30/18 04:45 





 











PT  15.4 Seconds (9.8-13.1)  H  04/30/18  04:45    


 


INR  1.4  (0.9-1.2)  H  04/30/18  04:45    


 


APTT  32.6 Seconds (25.6-37.1)   04/30/18  04:45    














- Constitutional


Appears: Other (intubated and sedated)





- Head Exam


Head Exam: ATRAUMATIC, NORMOCEPHALIC





- Eye Exam


Eye Exam: Normal appearance





- ENT Exam


ENT Exam: Mucous Membranes Moist





- Respiratory Exam


Additional comments: 





intubated and on mechanical ventilation PRVC





- Cardiovascular Exam


Cardiovascular Exam: REGULAR RHYTHM





- GI/Abdominal Exam


GI & Abdominal Exam: Distended





- Extremities Exam


Extremities Exam: Normal Capillary Refill





- Neurological Exam


Neurological Exam: Altered





- Psychiatric Exam


Psychiatric exam: Flat Affect





- Skin


Skin Exam: Dry, Intact, Warm





Assessment and Plan





- Assessment and Plan (Free Text)


Plan: 





37M with hx of alcohol abuse, liver cirrhosis, presents with GI bleed requiring 

massive transfusion


CTA - negative for extravasation





- Intubated


- PTX drip


- monitor CBC


- Transfuse as necessary


- Recommend EGD/Colonoscopy


- Further recs as per Dr. Arabella Reeves PGY1

## 2018-04-30 NOTE — CT
PROCEDURE:  ULTRASOUND-GUIDED PARACENTESIS



CLINICAL HISTORY:  37-year-old male with ascites is referred to 

Interventional Radiology for ultrasound-guided paracentesis.



COMPARISON:

CT scan of the abdomen and pelvis dated 04/27/2018.



PROCEDURE:  1. Ultrasound-guided paracentesis.



PRE-PROCEDURE FINDINGS:  1. Small volume ascites.



POST-PROCEDURE FINDINGS:  1. No evidence of post-procedural 

complication.



INTERVENTIONAL RADIOLOGIST:  Israel Rivas M.D. (the attending was 

present for the entire procedure)



ANESTHESIA:  None.



MEDICATION:  Lidocaine 1% for local subcutaneous analgesia.



COMPLICATIONS:  None.



PROCEDURE DESCRIPTION AND FINDINGS:  The risks, benefits, 

alternatives and possible complications of the procedure were fully 

discussed; all questions were answered and informed consent was 

obtained. A pre-procedure 'time-out' was performed. 



Preliminary ultrasound images of the right lower quadrant demonstrate 

a small amount of ascites. The right lower quadrant was prepped and 

draped in the usual sterile fashion. Maximum sterile barrier 

precautions were maintained throughout the entire procedure. 

Following subcutaneous infiltration of lidocaine 1% for local 

analgesia, under real-time ultrasound guidance, a 5 Cymraes centesis 

catheter was advanced into the right lower quadrant with real-time 

visualization of needle entry. The ultrasound images were permanently 

recorded and submitted to the PACS. The inner stylet was removed and 

the catheter was attached to gentle vacuum suction. A total of 0.9 

liters of straw-colored fluid were aspirated. The drainage catheter 

was then removed. A sterile adhesive bandage was placed over the 

puncture site. 



The patient tolerated the procedure well without immediate 

post-procedure complications.



IMPRESSION:

SUCCESSFUL ULTRASOUND-GUIDED THERAPEUTIC PARACENTESIS.

## 2018-05-01 NOTE — CP.PCM.PN
Subjective





- Date & Time of Evaluation


Date of Evaluation: 05/01/18


Time of Evaluation: 09:30





- Subjective


Subjective: 





Pt remains intubated on Mecg Vent FiO2 30% 12/500/5


still with some dark cofee ground drainage from OGT however less than previous 

day


Rectal tube with dark stool


Pt is afebrile


Sedated


Plan for EGD at bedside today





Objective





- Vital Signs/Intake and Output


Vital Signs (last 24 hours): 


 











Temp Pulse Resp BP Pulse Ox


 


 98.9 F   66   12   114/68   100 


 


 05/01/18 08:00  05/01/18 09:00  05/01/18 09:00  05/01/18 09:00  05/01/18 09:00








Intake and Output: 


 











 05/01/18 05/01/18





 06:59 18:59


 


Intake Total 530 56


 


Output Total 980 200


 


Balance -450 -144














- Medications


Medications: 


 Current Medications





Acetaminophen (Tylenol 325mg/10.15ml Ud)  325 mg NG Q6 PRN


   PRN Reason: Temperature


   Last Admin: 04/29/18 21:37 Dose:  325 mg


Chlordiazepoxide (Librium)  50 mg PO Q8 JUAN LUIS


   Last Admin: 04/27/18 08:49 Dose:  50 mg


Folic Acid (Folic Acid)  1 mg PO DAILY JUAN LUIS


   Last Admin: 04/27/18 08:09 Dose:  1 mg


Ciprofloxacin (Cipro 400mg/200ml Dsw)  400 mg in 200 mls @ 200 mls/hr IVPB Q12 

JUAN LUIS


   PRN Reason: Protocol


   Last Admin: 05/01/18 08:15 Dose:  200 mls/hr


Vancomycin HCl 1 gm/ Sodium (Chloride)  250 mls @ 125 mls/hr IVPB DAILY JUAN LUIS


   PRN Reason: Protocol


   Last Admin: 05/01/18 08:16 Dose:  125 mls/hr


Pantoprazole Sodium 40 mg/ (Sodium Chloride)  100 mls @ 20 mls/hr IVPB Q5H JUAN LUIS


   PRN Reason: 8 MG/HR


   Last Admin: 05/01/18 06:13 Dose:  20 mls/hr


Octreotide Acetate 1,250 mcg/ (Sodium Chloride)  252.5 mls @ 10.1 mls/hr IV 

.Q24H JUAN LUIS


   PRN Reason: 50 MCG/HR


   Last Admin: 04/30/18 21:22 Dose:  10.1 mls/hr


Dexmedetomidine HCl 400 mcg/ (Sodium Chloride)  100 mls @ 11.16 mls/hr IV 

.Q8H58M JUAN LUIS; 0.5 MCG/KG/HR


   PRN Reason: Protocol


   Last Admin: 05/01/18 06:46 Dose:  1.19 mcg/kg/hr, 26.58 mls/hr


Lorazepam (Ativan)  2 mg IVP Q6 PRN


   PRN Reason: Agitation


   Last Admin: 04/30/18 23:00 Dose:  2 mg


Metoclopramide HCl (Reglan)  10 mg IVP Q6 PRN


   PRN Reason: Nausea/Vomiting


   Last Admin: 04/20/18 08:49 Dose:  10 mg


Morphine Sulfate (Morphine)  6 mg IVP Q4H PRN


   PRN Reason: Pain scale 4-10


   Last Admin: 05/01/18 05:07 Dose:  6 mg


Thiamine HCl (Vitamin B1 Tab)  100 mg PO DAILY JUAN LUIS


   Last Admin: 04/27/18 08:09 Dose:  100 mg











- Labs


Labs: 


 





 05/01/18 04:20 





 05/01/18 04:20 





 











PT  15.1 Seconds (9.8-13.1)  H  05/01/18  04:00    


 


INR  1.4  (0.9-1.2)  H  05/01/18  04:00    


 


APTT  32.6 Seconds (25.6-37.1)   04/30/18  04:45    




















- Constitutional


Appears: Other (Intubated on Mech Vent)





- Head Exam


Head Exam: NORMAL INSPECTION, NORMOCEPHALIC





- Eye Exam


Eye Exam: Normal appearance, PERRL





- ENT Exam


ENT Exam: Mucous Membranes Dry, Normal External Ear Exam


Additional comments: 


OGT in place with coffee ground drainage





- Respiratory Exam


Respiratory Exam: Rales.  absent: Wheezes


Additional comments: 


Intubated on Vent





- Cardiovascular Exam


Cardiovascular Exam: REGULAR RHYTHM, +S1, +S2





- GI/Abdominal Exam


GI & Abdominal Exam: Distended, Normal BS





- Rectal Exam


Additional comments: 


Rectal Tube in place- dark  maroon  liquid stool  in rectal tube 





- Extremities Exam


Extremities Exam: Normal Capillary Refill





- Neurological Exam


Additional comments: 


Sedated





- Skin


Skin Exam: Dry, Normal Color, Warm





Assessment and Plan


(1) Acute blood loss anemia


Status: Acute   





(2) GI bleed


Status: Acute   





(3) Alcohol withdrawal


Status: Acute   





(4) Alcohol abuse


Status: Chronic   





(5) Coagulopathy


Status: Acute   





(6) Transaminitis


Status: Acute   





- Assessment and Plan (Free Text)


Assessment: 





38 y/o male with  known history  of Alcoholism and recent GI bleed, was brought 

in because of hematemesis and alteration in   mental status.


He was recently discharged from this hospital  after an episode of GI bleed .  

EGD done on 4/16 did not show any active bleed.  The patient was discharged 

home and again started drinking.On day of admission, he  had hematemesis and 

was noted to be confused and agitated.  HGB=6.1.He was transfused with PRBC and 

FFP, intubated for airway protection. EGD showed no active bleeding. Since Hgb 

continued  to drop bleeding scan was performed that showed no active source of 

bleeding .He was started on Levophed drip 4/27 for hypotension and underwent 

CTA of abdomen by IR that showed no active bleeding.


Transfused total of 19 unit PRBC and 14 unit FFP and 3 Platelet 


off Levophed drip 


At present intubated on MV PRVC / AC mode , sedated on Precedex drip , Protonix 

and Octreotide drip


OGT and Rectal tube in place  with dark bloody output 


HGb 9.2  








1.Acute blood loss anemia sec to GI Bleed prob secondary  to Bleeding  

Esophageal Varices


Acute   


s/p total 19 unit PRBC and 14 unit FFP transfusion and 3 platelet


OGT and flexiseal in place with dark bloody output. Hgb 9.2 today 


EGD and CTA abdomen failed to show any source of bleeding 


received DDAVP, Vitamin K and at present on Protonix and Octreotide drip


off Levophed drip


GI , IR and surgery on consult 


Continue ICU monitoring .


Discussed case with Dr Owen : plan for repeat EGD today at bedside


cont IV Cipro


Monitor H& H and transfuse as needed


keep NPO 








2. Pneumonia 


CXR today showed resolution of right apex residual consolidation , new right 

lower base atelectasis vs infiltrate


afebrile last 24 hours 


procalcitonin elevated


cont IV Cipro and  IV Vanco





3. Intubated for Airway Protection


on vent PRVC AC mode 12/500/5/30 % 


CXR showed possible RLL infiltrate


Continue respiratory toilet 


On Cipro and Vanco IV 


Will attempt to wean off the vent 


Pulmonary on  consult 





4. Alcohol withdrawal, hx of Alcohol Abuse 


Acute  


on Prece dex drip for sedation, Ativan 


cont Thiamine and FA


IVF hydration





5. Hepatic Encephalopathy


Ammonia level was elevated


on Lactulose TID prn 


CT of the head : no bleed








6. Coagulopathy sec to Liver dis from alcoholism


received Vit K, DDAVP


s/p 14 unit FFP transfusion 


INR 1.4 


will give again DDAVP today 





7.  Cirrhosis with ascites , coagulopathy, thrombocytopenia sec to ETOH abuse 


s/p abdominal paracentesis by IR  4/30  with removal 900 ml ascitic fluid 





8.DVT proph


SCD


no anticoag sec to GIB and coagulopathy & low Platelet

## 2018-05-01 NOTE — RAD
PROCEDURE:  CHEST RADIOGRAPH, 1 VIEW



HISTORY:

intubated



COMPARISON:

Chest radiograph dated 04/29/2018.



FINDINGS:



LUNGS:

Pulmonary vascular congestion. No definite consolidation.



PLEURA:

No pneumothorax or pleural fluid seen.



CARDIOVASCULAR:

Cardiomediastinal silhouette unchanged.



OSSEOUS STRUCTURES:

Unchanged.



VISUALIZED UPPER ABDOMEN:

Normal.



OTHER FINDINGS:

Endotracheal, enteric tubes, unchanged. 



IMPRESSION:

Stable tubes and lines. No pleural effusion no definite consolidates.

## 2018-05-01 NOTE — PN
DATE:  05/01/2018



CRITICAL CARE PROGRESS NOTE



LOCATION:  The patient in ICU bed 430.



TIME SPENT:  35 minutes.



SUBJECTIVE:  The patient is seen and evaluated at the bedside.  Past

medical, surgical and social history reviewed.



A 37-year-old male with cirrhosis, ascites, thrombocytopenia, coagulopathy,

and hepatic encephalopathy, admitted with GI bleeding secondary to bleeding

esophageal varices, status post transfusion of multiple units of packed red

blood cells, fresh frozen plasma, noted further drop in the hemoglobin. 

Seen by GI consult, awaiting for repeat EGD this afternoon.  Currently

remains intubated on mechanical ventilation.  AC/PRVC rate 12, tidal volume

of 500, FiO2 of 30%, observed rate 12, exhaled tidal volume 520, minute

ventilation 6.7 liters, saturating 100%.  Remains lethargic.



OBJECTIVE:

VITAL SIGNS:  Temperature 98.9, heart rate 68, blood pressure 111/68,

saturation 100% on FiO2 of 30%.

INTAKE AND OUTPUT:  Intake 1830, output 1680, positive balance of 150,

weight 202 pounds.

HEENT:  Pupils reactive, OGT in place with coffee ground output.

CHEST:  Bilateral breath sounds, clear to auscultation anteriorly and

laterally.

HEART:  Rhythm regular.  S1 and S2 normal in intensity.

ABDOMEN:  Distended, soft.

NEUROLOGIC:  Sedated, on Precedex.  Opens eyes to calling his name.

SKIN:  Appears dry and warm.



CURRENT MEDICATIONS:  Tylenol 325 mg NG every 6 hours p.r.n., Librium 50 mg

p.o. every 8 hours, ciprofloxacin 400 mg IV every 12 hours, Precedex at

11.16 mL/hour 400 mcg in 100 mL, folic acid 1 mg daily, Ativan 2 mg IV

every 6 hours p.r.n., Reglan 10 mg IV every 6 hours p.r.n., morphine 6

_____ IV every 4 hours p.r.n., Sandostatin 1250 in 252.5 mL at 10.1

mL/hour, thiamine 100 mg daily, and vancomycin 1 g IV daily.

























IMPRESSION AND PLAN:

1.  Admitted with gastrointestinal bleed with acute blood loss anemia,

leaving status post esophagogastroduodenoscopy, noted esophageal varices,

status post multiple units of packed red blood cells, fresh frozen plasma

and platelets.  OGT  in place with dark bloody output, for

esophagogastroduodenoscopy this afternoon.  Continue a pureed diet.

2.  Acute respiratory failure, remains intubated.  Chest x-ray shows

resolution of the right apex residual consolidation.  New right lower lobe

base atelectasis versus infiltrate, on IV antibiotic vancomycin and

ciprofloxacin.  The patient is not able to wean off now as remains

agitated, requiring Precedex and awaiting for EGD.

3.  Alcohol withdrawal, history of alcohol abuse:  Continue thiamine, folic

acid, and Precedex.

4.  Hepatic encephalopathy, on lactulose, improved.  CT head, no acute

abnormality.

5.  Cirrhosis with ascites, coagulopathy, and thrombocytopenia secondary to

EtOH abuse, status post paracentesis by IR with removal of 900 mL of

ascitic fluid.

6.  Continue DVT prophylaxis with SCDs.  Hold anticoagulation secondary to

the risk for bleeding and low platelet count.





__________________________________________

Gilbert Mckeon MD





DD:  05/01/2018 11:51:43

DT:  05/01/2018 11:59:37

Job # [id number]



[id number]

## 2018-05-02 NOTE — CP.PCM.PN
Subjective





- Date & Time of Evaluation


Date of Evaluation: 05/02/18


Time of Evaluation: 12:00





- Subjective


Subjective: 


Pt remains intubated  AC     5/12/500 30%


afebrile continues to have Melena per rectal tube 


OGT d/c yesterday 


Hgb again dropped 2 grams - will transfuse 2 units PRBC and 2 FFP








Objective





- Vital Signs/Intake and Output


Vital Signs (last 24 hours): 


 











Temp Pulse Resp BP Pulse Ox


 


 98.4 F   71   11 L  95/45 L  97 


 


 05/02/18 12:00  05/02/18 12:00  05/02/18 12:00  05/02/18 12:00  05/02/18 12:00








Intake and Output: 


 











 05/02/18 05/02/18





 06:59 18:59


 


Intake Total 530 1500


 


Output Total 600 175


 


Balance -70 1325














- Medications


Medications: 


 Current Medications





Acetaminophen (Tylenol 325mg/10.15ml Ud)  325 mg NG Q6 PRN


   PRN Reason: Temperature


   Last Admin: 04/29/18 21:37 Dose:  325 mg


Chlordiazepoxide (Librium)  50 mg PO Q8 JUAN LUIS


   Last Admin: 04/27/18 08:49 Dose:  50 mg


Folic Acid (Folic Acid)  1 mg PO DAILY JUAN LUIS


   Last Admin: 04/27/18 08:09 Dose:  1 mg


Furosemide (Lasix)  40 mg IV ONCE ONE


   Stop: 05/03/18 15:01


Ciprofloxacin (Cipro 400mg/200ml Dsw)  400 mg in 200 mls @ 200 mls/hr IVPB Q12 

JUAN LUIS


   PRN Reason: Protocol


   Last Admin: 05/02/18 08:04 Dose:  200 mls/hr


Vancomycin HCl 1 gm/ Sodium (Chloride)  250 mls @ 125 mls/hr IVPB DAILY JUAN LUIS


   PRN Reason: Protocol


   Last Admin: 05/02/18 08:05 Dose:  125 mls/hr


Pantoprazole Sodium 40 mg/ (Sodium Chloride)  100 mls @ 20 mls/hr IVPB Q5H JUAN LUIS


   PRN Reason: 8 MG/HR


   Last Admin: 05/02/18 09:39 Dose:  20 mls/hr


Octreotide Acetate 1,250 mcg/ (Sodium Chloride)  252.5 mls @ 10.1 mls/hr IV 

.Q24H JUAN LUIS


   PRN Reason: 50 MCG/HR


   Last Admin: 05/01/18 23:39 Dose:  10.1 mls/hr


Dexmedetomidine HCl 400 mcg/ (Sodium Chloride)  100 mls @ 11.16 mls/hr IV 

.Q8H58M JUAN LUIS; 0.5 MCG/KG/HR


   PRN Reason: Protocol


   Last Titration: 05/02/18 11:24 Dose:  1.09 mcg/kg/hr, 24.35 mls/hr


Lorazepam (Ativan)  2 mg IVP Q6 PRN


   PRN Reason: Agitation


   Last Admin: 05/01/18 23:57 Dose:  2 mg


Metoclopramide HCl (Reglan)  10 mg IVP Q6 PRN


   PRN Reason: Nausea/Vomiting


   Last Admin: 04/20/18 08:49 Dose:  10 mg


Morphine Sulfate (Morphine)  6 mg IVP Q4H PRN


   PRN Reason: Pain scale 4-10


   Last Admin: 05/02/18 11:11 Dose:  6 mg


Thiamine HCl (Vitamin B1 Tab)  100 mg PO DAILY JUAN LUIS


   Last Admin: 04/27/18 08:09 Dose:  100 mg











- Labs


Labs: 


 





 05/02/18 04:20 





 05/02/18 04:20 





 











PT  15.1 Seconds (9.8-13.1)  H  05/01/18  04:00    


 


INR  1.4  (0.9-1.2)  H  05/01/18  04:00    


 


APTT  32.6 Seconds (25.6-37.1)   04/30/18  04:45    

















- Constitutional


Appears: Other (Intubated on Mech Vent)





- Head Exam


Head Exam: NORMAL INSPECTION, NORMOCEPHALIC





- Eye Exam


Eye Exam: Normal appearance, PERRL





- ENT Exam


ENT Exam: Mucous Membranes Dry, Normal External Ear Exam








- Respiratory Exam


Respiratory Exam: Rales.  absent: Wheezes


Additional comments: 


Intubated on Vent





- Cardiovascular Exam


Cardiovascular Exam: REGULAR RHYTHM, +S1, +S2





- GI/Abdominal Exam


GI & Abdominal Exam: Distended, Normal BS





- Rectal Exam


Additional comments: 


Rectal Tube in place- dark  maroon  liquid stool  in rectal tube 





- Extremities Exam


Extremities Exam: Normal Capillary Refill





- Neurological Exam


Additional comments: 


Sedated





- Skin


Skin Exam: Dry, Normal Color, Warm





Assessment and Plan


(1) Acute blood loss anemia


Status: Acute   





(2) GI bleed


Status: Acute   





(3) Alcohol withdrawal


Status: Acute   





(4) Alcohol abuse


Status: Chronic   





(5) Coagulopathy


Status: Acute   





(6) Transaminitis


Status: Acute   





- Assessment and Plan (Free Text)


Assessment: 








38 y/o male with  known history  of Alcoholism and recent GI bleed, was brought 

in because of hematemesis and alteration in   mental status.


He was recently discharged from this hospital  after an episode of GI bleed .  

EGD done on 4/16 did not show any active bleed.  The patient was discharged 

home and again started drinking.On day of admission, he  had hematemesis and 

was noted to be confused and agitated.  HGB=6.1.He was transfused with PRBC and 

FFP, intubated for airway protection. EGD showed no active bleeding. Since Hgb 

continued  to drop bleeding scan was performed that showed no active source of 

bleeding .He was started on Levophed drip 4/27 for hypotension and underwent 

CTA of abdomen by IR that showed no active bleeding.


Transfused total of 19 unit PRBC and 14 unit FFP and 3 Platelet 


off Levophed drip 


At present intubated on MV PRVC / AC mode , sedated on Precedex drip , Protonix 

and Octreotide drip


Rectal tube in place  with dark bloody output  , OGT d/c as it was  noted to be 

traumatizing the gas tric mucosa.( seen on EGD)


Hgb dropped to 7.6 from 9.8 yesterday - transfusion ordered.








1.Acute blood loss anemia sec to GI Bleed prob secondary  to Bleeding  

Esophageal Varices


Acute   


s/p total 19 unit PRBC and 14 unit FFP transfusion and 3 platelets


will transfuse additional 2 units PRBC and 2 units FFP today


Melanotic liquid  stool  per rectal tube - approx 400ml in bag at present 


EGD x2 and CTA abdomen failed to show any source of bleeding 


Rpt EGD done 5/1 : noted blood at area where Gastric tube was ( ? traumatizing 

mucosa)


received DDAVP, Vitamin K and at present on Protonix and Octreotide drip


off Levophed drip


GI , IR and surgery on consult 


Continue ICU monitoring .


Discussed case with Dr Owen


cont IV Cipro and Vanco


Monitor H& H and transfuse as needed


keep NPO 








2. Pneumonia 


CXR today showed resolution of right apex residual consolidation , new right 

lower base atelectasis vs infiltrate


afebrile last 24 hours 


procalcitonin elevated


cont IV Cipro and  IV Vanco





3. Intubated for Airway Protection


on vent PRVC AC mode 12/500/5/30 % 


CXR showed possible RLL infiltrate


Continue respiratory toilet 


On Cipro and Vanco IV 


Will attempt to wean off the vent 


Pulmonary on  consult 





4. Alcohol withdrawal, hx of Alcohol Abuse 


Acute  


on Precedex drip for sedation, Ativan 


cont Thiamine and FA


IVF hydration





5. Hepatic Encephalopathy


Ammonia level was elevated


on Lactulose prn 


CT of the head : no bleed








6. Coagulopathy sec to Liver dis from alcoholism


received Vit K, DDAVP


s/p 14 unit FFP transfusion 


INR 1.4 








7.  Cirrhosis with ascites , coagulopathy, thrombocytopenia sec to ETOH abuse 


s/p abdominal paracentesis by IR  4/30  with removal 900 ml ascitic fluid 





8.DVT proph


SCD


no anticoag sec to GIB and coagulopathy & low Platelet

## 2018-05-02 NOTE — RAD
HISTORY:

vented  



COMPARISON:

Portable chest 05/01/2018. 



FINDINGS:



LUNGS:

Endotracheal intubation is stable.  Prior nasogastric tube appears to 

been removed. Limited patchy airspace disease in the right infrahilar 

space the remaining lung fields clear. No pleural effusion 

bilaterally or pneumothorax. No left-sided infiltrate.



PLEURA:

As above.



CARDIOVASCULAR:

Normal.



OSSEOUS STRUCTURES:

No significant abnormalities.



VISUALIZED UPPER ABDOMEN:

Normal.



OTHER FINDINGS:

None.



IMPRESSION:

Limited right infrahilar patchy infiltrate with remaining lung fields 

clear.  Nasogastric tube appears to have been removed.

## 2018-05-02 NOTE — CP.CCUPN
CCU Subjective





- Physician Review


Subjective (Free Text): 








Events reviwed over the past 48H since my last visit and exam. OGT now removed 

as of last EGD done yesterday. Rectal tube shows dark / black stool. He is 

slightly hypotenisve this AM, and additional blood products ordered with HGb at 

7.6 today compared to yesterday at 9.8. 








Other VS and I/Os reviewed.





ROS:  No other pertinent negs or positives on 10+  system review obtainable 

from intubated and sedated patient. 











PMSFH:    All other Nursing and physician documentation reviewed to date; no 

new pertinent info noted relevant to current medical problems.








EXAM-


HEENT: no icterus, no gaze preference, pupils equal and reactive, no icterus


NECK: No JVD, supple, carotids equal upstroke bilat/no bruits


CHEST: decreased BS bases, no wheezes audible


HEART:  regular tachy, distant, S1S2, no rubs.


ABD:  soft, ++ distention, no tympany, no palp tenderness, BS hypoactive.


EXT: No peripheral/ digital cyanosis, no calf tenderness or palpable cords, 

distal pulses intact and symmetrical. 


GENIT:  +scrotal and penile edema


NEURO:  no focal motor deficits, withdraws to deep pain.


SKIN:   no rashes,  warm and dry.











LABS: 





WBC= 13.5


HGB= 7.6   


PLTs=  154K





INR= 1.4 yesterday











Rh=895


K= 3.8


BL=751


HCO3= 25


BUN/Cr= 21/1.2


LX=424





7.50 / 32 / 77





CXR: (my interp) R hilar increased interstitial changes. 








IMPRESSION / MAJOR PROBLEMS NOW:


1.  Recurrent UGIB, r/o gastritis, PUD, other occult, intermittent Variceal 

bleeding?


2.  Acute Anemia 2 blood loss; and mild Coagulopathy


3.  AKA on admission: suspect binge ETOH use after last hospital discharge. 


4.  ETOH Withdrawal / Delirium


5.  Azotemia / Dehydration








PLAN:


1.   More FFP and PRBCs. 


2.   Lasix PRN


3.   OGT out as per GI, on PPi / Octreotide drips. 


4.   See orders. 











CCU Objective





- Vital Signs / Intake & Output


Vital Signs (Last 4 hours): 


Vital Signs











  Temp Pulse Resp BP Pulse Ox


 


 05/02/18 11:00   75  15  110/58 L  99


 


 05/02/18 10:00   82  14  84/43 L  98


 


 05/02/18 09:00   70  12  85/36 L  98


 


 05/02/18 08:00  99.8 F H  72  15  83/49 L  100











Intake and Output (Last 8hrs): 


 Intake & Output











 05/01/18 05/02/18 05/02/18





 22:59 06:59 14:59


 


Intake Total 510 220 700


 


Output Total 150 600 125


 


Balance 360 -380 575


 


Weight  204 lb 


 


Intake:   


 


   220 250


 


  Intake, Piggyback 200  450


 


Output:   


 


  Urine 150 300 125


 


    Urethral (Burger) 150 300 125


 


  Stool  300 














Critical Care Progress Note





- Nutrition


Nutrition: 


 Nutrition











 Category Date Time Status


 


 NPO Diet [DIET] Diets  04/22/18 Breakfast Active

## 2018-05-02 NOTE — CP.PCM.PN
Subjective





- Date & Time of Evaluation


Date of Evaluation: 05/02/18


Time of Evaluation: 10:00





- Subjective


Subjective: 





Patient seen and examined this morning. Remains intubated.  Currently receiving 

FFP transfusions. Hgb dropped by 2 pts, patient to get 2 uPRBCs. Patient 

remains with rectal tube and continuous to have melenotic stool output. Bloody 

urine output into dunn bag. 





Objective





- Vital Signs/Intake and Output


Vital Signs (last 24 hours): 


 











Temp Pulse Resp BP Pulse Ox


 


 99.8 F H  75   15   110/58 L  99 


 


 05/02/18 08:00  05/02/18 11:00  05/02/18 11:00  05/02/18 11:00  05/02/18 11:00








Intake and Output: 


 











 05/02/18 05/02/18





 06:59 18:59


 


Intake Total 530 700


 


Output Total 600 125


 


Balance -70 575














- Medications


Medications: 


 Current Medications





Acetaminophen (Tylenol 325mg/10.15ml Ud)  325 mg NG Q6 PRN


   PRN Reason: Temperature


   Last Admin: 04/29/18 21:37 Dose:  325 mg


Chlordiazepoxide (Librium)  50 mg PO Q8 JUAN LUIS


   Last Admin: 04/27/18 08:49 Dose:  50 mg


Folic Acid (Folic Acid)  1 mg PO DAILY JUAN LUIS


   Last Admin: 04/27/18 08:09 Dose:  1 mg


Ciprofloxacin (Cipro 400mg/200ml Dsw)  400 mg in 200 mls @ 200 mls/hr IVPB Q12 

JUAN LUIS


   PRN Reason: Protocol


   Last Admin: 05/02/18 08:04 Dose:  200 mls/hr


Vancomycin HCl 1 gm/ Sodium (Chloride)  250 mls @ 125 mls/hr IVPB DAILY JUAN LUIS


   PRN Reason: Protocol


   Last Admin: 05/02/18 08:05 Dose:  125 mls/hr


Pantoprazole Sodium 40 mg/ (Sodium Chloride)  100 mls @ 20 mls/hr IVPB Q5H JUAN LUIS


   PRN Reason: 8 MG/HR


   Last Admin: 05/02/18 09:39 Dose:  20 mls/hr


Octreotide Acetate 1,250 mcg/ (Sodium Chloride)  252.5 mls @ 10.1 mls/hr IV 

.Q24H JUAN LUIS


   PRN Reason: 50 MCG/HR


   Last Admin: 05/01/18 23:39 Dose:  10.1 mls/hr


Dexmedetomidine HCl 400 mcg/ (Sodium Chloride)  100 mls @ 11.16 mls/hr IV 

.Q8H58M JUAN LUIS; 0.5 MCG/KG/HR


   PRN Reason: Protocol


   Last Admin: 05/02/18 09:37 Dose:  0.99 mcg/kg/hr, 22.11 mls/hr


Lorazepam (Ativan)  2 mg IVP Q6 PRN


   PRN Reason: Agitation


   Last Admin: 05/01/18 23:57 Dose:  2 mg


Metoclopramide HCl (Reglan)  10 mg IVP Q6 PRN


   PRN Reason: Nausea/Vomiting


   Last Admin: 04/20/18 08:49 Dose:  10 mg


Morphine Sulfate (Morphine)  6 mg IVP Q4H PRN


   PRN Reason: Pain scale 4-10


   Last Admin: 05/02/18 11:11 Dose:  6 mg


Thiamine HCl (Vitamin B1 Tab)  100 mg PO DAILY JUAN LUIS


   Last Admin: 04/27/18 08:09 Dose:  100 mg











- Labs


Labs: 


 





 05/02/18 04:20 





 05/02/18 04:20 





 











PT  15.1 Seconds (9.8-13.1)  H  05/01/18  04:00    


 


INR  1.4  (0.9-1.2)  H  05/01/18  04:00    


 


APTT  32.6 Seconds (25.6-37.1)   04/30/18  04:45    














- Constitutional


Appears: No Acute Distress





- Head Exam


Head Exam: NORMOCEPHALIC





- Eye Exam


Eye Exam: Normal appearance





- ENT Exam


ENT Exam: Mucous Membranes Moist





- Respiratory Exam


Respiratory Exam: NORMAL BREATHING PATTERN





- Cardiovascular Exam


Cardiovascular Exam: +S1, +S2





- GI/Abdominal Exam


GI & Abdominal Exam: Distended, Soft.  absent: Firm





- Skin


Skin Exam: Dry, Warm





Assessment and Plan





- Assessment and Plan (Free Text)


Assessment: 





37M with hx of alcohol abuse, liver cirrhosis, presents with GI bleed requiring 

massive transfusion


CTA - negative for extravasation


Endoscopy: 


Plan: 





- Intubated


- PTX drip


- monitor H/H


- Transfuse as necessary


- Recommend Pill endoscopy/Colonoscopy


- Further recs as per Dr. Arabella Hernandez PGY2

## 2018-05-02 NOTE — CP.PCM.PN
Subjective





- Date & Time of Evaluation


Date of Evaluation: 05/02/18


Time of Evaluation: 19:02





- Subjective


Subjective: 





Patients remains intubated. NG tube is out. Dark secretions via rectal tube.





Objective





- Vital Signs/Intake and Output


Vital Signs (last 24 hours): 


 











Temp Pulse Resp BP Pulse Ox


 


 99.3 F   91 H  17   113/64   98 


 


 05/02/18 17:07  05/02/18 18:00  05/02/18 18:00  05/02/18 18:00  05/02/18 18:00








Intake and Output: 


 











 05/02/18 05/03/18





 18:59 06:59


 


Intake Total 2600 


 


Output Total 1475 


 


Balance 1125 














- Medications


Medications: 


 Current Medications





Acetaminophen (Tylenol 325mg/10.15ml Ud)  325 mg NG Q6 PRN


   PRN Reason: Temperature


   Last Admin: 04/29/18 21:37 Dose:  325 mg


Chlordiazepoxide (Librium)  50 mg PO Q8 JUAN LUIS


   Last Admin: 04/27/18 08:49 Dose:  50 mg


Folic Acid (Folic Acid)  1 mg PO DAILY JUAN LUIS


   Last Admin: 04/27/18 08:09 Dose:  1 mg


Ciprofloxacin (Cipro 400mg/200ml Dsw)  400 mg in 200 mls @ 200 mls/hr IVPB Q12 

JUAN LUIS


   PRN Reason: Protocol


   Last Admin: 05/02/18 08:04 Dose:  200 mls/hr


Vancomycin HCl 1 gm/ Sodium (Chloride)  250 mls @ 125 mls/hr IVPB DAILY JUAN LUIS


   PRN Reason: Protocol


   Last Admin: 05/02/18 08:05 Dose:  125 mls/hr


Pantoprazole Sodium 40 mg/ (Sodium Chloride)  100 mls @ 20 mls/hr IVPB Q5H JUAN LUIS


   PRN Reason: 8 MG/HR


   Last Admin: 05/02/18 09:39 Dose:  20 mls/hr


Octreotide Acetate 1,250 mcg/ (Sodium Chloride)  252.5 mls @ 10.1 mls/hr IV 

.Q24H JUAN LUIS


   PRN Reason: 50 MCG/HR


   Last Admin: 05/01/18 23:39 Dose:  10.1 mls/hr


Dexmedetomidine HCl 400 mcg/ (Sodium Chloride)  100 mls @ 11.16 mls/hr IV 

.Q8H58M JUAN LUIS; 0.5 MCG/KG/HR


   PRN Reason: Protocol


   Last Admin: 05/02/18 18:36 Dose:  1.19 mcg/kg/hr, 26.58 mls/hr


Lorazepam (Ativan)  2 mg IVP Q6 PRN


   PRN Reason: Agitation


   Last Admin: 05/01/18 23:57 Dose:  2 mg


Metoclopramide HCl (Reglan)  10 mg IVP Q6 PRN


   PRN Reason: Nausea/Vomiting


   Last Admin: 04/20/18 08:49 Dose:  10 mg


Morphine Sulfate (Morphine)  6 mg IVP Q4H PRN


   PRN Reason: Pain scale 4-10


   Last Admin: 05/02/18 18:04 Dose:  6 mg


Thiamine HCl (Vitamin B1 Tab)  100 mg PO DAILY JUAN LUIS


   Last Admin: 04/27/18 08:09 Dose:  100 mg











- Labs


Labs: 


 





 05/02/18 04:20 





 05/02/18 04:20 





 











PT  15.1 Seconds (9.8-13.1)  H  05/01/18  04:00    


 


INR  1.4  (0.9-1.2)  H  05/01/18  04:00    


 


APTT  32.6 Seconds (25.6-37.1)   04/30/18  04:45    














- Eye Exam


Pupil Exam: PERRL





- Neck Exam


Neck Exam: Full ROM





- Respiratory Exam


Respiratory Exam: NORMAL BREATHING PATTERN





- Cardiovascular Exam


Cardiovascular Exam: REGULAR RHYTHM





- GI/Abdominal Exam


GI & Abdominal Exam: Distended, Soft





- Rectal Exam


Rectal Exam: Black Stool





Assessment and Plan


(1) GI bleed


Assessment & Plan: 


Hgb this AM had dropped to 7.6. Continue monitoring Hgb. On IV PPI.


Status: Acute

## 2018-05-03 NOTE — CT
PROCEDURE:  CT Angiography Abdomen, Pelvis and Lower Extremity with 

Contrast



HISTORY:

Recurrent GI bleeding 



Relevant interventional procedure(s): 04/30/2018 paracentesis with 

removal of 0.9 L of straw-colored fluid 



COMPARISON:

04/27/2018 CT angio abdomen and pelvis: Summary of findings on the 

comparison examination:



* No evidence of active arterial contrast extravasation.



* Nonspecific rounded area of high density in the pelvis measuring 

approximately 5.9 x 4.8 cm, anterior to the sigmoid. This may 

represent an area of focal, contained hemorrhage persists collapsed 

small bowel.



04/14/2018 CT abdomen and pelvis without intravenous contrast Summary 

of findings on the comparison examination:



* Extensive infiltration in the right lower quadrant mesenteric fat 

extending along the right pericolic gutter possibly representing an 

inflammatory process.



04/23/2018 GI bleeding study. Summary of findings on the comparison 

examination: No evidence of acute gastrointestinal bleeding 



TECHNIQUE:

Technique: CT angiography of the abdomen, pelvis and bilateral lower 

extremities performed in the arterial phase of enhancement. Coronal 

and sagittal reformats, and well as rotating MIP images of the 

vessels generated at the workstation.



Intravenous contrast dose: 99 cc Visipaque 320



Radiation dose:



Total exam DLP = 2389.92 mGy-cm.



This CT exam was performed using one or more of the following dose 

reduction techniques: Automated exposure control, adjustment of the 

mA and/or kV according to patient size, and/or use of iterative 

reconstruction technique.



FINDINGS:



CT ANGIOGRAPHY:



ABDOMINAL AORTA::

Non aneurysmal.



MAJOR AORTIC BRANCHES:

Celiac Axis: Unremarkable.



Superior mesenteric artery: Unremarkable.



Inferior mesenteric artery: Unremarkable.



Renal arteries: Unremarkable.



PELVIC ARTERIES:

Right Common Iliac: Unremarkable.



Right External Iliac:  Unremarkable.



Right Internal Iliac:  Unremarkable.



Left Common Iliac: Unremarkable.



Left External Iliac: Unremarkable.



Left Internal Iliac: Unremarkable.



NON-ANGIOGRAPHIC ASPECT OF THE EXAM:



LOWER THORAX:

Trace right pleural effusion. Stable dependent atelectasis at the 

lung bases.  Overall, no interval change. 



LIVER:

Hepatomegaly, cirrhotic appearing liver. No focal hepatic 

abnormalities. Attenuation of hepatic arteries consistent with 

hepatocellular disease/ cirrhosis. 



GALLBLADDER AND BILE DUCTS:

Unremarkable. 



PANCREAS:

Unremarkable. No gross lesion or ductal dilatation.



SPLEEN:

Splenomegaly.  Orthogonal measurements 9.8 x 14.4 cm 



ADRENALS:

Unremarkable. No mass. 



KIDNEYS AND URETERS:

Unremarkable. No hydronephrosis. No solid mass. 



STOMACH AND BOWEL:

Thickening of the wall of the descending colon, sigmoid and rectum 

suggests acute colitis.  



Removal of support apparatus since the prior study: Nasogastric tube. 

 Stomach is mildly distended and fluid filled with debris. 



Rectal tube is in stable position 



APPENDIX:

A normal appendix is not visualized. 



PERITONEUM:

Honest decrease in abdominal and pelvic ascites. 



LYMPH NODES:

Unremarkable. No enlarged lymph nodes. 



BLADDER:

Distended urinary bladder.  Status post removal of Burger catheter.  

Trace air identified in the urinary bladder 



REPRODUCTIVE:

Unremarkable. 



BONES:

No acute fracture. 



OTHER FINDINGS:

Diffuse edema and anasarca is a stable finding



Venous access catheter remains stable position inserted via right 

femoral approach. 



IMPRESSION:

1. No evidence of acute gastrointestinal hemorrhage.



2. Thickening of the wall distal descending colon and sigmoid 

consistent with acute colitis. This was not apparent on the prior 

study



3. Stable hepatosplenomegaly and CT manifestations of cirrhosis. . 

Nondiagnostic study of the portal venous system (examination 

performed in the arterial phase)

## 2018-05-03 NOTE — CP.CCUPN
CCU Subjective





- Physician Review


Subjective (Free Text): 








Has persistently bleed from rectal tube overnight approx 700ml output of dark 

maroon bloody fluid. More prbcs and FFP given , hypotensive on low dose 

Levophed and ordered STAT CTA yet to be done pending BP stability. 

Intermittenly agitated, remains on Precedex infusion,. 








Other VS and I/Os reviewed.  Given Lasix in-between transfusions, only 500ml 

output. 








ROS:  No other pertinent negs or positives on 10+  system review obtainable 

from intubated and sedated patient. 











PMSFH:    All other Nursing and physician documentation reviewed to date; no 

new pertinent info noted relevant to current medical problems.








EXAM-


HEENT: no icterus, no gaze preference, pupils equal and reactive, no icterus


NECK: No JVD, supple, carotids equal upstroke bilat/no bruits


CHEST: decreased BS bases, no wheezes audible


HEART:  regular tachy, distant, S1S2, no rubs.


ABD:  soft, ++ distention, no tympany, no palp tenderness, BS hypoactive.


EXT: No peripheral/ digital cyanosis, no calf tenderness or palpable cords, 

distal pulses intact and symmetrical. 


GENIT:  +scrotal and penile edema


NEURO:  no focal motor deficits, withdraws to deep pain.


SKIN:   no rashes,  warm and dry.











LABS: 





WBC= 13.5


HGB= 7.6   


PLTs=  154K





INR= 1.4 yesterday











Qv=727


K= 3.8


PZ=035


HCO3= 25


BUN/Cr= 21/1.2


BB=595





7.50 / 32 / 77





CXR: (my interp) mild decrease in R lung volume, otherwise no new focal 

infiltrates, ETT position OK above carole.








IMPRESSION / MAJOR PROBLEMS NOW:


1.  Recurrent UGIB, r/o gastritis, PUD, Variceal bleeding?


2.  Acute Anemia 2 blood loss; and mild Coagulopathy


3.  AKA on admission: suspect binge ETOH use after last hospital discharge. 


4.  ETOH Withdrawal / Delirium


5.  Azotemia / Dehydration








PLAN:


1.   CTA Abd Pelvis, IR eval if CTA positive for location of bleed.


2.   HGb maintenance at approx 8.0-8.5; wean vasopressors.


3.   Lasix prn.


4.   PPi / Octreotide drips; empiric  abx coverage. 


5.   See orders. 





CCU Objective





- Vital Signs / Intake & Output


Vital Signs (Last 4 hours): 


Vital Signs











  Temp Pulse Resp BP Pulse Ox


 


 05/03/18 05:00   58 L  12  87/48 L  100


 


 05/03/18 04:00  97.9 F  55 L  12  124/81  100


 


 05/03/18 03:30     110/69 


 


 05/03/18 03:00   58 L  12  110/69  100











Intake and Output (Last 8hrs): 


 Intake & Output











 05/02/18 05/02/18 05/03/18





 14:59 22:59 06:59


 


Intake Total 1950 650 850


 


Output Total 275 1200 700


 


Balance 1675 -550 150


 


Intake:   


 


   300 200


 


  Intake, Piggyback 450  


 


  Blood Product 1050 350 650


 


Output:   


 


  Urine 275 1150 


 


    Urethral (Burger) 275 1150 


 


  Stool  50 700

## 2018-05-03 NOTE — CP.PCM.PN
Subjective





- Date & Time of Evaluation


Date of Evaluation: 05/03/18


Time of Evaluation: 11:30





- Subjective


Subjective: 


Pt had large amount of melena overnight - total of 900 ml yesterday


about 150 ml of melena in bag at present


Pt remains intubated , on [facility] Vent - AC 12/500/5/30% FiO2


Had CT Angio fo abd   today - no active bleeding








 





Objective





- Vital Signs/Intake and Output


Vital Signs (last 24 hours): 


 











Temp Pulse Resp BP Pulse Ox


 


 98.1 F   55 L  13   130/77   100 


 


 05/03/18 08:00  05/03/18 08:00  05/03/18 08:00  05/03/18 08:00  05/03/18 08:00








Intake and Output: 


 











 05/03/18 05/03/18





 06:59 18:59


 


Intake Total 850 4


 


Output Total 700 


 


Balance 150 4














- Medications


Medications: 


 Current Medications





Acetaminophen (Tylenol 325mg/10.15ml Ud)  325 mg NG Q6 PRN


   PRN Reason: Temperature


   Last Admin: 04/29/18 21:37 Dose:  325 mg


Chlordiazepoxide (Librium)  50 mg PO Q8 JUAN LUIS


   Last Admin: 04/27/18 08:49 Dose:  50 mg


Folic Acid (Folic Acid)  1 mg PO DAILY JUAN LUIS


   Last Admin: 04/27/18 08:09 Dose:  1 mg


Ciprofloxacin (Cipro 400mg/200ml Dsw)  400 mg in 200 mls @ 200 mls/hr IVPB Q12 

JUAN LUIS


   PRN Reason: Protocol


   Last Admin: 05/02/18 20:22 Dose:  200 mls/hr


Vancomycin HCl 1 gm/ Sodium (Chloride)  250 mls @ 125 mls/hr IVPB DAILY JUAN LUIS


   PRN Reason: Protocol


   Last Admin: 05/02/18 08:05 Dose:  125 mls/hr


Pantoprazole Sodium 40 mg/ (Sodium Chloride)  100 mls @ 20 mls/hr IVPB Q5H JUAN LUSI


   PRN Reason: 8 MG/HR


   Last Admin: 05/03/18 06:18 Dose:  20 mls/hr


Octreotide Acetate 1,250 mcg/ (Sodium Chloride)  252.5 mls @ 10.1 mls/hr IV 

.Q24H JUAN LUIS


   PRN Reason: 50 MCG/HR


   Last Admin: 05/03/18 06:26 Dose:  10.1 mls/hr


Dexmedetomidine HCl 400 mcg/ (Sodium Chloride)  100 mls @ 11.16 mls/hr IV 

.Q8H58M JUAN LUIS; 0.5 MCG/KG/HR


   PRN Reason: Protocol


   Last Titration: 05/03/18 08:00 Dose:  1.09 mcg/kg/hr, 24.35 mls/hr


Norepinephrine Bitartrate 4 mg (/ Dextrose)  254 mls @ 9.52 mls/hr IV .Q24H JUAN LUIS

; 2.5 MCG/MIN


   PRN Reason: Protocol


   Last Titration: 05/03/18 05:17 Dose:  5 mcg/min, 19.05 mls/hr


Lorazepam (Ativan)  2 mg IVP Q6 PRN


   PRN Reason: Agitation


   Last Admin: 05/01/18 23:57 Dose:  2 mg


Metoclopramide HCl (Reglan)  10 mg IVP Q6 PRN


   PRN Reason: Nausea/Vomiting


   Last Admin: 04/20/18 08:49 Dose:  10 mg


Morphine Sulfate (Morphine)  6 mg IVP Q4H PRN


   PRN Reason: Pain scale 4-10


   Last Admin: 05/02/18 18:04 Dose:  6 mg


Thiamine HCl (Vitamin B1 Tab)  100 mg PO DAILY JUAN LUIS


   Last Admin: 04/27/18 08:09 Dose:  100 mg











- Labs


Labs: 


 





 05/02/18 04:20 





 05/02/18 04:20 





 











PT  15.1 Seconds (9.8-13.1)  H  05/01/18  04:00    


 


INR  1.4  (0.9-1.2)  H  05/01/18  04:00    


 


APTT  32.6 Seconds (25.6-37.1)   04/30/18  04:45    














- Constitutional


Appears: Other ([facility])





- Head Exam


Head Exam: NORMAL INSPECTION, NORMOCEPHALIC





- Eye Exam


Eye Exam: Normal appearance, PERRL





- ENT Exam


ENT Exam: Mucous Membranes Dry, Normal External Ear Exam








- Respiratory Exam


Respiratory Exam: Rales.  absent: Wheezes


Additional comments: 


Intubated on Vent





- Cardiovascular Exam


Cardiovascular Exam: REGULAR RHYTHM, +S1, +S2





- GI/Abdominal Exam


GI & Abdominal Exam: Distended, Normal BS





- Rectal Exam


Additional comments: 


Rectal Tube in place- dark  maroon  liquid stool  in rectal tube 





- Extremities Exam


Extremities Exam: Normal Capillary Refill





- Neurological Exam


Additional comments: 


Sedated





- Skin


Skin Exam: Dry, Normal Color, Warm





Assessment and Plan


(1) Acute blood loss anemia


Status: Acute   





(2) GI bleed


Status: Acute   





(3) Alcohol withdrawal


Status: Acute   





(4) Alcohol abuse


Status: Chronic   





(5) Coagulopathy


Status: Acute   





(6) Transaminitis


Status: Acute   





- Assessment and Plan (Free Text)


Assessment: 





36 y/o male with  known history  of Alcoholism and recent GI bleed, was brought 

in because of hematemesis and alteration in   mental status.


He was recently discharged from this hospital  after an episode of GI bleed .  

EGD done on 4/16 did not show any active bleed.  The patient was discharged 

home and again started drinking.


On day of admission, he  had hematemesis and was noted to be confused and 

agitated.  HGB=6.1.He was transfused with PRBC and FFP, intubated for airway 

protection.  Rpt EGD showed no active bleeding. Since Hgb continued  to drop 

bleeding scan was performed that showed no active source of bleeding .He was 

started on Levophed drip 4/27 for hypotension and underwent CTA of abdomen by 

IR that showed no active bleeding.


Transfused total of 25  unit PRBC and 17 unit FFP and 3 Platelets 


At present intubated on MV PRVC / AC mode , sedated on Precedex drip ,  On 

Levophed drip, Protonix and Octreotide drip


Rectal tube in place  with dark bloody output  , OGT d/c as it was  noted to be 

irritating the gastric mucosa.( seen on  rpt EGD 5/1 )


5/3: Rpt CT Angio of abd : no active Bleeding








1.Acute blood loss anemia sec to GI Bleed prob secondary  to Bleeding  

Esophageal Varices


Acute   


s/p total 25 units PRBC and 17 unit FFP transfusion and 3 platelets


Melanotic liquid  stool  per rectal tube 


EGD x2 and CTA abdomen  x2 failed to show any source of bleeding 


Rpt EGD done 5/1 : noted blood at area where Gastric tube was ( ? traumatizing 

mucosa)


received DDAVP, Vitamin K and at present on Protonix and Octreotide drip


Calcium Gluconate also given


On Levophed drip 2.5 mcg


GI , IR and surgery on consult 


Continue ICU monitoring .


Discussed case with Dr Owen- rec Hematology consult - Dr Killian called 


cont IV Cipro and Vanco


Monitor H& H and transfuse as needed


keep NPO 








2. Pneumonia 


CXR : showed resolution of right apex residual consolidation 


afebrile 


procalcitonin elevated


cont IV Cipro and  IV Vanco





3. Intubated for Airway Protection


on vent PRVC AC mode 12/500/5/30 % 


Continue respiratory toilet 


On Cipro and Vanco IV 


Will attempt to wean off the vent 


Pulmonary on  consult 





4. Alcohol withdrawal, hx of Alcohol Abuse 


Acute  


on Precedex drip for sedation, Ativan 


IVF hydration





5. Hepatic Encephalopathy


Ammonia level was elevated


received Lactulose 


CT of the head : no bleed








6. Coagulopathy sec to Liver dis from alcoholism


received Vit K, DDAVP


s/p 17 unit FFP transfusion 


INR 1.5 








7.  Cirrhosis with ascites , coagulopathy, thrombocytopenia sec to ETOH abuse 


s/p abdominal paracentesis by IR  4/30  with removal 900 ml ascitic fluid 





8.DVT proph


SCD


no anticoag sec to GIB and coagulopathy & low Platelet

## 2018-05-03 NOTE — CP.PCM.CON
History of Present Illness





- History of Present Illness


History of Present Illness: 





37 year old male with a history of alcoholism, alcoholic liver cirrhosis with 

esophageal varices, admitted with GI bleeding which has persisted despite 

several PRBC, FFP, and platelet transfusions.  The patient is currently 

intubated and I am unable to obtain a history from him.  He continues to have 

melanotic/maroon colored stool.  A bleeding scan was negative for bleeding.  A 

catscan of the abdomen was consistent with liver cirrhosis and splenomegaly.





Past medical, surgical, family, social history cannot be obtained from the 

patient.





Allergies:  Per documentation penicillins and shellfish





Review of systems cannot be obtained.








Past Patient History





- Past Medical History & Family History


Past Medical History?: Yes





- Past Social History


Smoking Status: Unknown If Ever Smoked





- CARDIAC


Hx Cardiac Disorders: No





- PULMONARY


Hx Respiratory Disorders: No





- NEUROLOGICAL


Hx Neurological Disorder: No





- HEENT


Hx HEENT Problems: No


Hx Blind: No


Hx Cataracts: No


Hx Deafness: No


Hx Difficulty Chewing: No


Hx Epistaxis: No


Hx Glaucoma: No


Hx Macular Degeneration: No


Hx Sinusitis: No





- RENAL


Hx Chronic Kidney Disease: Yes


Hx Dialysis: No


Hx Kidney Stones: No


Hx Neurogenic Bladder: No


Hx Pyelonephritis: No


Hx Renal (Kidney) Cancer: No


Hx Renal Failure: Yes


Other/Comment: Chronic kidney disease





- ENDOCRINE/METABOLIC


Hx Endocrine Disorders: No


Hx Adrenal Cancer: No


Hx Diabetes Insipidus: No


Hx Diabetes Mellitus Type 1: No


Hx Diabetes Mellitus Type 2: No


Hx Hyperthyroidism: No


Hx Hypothyroidism: No


Hx Systemic Lupus Erythematosus: No





- HEMATOLOGICAL/ONCOLOGICAL


Hx Blood Disorders: Yes


Hx AIDS: No (unknown)


Hx Human Immunodeficiency Virus (HIV): No ([place])





- INTEGUMENTARY


Hx Dermatological Problems: No





- MUSCULOSKELETAL/RHEUMATOLOGICAL


Hx Musculoskeletal Disorders: Yes


Hx Back Pain: Yes


Hx Falls: No





- GASTROINTESTINAL


Hx Gastrointestinal Disorders: No





- GENITOURINARY/GYNECOLOGICAL


Hx Genitourinary Disorders: No





- PSYCHIATRIC


Hx Psychophysiologic Disorder: No


Hx Substance Use: No





- SURGICAL HISTORY


Hx Surgeries: No


Hx Herniorrhaphy: No


Hx Hysterectomy: No


Hx Joint Replacement: No


Hx Kidney Transplant: No


Hx Liver Transplant: No


Hx Mastectomy: No


Hx Musculoskeletal Surgery: No


Hx Open Heart Surgery: No


Hx Open Reduction Internal Fixation: No


Hx Orthopedic Surgery: No


Hx Parathyroidectomy: No


Hx Penile Implant: No


Hx Pulmonary Surgery: No


Hx Splenectomy: No


Hx Thyroidectomy: No


Hx Tonsillectomy: No


Hx Tubal Ligation: No


Hx Valve Replacement: No


Hx Vascular Surgery: No


Hx Vascular Access Device: No





- ANESTHESIA


Hx Anesthesia: No


Hx Anesthesia Reactions: No


Hx Malignant Hyperthermia: No


Has any member of the family had a problem w/ anesthesia?:  (Unknown)





Meds


Allergies/Adverse Reactions: 


 Allergies











Allergy/AdvReac Type Severity Reaction Status Date / Time


 


Penicillins Allergy  RASH Verified 10/06/17 17:48


 


shellfish derived Allergy  ITCHING Verified 04/14/18 18:20














- Medications


Medications: 


 Current Medications





Acetaminophen (Tylenol 325mg/10.15ml Ud)  325 mg NG Q6 PRN


   PRN Reason: Temperature


   Last Admin: 04/29/18 21:37 Dose:  325 mg


Chlordiazepoxide (Librium)  50 mg PO Q8 JUAN LUIS


   Last Admin: 04/27/18 08:49 Dose:  50 mg


Folic Acid (Folic Acid)  1 mg PO DAILY JUAN LUIS


   Last Admin: 04/27/18 08:09 Dose:  1 mg


Ciprofloxacin (Cipro 400mg/200ml Dsw)  400 mg in 200 mls @ 200 mls/hr IVPB Q12 

JUAN LUIS


   PRN Reason: Protocol


   Last Admin: 05/03/18 21:24 Dose:  200 mls/hr


Vancomycin HCl 1 gm/ Sodium (Chloride)  250 mls @ 125 mls/hr IVPB DAILY JUAN LUIS


   PRN Reason: Protocol


   Last Admin: 05/03/18 12:00 Dose:  125 mls/hr


Pantoprazole Sodium 40 mg/ (Sodium Chloride)  100 mls @ 20 mls/hr IVPB Q5H JUAN LUIS


   PRN Reason: 8 MG/HR


   Last Admin: 05/03/18 19:00 Dose:  20 mls/hr


Octreotide Acetate 1,250 mcg/ (Sodium Chloride)  252.5 mls @ 10.1 mls/hr IV 

.Q24H JUAN LUIS


   PRN Reason: 50 MCG/HR


   Last Admin: 05/03/18 06:26 Dose:  10.1 mls/hr


Dexmedetomidine HCl 400 mcg/ (Sodium Chloride)  100 mls @ 11.16 mls/hr IV 

.Q8H58M JUAN LUIS; 0.5 MCG/KG/HR


   PRN Reason: Protocol


   Last Admin: 05/03/18 19:51 Dose:  1.29 mcg/kg/hr, 28.81 mls/hr


Norepinephrine Bitartrate 4 mg (/ Dextrose)  254 mls @ 9.52 mls/hr IV .Q24H JUAN LUIS

; 2.5 MCG/MIN


   PRN Reason: Protocol


   Last Titration: 05/03/18 05:17 Dose:  5 mcg/min, 19.05 mls/hr


Lorazepam (Ativan)  2 mg IVP Q6 PRN


   PRN Reason: Agitation


   Last Admin: 05/01/18 23:57 Dose:  2 mg


Metoclopramide HCl (Reglan)  10 mg IVP Q6 PRN


   PRN Reason: Nausea/Vomiting


   Last Admin: 04/20/18 08:49 Dose:  10 mg


Morphine Sulfate (Morphine)  6 mg IVP Q4H PRN


   PRN Reason: Pain scale 4-10


   Last Admin: 05/02/18 18:04 Dose:  6 mg


Thiamine HCl (Vitamin B1 Tab)  100 mg PO DAILY JUAN LUIS


   Last Admin: 04/27/18 08:09 Dose:  100 mg











Physical Exam





- Head Exam


Head Exam: ATRAUMATIC





- Eye Exam


Eye Exam: Scleral icterus





- ENT Exam


ENT Exam: Mucous Membranes Dry





- Respiratory Exam


Respiratory Exam: NORMAL BREATHING PATTERN





- Cardiovascular Exam


Cardiovascular Exam: +S1, +S2





- GI/Abdominal Exam


GI & Abdominal Exam: Normal Bowel Sounds





Results





- Vital Signs


Recent Vital Signs: 


 Last Vital Signs











Temp  97.6 F   05/03/18 19:26


 


Pulse  54 L  05/03/18 19:26


 


Resp  20   05/03/18 19:26


 


BP  147/81   05/03/18 19:26


 


Pulse Ox  100   05/03/18 19:26














- Labs


Result Diagrams: 


 05/03/18 12:10





 05/03/18 12:10


Labs: 


 Laboratory Results - last 24 hr











  05/02/18 05/03/18 05/03/18





  08:50 04:53 12:10


 


WBC    9.8


 


RBC    3.20 L


 


Hgb    9.3 L


 


Hct    28.2 L


 


MCV    88.4  D


 


MCH    29.2


 


MCHC    33.1


 


RDW    17.4 H


 


Plt Count    114 L D


 


PT   


 


INR   


 


pCO2   38 


 


pO2   82 


 


HCO3   26.7 


 


ABG pH   7.45 


 


ABG Total CO2   27.6 


 


ABG O2 Saturation   99.1 H 


 


ABG O2 Content   12.5 L 


 


ABG Base Excess   2.3 


 


ABG Hemoglobin   9.2 L 


 


ABG Carboxyhemoglobin   2.3 H 


 


POC ABG HHb (Measured)   0.9 


 


ABG Methemoglobin   1.1 


 


ABG O2 Capacity   12.6 L 


 


Anthony Test   Yes 


 


A-a O2 Difference   84.0 


 


Hgb O2 Saturation   95.7 


 


Vent Mode   A/c 


 


Mechanical Rate   500 


 


FiO2   30.0 


 


Tidal Volume   12 


 


PEEP   5 


 


Sodium   


 


Potassium   


 


Chloride   


 


Carbon Dioxide   


 


Anion Gap   


 


BUN   


 


Creatinine   


 


Est GFR ( Amer)   


 


Est GFR (Non-Af Amer)   


 


Random Glucose   


 


Calcium   


 


Ammonia   


 


Blood Type  O POSITIVE  


 


Antibody Screen  Negative  


 


Crossmatch  See Detail  


 


BBK History Checked  Patient has bt  














  05/03/18 05/03/18 05/03/18





  12:10 12:10 12:10


 


WBC   


 


RBC   


 


Hgb   


 


Hct   


 


MCV   


 


MCH   


 


MCHC   


 


RDW   


 


Plt Count   


 


PT  16.5 H  


 


INR  1.5 H  


 


pCO2   


 


pO2   


 


HCO3   


 


ABG pH   


 


ABG Total CO2   


 


ABG O2 Saturation   


 


ABG O2 Content   


 


ABG Base Excess   


 


ABG Hemoglobin   


 


ABG Carboxyhemoglobin   


 


POC ABG HHb (Measured)   


 


ABG Methemoglobin   


 


ABG O2 Capacity   


 


Anthony Test   


 


A-a O2 Difference   


 


Hgb O2 Saturation   


 


Vent Mode   


 


Mechanical Rate   


 


FiO2   


 


Tidal Volume   


 


PEEP   


 


Sodium   145 


 


Potassium   3.7 


 


Chloride   108 H 


 


Carbon Dioxide   26 


 


Anion Gap   15 


 


BUN   24 H 


 


Creatinine   1.0 


 


Est GFR ( Amer)   > 60 


 


Est GFR (Non-Af Amer)   > 60 


 


Random Glucose   111 H 


 


Calcium   7.6 L 


 


Ammonia    34  D


 


Blood Type   


 


Antibody Screen   


 


Crossmatch   


 


BBK History Checked   














Assessment & Plan


(1) Acute GI bleeding


Assessment and Plan: 


likely related to portal hypertension


endoscopy and bleeding scan have been unable to localize bleed but likely 

intermittent variceal bleeding


recommend cryopercipitate transfusion in AM as opposed to FFP to reverse 

coagulopathy as this will minimize volume (minimize hemostatic dilutional 

effect and minimize increase in portal pressure).


cryopercipitate dosing - 1 bag per 10 kg ~ 9-10 bags)


may also try trial of antifibrinolytic if bleeding persists


Status: Acute   





(2) Anemia


Assessment and Plan: 


secondary to acute GI blood loss


will check retic count, b12, folate, ferritin


agree with transfusion support








Status: Acute   





(3) Thrombocytopenia


Assessment and Plan: 


multifactorial


liver disease causing thrombopoietin dysregulation


splenic sequestration from portal hyptertension


dilutional effect from large volume transfusion


of note, uremia will cause platelet dysfunction


s/p platelet transfusion and DDAVP


Status: Acute   





(4) Coagulopathy


Assessment and Plan: 


liver disease


dilution from transfusions 


nutritional component


s/p FFP


cryopercipitate and vit k





Thank you for this interesting consult.


Status: Acute

## 2018-05-04 NOTE — CP.CCUPN
CCU Subjective





- Physician Review


Subjective (Free Text): 








New today AM: now bright red clots/ hematemesis. BP 91/47, HR 66 on Levophed 

and {recedex. Rectal tube draining liquid dark maroon fluid output. 








Other VS and I/Os reviewed.  Given Lasix in-between transfusions, only 500ml 

output. 








ROS:  No other pertinent negs or positives on 10+  system review obtainable 

from intubated and sedated patient. 











PMSFH:    All other Nursing and physician documentation reviewed to date; no 

new pertinent info noted relevant to current medical problems.








EXAM-


HEENT: no icterus, no gaze preference, pupils equal and reactive


NECK: No JVD, supple, carotids equal upstroke bilat/no bruits


CHEST: decreased BS bases, no wheezes audible


HEART:  regular tachy, distant, S1S2, no rubs.


ABD:  soft, ++ distention, no tympany, no palp tenderness, BS hypoactive.


EXT: No peripheral/ digital cyanosis, no calf tenderness or palpable cords, 

distal pulses intact and symmetrical. 


GENIT:  +scrotal and penile edema


NEURO:  no focal motor deficits, withdraws to deep pain.


SKIN:   no rashes,  warm and dry.











LABS: 





WBC= 9.2


HGB= 8.3, was 9.3 yesterday   


PLTs=  108K





INR= 1.4


PTT normal


 Fibrinogen 308











Qm=812


K= 3.8


UI=660


HCO3= 28


BUN/Cr= 21/1.0


HW=538





7.48/36/79





CXR: (my interp) mild decrease in R lung volume, otherwise no new focal 

infiltrates, ETT position OK above carole.








IMPRESSION / MAJOR PROBLEMS NOW:


1.  Recurrent UGIB, r/o gastritis, PUD, Variceal bleeding?


2.  Acute Anemia 2 blood loss; and mild Coagulopathy


3.  AKA on admission: suspect binge ETOH use after last hospital discharge. 


4.  ETOH Withdrawal / Delirium


5.  Azotemia / Dehydration








PLAN:


1. Discussed with GI; possible EGD today. Will keep intubated on MV for airway 

protraction during this procedure.


2. Additional Desmopressin.


3. Consider Tranexamic acid bolus, followed by maintenance IV infusion. 


4.  Surgical team has ordered a Nuclear GI Bleeding scan. CT Angio study was 

negative yesteerday. 


5.  See Orders. 




















CCU Objective





- Vital Signs / Intake & Output


Vital Signs (Last 4 hours): 


Vital Signs











  Pulse Resp BP Pulse Ox


 


 05/04/18 06:00  67  16  127/75  99


 


 05/04/18 05:00  70  16  123/70  100











Intake and Output (Last 8hrs): 


 Intake & Output











 05/03/18 05/04/18 05/04/18





 22:59 06:59 14:59


 


Intake Total 413 764 


 


Output Total 1116  


 


Balance -703 764 


 


Intake:   


 


   200 


 


  Intake, Piggyback  250 


 


  Blood Product 313 314 


 


Output:   


 


  Urine 1116  


 


    Urethral (Burger) 1116

## 2018-05-04 NOTE — CP.PCM.PN
Subjective





- Date & Time of Evaluation


Date of Evaluation: 05/04/18


Time of Evaluation: 07:00





- Subjective


Subjective: 





Patient seen and examined, remains intubated. Levophed at 2.5 mcg. Continues to 

have bloody stools. S/p 3u PRBc and 4FFP over past 24 hrs. Bleeding scan 

negative. 





Objective





- Vital Signs/Intake and Output


Vital Signs (last 24 hours): 


 











Temp Pulse Resp BP Pulse Ox


 


 99.0 F   61   12   111/71   100 


 


 05/04/18 00:00  05/04/18 02:00  05/04/18 02:00  05/04/18 02:00  05/04/18 02:00








Intake and Output: 


 











 05/04/18 05/04/18





 06:59 18:59


 


Intake Total 764 


 


Balance 764 














- Medications


Medications: 


 Current Medications





Acetaminophen (Tylenol 325mg/10.15ml Ud)  325 mg NG Q6 PRN


   PRN Reason: Temperature


   Last Admin: 04/29/18 21:37 Dose:  325 mg


Chlordiazepoxide (Librium)  50 mg PO Q8 JUAN LUIS


   Last Admin: 04/27/18 08:49 Dose:  50 mg


Folic Acid (Folic Acid)  1 mg PO DAILY JUAN LUIS


   Last Admin: 04/27/18 08:09 Dose:  1 mg


Ciprofloxacin (Cipro 400mg/200ml Dsw)  400 mg in 200 mls @ 200 mls/hr IVPB Q12 

JUAN LUIS


   PRN Reason: Protocol


   Last Admin: 05/03/18 21:24 Dose:  200 mls/hr


Vancomycin HCl 1 gm/ Sodium (Chloride)  250 mls @ 125 mls/hr IVPB DAILY JUAN LUIS


   PRN Reason: Protocol


   Last Admin: 05/03/18 12:00 Dose:  125 mls/hr


Pantoprazole Sodium 40 mg/ (Sodium Chloride)  100 mls @ 20 mls/hr IVPB Q5H JUAN LUIS


   PRN Reason: 8 MG/HR


   Last Admin: 05/04/18 04:58 Dose:  20 mls/hr


Octreotide Acetate 1,250 mcg/ (Sodium Chloride)  252.5 mls @ 10.1 mls/hr IV 

.Q24H JUAN LUIS


   PRN Reason: 50 MCG/HR


   Last Admin: 05/03/18 06:26 Dose:  10.1 mls/hr


Dexmedetomidine HCl 400 mcg/ (Sodium Chloride)  100 mls @ 11.16 mls/hr IV 

.Q8H58M JUAN LUIS; 0.5 MCG/KG/HR


   PRN Reason: Protocol


   Last Titration: 05/04/18 06:31 Dose:  1.49 mcg/kg/hr, 33.28 mls/hr


Lorazepam (Ativan)  2 mg IVP Q6 PRN


   PRN Reason: Agitation


   Last Admin: 05/01/18 23:57 Dose:  2 mg


Metoclopramide HCl (Reglan)  10 mg IVP Q6 PRN


   PRN Reason: Nausea/Vomiting


   Last Admin: 04/20/18 08:49 Dose:  10 mg


Morphine Sulfate (Morphine)  6 mg IVP Q4H PRN


   PRN Reason: Pain scale 4-10


   Last Admin: 05/02/18 18:04 Dose:  6 mg


Thiamine HCl (Vitamin B1 Tab)  100 mg PO DAILY JUAN LUIS


   Last Admin: 04/27/18 08:09 Dose:  100 mg











- Labs


Labs: 


 





 05/04/18 04:20 





 05/04/18 04:20 





 











PT  15.7 Seconds (9.8-13.1)  H  05/04/18  04:20    


 


INR  1.4  (0.9-1.2)  H  05/04/18  04:20    


 


APTT  36.4 Seconds (25.6-37.1)   05/04/18  04:20    














- Constitutional


Appears: Chronically Ill





- ENT Exam


ENT Exam: Mucous Membranes Moist





- Respiratory Exam


Respiratory Exam: NORMAL BREATHING PATTERN


Additional comments: 





intubated





- Cardiovascular Exam


Cardiovascular Exam: +S1, +S2





- GI/Abdominal Exam


GI & Abdominal Exam: Distended, Soft





- Neurological Exam


Neurological Exam: absent: Oriented x3





- Skin


Skin Exam: Normal Color, Warm





Assessment and Plan





- Assessment and Plan (Free Text)


Assessment: 





37M with hx of alcohol abuse, liver cirrhosis, presents with GI bleed requiring 

massive transfusion


CTA - negative for extravasation





Plan: 





- Intubated


- PTX drip


- monitor H/H


- Transfuse as necessary


- Hem/Onc recs appreciated


- Further recs as per Dr. Arabella Hernandez PGY2

## 2018-05-04 NOTE — RAD
HISTORY:

Intubated.  



COMPARISON:

Multiple serial examinations preceding the most recent study: May 3, 

2018.  



FINDINGS:



LUNGS:

No active pulmonary disease.



PLEURA:

No significant pleural effusion identified, no pneumothorax apparent.



CARDIOVASCULAR:

Normal.



OSSEOUS STRUCTURES:

No significant abnormalities.



VISUALIZED UPPER ABDOMEN:

Normal.



OTHER FINDINGS:

Stable position of endotracheal tube.



IMPRESSION:

No active pulmonary disease. Stable and satisfactory position 

endotracheal tube.

## 2018-05-04 NOTE — CP.PCM.PN
Subjective





- Date & Time of Evaluation


Date of Evaluation: 05/04/18


Time of Evaluation: 07:59





- Subjective


Subjective: 





intubated, sedated


BRB emesis, clots, nearly 900 ml


bright maroon 200 ml via flexiseal


FOR EGD today


HD stable, on levophed


TXA today








Objective





- Vital Signs/Intake and Output


Vital Signs (last 24 hours): 


 











Temp Pulse Resp BP Pulse Ox


 


 99.0 F   61   12   111/71   100 


 


 05/04/18 00:00  05/04/18 02:00  05/04/18 02:00  05/04/18 02:00  05/04/18 02:00








Intake and Output: 


 











 05/04/18 05/04/18





 06:59 18:59


 


Intake Total 764 


 


Balance 764 














- Medications


Medications: 


 Current Medications





Acetaminophen (Tylenol 325mg/10.15ml Ud)  325 mg NG Q6 PRN


   PRN Reason: Temperature


   Last Admin: 04/29/18 21:37 Dose:  325 mg


Chlordiazepoxide (Librium)  50 mg PO Q8 JUAN LUIS


   Last Admin: 04/27/18 08:49 Dose:  50 mg


Folic Acid (Folic Acid)  1 mg PO DAILY JUAN LUIS


   Last Admin: 04/27/18 08:09 Dose:  1 mg


Ciprofloxacin (Cipro 400mg/200ml Dsw)  400 mg in 200 mls @ 200 mls/hr IVPB Q12 

JUAN LUIS


   PRN Reason: Protocol


   Last Admin: 05/03/18 21:24 Dose:  200 mls/hr


Vancomycin HCl 1 gm/ Sodium (Chloride)  250 mls @ 125 mls/hr IVPB DAILY JUAN LUIS


   PRN Reason: Protocol


   Last Admin: 05/03/18 12:00 Dose:  125 mls/hr


Pantoprazole Sodium 40 mg/ (Sodium Chloride)  100 mls @ 20 mls/hr IVPB Q5H JUAN LUIS


   PRN Reason: 8 MG/HR


   Last Admin: 05/04/18 04:58 Dose:  20 mls/hr


Octreotide Acetate 1,250 mcg/ (Sodium Chloride)  252.5 mls @ 10.1 mls/hr IV 

.Q24H JUAN LUIS


   PRN Reason: 50 MCG/HR


   Last Admin: 05/03/18 06:26 Dose:  10.1 mls/hr


Dexmedetomidine HCl 400 mcg/ (Sodium Chloride)  100 mls @ 11.16 mls/hr IV 

.Q8H58M JUAN LUIS; 0.5 MCG/KG/HR


   PRN Reason: Protocol


   Last Titration: 05/04/18 06:31 Dose:  1.49 mcg/kg/hr, 33.28 mls/hr


Desmopressin Acetate 20 mcg/ (Sodium Chloride)  55 mls @ 110 mls/hr IV ONCE ONE


   Stop: 05/04/18 07:59


Azithromycin 250 mg/ Sodium (Chloride)  250 mls @ 250 mls/hr IVPB ONCE ONE


   PRN Reason: Protocol


   Stop: 05/04/18 09:59


Lorazepam (Ativan)  2 mg IVP Q6 PRN


   PRN Reason: Agitation


   Last Admin: 05/01/18 23:57 Dose:  2 mg


Metoclopramide HCl (Reglan)  10 mg IVP Q6 PRN


   PRN Reason: Nausea/Vomiting


   Last Admin: 04/20/18 08:49 Dose:  10 mg


Morphine Sulfate (Morphine)  6 mg IVP Q4H PRN


   PRN Reason: Pain scale 4-10


   Last Admin: 05/02/18 18:04 Dose:  6 mg


Thiamine HCl (Vitamin B1 Tab)  100 mg PO DAILY JUAN LUIS


   Last Admin: 04/27/18 08:09 Dose:  100 mg











- Labs


Labs: 


 





 05/04/18 04:20 





 05/04/18 04:20 





 











PT  15.7 Seconds (9.8-13.1)  H  05/04/18  04:20    


 


INR  1.4  (0.9-1.2)  H  05/04/18  04:20    


 


APTT  36.4 Seconds (25.6-37.1)   05/04/18  04:20    














- Constitutional


Appears: Non-toxic, No Acute Distress





- Eye Exam


Eye Exam: EOMI, Normal appearance, PERRL





- ENT Exam


ENT Exam: Mucous Membranes Moist, Normal Oropharynx





- Neck Exam


Neck Exam: Normal Inspection.  absent: Lymphadenopathy





- Respiratory Exam


Respiratory Exam: Clear to Ausculation Bilateral, NORMAL BREATHING PATTERN





- Cardiovascular Exam


Cardiovascular Exam: RRR, +S1, +S2





- GI/Abdominal Exam


GI & Abdominal Exam: Soft, Normal Bowel Sounds





- Extremities Exam


Extremities Exam: absent: Joint Swelling, Pedal Edema





- Back Exam


Back Exam: NORMAL INSPECTION.  absent: rash noted





- Neurological Exam


Neurological Exam: Reflexes Normal


Additional comments: 





otherwise difficult to assess 2/2 sedation





- Psychiatric Exam


Additional comments: 





unable to assess sedated





- Skin


Skin Exam: Dry, Warm





Assessment and Plan





- Assessment and Plan (Free Text)


Plan: 








38 y/o male with  known history  of Alcoholism and recent GI bleed, was brought 

in because of hematemesis and alteration in   mental status.


He was recently discharged from this hospital  after an episode of GI bleed .  

EGD done on 4/16 did not show any active bleed.  The patient was discharged 

home and again started drinking.


On day of admission, he  had hematemesis and was noted to be confused and 

agitated.  HGB=6.1.He was transfused with PRBC and FFP, intubated for airway 

protection.  Rpt EGD showed no active bleeding. Since Hgb continued  to drop 

bleeding scan was performed that showed no active source of bleeding .He was 

started on Levophed drip 4/27 for hypotension and underwent CTA of abdomen by 

IR that showed no active bleeding.


Transfused total of 25  unit PRBC and 17 unit FFP and 3 Platelets 


At present intubated on MV PRVC / AC mode , sedated on Precedex drip ,  On 

Levophed drip, Protonix and Octreotide drip


Rectal tube in place  with dark bloody output  , OGT d/c as it was  noted to be 

irritating the gastric mucosa.( seen on  rpt EGD 5/1 )


5/3: Rpt CT Angio of abd : no active Bleeding


5/4: Rpt EGD, continues to bleed, bright red emesis, bright maroon via flexi








1.Acute blood loss anemia sec to GI Bleed prob secondary  to Bleeding  

Esophageal Varices


Acute   


s/p total 25 units PRBC and 21 unit FFP transfusion and 3 platelets


Melanotic liquid  stool  per rectal tube 


EGD x2 and CTA abdomen  x2 failed to show any source of bleeding 


Rpt EGD done 5/1 : noted blood at area where Gastric tube was ( ? traumatizing 

mucosa)


received DDAVP, Vitamin K and at present on Protonix and Octreotide drip


Calcium Gluconate also given


On Levophed drip 2.5 mcg


GI , IR and surgery on consult 


Continue ICU monitoring .


Discussed case with Dr Owen- rec Hematology consult - Dr Killian called 


cont IV Cipro and Vanco


Monitor H& H and transfuse as needed


keep NPO 








2. Pneumonia 


CXR : showed resolution of right apex residual consolidation 


afebrile 


procalcitonin elevated


cont IV Cipro and  IV Vanco





3. Intubated for Airway Protection


on vent PRVC AC mode 12/500/5/30 % 


Continue respiratory toilet 


On Cipro and Vanco IV 


Will attempt to wean off the vent 


Pulmonary on  consult 





4. Alcohol withdrawal, hx of Alcohol Abuse 


Acute  


on Precedex drip for sedation, Ativan 


IVF hydration





5. Hepatic Encephalopathy


Ammonia level was elevated


received Lactulose 


CT of the head : no bleed








6. Coagulopathy sec to Liver dis from alcoholism


received Vit K, DDAVP


s/p 21 unit FFP transfusion 


INR 1.5 








7.  Cirrhosis with ascites , coagulopathy, thrombocytopenia sec to ETOH abuse 


s/p abdominal paracentesis by IR  4/30  with removal 900 ml ascitic fluid 





8.DVT proph


SCD


no anticoag sec to GIB and coagulopathy & low Platelet

## 2018-05-04 NOTE — CP.PCM.PN
Subjective





- Date & Time of Evaluation


Date of Evaluation: 05/02/18


Time of Evaluation: 07:00





- Subjective


Subjective: 





Patient remains intubated. As per nursing patient had about 700cc's of melena 

over night. Scheduled for CT Angio of abdomen this morning. 











Objective





- Vital Signs/Intake and Output


Vital Signs (last 24 hours): 


 











Temp Pulse Resp BP Pulse Ox


 


 99.0 F   61   12   111/71   100 


 


 05/04/18 00:00  05/04/18 02:00  05/04/18 02:00  05/04/18 02:00  05/04/18 02:00








Intake and Output: 


 











 05/04/18 05/04/18





 06:59 18:59


 


Intake Total 764 


 


Balance 764 














- Medications


Medications: 


 Current Medications





Acetaminophen (Tylenol 325mg/10.15ml Ud)  325 mg NG Q6 PRN


   PRN Reason: Temperature


   Last Admin: 04/29/18 21:37 Dose:  325 mg


Chlordiazepoxide (Librium)  50 mg PO Q8 JUAN LUIS


   Last Admin: 04/27/18 08:49 Dose:  50 mg


Desmopressin Acetate (Ddavp)  20 mcg IV ONCE ONE


   Stop: 05/04/18 07:18


Folic Acid (Folic Acid)  1 mg PO DAILY JUAN LUIS


   Last Admin: 04/27/18 08:09 Dose:  1 mg


Ciprofloxacin (Cipro 400mg/200ml Dsw)  400 mg in 200 mls @ 200 mls/hr IVPB Q12 

JUAN LUIS


   PRN Reason: Protocol


   Last Admin: 05/03/18 21:24 Dose:  200 mls/hr


Vancomycin HCl 1 gm/ Sodium (Chloride)  250 mls @ 125 mls/hr IVPB DAILY JUAN LUIS


   PRN Reason: Protocol


   Last Admin: 05/03/18 12:00 Dose:  125 mls/hr


Pantoprazole Sodium 40 mg/ (Sodium Chloride)  100 mls @ 20 mls/hr IVPB Q5H JUAN LUIS


   PRN Reason: 8 MG/HR


   Last Admin: 05/04/18 04:58 Dose:  20 mls/hr


Octreotide Acetate 1,250 mcg/ (Sodium Chloride)  252.5 mls @ 10.1 mls/hr IV 

.Q24H JUAN LUIS


   PRN Reason: 50 MCG/HR


   Last Admin: 05/03/18 06:26 Dose:  10.1 mls/hr


Dexmedetomidine HCl 400 mcg/ (Sodium Chloride)  100 mls @ 11.16 mls/hr IV 

.Q8H58M JUAN LUIS; 0.5 MCG/KG/HR


   PRN Reason: Protocol


   Last Titration: 05/04/18 06:31 Dose:  1.49 mcg/kg/hr, 33.28 mls/hr


Lorazepam (Ativan)  2 mg IVP Q6 PRN


   PRN Reason: Agitation


   Last Admin: 05/01/18 23:57 Dose:  2 mg


Metoclopramide HCl (Reglan)  10 mg IVP Q6 PRN


   PRN Reason: Nausea/Vomiting


   Last Admin: 04/20/18 08:49 Dose:  10 mg


Morphine Sulfate (Morphine)  6 mg IVP Q4H PRN


   PRN Reason: Pain scale 4-10


   Last Admin: 05/02/18 18:04 Dose:  6 mg


Thiamine HCl (Vitamin B1 Tab)  100 mg PO DAILY JUAN LUIS


   Last Admin: 04/27/18 08:09 Dose:  100 mg











- Labs


Labs: 


 





 05/04/18 04:20 





 05/04/18 04:20 





 











PT  15.7 Seconds (9.8-13.1)  H  05/04/18  04:20    


 


INR  1.4  (0.9-1.2)  H  05/04/18  04:20    


 


APTT  36.4 Seconds (25.6-37.1)   05/04/18  04:20    














- Constitutional


Appears: Chronically Ill





- ENT Exam


ENT Exam: Mucous Membranes Moist





- Respiratory Exam


Additional comments: 





intubated





- Cardiovascular Exam


Cardiovascular Exam: +S1, +S2





- GI/Abdominal Exam


GI & Abdominal Exam: Distended, Soft





- Skin


Skin Exam: Normal Color, Warm





Assessment and Plan





- Assessment and Plan (Free Text)


Assessment: 





37M with hx of alcohol abuse, liver cirrhosis, presents with GI bleed requiring 

massive transfusion





Plan: 





- C/w PTX drip


- monitor H/H


- Transfuse as necessary


- F/u CTA Abd/Pel


- Further recs as per Dr. Arabella Hernandez PGY2

## 2018-05-05 NOTE — CP.PCM.PN
Subjective





- Date & Time of Evaluation


Date of Evaluation: 05/05/18


Time of Evaluation: 12:45





- Subjective


Subjective: 





Vented, family at bedside


s/p EGD with esophageal varices banding


developed fever overnight during transfusion





Objective





- Vital Signs/Intake and Output


Vital Signs (last 24 hours): 


 











Temp Pulse Resp BP Pulse Ox


 


 99.8 F H  67   13   116/72   100 


 


 05/05/18 21:00  05/05/18 18:00  05/05/18 18:00  05/05/18 20:20  05/05/18 18:00








Intake and Output: 


 











 05/05/18 05/06/18





 18:59 06:59


 


Intake Total 1252 


 


Output Total  500


 


Balance 1252 -500














- Medications


Medications: 


 Current Medications





Acetaminophen (Tylenol 325mg/10.15ml Ud)  325 mg NG Q6 PRN


   PRN Reason: Temperature


   Last Admin: 04/29/18 21:37 Dose:  325 mg


Chlordiazepoxide (Librium)  50 mg PO Q8 JUAN LUIS


   Last Admin: 04/27/18 08:49 Dose:  50 mg


Folic Acid (Folic Acid)  1 mg PO DAILY JUAN LUIS


   Last Admin: 04/27/18 08:09 Dose:  1 mg


Furosemide (Lasix)  40 mg IVP BID [facility]


   Last Admin: 05/05/18 20:20 Dose:  40 mg


Pantoprazole Sodium 40 mg/ (Sodium Chloride)  100 mls @ 20 mls/hr IVPB Q5H JUAN LUIS


   PRN Reason: 8 MG/HR


   Last Admin: 05/05/18 20:16 Dose:  20 mls/hr


Octreotide Acetate 1,250 mcg/ (Sodium Chloride)  252.5 mls @ 10.1 mls/hr IV 

.Q24H JUAN LUIS


   PRN Reason: 50 MCG/HR


   Last Admin: 05/05/18 14:02 Dose:  10.1 mls/hr


Dexmedetomidine HCl 400 mcg/ (Sodium Chloride)  100 mls @ 11.16 mls/hr IV 

.Q8H58M JUAN LUIS; 0.5 MCG/KG/HR


   PRN Reason: Protocol


   Last Admin: 05/05/18 14:06 Dose:  1.49 mcg/kg/hr, 33.28 mls/hr


Tranexamic Acid 3,000 mg/ (Sodium Chloride)  280 mls @ 10.417 mls/hr IV .Q24H 

JUAN LUIS


   Last Admin: 05/05/18 20:17 Dose:  10.417 mls/hr


Metoclopramide HCl (Reglan)  10 mg IVP Q6 PRN


   PRN Reason: Nausea/Vomiting


   Last Admin: 04/20/18 08:49 Dose:  10 mg


Thiamine HCl (Vitamin B1 Tab)  100 mg PO DAILY JUAN LUIS


   Last Admin: 04/27/18 08:09 Dose:  100 mg











- Labs


Labs: 


 





 05/05/18 04:40 





 05/05/18 04:40 





 











PT  15.7 Seconds (9.8-13.1)  H  05/04/18  04:20    


 


INR  1.4  (0.9-1.2)  H  05/04/18  04:20    


 


APTT  36.4 Seconds (25.6-37.1)   05/04/18  04:20    














- Head Exam


Head Exam: ATRAUMATIC





- Eye Exam


Eye Exam: Normal appearance





- ENT Exam


ENT Exam: Mucous Membranes Dry





- Respiratory Exam


Respiratory Exam: NORMAL BREATHING PATTERN





- Cardiovascular Exam


Cardiovascular Exam: +S1, +S2





- GI/Abdominal Exam


GI & Abdominal Exam: Normal Bowel Sounds





Assessment and Plan


(1) Acute GI bleeding


Assessment & Plan: 


s/p esophageal varices banding


transfusion support


octreotide


antifibrinolysis


Status: Acute   





(2) Anemia


Assessment & Plan: 


secondary to GI bleeding


transfusion support


will start IV iron given low iron stores


Status: Acute   





(3) Thrombocytopenia


Assessment & Plan: 


liver disease


dilutional effect from transfusion


Status: Acute   





(4) Coagulopathy


Assessment & Plan: 


liver disease


s/p vit k and DDAVP


FFP


Status: Acute

## 2018-05-05 NOTE — CP.CCUPN
CCU Subjective





- Physician Review


Events Since Last Encounter (Free Text): 





05/05/18 18:15





sedated, intubated, has received multiple transfusions, no active UGIB at the 

[facility], 





CCU Objective





- Vital Signs / Intake & Output


Vital Signs (Last 4 hours): 


Vital Signs











  Temp Pulse Resp BP Pulse Ox


 


 05/05/18 17:00  100.2 F H  68  14  130/85  99


 


 05/05/18 16:00   69  14  133/79  99


 


 05/05/18 15:00  101.6 F H  71  14  122/73  100











Intake and Output (Last 8hrs): 


 Intake & Output











 05/05/18 05/05/18 05/05/18





 06:59 14:59 22:59


 


Intake Total 708 332 83


 


Output Total 350  


 


Balance 358 332 83


 


Weight  210 lb 


 


Intake:   


 


   332 83


 


Output:   


 


  Urine 350  


 


    Urethral (Burger) 350  














- Physical Exam


Narrative Physical Exam (Free Text): 





05/05/18 18:16


P/E


neck: No JVD


Lungs : decreased breath sounds in bases


Abdomen : distended, but soft


Ext: +2 edema


heart: No gallop


Neuro: sedated and intubated.


Head: Positive for: Atraumatic, Normocephalic


Pupils: Positive for: PERRL.  Negative for: Sluggish, Non-Reactive, Pinpoint


Extroacular Muscles: Positive for: EOMI


Conjunctiva: Negative for: Injected, Icteric, Other


Mouth: Positive for: Moist Mucous Membranes, Dry


Pharnyx: Positive for: Normal


Neck: Positive for: Normal Range of Motion


Respiratory/Chest: Positive for: Clear to Auscultation, Good Air Exchange.  

Negative for: Respiratory Distress, Accessory Muscle Use, Wheezes, Rhonchi


Cardiovascular: Positive for: Regular Rate and Rhythm, Normal S1, S2, Peripheal 

Pulses Present.  Negative for: Murmurs, Irregular Rhythm, Tachycardic, 

Bradycardic


Abdomen: Positive for: Distention, Normal Bowel Sounds.  Negative for: 

Tenderness


Upper Extremity: Positive for: Normal Inspection.  Negative for: Edema


Lower Extremity: Positive for: Normal Inspection.  Negative for: Edema


Neurological: Positive for: Speech Normal


Psychiatric: Positive for: Alert





- Medications


Active Medications: 


Active Medications











Generic Name Dose Route Start Last Admin





  Trade Name Freq  PRN Reason Stop Dose Admin


 


Acetaminophen  325 mg  04/27/18 10:24  04/29/18 21:37





  Tylenol 325mg/10.15ml Ud  NG   325 mg





  Q6 PRN   Administration





  Temperature   


 


Chlordiazepoxide  50 mg  04/26/18 17:00  04/27/18 08:49





  Librium  PO   50 mg





  Q8 JUAN LUIS   Administration


 


Folic Acid  1 mg  04/20/18 11:15  04/27/18 08:09





  Folic Acid  PO   1 mg





  DAILY JUAN LUIS   Administration


 


Pantoprazole Sodium 40 mg/  100 mls @ 20 mls/hr  04/27/18 12:15  05/05/18 14:11





  Sodium Chloride  IVPB   20 mls/hr





  Q5H JUAN LUIS   Administration





  8 MG/HR   


 


Octreotide Acetate 1,250 mcg/  252.5 mls @ 10.1 mls/hr  04/27/18 12:00  05/05/ 18 14:02





  Sodium Chloride  IV   10.1 mls/hr





  .Q24H JUAN LUIS   Administration





  50 MCG/HR   


 


Dexmedetomidine HCl 400 mcg/  100 mls @ 11.16 mls/hr  05/02/18 14:00  05/05/18 

14:06





  Sodium Chloride  IV   1.49 mcg/kg/hr





  .Q8H58M JUAN LUIS   33.28 mls/hr





  Protocol   Administration





  0.5 MCG/KG/HR   


 


Metoclopramide HCl  10 mg  04/19/18 22:52  04/20/18 08:49





  Reglan  IVP   10 mg





  Q6 PRN   Administration





  Nausea/Vomiting   


 


Thiamine HCl  100 mg  04/20/18 11:15  04/27/18 08:09





  Vitamin B1 Tab  PO   100 mg





  DAILY JUAN LUIS   Administration














- Patient Studies


Lab Studies: 


 Microbiology Studies











 05/05/18 14:00 Gram Stain - Final





 Blood 








 Lab Studies











  05/05/18 05/05/18 05/05/18 Range/Units





  08:13 05:12 04:40 


 


WBC     (4.8-10.8)  K/uL


 


RBC     (4.40-5.90)  Mil/uL


 


Hgb     (12.0-18.0)  g/dL


 


Hct     (35.0-51.0)  %


 


MCV     (80.0-94.0)  fl


 


MCH     (27.0-31.0)  pg


 


MCHC     (33.0-37.0)  g/dL


 


RDW     (11.5-14.5)  %


 


Plt Count     (130-400)  K/uL


 


MPV     (7.2-11.7)  fl


 


Neut % (Auto)     (50.0-75.0)  %


 


Lymph % (Auto)     (20.0-40.0)  %


 


Mono % (Auto)     (0.0-10.0)  %


 


Eos % (Auto)     (0.0-4.0)  %


 


Baso % (Auto)     (0.0-2.0)  %


 


Neut # (Auto)     (1.8-7.0)  K/uL


 


Lymph # (Auto)     (1.0-4.3)  K/uL


 


Mono # (Auto)     (0.0-0.8)  K/uL


 


Eos # (Auto)     (0.0-0.7)  K/uL


 


Baso # (Auto)     (0.0-0.2)  K/uL


 


pCO2   34 L   (35-45)  mm/Hg


 


pO2   78 L   ()  mm/Hg


 


HCO3   27.9   (21-28)  mmol/L


 


ABG pH   7.51 H   (7.35-7.45)  


 


ABG Total CO2   28.1 H   (22-28)  mmol/L


 


ABG O2 Saturation   100.6 H   (95-98)  %


 


ABG O2 Content   9.5 L   (15-23)  ML/dL


 


ABG Base Excess   3.8 H   (-2.0-3.0)  mmol/L


 


ABG Hemoglobin   6.9 L   (11.7-17.4)  g/dL


 


ABG Carboxyhemoglobin   2.8 H   (0.5-1.5)  %


 


POC ABG HHb (Measured)   -0.6 L   (0.0-5.0)  %


 


ABG Methemoglobin   1.1   (0.0-3.0)  %


 


ABG O2 Capacity   9.4 L   (16-24)  mL/dL


 


Anthony Test   Yes   


 


A-a O2 Difference   93.0   mm/Hg


 


Hgb O2 Saturation   96.8   (95.0-98.0)  %


 


Vent Mode   A/c   


 


Mechanical Rate   12   


 


FiO2   30.0   %


 


Tidal Volume   500   


 


PEEP   5   


 


Sodium    144  (132-148)  mmol/l


 


Potassium    3.6  (3.6-5.0)  MMOL/L


 


Chloride    111 H  ()  mmol/L


 


Carbon Dioxide    27  (22-30)  mmol/L


 


Anion Gap    10  (10-20)  


 


BUN    20  (9-20)  mg/dl


 


Creatinine    1.1  (0.8-1.5)  mg/dl


 


Est GFR (African Amer)    > 60  


 


Est GFR (Non-Af Amer)    > 60  


 


Random Glucose    112 H  ()  mg/dL


 


Calcium    7.3 L  (8.4-10.2)  mg/dL


 


Total Bilirubin    2.0 H  (0.2-1.3)  mg/dl


 


AST    85 H  (17-59)  U/L


 


ALT    50  (21-72)  U/L


 


Alkaline Phosphatase    88  ()  U/L


 


Total Protein    5.4 L  (6.3-8.2)  G/DL


 


Albumin    2.2 L  (3.5-5.0)  g/dL


 


Globulin    3.2  (2.2-3.9)  gm/dL


 


Albumin/Globulin Ratio    0.7 L  (1.0-2.1)  


 


Blood Type  O POSITIVE    


 


Antibody Screen  Negative    


 


Crossmatch  See Detail    


 


Reaction Clerical Check     (NO DISCREPA)  


 


Pre-Trans Blood Type     


 


Pre-Trans Bld Appearanc     (NO HEMOLYSI)  


 


Pre-Trans ELVIA     (NEGATIVE)  


 


Post-Trans Blood Type     


 


Post-Trans Spec Appear     (NO HEMOLYSI)  


 


Post-Trans ELVIA     (NEGATIVE)  


 


BBK History Checked  Patient has bt    














  05/05/18 05/05/18 05/04/18 Range/Units





  04:40 00:00 21:01 


 


WBC  10.2  9.3   (4.8-10.8)  K/uL


 


RBC  2.32 L  2.22 L   (4.40-5.90)  Mil/uL


 


Hgb  6.8 L  6.4 L*   (12.0-18.0)  g/dL


 


Hct  20.4 L  19.6 L   (35.0-51.0)  %


 


MCV  88.3  88.5   (80.0-94.0)  fl


 


MCH  29.2  28.8   (27.0-31.0)  pg


 


MCHC  33.1  32.5 L   (33.0-37.0)  g/dL


 


RDW  18.1 H  18.0 H   (11.5-14.5)  %


 


Plt Count  101 L  92 L   (130-400)  K/uL


 


MPV  10.7    (7.2-11.7)  fl


 


Neut % (Auto)  60.8    (50.0-75.0)  %


 


Lymph % (Auto)  28.3    (20.0-40.0)  %


 


Mono % (Auto)  8.3    (0.0-10.0)  %


 


Eos % (Auto)  1.3    (0.0-4.0)  %


 


Baso % (Auto)  1.3    (0.0-2.0)  %


 


Neut # (Auto)  6.2    (1.8-7.0)  K/uL


 


Lymph # (Auto)  2.9    (1.0-4.3)  K/uL


 


Mono # (Auto)  0.8    (0.0-0.8)  K/uL


 


Eos # (Auto)  0.1    (0.0-0.7)  K/uL


 


Baso # (Auto)  0.1    (0.0-0.2)  K/uL


 


pCO2     (35-45)  mm/Hg


 


pO2     ()  mm/Hg


 


HCO3     (21-28)  mmol/L


 


ABG pH     (7.35-7.45)  


 


ABG Total CO2     (22-28)  mmol/L


 


ABG O2 Saturation     (95-98)  %


 


ABG O2 Content     (15-23)  ML/dL


 


ABG Base Excess     (-2.0-3.0)  mmol/L


 


ABG Hemoglobin     (11.7-17.4)  g/dL


 


ABG Carboxyhemoglobin     (0.5-1.5)  %


 


POC ABG HHb (Measured)     (0.0-5.0)  %


 


ABG Methemoglobin     (0.0-3.0)  %


 


ABG O2 Capacity     (16-24)  mL/dL


 


Anthony Test     


 


A-a O2 Difference     mm/Hg


 


Hgb O2 Saturation     (95.0-98.0)  %


 


Vent Mode     


 


Mechanical Rate     


 


FiO2     %


 


Tidal Volume     


 


PEEP     


 


Sodium     (132-148)  mmol/l


 


Potassium     (3.6-5.0)  MMOL/L


 


Chloride     ()  mmol/L


 


Carbon Dioxide     (22-30)  mmol/L


 


Anion Gap     (10-20)  


 


BUN     (9-20)  mg/dl


 


Creatinine     (0.8-1.5)  mg/dl


 


Est GFR (African Amer)     


 


Est GFR (Non-Af Amer)     


 


Random Glucose     ()  mg/dL


 


Calcium     (8.4-10.2)  mg/dL


 


Total Bilirubin     (0.2-1.3)  mg/dl


 


AST     (17-59)  U/L


 


ALT     (21-72)  U/L


 


Alkaline Phosphatase     ()  U/L


 


Total Protein     (6.3-8.2)  G/DL


 


Albumin     (3.5-5.0)  g/dL


 


Globulin     (2.2-3.9)  gm/dL


 


Albumin/Globulin Ratio     (1.0-2.1)  


 


Blood Type     


 


Antibody Screen     


 


Crossmatch     


 


Reaction Clerical Check    No discrepancy  (NO DISCREPA)  


 


Pre-Trans Blood Type    O POSITIVE  


 


Pre-Trans Bld Appearanc    No hemolysis  (NO HEMOLYSI)  


 


Pre-Trans ELVIA    Negative  (NEGATIVE)  


 


Post-Trans Blood Type    O POSITIVE  


 


Post-Trans Spec Appear    No hemolysis  (NO HEMOLYSI)  


 


Post-Trans ELVIA    Negative  (NEGATIVE)  


 


BBK History Checked     














  05/04/18 05/02/18 Range/Units





  20:53 08:50 


 


WBC  11.5 H   (4.8-10.8)  K/uL


 


RBC  2.49 L   (4.40-5.90)  Mil/uL


 


Hgb  7.1 L   (12.0-18.0)  g/dL


 


Hct  21.8 L   (35.0-51.0)  %


 


MCV  87.6  D   (80.0-94.0)  fl


 


MCH  28.6   (27.0-31.0)  pg


 


MCHC  32.7 L   (33.0-37.0)  g/dL


 


RDW  17.9 H   (11.5-14.5)  %


 


Plt Count  103 L   (130-400)  K/uL


 


MPV    (7.2-11.7)  fl


 


Neut % (Auto)    (50.0-75.0)  %


 


Lymph % (Auto)    (20.0-40.0)  %


 


Mono % (Auto)    (0.0-10.0)  %


 


Eos % (Auto)    (0.0-4.0)  %


 


Baso % (Auto)    (0.0-2.0)  %


 


Neut # (Auto)    (1.8-7.0)  K/uL


 


Lymph # (Auto)    (1.0-4.3)  K/uL


 


Mono # (Auto)    (0.0-0.8)  K/uL


 


Eos # (Auto)    (0.0-0.7)  K/uL


 


Baso # (Auto)    (0.0-0.2)  K/uL


 


pCO2    (35-45)  mm/Hg


 


pO2    ()  mm/Hg


 


HCO3    (21-28)  mmol/L


 


ABG pH    (7.35-7.45)  


 


ABG Total CO2    (22-28)  mmol/L


 


ABG O2 Saturation    (95-98)  %


 


ABG O2 Content    (15-23)  ML/dL


 


ABG Base Excess    (-2.0-3.0)  mmol/L


 


ABG Hemoglobin    (11.7-17.4)  g/dL


 


ABG Carboxyhemoglobin    (0.5-1.5)  %


 


POC ABG HHb (Measured)    (0.0-5.0)  %


 


ABG Methemoglobin    (0.0-3.0)  %


 


ABG O2 Capacity    (16-24)  mL/dL


 


Anthony Test    


 


A-a O2 Difference    mm/Hg


 


Hgb O2 Saturation    (95.0-98.0)  %


 


Vent Mode    


 


Mechanical Rate    


 


FiO2    %


 


Tidal Volume    


 


PEEP    


 


Sodium    (132-148)  mmol/l


 


Potassium    (3.6-5.0)  MMOL/L


 


Chloride    ()  mmol/L


 


Carbon Dioxide    (22-30)  mmol/L


 


Anion Gap    (10-20)  


 


BUN    (9-20)  mg/dl


 


Creatinine    (0.8-1.5)  mg/dl


 


Est GFR (African Amer)    


 


Est GFR (Non-Af Amer)    


 


Random Glucose    ()  mg/dL


 


Calcium    (8.4-10.2)  mg/dL


 


Total Bilirubin    (0.2-1.3)  mg/dl


 


AST    (17-59)  U/L


 


ALT    (21-72)  U/L


 


Alkaline Phosphatase    ()  U/L


 


Total Protein    (6.3-8.2)  G/DL


 


Albumin    (3.5-5.0)  g/dL


 


Globulin    (2.2-3.9)  gm/dL


 


Albumin/Globulin Ratio    (1.0-2.1)  


 


Blood Type   O POSITIVE  


 


Antibody Screen   Negative  


 


Crossmatch   See Detail  


 


Reaction Clerical Check    (NO DISCREPA)  


 


Pre-Trans Blood Type    


 


Pre-Trans Bld Appearanc    (NO HEMOLYSI)  


 


Pre-Trans ELVIA    (NEGATIVE)  


 


Post-Trans Blood Type    


 


Post-Trans Spec Appear    (NO HEMOLYSI)  


 


Post-Trans ELVIA    (NEGATIVE)  


 


BBK History Checked   Patient has bt  








 Laboratory Results - last 24 hr











  05/02/18 05/04/18 05/04/18





  08:50 20:53 21:01


 


WBC   11.5 H 


 


RBC   2.49 L 


 


Hgb   7.1 L 


 


Hct   21.8 L 


 


MCV   87.6  D 


 


MCH   28.6 


 


MCHC   32.7 L 


 


RDW   17.9 H 


 


Plt Count   103 L 


 


MPV   


 


Neut % (Auto)   


 


Lymph % (Auto)   


 


Mono % (Auto)   


 


Eos % (Auto)   


 


Baso % (Auto)   


 


Neut # (Auto)   


 


Lymph # (Auto)   


 


Mono # (Auto)   


 


Eos # (Auto)   


 


Baso # (Auto)   


 


pCO2   


 


pO2   


 


HCO3   


 


ABG pH   


 


ABG Total CO2   


 


ABG O2 Saturation   


 


ABG O2 Content   


 


ABG Base Excess   


 


ABG Hemoglobin   


 


ABG Carboxyhemoglobin   


 


POC ABG HHb (Measured)   


 


ABG Methemoglobin   


 


ABG O2 Capacity   


 


Anthony Test   


 


A-a O2 Difference   


 


Hgb O2 Saturation   


 


Vent Mode   


 


Mechanical Rate   


 


FiO2   


 


Tidal Volume   


 


PEEP   


 


Sodium   


 


Potassium   


 


Chloride   


 


Carbon Dioxide   


 


Anion Gap   


 


BUN   


 


Creatinine   


 


Est GFR ( Amer)   


 


Est GFR (Non-Af Amer)   


 


Random Glucose   


 


Calcium   


 


Total Bilirubin   


 


AST   


 


ALT   


 


Alkaline Phosphatase   


 


Total Protein   


 


Albumin   


 


Globulin   


 


Albumin/Globulin Ratio   


 


Blood Type  O POSITIVE  


 


Antibody Screen  Negative  


 


Crossmatch  See Detail  


 


Reaction Clerical Check    No discrepancy


 


Pre-Trans Blood Type    O POSITIVE


 


Pre-Trans Bld Appearanc    No hemolysis


 


Pre-Trans ELVIA    Negative


 


Post-Trans Blood Type    O POSITIVE


 


Post-Trans Spec Appear    No hemolysis


 


Post-Trans ELVIA    Negative


 


BBK History Checked  Patient has bt  














  05/05/18 05/05/18 05/05/18





  00:00 04:40 04:40


 


WBC  9.3  10.2 


 


RBC  2.22 L  2.32 L 


 


Hgb  6.4 L*  6.8 L 


 


Hct  19.6 L  20.4 L 


 


MCV  88.5  88.3 


 


MCH  28.8  29.2 


 


MCHC  32.5 L  33.1 


 


RDW  18.0 H  18.1 H 


 


Plt Count  92 L  101 L 


 


MPV   10.7 


 


Neut % (Auto)   60.8 


 


Lymph % (Auto)   28.3 


 


Mono % (Auto)   8.3 


 


Eos % (Auto)   1.3 


 


Baso % (Auto)   1.3 


 


Neut # (Auto)   6.2 


 


Lymph # (Auto)   2.9 


 


Mono # (Auto)   0.8 


 


Eos # (Auto)   0.1 


 


Baso # (Auto)   0.1 


 


pCO2   


 


pO2   


 


HCO3   


 


ABG pH   


 


ABG Total CO2   


 


ABG O2 Saturation   


 


ABG O2 Content   


 


ABG Base Excess   


 


ABG Hemoglobin   


 


ABG Carboxyhemoglobin   


 


POC ABG HHb (Measured)   


 


ABG Methemoglobin   


 


ABG O2 Capacity   


 


Anthony Test   


 


A-a O2 Difference   


 


Hgb O2 Saturation   


 


Vent Mode   


 


Mechanical Rate   


 


FiO2   


 


Tidal Volume   


 


PEEP   


 


Sodium    144


 


Potassium    3.6


 


Chloride    111 H


 


Carbon Dioxide    27


 


Anion Gap    10


 


BUN    20


 


Creatinine    1.1


 


Est GFR ( Amer)    > 60


 


Est GFR (Non-Af Amer)    > 60


 


Random Glucose    112 H


 


Calcium    7.3 L


 


Total Bilirubin    2.0 H


 


AST    85 H


 


ALT    50


 


Alkaline Phosphatase    88


 


Total Protein    5.4 L


 


Albumin    2.2 L


 


Globulin    3.2


 


Albumin/Globulin Ratio    0.7 L


 


Blood Type   


 


Antibody Screen   


 


Crossmatch   


 


Reaction Clerical Check   


 


Pre-Trans Blood Type   


 


Pre-Trans Bld Appearanc   


 


Pre-Trans ELVIA   


 


Post-Trans Blood Type   


 


Post-Trans Spec Appear   


 


Post-Trans ELVIA   


 


BBK History Checked   














  05/05/18 05/05/18





  05:12 08:13


 


WBC  


 


RBC  


 


Hgb  


 


Hct  


 


MCV  


 


MCH  


 


MCHC  


 


RDW  


 


Plt Count  


 


MPV  


 


Neut % (Auto)  


 


Lymph % (Auto)  


 


Mono % (Auto)  


 


Eos % (Auto)  


 


Baso % (Auto)  


 


Neut # (Auto)  


 


Lymph # (Auto)  


 


Mono # (Auto)  


 


Eos # (Auto)  


 


Baso # (Auto)  


 


pCO2  34 L 


 


pO2  78 L 


 


HCO3  27.9 


 


ABG pH  7.51 H 


 


ABG Total CO2  28.1 H 


 


ABG O2 Saturation  100.6 H 


 


ABG O2 Content  9.5 L 


 


ABG Base Excess  3.8 H 


 


ABG Hemoglobin  6.9 L 


 


ABG Carboxyhemoglobin  2.8 H 


 


POC ABG HHb (Measured)  -0.6 L 


 


ABG Methemoglobin  1.1 


 


ABG O2 Capacity  9.4 L 


 


Anthony Test  Yes 


 


A-a O2 Difference  93.0 


 


Hgb O2 Saturation  96.8 


 


Vent Mode  A/c 


 


Mechanical Rate  12 


 


FiO2  30.0 


 


Tidal Volume  500 


 


PEEP  5 


 


Sodium  


 


Potassium  


 


Chloride  


 


Carbon Dioxide  


 


Anion Gap  


 


BUN  


 


Creatinine  


 


Est GFR ( Amer)  


 


Est GFR (Non-Af Amer)  


 


Random Glucose  


 


Calcium  


 


Total Bilirubin  


 


AST  


 


ALT  


 


Alkaline Phosphatase  


 


Total Protein  


 


Albumin  


 


Globulin  


 


Albumin/Globulin Ratio  


 


Blood Type   O POSITIVE


 


Antibody Screen   Negative


 


Crossmatch   See Detail


 


Reaction Clerical Check  


 


Pre-Trans Blood Type  


 


Pre-Trans Bld Appearanc  


 


Pre-Trans ELVIA  


 


Post-Trans Blood Type  


 


Post-Trans Spec Appear  


 


Post-Trans ELVIA  


 


BBK History Checked   Patient has bt











Fingerstick Blood Sugar Results: 158





Critical Care Progress Note





- Nutrition


Nutrition: 


 Nutrition











 Category Date Time Status


 


 NPO Diet [DIET] Diets  04/22/18 Breakfast Active














Assessment/Plan





- Assessment and Plan (Free Text)


Assessment: 








1: Upper GI bleed, hypovolemic shock 


on levophed drip and intubated 


continue transfusion PRN 


Now third spacing and has increasing edema due to massive blood transfusion 


Will give lasix, and will stop IVF, recieving blood , as needed


 


s/p total 27 units PRBC and 21 unit FFP transfusion and 3 platelets


s/p 3 EGD and 2 CTA abdomen this admission that showed no active source of 

bleeding 


Underwent EGD yesterday 5/4  with banding of esophageal varices


Continue transfusion support as needed. Will repeat type cross and match today 

due to transfusion reaction that developed yesterday. transfusion on hold at 

present as per blood bank and hgb 6.8 


No more bleeding noticed overnight


Continue Protonix, octreotide and Tranesamix drip


Hematology,GI and surgery on consult 


Received DDAVP and Vitamin K 


was on  IV Cipro and Vanco  empirically 


keep NPO 


Will receive blood as needed but no IVF at the moment 





2. Transfusion reaction 


with fever episode during transfusion 


Will type and cross match again 


also gas coagulopathy:received FFPs, DDAVP








3. Edema : post massive transfusion:


lasix 40 mg IV q 12 H





4.  Cirrhosis with ascites , coagulopathy, thrombocytopenia sec to ETOH abuse 


s/p abdominal paracentesis by IR  4/30  with removal 900 ml ascitic fluid


With anasarca 





5. Pneumonia 


CXR : showed resolution of right apex residual consolidation 


WBC 10 k


procalcitonin was elevated


received  IV Cipro and  IV Vanco





6. Intubated for Airway Protection


on vent PRVC AC mode 12/500/5/30 % 


Continue respiratory toilet 


Received Cipro and Vanco IV 


Will attempt to wean off the vent 


Pulmonary on  consult 








8. Hepatic Encephalopathy


Ammonia level was elevated


received Lactulose 


CT of the head : no bleed








10.DVT proph


SCD


no anticoag sec to GIB and coagulopathy & low Platelet

## 2018-05-05 NOTE — PCM.PROC
Procedures


Attestation:: I certify that I have explained the specified Operation(s) or 

Procedure(s), risks, benefits and reasonable alternatives to the Patient and/or 

other person responsible. The opportunity was given to ask questions and all 

questions answered





- Central Line Placement


  ** Right Femoral Triple Lumen Catheter


Aseptic technique was employed throughout the procedure: Hand Hygiene done 

prior to procedure, Full sterile barriers (mask, hair cover, sterile gown, 

sterile gloves), Full body sterile drape, Chloraprep Antiseptic: 2 minute prep 

for Femoral


CVP Time Out Performed: No


Pt. Placed on Pulse Ox Monitor: Yes


Central Line Prep: Chlorhexidine-Alcohol Combination


Ultrasound Used for Placement: No


Central Line Lumen Inserted: triple


Central Line Length: 20 cm


Post Procedure: Sutured in Place, Good Blood Return, All Ports Aspirated, 

Flushed, Capped, Sterile Dressing Applied


Secured by: Suture


Post procedure dressing: Clear vapor permeable


Post Procedure X-Ray: No


Patient Tolerated Procedure: Well (Because 2 of the original TLC were blocked 

and Alteplase not used because of active bleeding, an exchange of a new TLC was 

done over guide wire under aseptic condition as outlined above.), No 

Complications

## 2018-05-05 NOTE — RAD
PROCEDURE:  CHEST RADIOGRAPH, 1 VIEW



HISTORY:

pt intubated



COMPARISON:

None available.



FINDINGS:



LUNGS:

Clear.



PLEURA:

No pneumothorax or pleural fluid seen.



CARDIOVASCULAR:

Normal.



OSSEOUS STRUCTURES:

No significant abnormalities.



VISUALIZED UPPER ABDOMEN:

Normal.



OTHER FINDINGS:

Endotracheal tube, unchanged. 



IMPRESSION:

No significant interval chin.

## 2018-05-05 NOTE — CP.PCM.PN
Subjective





- Date & Time of Evaluation


Date of Evaluation: 05/05/18


Time of Evaluation: 09:00





- Subjective


Subjective: 


Patient seen and examined bedside. Intubated on MV PRVC AC MODE 12/500/5/30 %  

with ABG   34/78/27/7.5


On Presedex, protonix , octreotide, tranesamix acid and levophed drip


Sedated,not responsive


s/p EGD yesterday with banding of esophageal varices. No more melena episodes 

or hematemesis overnight


Developed transfusion reaction with  fever yesterday so transfusion kept on hold


Hgb this AM 6.8   WBC 10.2


Tmax 101.6  /61


s/p total 27 unit PRBC and 21 FFP TRANSFUSION TO DATE 





Objective





- Vital Signs/Intake and Output


Vital Signs (last 24 hours): 


 











Temp Pulse Resp BP Pulse Ox


 


 101.6 F H  70   13   110/61   100 


 


 05/05/18 08:00  05/05/18 08:00  05/05/18 08:00  05/05/18 08:00  05/05/18 08:00








Intake and Output: 


 











 05/05/18 05/05/18





 06:59 18:59


 


Intake Total 1161 83


 


Output Total 400 


 


Balance 761 83














- Medications


Medications: 


 Current Medications





Acetaminophen (Tylenol 325mg/10.15ml Ud)  325 mg NG Q6 PRN


   PRN Reason: Temperature


   Last Admin: 04/29/18 21:37 Dose:  325 mg


Chlordiazepoxide (Librium)  50 mg PO Q8 [facility]


   Last Admin: 04/27/18 08:49 Dose:  50 mg


Folic Acid (Folic Acid)  1 mg PO DAILY [facility]


   Last Admin: 04/27/18 08:09 Dose:  1 mg


Pantoprazole Sodium 40 mg/ (Sodium Chloride)  100 mls @ 20 mls/hr IVPB Q5H [facility]


   PRN Reason: 8 MG/HR


   Last Admin: 05/05/18 08:56 Dose:  20 mls/hr


Octreotide Acetate 1,250 mcg/ (Sodium Chloride)  252.5 mls @ 10.1 mls/hr IV 

.Q24H [facility]


   PRN Reason: 50 MCG/HR


   Last Admin: 05/04/18 09:45 Dose:  10.1 mls/hr


Dexmedetomidine HCl 400 mcg/ (Sodium Chloride)  100 mls @ 11.16 mls/hr IV 

.Q8H58M [facility]; 0.5 MCG/KG/HR


   PRN Reason: Protocol


   Last Admin: 05/05/18 06:31 Dose:  1.49 mcg/kg/hr, 33.28 mls/hr


Tranexamic Acid 3,000 mg/ (Sodium Chloride)  250 mls @ 10.417 mls/hr IVPB ONCE 

ONE


   Stop: 05/05/18 09:59


   Last Admin: 05/04/18 10:29 Dose:  10.417 mls/hr


Norepinephrine Bitartrate 4 mg (/ Dextrose)  254 mls @ 9.52 mls/hr IV .Q24H [facility]

; 2.5 MCG/MIN


   PRN Reason: Protocol


   Last Admin: 05/04/18 20:11 Dose:  2.5 mcg/min, 9.52 mls/hr


Metoclopramide HCl (Reglan)  10 mg IVP Q6 PRN


   PRN Reason: Nausea/Vomiting


   Last Admin: 04/20/18 08:49 Dose:  10 mg


Thiamine HCl (Vitamin B1 Tab)  100 mg PO DAILY [facility]


   Last Admin: 04/27/18 08:09 Dose:  100 mg











- Labs


Labs: 


 





 05/05/18 04:40 





 05/05/18 04:40 





 











PT  15.7 Seconds (9.8-13.1)  H  05/04/18  04:20    


 


INR  1.4  (0.9-1.2)  H  05/04/18  04:20    


 


APTT  36.4 Seconds (25.6-37.1)   05/04/18  04:20    














- Constitutional


Appears: Chronically Ill, Other (sedated on Presedex drip not responsding 

intubated on MV )





- Head Exam


Head Exam: ATRAUMATIC





- Eye Exam


Eye Exam: PERRL





- ENT Exam


ENT Exam: Mucous Membranes Dry





- Neck Exam


Neck Exam: Normal Inspection





- Respiratory Exam


Respiratory Exam: Clear to Ausculation Bilateral, NORMAL BREATHING PATTERN.  

absent: Wheezes, Respiratory Distress





- Cardiovascular Exam


Cardiovascular Exam: REGULAR RHYTHM, +S1, +S2.  absent: JVD





- GI/Abdominal Exam


GI & Abdominal Exam: Soft.  absent: Distended, Guarding, Rebound





- Rectal Exam


Rectal Exam: Deferred





- Neurological Exam


Additional comments: 





sedated , not responding 





- Skin


Skin Exam: Dry, Intact, Warm


Additional comments: 





anasarca





Assessment and Plan





- Assessment and Plan (Free Text)


Assessment: 


38 y/o male with  known history  of Alcoholism and recent GI bleed, was brought 

in because of hematemesis and alteration in   mental status.


He was recently discharged from this hospital  after an episode of GI bleed .  

EGD done on 4/16 did not show any active bleed.  The patient was discharged 

home and again started drinking.


On day of admission, he  had hematemesis and was noted to be confused and 

agitated.  HGB=6.1.He was transfused with PRBC and FFP, intubated for airway 

protection.  Rpt EGD showed no active bleeding. Since Hgb continued  to drop 

bleeding scan was performed that showed no active source of bleeding .He was 

started on Levophed drip 4/27 for hypotension and underwent CTA of abdomen by 

IR that showed no active bleeding.


Transfused total to date  27  unit PRBC and 21 unit FFP and 3 Platelets 


At present intubated on MV PRVC / AC mode , sedated on Precedex drip ,  On 

Levophed drip, Protonix, Octreotide drip and Tranesamix drip 


Underwent repeat CT abdomen 5/2 and EGD 5/1 thart showed no bleeding 


s/p repeat EGD yesterday 5/4 with banding of esophageal varices . No more 

bleeding episodes with melena or hematemesis overnight. OGT removed because was

   noted to be irritating the gastric mucosa on  repeat EGD 5/1 


Developed transfusion reaction overnight with fever overnight so transfusion is 

on hold for now .








1.Hypovolemic shock  secondary to GI bleed 


on levophed drip and intubated 


continue transfusion PRN 








2.Acute blood loss anemia sec to GI Bleed secondary  to Bleeding  Esophageal 

Varices


Acute   


s/p total 27 units PRBC and 21 unit FFP transfusion and 3 platelets


s/p 3 EGD and 2 CTA abdomen this admission that showed no active source of 

bleeding 


Underwent EGD yesterday 5/4  with banding of esophageal varices


Continue transfusion support as needed. Will repeat type cross and match today 

due to transfusion reaction that developed yesterday. transfusion on hold at 

present as per blood bank and hgb 6.8 


No more bleeding noticed overnight


Continue Protonix, octreotide and Tranesamix drip


Hematology,GI and surgery on consult 


Received DDAVP and Vitamin K 


was on  IV Cipro and Vanco  empirically 


keep NPO 





3. Transfusion reaction 


with fever episode during transfusion 


Will type and cross match again 


Hold transfusion for now until cleared by blood bank 








4. Coagulopathy sec to Liver dis from alcoholism


received Vit K, DDAVP


s/p 21 unit FFP transfusion 


Hematology consulted and started tranesamix acid IV 








5.  Cirrhosis with ascites , coagulopathy, thrombocytopenia sec to ETOH abuse 


s/p abdominal paracentesis by IR  4/30  with removal 900 ml ascitic fluid


With anasarca 








6. Pneumonia 


CXR : showed resolution of right apex residual consolidation 


WBC 10 k


procalcitonin was elevated


received  IV Cipro and  IV Vanco





7. Intubated for Airway Protection


on vent PRVC AC mode 12/500/5/30 % 


Continue respiratory toilet 


Received Cipro and Vanco IV 


Will attempt to wean off the vent 


Pulmonary on  consult 





8. Alcohol withdrawal, hx of Alcohol Abuse 


Acute  


on Precedex drip for sedation, Ativan 


IVF hydration





9. Hepatic Encephalopathy


Ammonia level was elevated


received Lactulose 


CT of the head : no bleed








10.DVT proph


SCD


no anticoag sec to GIB and coagulopathy & low Platelet

## 2018-05-05 NOTE — CP.PCM.PN
Subjective





- Date & Time of Evaluation


Date of Evaluation: 05/05/18


Time of Evaluation: 09:37





- Subjective


Subjective: 





SURGERY NOTE FOR DR. ADAMES





37M seen and examined at bedside. Patient had a reactive to last blood 

transfused. Patient has not had any further blood per rectum or via OG tube. 

Remains intubated and sedated.





Objective





- Vital Signs/Intake and Output


Vital Signs (last 24 hours): 


 











Temp Pulse Resp BP Pulse Ox


 


 101.6 F H  70   13   110/61   100 


 


 05/05/18 08:00  05/05/18 08:00  05/05/18 08:00  05/05/18 08:00  05/05/18 08:00








Intake and Output: 


 











 05/05/18 05/05/18





 06:59 18:59


 


Intake Total 1161 83


 


Output Total 400 


 


Balance 761 83














- Medications


Medications: 


 Current Medications





Acetaminophen (Tylenol 325mg/10.15ml Ud)  325 mg NG Q6 PRN


   PRN Reason: Temperature


   Last Admin: 04/29/18 21:37 Dose:  325 mg


Chlordiazepoxide (Librium)  50 mg PO Q8 [facility]


   Last Admin: 04/27/18 08:49 Dose:  50 mg


Folic Acid (Folic Acid)  1 mg PO DAILY [facility]


   Last Admin: 04/27/18 08:09 Dose:  1 mg


Pantoprazole Sodium 40 mg/ (Sodium Chloride)  100 mls @ 20 mls/hr IVPB Q5H [facility]


   PRN Reason: 8 MG/HR


   Last Admin: 05/05/18 08:56 Dose:  20 mls/hr


Octreotide Acetate 1,250 mcg/ (Sodium Chloride)  252.5 mls @ 10.1 mls/hr IV 

.Q24H [facility]


   PRN Reason: 50 MCG/HR


   Last Admin: 05/04/18 09:45 Dose:  10.1 mls/hr


Dexmedetomidine HCl 400 mcg/ (Sodium Chloride)  100 mls @ 11.16 mls/hr IV 

.Q8H58M [facility]; 0.5 MCG/KG/HR


   PRN Reason: Protocol


   Last Admin: 05/05/18 06:31 Dose:  1.49 mcg/kg/hr, 33.28 mls/hr


Tranexamic Acid 3,000 mg/ (Sodium Chloride)  250 mls @ 10.417 mls/hr IVPB ONCE 

ONE


   Stop: 05/05/18 09:59


   Last Admin: 05/04/18 10:29 Dose:  10.417 mls/hr


Norepinephrine Bitartrate 4 mg (/ Dextrose)  254 mls @ 9.52 mls/hr IV .Q24H [facility]

; 2.5 MCG/MIN


   PRN Reason: Protocol


   Last Admin: 05/04/18 20:11 Dose:  2.5 mcg/min, 9.52 mls/hr


Metoclopramide HCl (Reglan)  10 mg IVP Q6 PRN


   PRN Reason: Nausea/Vomiting


   Last Admin: 04/20/18 08:49 Dose:  10 mg


Thiamine HCl (Vitamin B1 Tab)  100 mg PO DAILY [facility]


   Last Admin: 04/27/18 08:09 Dose:  100 mg











- Labs


Labs: 


 





 05/05/18 04:40 





 05/05/18 04:40 





 











PT  15.7 Seconds (9.8-13.1)  H  05/04/18  04:20    


 


INR  1.4  (0.9-1.2)  H  05/04/18  04:20    


 


APTT  36.4 Seconds (25.6-37.1)   05/04/18  04:20    














- Constitutional


Appears: Non-toxic, No Acute Distress





- Respiratory Exam


Respiratory Exam: Clear to Ausculation Bilateral, NORMAL BREATHING PATTERN


Additional comments: 





intubated





- Cardiovascular Exam


Cardiovascular Exam: REGULAR RHYTHM, +S1, +S2





- GI/Abdominal Exam


GI & Abdominal Exam: Soft.  absent: Distended, Firm, Guarding, Rigid


Additional comments: 





NGT - 100 cc gastric content





- Skin


Skin Exam: Dry, Intact, Normal Color, Warm





Assessment and Plan





- Assessment and Plan (Free Text)


Assessment: 





37M with Upper GI bleed, s/p EGD with banding of 3 varices 


Plan: 





monitor NGT output


Monitor hemoglobin


Patient needs more pRBC, 3 ordered


Further recs discuss with Dr Arabella Robbins, PGY2

## 2018-05-06 NOTE — RAD
PROCEDURE:  CHEST RADIOGRAPH, 1 VIEW



HISTORY:

intubated



COMPARISON:

Chest radiograph dated 05/05/2018



FINDINGS:



LUNGS:

Clear.



PLEURA:

No pneumothorax or pleural fluid seen.



CARDIOVASCULAR:

Normal.



OSSEOUS STRUCTURES:

No significant abnormalities.



VISUALIZED UPPER ABDOMEN:

Normal.



OTHER FINDINGS:

Endotracheal tube, unchanged. 



IMPRESSION:

No significant interval change.

## 2018-05-06 NOTE — CP.PCM.PN
Subjective





- Date & Time of Evaluation


Date of Evaluation: 05/06/18


Time of Evaluation: 17:44





- Subjective


Subjective: 





Blood pressure more stable over the weekend. Bleeding appears controlled.





Objective





- Vital Signs/Intake and Output


Vital Signs (last 24 hours): 


 











Temp Pulse Resp BP Pulse Ox


 


 99.1 F   66   15   110/67   100 


 


 05/06/18 12:00  05/06/18 15:00  05/06/18 15:00  05/06/18 16:31  05/06/18 15:00








Intake and Output: 


 











 05/06/18 05/06/18





 06:59 18:59


 


Intake Total 651 576


 


Output Total 2500 1300


 


Balance -0239 -724














- Medications


Medications: 


 Current Medications





Acetaminophen (Tylenol 325mg/10.15ml Ud)  325 mg NG Q6 PRN


   PRN Reason: Temperature


   Last Admin: 04/29/18 21:37 Dose:  325 mg


Chlordiazepoxide (Librium)  50 mg PO Q8 [facility]


   Last Admin: 04/27/18 08:49 Dose:  50 mg


Folic Acid (Folic Acid)  1 mg PO DAILY [facility]


   Last Admin: 04/27/18 08:09 Dose:  1 mg


Furosemide (Lasix)  40 mg IVP BID [facility]


   Last Admin: 05/06/18 16:31 Dose:  40 mg


Pantoprazole Sodium 40 mg/ (Sodium Chloride)  100 mls @ 20 mls/hr IVPB Q5H [facility]


   PRN Reason: 8 MG/HR


   Last Admin: 05/06/18 16:34 Dose:  20 mls/hr


Octreotide Acetate 1,250 mcg/ (Sodium Chloride)  252.5 mls @ 10.1 mls/hr IV 

.Q24H [facility]


   PRN Reason: 50 MCG/HR


   Last Admin: 05/06/18 15:31 Dose:  10.1 mls/hr


Tranexamic Acid 3,000 mg/ (Sodium Chloride)  280 mls @ 10.417 mls/hr IV .Q24H 

[facility]


   Last Admin: 05/05/18 20:17 Dose:  10.417 mls/hr


Iron Sucrose 200 mg/ Sodium (Chloride)  110 mls @ 110 mls/hr IVPB DAILY [facility]


   Stop: 05/11/18 09:01


   Last Admin: 05/06/18 15:20 Dose:  110 mls/hr


Dexmedetomidine HCl 400 mcg/ (Sodium Chloride)  100 mls @ 29.04 mls/hr IV 

.Q3H27M [facility]; 1.3 MCG/KG/HR


   PRN Reason: Protocol


   Last Admin: 05/06/18 16:21 Dose:  1.3 mcg/kg/hr, 29.04 mls/hr


Metoclopramide HCl (Reglan)  10 mg IVP Q6 PRN


   PRN Reason: Nausea/Vomiting


   Last Admin: 04/20/18 08:49 Dose:  10 mg


Thiamine HCl (Vitamin B1 Tab)  100 mg PO DAILY [facility]


   Last Admin: 04/27/18 08:09 Dose:  100 mg











- Labs


Labs: 


 





 05/06/18 05:00 





 05/06/18 05:00 





 











PT  15.7 Seconds (9.8-13.1)  H  05/04/18  04:20    


 


INR  1.4  (0.9-1.2)  H  05/04/18  04:20    


 


APTT  36.4 Seconds (25.6-37.1)   05/04/18  04:20    














- Eye Exam


Eye Exam: Normal appearance





- ENT Exam


ENT Exam: Mucous Membranes Moist





- Respiratory Exam


Respiratory Exam: Clear to Ausculation Bilateral





- Cardiovascular Exam


Cardiovascular Exam: REGULAR RHYTHM





- GI/Abdominal Exam


GI & Abdominal Exam: Distended.  absent: Tenderness





- Rectal Exam


Rectal Exam: Black Stool





Assessment and Plan


(1) GI bleed


Assessment & Plan: 


Had esophageal banding on Friday and appears improved. Continue current 

treatment. Possible parascentesis tomorrow.


Status: Acute

## 2018-05-06 NOTE — CP.CCUPN
CCU Subjective





- Physician Review


Events Since Last Encounter (Free Text): 





05/06/18 09:38


 Opens eyes and responses on verbal command, off sedation except precededx, off 

levophed, BP stable 


Hb 9/3, no drop since yesterday, was given 40 mg IV lasix yesterday, urinated 

2000 ml. Abdomen is more distended but soft, will need another paracentesis by 

tomorrow to start weaning. 





CCU Objective





- Vital Signs / Intake & Output


Vital Signs (Last 4 hours): 


Vital Signs











  Temp Pulse Resp BP Pulse Ox


 


 05/06/18 09:09     111/67 


 


 05/06/18 08:00  99.8 F H  63  14  105/60  99


 


 05/06/18 07:00   66  16  110/71  95


 


 05/06/18 06:00   62  12  99/60 L  98











Intake and Output (Last 8hrs): 


 Intake & Output











 05/05/18 05/06/18 05/06/18





 22:59 06:59 14:59


 


Intake Total 966 651 175


 


Output Total 500 2000 


 


Balance 466 -1349 175


 


Weight   210 lb


 


Intake:   


 


   651 175


 


  Blood Product 700  


 


Output:   


 


  Urine 500 2000 


 


    Urethral (Burger) 500 2000 














- Physical Exam


Narrative Physical Exam (Free Text): 





05/06/18 09:43





P/E


Neck: No jvd


Lungs: No ronchi, crackles


Abdomen: soft, distended, non-tender


Ext; +2 pedal edema 


Heart: No gallop


Head: Positive for: Atraumatic, Normocephalic


Pupils: Positive for: PERRL.  Negative for: Sluggish, Non-Reactive, Pinpoint


Extroacular Muscles: Positive for: EOMI


Conjunctiva: Negative for: Injected, Icteric, Other


Mouth: Positive for: Moist Mucous Membranes, Dry


Pharnyx: Positive for: Normal


Neck: Positive for: Normal Range of Motion


Respiratory/Chest: Positive for: Clear to Auscultation, Good Air Exchange.  

Negative for: Respiratory Distress, Accessory Muscle Use, Wheezes, Rhonchi


Cardiovascular: Positive for: Regular Rate and Rhythm, Normal S1, S2, Peripheal 

Pulses Present.  Negative for: Murmurs, Irregular Rhythm, Tachycardic, 

Bradycardic


Abdomen: Positive for: Distention, Normal Bowel Sounds.  Negative for: 

Tenderness


Upper Extremity: Positive for: Normal Inspection.  Negative for: Edema


Lower Extremity: Positive for: Normal Inspection.  Negative for: Edema


Neurological: Positive for: Speech Normal


Psychiatric: Positive for: Alert





- Medications


Active Medications: 


Active Medications











Generic Name Dose Route Start Last Admin





  Trade Name Freq  PRN Reason Stop Dose Admin


 


Acetaminophen  325 mg  04/27/18 10:24  04/29/18 21:37





  Tylenol 325mg/10.15ml Ud  NG   325 mg





  Q6 PRN   Administration





  Temperature   


 


Chlordiazepoxide  50 mg  04/26/18 17:00  04/27/18 08:49





  Librium  PO   50 mg





  Q8 JUAN LUIS   Administration


 


Folic Acid  1 mg  04/20/18 11:15  04/27/18 08:09





  Folic Acid  PO   1 mg





  DAILY JUAN LUIS   Administration


 


Furosemide  40 mg  05/05/18 18:45  05/06/18 09:09





  Lasix  IVP   40 mg





  BID JUAN LUIS   Administration


 


Pantoprazole Sodium 40 mg/  100 mls @ 20 mls/hr  04/27/18 12:15  05/06/18 06:53





  Sodium Chloride  IVPB   20 mls/hr





  Q5H JUAN LUIS   Administration





  8 MG/HR   


 


Octreotide Acetate 1,250 mcg/  252.5 mls @ 10.1 mls/hr  04/27/18 12:00  05/05/ 18 14:02





  Sodium Chloride  IV   10.1 mls/hr





  .Q24H JUAN LUIS   Administration





  50 MCG/HR   


 


Dexmedetomidine HCl 400 mcg/  100 mls @ 11.16 mls/hr  05/02/18 14:00  05/06/18 

09:04





  Sodium Chloride  IV   1.39 mcg/kg/hr





  .Q8H58M JUAN LUIS   31.05 mls/hr





  Protocol   Administration





  0.5 MCG/KG/HR   


 


Tranexamic Acid 3,000 mg/  280 mls @ 10.417 mls/hr  05/05/18 19:45  05/05/18 20:

17





  Sodium Chloride  IV   10.417 mls/hr





  .Q24H JUAN LUIS   Administration


 


Iron Sucrose 200 mg/ Sodium  110 mls @ 110 mls/hr  05/06/18 09:00  





  Chloride  IVPB  05/11/18 09:01  





  DAILY JUAN LUIS   


 


Metoclopramide HCl  10 mg  04/19/18 22:52  04/20/18 08:49





  Reglan  IVP   10 mg





  Q6 PRN   Administration





  Nausea/Vomiting   


 


Thiamine HCl  100 mg  04/20/18 11:15  04/27/18 08:09





  Vitamin B1 Tab  PO   100 mg





  DAILY JUAN LUIS   Administration














- Patient Studies


Lab Studies: 


 Microbiology Studies











 05/05/18 14:00 Gram Stain - Final





 Blood 








 Lab Studies











  05/06/18 05/06/18 05/06/18 Range/Units





  05:19 05:00 05:00 


 


WBC    7.7  (4.8-10.8)  K/uL


 


RBC    3.14 L  (4.40-5.90)  Mil/uL


 


Hgb    9.3 L  (12.0-18.0)  g/dL


 


Hct    27.9 L  (35.0-51.0)  %


 


MCV    88.8  (80.0-94.0)  fl


 


MCH    29.6  (27.0-31.0)  pg


 


MCHC    33.4  (33.0-37.0)  g/dL


 


RDW    17.1 H  (11.5-14.5)  %


 


Plt Count    97 L  (130-400)  K/uL


 


MPV    10.3  (7.2-11.7)  fl


 


Neut % (Auto)    59.7  (50.0-75.0)  %


 


Lymph % (Auto)    29.0  (20.0-40.0)  %


 


Mono % (Auto)    8.3  (0.0-10.0)  %


 


Eos % (Auto)    1.7  (0.0-4.0)  %


 


Baso % (Auto)    1.3  (0.0-2.0)  %


 


Neut # (Auto)    4.6  (1.8-7.0)  K/uL


 


Lymph # (Auto)    2.2  (1.0-4.3)  K/uL


 


Mono # (Auto)    0.6  (0.0-0.8)  K/uL


 


Eos # (Auto)    0.1  (0.0-0.7)  K/uL


 


Baso # (Auto)    0.1  (0.0-0.2)  K/uL


 


pCO2  36    (35-45)  mm/Hg


 


pO2  75 L    ()  mm/Hg


 


HCO3  26.3    (21-28)  mmol/L


 


ABG pH  7.46 H    (7.35-7.45)  


 


ABG Total CO2  26.7    (22-28)  mmol/L


 


ABG O2 Saturation  98.1 H    (95-98)  %


 


ABG O2 Content  13.6 L    (15-23)  ML/dL


 


ABG Base Excess  1.8    (-2.0-3.0)  mmol/L


 


ABG Hemoglobin  10.1 L    (11.7-17.4)  g/dL


 


ABG Carboxyhemoglobin  2.1 H    (0.5-1.5)  %


 


POC ABG HHb (Measured)  1.8    (0.0-5.0)  %


 


ABG Methemoglobin  0.9    (0.0-3.0)  %


 


ABG O2 Capacity  13.9 L    (16-24)  mL/dL


 


Anthony Test  Yes    


 


A-a O2 Difference  94.0    mm/Hg


 


Hgb O2 Saturation  95.3    (95.0-98.0)  %


 


Vent Mode  A/c    


 


Mechanical Rate  12    


 


FiO2  30.0    %


 


Tidal Volume  500    


 


PEEP  5    


 


Sodium   146   (132-148)  mmol/l


 


Potassium   3.7   (3.6-5.0)  MMOL/L


 


Chloride   110 H   ()  mmol/L


 


Carbon Dioxide   27   (22-30)  mmol/L


 


Anion Gap   13   (10-20)  


 


BUN   18   (9-20)  mg/dl


 


Creatinine   1.1   (0.8-1.5)  mg/dl


 


Est GFR (African Amer)   > 60   


 


Est GFR (Non-Af Amer)   > 60   


 


Random Glucose   105   ()  mg/dL


 


Calcium   7.5 L   (8.4-10.2)  mg/dL


 


Blood Type     


 


Antibody Screen     


 


Crossmatch     


 


BBK History Checked     














  05/06/18 05/05/18 Range/Units





  00:26 08:13 


 


WBC  8.3   (4.8-10.8)  K/uL


 


RBC  3.24 L   (4.40-5.90)  Mil/uL


 


Hgb  9.5 L D   (12.0-18.0)  g/dL


 


Hct  28.9 L   (35.0-51.0)  %


 


MCV  89.2   (80.0-94.0)  fl


 


MCH  29.3   (27.0-31.0)  pg


 


MCHC  32.9 L   (33.0-37.0)  g/dL


 


RDW  17.3 H   (11.5-14.5)  %


 


Plt Count  98 L   (130-400)  K/uL


 


MPV  10.5   (7.2-11.7)  fl


 


Neut % (Auto)  63.1   (50.0-75.0)  %


 


Lymph % (Auto)  26.8   (20.0-40.0)  %


 


Mono % (Auto)  8.1   (0.0-10.0)  %


 


Eos % (Auto)  1.6   (0.0-4.0)  %


 


Baso % (Auto)  0.4   (0.0-2.0)  %


 


Neut # (Auto)  5.3   (1.8-7.0)  K/uL


 


Lymph # (Auto)  2.2   (1.0-4.3)  K/uL


 


Mono # (Auto)  0.7   (0.0-0.8)  K/uL


 


Eos # (Auto)  0.1   (0.0-0.7)  K/uL


 


Baso # (Auto)  0.0   (0.0-0.2)  K/uL


 


pCO2    (35-45)  mm/Hg


 


pO2    ()  mm/Hg


 


HCO3    (21-28)  mmol/L


 


ABG pH    (7.35-7.45)  


 


ABG Total CO2    (22-28)  mmol/L


 


ABG O2 Saturation    (95-98)  %


 


ABG O2 Content    (15-23)  ML/dL


 


ABG Base Excess    (-2.0-3.0)  mmol/L


 


ABG Hemoglobin    (11.7-17.4)  g/dL


 


ABG Carboxyhemoglobin    (0.5-1.5)  %


 


POC ABG HHb (Measured)    (0.0-5.0)  %


 


ABG Methemoglobin    (0.0-3.0)  %


 


ABG O2 Capacity    (16-24)  mL/dL


 


Anthony Test    


 


A-a O2 Difference    mm/Hg


 


Hgb O2 Saturation    (95.0-98.0)  %


 


Vent Mode    


 


Mechanical Rate    


 


FiO2    %


 


Tidal Volume    


 


PEEP    


 


Sodium    (132-148)  mmol/l


 


Potassium    (3.6-5.0)  MMOL/L


 


Chloride    ()  mmol/L


 


Carbon Dioxide    (22-30)  mmol/L


 


Anion Gap    (10-20)  


 


BUN    (9-20)  mg/dl


 


Creatinine    (0.8-1.5)  mg/dl


 


Est GFR (African Amer)    


 


Est GFR (Non-Af Amer)    


 


Random Glucose    ()  mg/dL


 


Calcium    (8.4-10.2)  mg/dL


 


Blood Type   O POSITIVE  


 


Antibody Screen   Negative  


 


Crossmatch   See Detail  


 


BBK History Checked   Patient has bt  








 Laboratory Results - last 24 hr











  05/05/18 05/06/18 05/06/18





  08:13 00:26 05:00


 


WBC   8.3  7.7


 


RBC   3.24 L  3.14 L


 


Hgb   9.5 L D  9.3 L


 


Hct   28.9 L  27.9 L


 


MCV   89.2  88.8


 


MCH   29.3  29.6


 


MCHC   32.9 L  33.4


 


RDW   17.3 H  17.1 H


 


Plt Count   98 L  97 L


 


MPV   10.5  10.3


 


Neut % (Auto)   63.1  59.7


 


Lymph % (Auto)   26.8  29.0


 


Mono % (Auto)   8.1  8.3


 


Eos % (Auto)   1.6  1.7


 


Baso % (Auto)   0.4  1.3


 


Neut # (Auto)   5.3  4.6


 


Lymph # (Auto)   2.2  2.2


 


Mono # (Auto)   0.7  0.6


 


Eos # (Auto)   0.1  0.1


 


Baso # (Auto)   0.0  0.1


 


pCO2   


 


pO2   


 


HCO3   


 


ABG pH   


 


ABG Total CO2   


 


ABG O2 Saturation   


 


ABG O2 Content   


 


ABG Base Excess   


 


ABG Hemoglobin   


 


ABG Carboxyhemoglobin   


 


POC ABG HHb (Measured)   


 


ABG Methemoglobin   


 


ABG O2 Capacity   


 


Anthony Test   


 


A-a O2 Difference   


 


Hgb O2 Saturation   


 


Vent Mode   


 


Mechanical Rate   


 


FiO2   


 


Tidal Volume   


 


PEEP   


 


Sodium   


 


Potassium   


 


Chloride   


 


Carbon Dioxide   


 


Anion Gap   


 


BUN   


 


Creatinine   


 


Est GFR ( Amer)   


 


Est GFR (Non-Af Amer)   


 


Random Glucose   


 


Calcium   


 


Blood Type  O POSITIVE  


 


Antibody Screen  Negative  


 


Crossmatch  See Detail  


 


BBK History Checked  Patient has bt  














  05/06/18 05/06/18





  05:00 05:19


 


WBC  


 


RBC  


 


Hgb  


 


Hct  


 


MCV  


 


MCH  


 


MCHC  


 


RDW  


 


Plt Count  


 


MPV  


 


Neut % (Auto)  


 


Lymph % (Auto)  


 


Mono % (Auto)  


 


Eos % (Auto)  


 


Baso % (Auto)  


 


Neut # (Auto)  


 


Lymph # (Auto)  


 


Mono # (Auto)  


 


Eos # (Auto)  


 


Baso # (Auto)  


 


pCO2   36


 


pO2   75 L


 


HCO3   26.3


 


ABG pH   7.46 H


 


ABG Total CO2   26.7


 


ABG O2 Saturation   98.1 H


 


ABG O2 Content   13.6 L


 


ABG Base Excess   1.8


 


ABG Hemoglobin   10.1 L


 


ABG Carboxyhemoglobin   2.1 H


 


POC ABG HHb (Measured)   1.8


 


ABG Methemoglobin   0.9


 


ABG O2 Capacity   13.9 L


 


Anthony Test   Yes


 


A-a O2 Difference   94.0


 


Hgb O2 Saturation   95.3


 


Vent Mode   A/c


 


Mechanical Rate   12


 


FiO2   30.0


 


Tidal Volume   500


 


PEEP   5


 


Sodium  146 


 


Potassium  3.7 


 


Chloride  110 H 


 


Carbon Dioxide  27 


 


Anion Gap  13 


 


BUN  18 


 


Creatinine  1.1 


 


Est GFR ( Amer)  > 60 


 


Est GFR (Non-Af Amer)  > 60 


 


Random Glucose  105 


 


Calcium  7.5 L 


 


Blood Type  


 


Antibody Screen  


 


Crossmatch  


 


BBK History Checked  











Fingerstick Blood Sugar Results: 158





Critical Care Progress Note





- Nutrition


Nutrition: 


 Nutrition











 Category Date Time Status


 


 NPO Diet [DIET] Diets  04/22/18 Breakfast Active














Assessment/Plan





- Assessment and Plan (Free Text)


Assessment: 








1: Upper GI bleed, hypovolemic shock 


Off levophed drip now


Intubated 


Hb holding 9.3, no more transfusion needed at this point, will continue to 

monitor 


Now third spacing and has increasing edema due to massive blood transfusion 


Continue IV lasix, 





s/p total 27 units PRBC and 21 unit FFP transfusion and 3 platelets


s/p 3 EGD and 2 CTA abdomen this admission that showed no active source of 

bleeding 


Underwent EGD on 5/4  with banding of esophageal varices


Continue Protonix, octreotide and Tranesamix drip


Hematology,GI and surgery on consult 


Received DDAVP and Vitamin K 


was on  IV Cipro and Vanco  empirically 


keep NPO 








2. Edema : post massive transfusion:


lasix 40 mg IV q 12 H





3.  Cirrhosis with ascites , coagulopathy, thrombocytopenia sec to ETOH abuse 


s/p abdominal paracentesis by IR  4/30  with removal 900 ml ascitic fluid


WIll need another paracentesis with at leat 2-3 L off , if possible 


With anasarca 





4. Pneumonia 


CXR : showed resolution of right apex residual consolidation 


WBC 10 k


procalcitonin was elevated


received  IV Cipro and  IV Vanco





5. Intubated for Airway Protection


on vent PRVC AC mode 12/500/5/30 % 


Continue respiratory toilet 


Received Cipro and Vanco IV 


Will attempt to wean off the vent , after paracentesis, will swith to CPAP today


Pulmonary on  consult 








6. Hepatic Encephalopathy


Ammonia level was elevated


received Lactulose 


CT of the head : no bleed








10.DVT proph


SCD


no anticoag sec to GIB and coagulopathy & low Platelet

## 2018-05-06 NOTE — CP.PCM.PN
Subjective





- Date & Time of Evaluation


Date of Evaluation: 05/06/18


Time of Evaluation: 08:59





- Subjective


Subjective: 





SURGERY NOTE FOR DR. ADAMES





37M seen and examined at bedside. Patient intubated and sedated. No acute 

events overnight. No further bleeding per rectum and via OG tube. 





Objective





- Vital Signs/Intake and Output


Vital Signs (last 24 hours): 


 











Temp Pulse Resp BP Pulse Ox


 


 99.8 F H  63   14   105/60   99 


 


 05/06/18 08:00  05/06/18 08:00  05/06/18 08:00  05/06/18 08:00  05/06/18 08:00








Intake and Output: 


 











 05/06/18 05/06/18





 06:59 18:59


 


Intake Total 651 75


 


Output Total 2500 


 


Balance -1849 75














- Medications


Medications: 


 Current Medications





Acetaminophen (Tylenol 325mg/10.15ml Ud)  325 mg NG Q6 PRN


   PRN Reason: Temperature


   Last Admin: 04/29/18 21:37 Dose:  325 mg


Chlordiazepoxide (Librium)  50 mg PO Q8 JUAN LUIS


   Last Admin: 04/27/18 08:49 Dose:  50 mg


Folic Acid (Folic Acid)  1 mg PO DAILY JUAN LUIS


   Last Admin: 04/27/18 08:09 Dose:  1 mg


Furosemide (Lasix)  40 mg IVP BID JUAN LUIS


   Last Admin: 05/05/18 20:20 Dose:  40 mg


Pantoprazole Sodium 40 mg/ (Sodium Chloride)  100 mls @ 20 mls/hr IVPB Q5H JUAN LUIS


   PRN Reason: 8 MG/HR


   Last Admin: 05/06/18 06:53 Dose:  20 mls/hr


Octreotide Acetate 1,250 mcg/ (Sodium Chloride)  252.5 mls @ 10.1 mls/hr IV 

.Q24H JUAN LUIS


   PRN Reason: 50 MCG/HR


   Last Admin: 05/05/18 14:02 Dose:  10.1 mls/hr


Dexmedetomidine HCl 400 mcg/ (Sodium Chloride)  100 mls @ 11.16 mls/hr IV 

.Q8H58M JUAN LUIS; 0.5 MCG/KG/HR


   PRN Reason: Protocol


   Last Titration: 05/06/18 06:03 Dose:  1.39 mcg/kg/hr, 31.05 mls/hr


Tranexamic Acid 3,000 mg/ (Sodium Chloride)  280 mls @ 10.417 mls/hr IV .Q24H 

JUAN LUIS


   Last Admin: 05/05/18 20:17 Dose:  10.417 mls/hr


Iron Sucrose 200 mg/ Sodium (Chloride)  110 mls @ 110 mls/hr IVPB DAILY JUAN LUIS


   Stop: 05/11/18 09:01


Metoclopramide HCl (Reglan)  10 mg IVP Q6 PRN


   PRN Reason: Nausea/Vomiting


   Last Admin: 04/20/18 08:49 Dose:  10 mg


Thiamine HCl (Vitamin B1 Tab)  100 mg PO DAILY JUAN LUIS


   Last Admin: 04/27/18 08:09 Dose:  100 mg











- Labs


Labs: 


 





 05/06/18 05:00 





 05/06/18 05:00 





 











PT  15.7 Seconds (9.8-13.1)  H  05/04/18  04:20    


 


INR  1.4  (0.9-1.2)  H  05/04/18  04:20    


 


APTT  36.4 Seconds (25.6-37.1)   05/04/18  04:20    














- Constitutional


Appears: Non-toxic, No Acute Distress





- Respiratory Exam


Respiratory Exam: Clear to Ausculation Bilateral, NORMAL BREATHING PATTERN





- Cardiovascular Exam


Cardiovascular Exam: REGULAR RHYTHM, +S1, +S2





- GI/Abdominal Exam


GI & Abdominal Exam: Soft.  absent: Distended, Firm, Guarding, Rigid, Tenderness

, Rebound


Additional comments: 


OG tube - no blood





- Extremities Exam


Extremities Exam: absent: Pedal Edema, Tenderness





- Skin


Skin Exam: Dry, Intact, Normal Color, Warm





Assessment and Plan





- Assessment and Plan (Free Text)


Assessment: 





37M with Upper GI bleed, s/p EGD with banding of 3 varices 





Plan: 





monitor NGT output


Monitor hemoglobin


Further recs discuss with Dr Arabella Robbins, PGY2

## 2018-05-06 NOTE — CP.PCM.PN
Subjective





- Date & Time of Evaluation


Date of Evaluation: 05/06/18


Time of Evaluation: 09:30





- Subjective


Subjective: 


Patient seen and examined bedside. Intubated on MV PRVC AC MODE 12/500/5/30 %  

with ABG   36/75/26/7.46


Off levophed drip and stable./67 afebrile HR 68 


 No more bleeding episodes for > 24 hours since last EGD 5/4  and Hgb stable 9.3


On Presedex, protonix , octreotide, tranesamix acid 


Sedated but responses to verbal command


s/p 30 unit PRBC transfusion and 21 unit FFP transfusion


 





Objective





- Vital Signs/Intake and Output


Vital Signs (last 24 hours): 


 











Temp Pulse Resp BP Pulse Ox


 


 99.1 F   66   15   110/67   100 


 


 05/06/18 12:00  05/06/18 15:00  05/06/18 15:00  05/06/18 16:31  05/06/18 15:00








Intake and Output: 


 











 05/06/18 05/06/18





 06:59 18:59


 


Intake Total 651 576


 


Output Total 2500 1300


 


Balance -1849 -724














- Medications


Medications: 


 Current Medications





Acetaminophen (Tylenol 325mg/10.15ml Ud)  325 mg NG Q6 PRN


   PRN Reason: Temperature


   Last Admin: 04/29/18 21:37 Dose:  325 mg


Chlordiazepoxide (Librium)  50 mg PO Q8 [facility]


   Last Admin: 04/27/18 08:49 Dose:  50 mg


Folic Acid (Folic Acid)  1 mg PO DAILY [facility]


   Last Admin: 04/27/18 08:09 Dose:  1 mg


Furosemide (Lasix)  40 mg IVP BID [facility]


   Last Admin: 05/06/18 16:31 Dose:  40 mg


Pantoprazole Sodium 40 mg/ (Sodium Chloride)  100 mls @ 20 mls/hr IVPB Q5H JUAN LUIS


   PRN Reason: 8 MG/HR


   Last Admin: 05/06/18 16:34 Dose:  20 mls/hr


Octreotide Acetate 1,250 mcg/ (Sodium Chloride)  252.5 mls @ 10.1 mls/hr IV 

.Q24H JUAN LUIS


   PRN Reason: 50 MCG/HR


   Last Admin: 05/06/18 15:31 Dose:  10.1 mls/hr


Tranexamic Acid 3,000 mg/ (Sodium Chloride)  280 mls @ 10.417 mls/hr IV .Q24H 

JUAN LUIS


   Last Admin: 05/05/18 20:17 Dose:  10.417 mls/hr


Iron Sucrose 200 mg/ Sodium (Chloride)  110 mls @ 110 mls/hr IVPB DAILY JUAN LUIS


   Stop: 05/11/18 09:01


   Last Admin: 05/06/18 15:20 Dose:  110 mls/hr


Dexmedetomidine HCl 400 mcg/ (Sodium Chloride)  100 mls @ 29.04 mls/hr IV 

.Q3H27M JUAN LUIS; 1.3 MCG/KG/HR


   PRN Reason: Protocol


   Last Admin: 05/06/18 16:21 Dose:  1.3 mcg/kg/hr, 29.04 mls/hr


Metoclopramide HCl (Reglan)  10 mg IVP Q6 PRN


   PRN Reason: Nausea/Vomiting


   Last Admin: 04/20/18 08:49 Dose:  10 mg


Thiamine HCl (Vitamin B1 Tab)  100 mg PO DAILY JUAN LUIS


   Last Admin: 04/27/18 08:09 Dose:  100 mg











- Labs


Labs: 


 





 05/06/18 05:00 





 05/06/18 05:00 





 











PT  15.7 Seconds (9.8-13.1)  H  05/04/18  04:20    


 


INR  1.4  (0.9-1.2)  H  05/04/18  04:20    


 


APTT  36.4 Seconds (25.6-37.1)   05/04/18  04:20    














- Constitutional


Appears: Chronically Ill, Other (intubated and sedated )





- Head Exam


Head Exam: ATRAUMATIC





- Eye Exam


Eye Exam: PERRL





- ENT Exam


ENT Exam: Mucous Membranes Dry





- Neck Exam


Neck Exam: Normal Inspection





- Respiratory Exam


Respiratory Exam: Clear to Ausculation Bilateral, NORMAL BREATHING PATTERN.  

absent: Wheezes, Respiratory Distress





- Cardiovascular Exam


Cardiovascular Exam: REGULAR RHYTHM, RRR, +S1, +S2.  absent: JVD





- GI/Abdominal Exam


GI & Abdominal Exam: Distended, Soft.  absent: Guarding, Tenderness, Rebound





- Rectal Exam


Rectal Exam: absent: Black Stool





- Extremities Exam


Extremities Exam: Normal Capillary Refill


Additional comments: 





anasarca





- Neurological Exam


Additional comments: 





sedated 


responds to verbal command by opening his eyes





- Skin


Skin Exam: Dry, Warm





Assessment and Plan





- Assessment and Plan (Free Text)


Assessment: 


38 y/o male with  known history  of Alcoholism and recent GI bleed, was brought 

in because of hematemesis and alteration in   mental status.


He was recently discharged from this hospital  after an episode of GI bleed .  

EGD done on 4/16 did not show any active bleed.  The patient was discharged 

home and again started drinking.


On day of admission, he  had hematemesis and was noted to be confused and 

agitated.  HGB=6.1.He was transfused with PRBC and FFP, intubated for airway 

protection.  Rpt EGD showed no active bleeding. Since Hgb continued  to drop 

bleeding scan was performed that showed no active source of bleeding .He was 

started on Levophed drip 4/27 for hypotension and underwent CTA of abdomen by 

IR that showed no active bleeding.


Transfused total to date  30  unit PRBC and 21 unit FFP and 3 Platelets 


At present intubated on MV PRVC / AC mode , sedated on Precedex drip ,  off 

Levophed drip


On Protonix, Octreotide  and Tranesamix drip 


Underwent repeat CT abdomen 5/2 and EGD 5/1 that showed no bleeding 


s/p repeat EGD on  5/4 with banding of esophageal varices . No more bleeding 

episodes with melena or hematemesis since than . OGT has been removed because 

was   noted to be irritating the gastric mucosa on  repeat EGD 5/1 


Developed transfusion reaction 5/5 with fever 


Hgb stable at present 








1.Hypovolemic shock  secondary to GI bleed 


off  levophed drip and intubated 


Diuresed yesterday with lasix around 2000 ml 


continue transfusion PRN  and diuresis








2.Acute blood loss anemia sec to GI Bleed secondary  to Bleeding  Esophageal 

Varices


Acute   


s/p total 30 units PRBC and 21 unit FFP transfusion and 3 platelets


s/p 3 EGD and 2 CTA abdomen this admission that showed no active source of 

bleeding 


Underwent EGD  5/4  with banding of esophageal varices. No more bleeding since 

last EGD 


Continue transfusion support as needed.


Hgb stable 9.3 fore now   


Continue Protonix, octreotide and Tranesamix drip


Hematology,GI and surgery on consult 


Received DDAVP and Vitamin K 


received   IV Cipro and Vanco  empirically 


keep NPO 





3. Transfusion reaction 


with fever episode during transfusion on 5/5 and now resolved 


Hb 9.3 








4. Coagulopathy sec to Liver dis from alcoholism


received Vit K, DDAVP


s/p 21 unit FFP transfusion 


Hematology consulted and started tranesamix acid IV 








5.  Cirrhosis with ascites , coagulopathy, thrombocytopenia sec to ETOH abuse 


s/p abdominal paracentesis by IR  4/30  with removal 900 ml ascitic fluid


With anasarca 


diuresed with 40 mg lasix IV 


Will try to diurese more today and ask IR for parasenthesis in AM








6. Pneumonia 


CXR : showed resolution of right apex residual consolidation 


WBC 10 k


procalcitonin was elevated


received  IV Cipro and  IV Vanco





7. Intubated for Airway Protection


on vent PRVC AC mode 12/500/5/30 % 


Continue respiratory toilet 


Received Cipro and Vanco IV 


Will attempt to wean off the vent in AM


Pulmonary on  consult 





8. Alcohol withdrawal, hx of Alcohol Abuse 


Acute  


on Precedex drip for sedation, Ativan 


IVF hydration





9. Hepatic Encephalopathy


Ammonia level was elevated


received Lactulose 


CT of the head : no bleed








10.DVT proph


SCD


no anticoag sec to GIB and coagulopathy & low Platelet

## 2018-05-07 NOTE — RAD
PROCEDURE:  CHEST RADIOGRAPH, 1 VIEW



HISTORY:

intubated



COMPARISON:

Portable chest 05/06/2018.



FINDINGS:



LUNGS:

Endotracheal tube unchanged in position.



The image was captured in the history phase with diminished volume 

appreciated bilaterally. Nevertheless, no acute infiltrate pleural 

effusion or pneumothorax identified.  Cardiomediastinal silhouette is 

stable.  No pulmonary vascular congestion evident.



PLEURA:

As above.



CARDIOVASCULAR:

As above.



OSSEOUS STRUCTURES:

No significant abnormalities.



VISUALIZED UPPER ABDOMEN:

Normal.



OTHER FINDINGS:

None. 



IMPRESSION:

Image captured during expiration.  No acute cardiopulmonary is 

appreciable at this time.

## 2018-05-07 NOTE — CP.PCM.PN
Subjective





- Date & Time of Evaluation


Date of Evaluation: 05/07/18


Time of Evaluation: 07:13





- Subjective


Subjective: 





Surgery 





Pt seen and examined. Pt had repeat EGD over the weekend and got esphageal 

varices banding. Hgb improving. Pt intubated and sedated. On PTXC and 

Octreotide drips. Possible paracenthesis today. 





Objective





- Vital Signs/Intake and Output


Vital Signs (last 24 hours): 


 











Temp Pulse Resp BP Pulse Ox


 


 98.7 F   58 L  16   100/61   100 


 


 05/07/18 04:00  05/07/18 06:00  05/07/18 06:00  05/07/18 06:00  05/07/18 06:00








Intake and Output: 


 











 05/07/18 05/07/18





 06:59 18:59


 


Intake Total 840 


 


Output Total 1200 


 


Balance -360 














- Medications


Medications: 


 Current Medications





Acetaminophen (Tylenol 325mg/10.15ml Ud)  325 mg NG Q6 PRN


   PRN Reason: Temperature


   Last Admin: 04/29/18 21:37 Dose:  325 mg


Acetaminophen (Tylenol 650 Mg Supp)  650 mg AR ONCE PRN


   PRN Reason: Fever >100.4 F


   Last Admin: 05/06/18 22:24 Dose:  650 mg


Chlordiazepoxide (Librium)  50 mg PO Q8 [facility]


   Last Admin: 04/27/18 08:49 Dose:  50 mg


Folic Acid (Folic Acid)  1 mg PO DAILY [facility]


   Last Admin: 04/27/18 08:09 Dose:  1 mg


Furosemide (Lasix)  40 mg IVP BID [facility]


   Last Admin: 05/06/18 16:31 Dose:  40 mg


Pantoprazole Sodium 40 mg/ (Sodium Chloride)  100 mls @ 20 mls/hr IVPB Q5H [facility]


   PRN Reason: 8 MG/HR


   Last Admin: 05/07/18 06:32 Dose:  20 mls/hr


Octreotide Acetate 1,250 mcg/ (Sodium Chloride)  252.5 mls @ 10.1 mls/hr IV 

.Q24H [facility]


   PRN Reason: 50 MCG/HR


   Last Admin: 05/06/18 15:31 Dose:  10.1 mls/hr


Tranexamic Acid 3,000 mg/ (Sodium Chloride)  280 mls @ 10.417 mls/hr IV .Q24H 

[facility]


   Last Admin: 05/06/18 21:36 Dose:  10.417 mls/hr


Iron Sucrose 200 mg/ Sodium (Chloride)  110 mls @ 110 mls/hr IVPB DAILY [facility]


   Stop: 05/11/18 09:01


   Last Admin: 05/06/18 15:20 Dose:  110 mls/hr


Dexmedetomidine HCl 400 mcg/ (Sodium Chloride)  100 mls @ 29.04 mls/hr IV 

.Q3H27M [facility]; 1.3 MCG/KG/HR


   PRN Reason: Protocol


   Last Admin: 05/07/18 06:29 Dose:  1.5 mcg/kg/hr, 33.5 mls/hr


Metoclopramide HCl (Reglan)  10 mg IVP Q6 PRN


   PRN Reason: Nausea/Vomiting


   Last Admin: 04/20/18 08:49 Dose:  10 mg


Thiamine HCl (Vitamin B1 Tab)  100 mg PO DAILY [facility]


   Last Admin: 04/27/18 08:09 Dose:  100 mg











- Labs


Labs: 


 





 05/07/18 04:10 





 05/07/18 04:10 





 











PT  15.7 Seconds (9.8-13.1)  H  05/04/18  04:20    


 


INR  1.4  (0.9-1.2)  H  05/04/18  04:20    


 


APTT  36.4 Seconds (25.6-37.1)   05/04/18  04:20    














- Constitutional


Appears: In Acute Distress





- Head Exam


Head Exam: ATRAUMATIC, NORMAL INSPECTION, NORMOCEPHALIC





- Neck Exam


Neck Exam: Normal Inspection





- Respiratory Exam


Respiratory Exam: Respiratory Distress


Additional comments: 





On vent 30% FiO2 





- Cardiovascular Exam


Cardiovascular Exam: Bradycardia





- GI/Abdominal Exam


GI & Abdominal Exam: Distended, Firm, Normal Bowel Sounds.  absent: Guarding, 

Soft, Tenderness, Rebound


Additional comments: 





fluids waves 





- Rectal Exam


Additional comments: 





REctal tube in place 





-  Exam


Additional comments: 





Burger in place: julio urine in bag 





- Extremities Exam


Extremities Exam: Normal Inspection





- Back Exam


Back Exam: NORMAL INSPECTION





- Neurological Exam


Neurological Exam: absent: Alert, Awake, Normal Gait, Oriented x3





- Skin


Skin Exam: Dry, Intact, Normal Color, Warm





Assessment and Plan





- Assessment and Plan (Free Text)


Assessment: 





37M with Upper GI bleed, s/p EGD with banding of 3 varices 


Total 30 PRBC 21 FFP 





Plan: 





monitor NGT output


Monitor hemoglobin


Further recs discuss with Dr Bell

## 2018-05-07 NOTE — CP.CCUPN
CCU Subjective





- Physician Review


Events Since Last Encounter (Free Text): 





05/07/18 13:43


sedated on vent.





CCU Objective





- Vital Signs / Intake & Output


Vital Signs (Last 4 hours): 


Vital Signs











  Temp Pulse Resp BP Pulse Ox


 


 05/07/18 11:46  97.7 F    96/65 L 


 


 05/07/18 11:00   55 L  11 L  96/65 L  100


 


 05/07/18 10:00   53 L  12  97/59 L  100











Intake and Output (Last 8hrs): 


 Intake & Output











 05/06/18 05/07/18 05/07/18





 22:59 06:59 14:59


 


Intake Total 478 640 231


 


Output Total 1400 1200 


 


Balance -922 -560 231


 


Weight   210 lb


 


Intake:   


 


   640 100


 


  Intake, Piggyback 100  131


 


Output:   


 


  Urine 1400 1200 


 


    Urethral (Dunn) 1400 1200 


 


  Stool  0 


 


Other:   


 


  # Bowel Movements 1  














- Physical Exam


Head: Positive for: Atraumatic, Normocephalic


Pupils: Positive for: PERRL.  Negative for: Sluggish, Non-Reactive, Pinpoint


Extroacular Muscles: Positive for: EOMI


Conjunctiva: Negative for: Injected, Icteric, Other


Mouth: Positive for: Moist Mucous Membranes, Dry


Pharnyx: Positive for: Normal


Neck: Positive for: Normal Range of Motion


Respiratory/Chest: Positive for: Clear to Auscultation, Good Air Exchange.  

Negative for: Respiratory Distress, Accessory Muscle Use, Wheezes, Rhonchi


Cardiovascular: Positive for: Regular Rate and Rhythm, Normal S1, S2, Peripheal 

Pulses Present.  Negative for: Murmurs, Irregular Rhythm, Tachycardic, 

Bradycardic


Abdomen: Positive for: Distention, Normal Bowel Sounds.  Negative for: 

Tenderness


Upper Extremity: Positive for: Normal Inspection.  Negative for: Edema


Lower Extremity: Positive for: Normal Inspection.  Negative for: Edema


Neurological: Positive for: Speech Normal


Psychiatric: Positive for: Alert





- Medications


Active Medications: 


Active Medications











Generic Name Dose Route Start Last Admin





  Trade Name Freq  PRN Reason Stop Dose Admin


 


Acetaminophen  325 mg  04/27/18 10:24  04/29/18 21:37





  Tylenol 325mg/10.15ml Ud  NG   325 mg





  Q6 PRN   Administration





  Temperature   


 


Acetaminophen  650 mg  05/06/18 22:11  05/06/18 22:24





  Tylenol 650 Mg Supp  AR   650 mg





  ONCE PRN   Administration





  Fever >100.4 F   


 


Chlordiazepoxide  50 mg  04/26/18 17:00  04/27/18 08:49





  Librium  PO   50 mg





  Q8 [facility]   Administration


 


Folic Acid  1 mg  04/20/18 11:15  04/27/18 08:09





  Folic Acid  PO   1 mg





  DAILY [facility]   Administration


 


Furosemide  40 mg  05/05/18 18:45  05/07/18 10:00





  Lasix  IVP   Not Given





  BID [facility]   


 


Pantoprazole Sodium 40 mg/  100 mls @ 20 mls/hr  04/27/18 12:15  05/07/18 11:46





  Sodium Chloride  IVPB   20 mls/hr





  Q5H [facility]   Administration





  8 MG/HR   


 


Octreotide Acetate 1,250 mcg/  252.5 mls @ 10.1 mls/hr  04/27/18 12:00  05/06/ 18 15:31





  Sodium Chloride  IV   10.1 mls/hr





  .Q24H [facility]   Administration





  50 MCG/HR   


 


Tranexamic Acid 3,000 mg/  280 mls @ 10.417 mls/hr  05/05/18 19:45  05/06/18 21:

36





  Sodium Chloride  IV   10.417 mls/hr





  .Q24H [facility]   Administration


 


Iron Sucrose 200 mg/ Sodium  110 mls @ 110 mls/hr  05/06/18 09:00  05/07/18 10:

19





  Chloride  IVPB  05/11/18 09:01  110 mls/hr





  DAILY [facility]   Administration


 


Dexmedetomidine HCl 400 mcg/  100 mls @ 29.04 mls/hr  05/06/18 10:47  05/07/18 

10:05





  Sodium Chloride  IV   Infused





  .Q3H27M [facility]   Titration





  Protocol   





  1.3 MCG/KG/HR   


 


Metoclopramide HCl  10 mg  04/19/18 22:52  04/20/18 08:49





  Reglan  IVP   10 mg





  Q6 PRN   Administration





  Nausea/Vomiting   


 


Thiamine HCl  100 mg  04/20/18 11:15  04/27/18 08:09





  Vitamin B1 Tab  PO   100 mg





  DAILY [facility]   Administration














- Patient Studies


Lab Studies: 


 Microbiology Studies











 05/05/18 14:00 Blood Culture - Preliminary





 Blood    NO GROWTH AFTER 24 HOURS





 Gram Stain - Final








 Lab Studies











  05/07/18 05/07/18 05/07/18 Range/Units





  09:30 05:01 04:10 


 


WBC     (4.8-10.8)  K/uL


 


RBC     (4.40-5.90)  Mil/uL


 


Hgb     (12.0-18.0)  g/dL


 


Hct     (35.0-51.0)  %


 


MCV     (80.0-94.0)  fl


 


MCH     (27.0-31.0)  pg


 


MCHC     (33.0-37.0)  g/dL


 


RDW     (11.5-14.5)  %


 


Plt Count     (130-400)  K/uL


 


PT  17.4 H    (9.8-13.1)  Seconds


 


INR  1.6 H    (0.9-1.2)  


 


pCO2   37   (35-45)  mm/Hg


 


pO2   61 L   ()  mm/Hg


 


HCO3   26.7   (21-28)  mmol/L


 


ABG pH   7.46 H   (7.35-7.45)  


 


ABG Total CO2   27.4   (22-28)  mmol/L


 


ABG O2 Saturation   95.6   (95-98)  %


 


ABG O2 Content   12.7 L   (15-23)  ML/dL


 


ABG Base Excess   2.4   (-2.0-3.0)  mmol/L


 


ABG Hemoglobin   9.7 L   (11.7-17.4)  g/dL


 


ABG Carboxyhemoglobin   1.8 H   (0.5-1.5)  %


 


POC ABG HHb (Measured)   4.3   (0.0-5.0)  %


 


ABG Methemoglobin   0.7   (0.0-3.0)  %


 


ABG O2 Capacity   13.3 L   (16-24)  mL/dL


 


Anthony Test   Yes   


 


A-a O2 Difference   107.0   mm/Hg


 


Hgb O2 Saturation   93.1 L   (95.0-98.0)  %


 


Vent Mode   A/c   


 


Mechanical Rate   12   


 


FiO2   30.0   %


 


Tidal Volume   500   


 


PEEP   5   


 


Sodium    150 H  (132-148)  mmol/l


 


Potassium    3.6  (3.6-5.0)  MMOL/L


 


Chloride    110 H  ()  mmol/L


 


Carbon Dioxide    24  (22-30)  mmol/L


 


Anion Gap    20  (10-20)  


 


BUN    20  (9-20)  mg/dl


 


Creatinine    1.2  (0.8-1.5)  mg/dl


 


Est GFR (African Amer)    > 60  


 


Est GFR (Non-Af Amer)    > 60  


 


Random Glucose    114 H  ()  mg/dL


 


Calcium    7.8 L  (8.4-10.2)  mg/dL


 


Total Bilirubin    1.8 H  (0.2-1.3)  mg/dl


 


AST    111 H D  (17-59)  U/L


 


ALT    53  (21-72)  U/L


 


Alkaline Phosphatase    109  ()  U/L


 


Total Protein    5.9 L  (6.3-8.2)  G/DL


 


Albumin    2.6 L  (3.5-5.0)  g/dL


 


Globulin    3.3  (2.2-3.9)  gm/dL


 


Albumin/Globulin Ratio    0.8 L  (1.0-2.1)  














  05/07/18 Range/Units





  04:10 


 


WBC  7.8  (4.8-10.8)  K/uL


 


RBC  3.24 L  (4.40-5.90)  Mil/uL


 


Hgb  9.7 L  (12.0-18.0)  g/dL


 


Hct  29.2 L  (35.0-51.0)  %


 


MCV  90.1  (80.0-94.0)  fl


 


MCH  30.0  (27.0-31.0)  pg


 


MCHC  33.3  (33.0-37.0)  g/dL


 


RDW  17.3 H  (11.5-14.5)  %


 


Plt Count  105 L  (130-400)  K/uL


 


PT   (9.8-13.1)  Seconds


 


INR   (0.9-1.2)  


 


pCO2   (35-45)  mm/Hg


 


pO2   ()  mm/Hg


 


HCO3   (21-28)  mmol/L


 


ABG pH   (7.35-7.45)  


 


ABG Total CO2   (22-28)  mmol/L


 


ABG O2 Saturation   (95-98)  %


 


ABG O2 Content   (15-23)  ML/dL


 


ABG Base Excess   (-2.0-3.0)  mmol/L


 


ABG Hemoglobin   (11.7-17.4)  g/dL


 


ABG Carboxyhemoglobin   (0.5-1.5)  %


 


POC ABG HHb (Measured)   (0.0-5.0)  %


 


ABG Methemoglobin   (0.0-3.0)  %


 


ABG O2 Capacity   (16-24)  mL/dL


 


Anthony Test   


 


A-a O2 Difference   mm/Hg


 


Hgb O2 Saturation   (95.0-98.0)  %


 


Vent Mode   


 


Mechanical Rate   


 


FiO2   %


 


Tidal Volume   


 


PEEP   


 


Sodium   (132-148)  mmol/l


 


Potassium   (3.6-5.0)  MMOL/L


 


Chloride   ()  mmol/L


 


Carbon Dioxide   (22-30)  mmol/L


 


Anion Gap   (10-20)  


 


BUN   (9-20)  mg/dl


 


Creatinine   (0.8-1.5)  mg/dl


 


Est GFR (African Amer)   


 


Est GFR (Non-Af Amer)   


 


Random Glucose   ()  mg/dL


 


Calcium   (8.4-10.2)  mg/dL


 


Total Bilirubin   (0.2-1.3)  mg/dl


 


AST   (17-59)  U/L


 


ALT   (21-72)  U/L


 


Alkaline Phosphatase   ()  U/L


 


Total Protein   (6.3-8.2)  G/DL


 


Albumin   (3.5-5.0)  g/dL


 


Globulin   (2.2-3.9)  gm/dL


 


Albumin/Globulin Ratio   (1.0-2.1)  








 Laboratory Results - last 24 hr











  05/07/18 05/07/18 05/07/18





  04:10 04:10 05:01


 


WBC  7.8  


 


RBC  3.24 L  


 


Hgb  9.7 L  


 


Hct  29.2 L  


 


MCV  90.1  


 


MCH  30.0  


 


MCHC  33.3  


 


RDW  17.3 H  


 


Plt Count  105 L  


 


PT   


 


INR   


 


pCO2    37


 


pO2    61 L


 


HCO3    26.7


 


ABG pH    7.46 H


 


ABG Total CO2    27.4


 


ABG O2 Saturation    95.6


 


ABG O2 Content    12.7 L


 


ABG Base Excess    2.4


 


ABG Hemoglobin    9.7 L


 


ABG Carboxyhemoglobin    1.8 H


 


POC ABG HHb (Measured)    4.3


 


ABG Methemoglobin    0.7


 


ABG O2 Capacity    13.3 L


 


Anthony Test    Yes


 


A-a O2 Difference    107.0


 


Hgb O2 Saturation    93.1 L


 


Vent Mode    A/c


 


Mechanical Rate    12


 


FiO2    30.0


 


Tidal Volume    500


 


PEEP    5


 


Sodium   150 H 


 


Potassium   3.6 


 


Chloride   110 H 


 


Carbon Dioxide   24 


 


Anion Gap   20 


 


BUN   20 


 


Creatinine   1.2 


 


Est GFR ( Amer)   > 60 


 


Est GFR (Non-Af Amer)   > 60 


 


Random Glucose   114 H 


 


Calcium   7.8 L 


 


Total Bilirubin   1.8 H 


 


AST   111 H D 


 


ALT   53 


 


Alkaline Phosphatase   109 


 


Total Protein   5.9 L 


 


Albumin   2.6 L 


 


Globulin   3.3 


 


Albumin/Globulin Ratio   0.8 L 














  05/07/18





  09:30


 


WBC 


 


RBC 


 


Hgb 


 


Hct 


 


MCV 


 


MCH 


 


MCHC 


 


RDW 


 


Plt Count 


 


PT  17.4 H


 


INR  1.6 H


 


pCO2 


 


pO2 


 


HCO3 


 


ABG pH 


 


ABG Total CO2 


 


ABG O2 Saturation 


 


ABG O2 Content 


 


ABG Base Excess 


 


ABG Hemoglobin 


 


ABG Carboxyhemoglobin 


 


POC ABG HHb (Measured) 


 


ABG Methemoglobin 


 


ABG O2 Capacity 


 


Anthony Test 


 


A-a O2 Difference 


 


Hgb O2 Saturation 


 


Vent Mode 


 


Mechanical Rate 


 


FiO2 


 


Tidal Volume 


 


PEEP 


 


Sodium 


 


Potassium 


 


Chloride 


 


Carbon Dioxide 


 


Anion Gap 


 


BUN 


 


Creatinine 


 


Est GFR ( Amer) 


 


Est GFR (Non-Af Amer) 


 


Random Glucose 


 


Calcium 


 


Total Bilirubin 


 


AST 


 


ALT 


 


Alkaline Phosphatase 


 


Total Protein 


 


Albumin 


 


Globulin 


 


Albumin/Globulin Ratio 











Fingerstick Blood Sugar Results: 158





Review of Systems





- Review of Systems


Systems not reviewed;Unavailable: Intubated





Critical Care Progress Note





- Nutrition


Nutrition: 


 Nutrition











 Category Date Time Status


 


 NPO Diet [DIET] Diets  04/22/18 Breakfast Active














Assessment/Plan


(1) Acute GI bleeding


Assessment and plan: 


37M re-admitted to ICU after being discharged from hospital 2 days ago for UGIB 

and found to have diffuse gastric inflammation with friability on EGD done 4/16/ 18.  Had recurrence of bloody emesis from grade 2 variceal bleed s/p banding (05 /04)





Neuro: alert, not following commands, precedex gtt - will lighten sedation.





Pulm: acute respiratory failure, on PRVC.  starting PS trial.





CV: hemodynamically stable.





Hem: bleeding has successfully been stopped s/p variceal banding (05/04).  

Continue Venofer for chronic anemia.





Renal: anasarca, diuresing 40 lasix IV q12h.





Endo: starting TPN, considerable risk with placing NGT.  





GI:  CT abdomen shows ileus, minimal ascites.  Enema to promote motility.





ID: no acute issues





DVT proph - SCD's, no a/c with recent severe bleeding


GI proph - protonix


dunn for strict I/O's during acute illness


Code status -  full code





Critical Care Time spent 35 minutes


Multi-disciplinary rounds were performed with house staff, nursing, speech 

therapy, respiratory therapy, pharmacy and nutrition with integrated input from 

the primary team/attending and other consulting services.  The documented time 

is cumulative and includes review of patient data/exams/labs/chart review and 

examination of the patient on rounds and throughout the day; time is exclusive 

of any procedures or teaching time.


Current Visit: Yes   Status: Acute

## 2018-05-07 NOTE — CT
PROCEDURE:  CT Abdomen and Pelvis with contrast



HISTORY:

r/o obstruction



COMPARISON:

None.



TECHNIQUE:

Helical CT of the abdomen and pelvis was performed without oral or 

intravenous contrast as per referring physician request. 



Contrast dose: None



Radiation dose:



Total exam DLP = 1189.75 mGy-cm.



This CT exam was performed using one or more of the following dose 

reduction techniques: Automated exposure control, adjustment of the 

mA and/or kV according to patient size, and/or use of iterative 

reconstruction technique.



FINDINGS:



LOWER THORAX:

Prior bilateral pleural effusions are nearly completely resolved at 

the left and with minimal residual at the right.  Final basilar 

dependent atelectasis is appreciated. Thickening of the distal 

esophagus is in question. Gynecomastia reiterated bilaterally. 



LIVER:

Hepatic splenomegaly persists and remains nonfocal in this unenhanced 

examination. 



GALLBLADDER AND BILE DUCTS:

Gallbladder hydrops is suspected once again. No radiodense 

cholelithiasis associated. 



PANCREAS:

Unremarkable. No gross lesion or ductal dilatation.



SPLEEN:

Unremarkable. 



ADRENALS:

Unremarkable. No mass. 



KIDNEYS AND URETERS:

Unremarkable. No hydronephrosis. No solid mass. 



VASCULATURE:

Unremarkable. No aortic aneurysm. 



BOWEL:

Distended large and small bowel loops are appreciated with occasional 

air-fluid levels in the central abdominal small bowel.  The stomach 

is mildly distended in the interval in overall pattern suggests ileus 

rather than small large-bowel obstruction.  Clinically correlate 

nevertheless. Mild abdominal ascites appreciate predominately in the 

perihepatic and perisplenic spaces as well as at the mid to inferior 

pelvis. No free intraperitoneal gas is demonstrated.



APPENDIX:

None identified however surgical seen at the base of the cecum may 

reflect prior appendectomy.  Clinically correlate. 



PERITONEUM:

S please see bowel section. 



LYMPH NODES:

Unremarkable. No enlarged lymph nodes. 



BLADDER:

A moderate amount of free intraperitoneal fluid is seen the pelvis 

with the urinary bladder mildly distended and thin walled. 



REPRODUCTIVE:

Burger catheter terminates in vagina. Removal and replacement into the 

urethra multiple in the urinary bladder is advised 



BONES:

No acute fracture. Postop changes seen at the right lower quadrant 

could reflect prior cholecystectomy or segmental resection with the 

latter not favored.  Clinically correlate further. 



OTHER FINDINGS:

Anasarca intra and extraperitoneal fatty reactive pattern.



IMPRESSION:

Findings favor a bowel ileus rather than distal large bowel 

obstruction at this time. Mild-to-moderate abdominal pelvic ascites 

noted. No free intraperitoneal gas however.



Burger catheter terminates in posterior vaginal vault.  Removal and 

replacement the urinary bladder is advised. 



Stable pattern splenomegaly. 



Anasarca. 



Diminished right pleural effusion with minimal residual.  None is 

seen the left.  Gynecomastia again noted bilaterally.

## 2018-05-07 NOTE — CP.PCM.PN
Subjective





- Date & Time of Evaluation


Date of Evaluation: 05/07/18


Time of Evaluation: 09:30





- Subjective


Subjective: 


Patient seen and examined bedside. Intubated on MV PRVC AC MODE 12/500/5/30 %  

with ABG   37/61/26/7.4


Off levophed drip and stable.


BP 99/64, Tmax 101.5 last 12 hours  HR 68 


No more bleeding episodes  since last EGD 5/4  and Hgb stable 9.7


On Presedex, protonix , octreotide, tranesamix acid drip


Sedated but responses to verbal command


s/p 30 unit PRBC transfusion and 21 unit FFP transfusion


Abdomen distended 


 





Objective





- Vital Signs/Intake and Output


Vital Signs (last 24 hours): 


 











Temp Pulse Resp BP Pulse Ox


 


 97.7 F   72   19   128/71   100 


 


 05/07/18 11:46  05/07/18 14:00  05/07/18 14:00  05/07/18 14:00  05/07/18 14:00








Intake and Output: 


 











 05/07/18 05/07/18





 06:59 18:59


 


Intake Total 840 231


 


Output Total 1200 


 


Balance -360 231














- Medications


Medications: 


 Current Medications





Acetaminophen (Tylenol 325mg/10.15ml Ud)  325 mg NG Q6 PRN


   PRN Reason: Temperature


   Last Admin: 04/29/18 21:37 Dose:  325 mg


Acetaminophen (Tylenol 650 Mg Supp)  650 mg MI ONCE PRN


   PRN Reason: Fever >100.4 F


   Last Admin: 05/06/18 22:24 Dose:  650 mg


Chlordiazepoxide (Librium)  50 mg PO Q8 [facility]


   Last Admin: 04/27/18 08:49 Dose:  50 mg


Folic Acid (Folic Acid)  1 mg PO DAILY [facility]


   Last Admin: 04/27/18 08:09 Dose:  1 mg


Furosemide (Lasix)  40 mg IVP BID [facility]


   Last Admin: 05/07/18 10:00 Dose:  Not Given


Pantoprazole Sodium 40 mg/ (Sodium Chloride)  100 mls @ 20 mls/hr IVPB Q5H [facility]


   PRN Reason: 8 MG/HR


   Last Admin: 05/07/18 11:46 Dose:  20 mls/hr


Octreotide Acetate 1,250 mcg/ (Sodium Chloride)  252.5 mls @ 10.1 mls/hr IV 

.Q24H [facility]


   PRN Reason: 50 MCG/HR


   Last Admin: 05/06/18 15:31 Dose:  10.1 mls/hr


Tranexamic Acid 3,000 mg/ (Sodium Chloride)  280 mls @ 10.417 mls/hr IV .Q24H 

[facility]


   Last Admin: 05/06/18 21:36 Dose:  10.417 mls/hr


Iron Sucrose 200 mg/ Sodium (Chloride)  110 mls @ 110 mls/hr IVPB DAILY [facility]


   Stop: 05/11/18 09:01


   Last Admin: 05/07/18 10:19 Dose:  110 mls/hr


Dexmedetomidine HCl 400 mcg/ (Sodium Chloride)  100 mls @ 29.04 mls/hr IV 

.Q3H27M [facility]; 1.3 MCG/KG/HR


   PRN Reason: Protocol


   Last Titration: 05/07/18 10:05 Dose:  Infused


Sodium Phosphate 15 mmole/Sodium Acetate 60 meq/Magnesium Sulfate 20 meq/

Multivitamins/Vitamin C 10 ml/Chromium/Copper/Manganese/Zinc 3 ml/ Amino Acids/

Potassium Chloride  1,152.9261 mls @ 30 mls/hr IV .Q24H [facility]


   Stop: 05/08/18 14:29


Thiamine HCl (Vitamin B1 Tab)  100 mg PO DAILY [facility]


   Last Admin: 04/27/18 08:09 Dose:  100 mg











- Labs


Labs: 


 





 05/07/18 04:10 





 05/07/18 04:10 





 











PT  17.4 Seconds (9.8-13.1)  H  05/07/18  09:30    


 


INR  1.6  (0.9-1.2)  H  05/07/18  09:30    


 


APTT  36.4 Seconds (25.6-37.1)   05/04/18  04:20    














- Constitutional


Appears: Chronically Ill, Other (intubated and sedated)





- Head Exam


Head Exam: NORMOCEPHALIC





- Eye Exam


Eye Exam: PERRL





- ENT Exam


ENT Exam: Mucous Membranes Dry





- Neck Exam


Neck Exam: Normal Inspection





- Respiratory Exam


Respiratory Exam: Clear to Ausculation Bilateral, NORMAL BREATHING PATTERN.  

absent: Rhonchi, Wheezes





- Cardiovascular Exam


Cardiovascular Exam: REGULAR RHYTHM, RRR, +S1, +S2.  absent: JVD





- GI/Abdominal Exam


GI & Abdominal Exam: Distended, Hypoactive Bowel Sounds.  absent: Tenderness, 

Rebound





- Rectal Exam


Rectal Exam: Deferred





- Extremities Exam


Additional comments: 





anasarca





- Neurological Exam


Additional comments: 





responds to name calling





- Skin


Skin Exam: Dry, Warm





Assessment and Plan





- Assessment and Plan (Free Text)


Assessment: 


36 y/o male with  known history  of Alcoholism and recent GI bleed, was brought 

in because of hematemesis and alteration in   mental status.


He was recently discharged from this hospital  after an episode of GI bleed .  

EGD done on 4/16 did not show any active bleed.  The patient was discharged 

home and again started drinking.


On day of admission, he  had hematemesis and was noted to be confused and 

agitated.  HGB=6.1.He was transfused with PRBC and FFP, intubated for airway 

protection.  Rpt EGD showed no active bleeding. Since Hgb continued  to drop 

bleeding scan was performed that showed no active source of bleeding .He was 

started on Levophed drip 4/27 for hypotension and underwent CTA of abdomen by 

IR that showed no active bleeding.


Transfused total to date  30  unit PRBC and 21 unit FFP and 3 Platelets 


At present intubated on MV PRVC / AC mode , sedated on Presedex drip ,  off 

Levophed 


On Protonix, Octreotide  and Tranesamix drip 


Underwent repeat CT abdomen 5/2 and EGD 5/1 that showed no bleeding 


s/p repeat EGD on  5/4 with banding of esophageal varices . No more bleeding 

episodes , melena or hematemesis since 5/4 and Hgb stable  . OGT has been 

removed because was   noted to be irritating the gastric mucosa on  repeat EGD 5 /1 


Developed transfusion reaction 5/5 with fever 


Hgb stable at present 


With abdominal distention 





1. Ileus


noted to have abdominal distention 


CT abdomen showed:Distended large and small bowel loops with occasional air-

fluid levels in the central abdominal small bowel.  The stomach is mildly 

distended in the interval in overall pattern suggests ileus rather than small 

large-bowel obstruction.


Will not insert OGT/NGT due to high risk of of bleeding 


Keep NPO 


Continue IVF and start TPN


Fleet Enema





2.Hypovolemic shock  secondary to GI bleed 


off  levophed drip and intubated 


Diuresed last couple of  days well. I/O last 24 hours 1594/3900


Hold off diureses since patient developed hypernatremia and hyperchloremia











3.Acute blood loss anemia sec to GI Bleed secondary  to Bleeding  Esophageal 

Varices


Acute   


s/p total 30 units PRBC and 21 unit FFP transfusion and 3 platelets


s/p 3 EGD and 2 CTA abdomen this admission that showed no active source of 

bleeding 


Underwent EGD  5/4  with banding of esophageal varices. No more bleeding since 

last EGD 


Continue transfusion support as needed.


Hgb stable 9.7 for now   


Continue Protonix, octreotide and Tranesamix drip


Hematology,GI and surgery on consult 


Received DDAVP and Vitamin K 


received   IV Cipro and Vanco  empirically 


keep NPO 


Start TPN 





4. Transfusion reaction 


with fever episode during transfusion on 5/5 and now resolved 


Hb 9.7 








5. Coagulopathy sec to Liver dis from alcoholism


received Vit K, DDAVP


s/p 21 unit FFP transfusion 


Hematology consulted and started tranesamix acid IV 


No more bleeding episodes and Hgb stable 








6.  Cirrhosis with ascites , coagulopathy, thrombocytopenia sec to ETOH abuse 


s/p abdominal paracentesis by IR  4/30  with removal 900 ml ascitic fluid


With anasarca 


CT abdomen today shoed small ascitic fluid 


Lasix PRN 








7. Pneumonia 


CXR : showed resolution of right apex residual consolidation 


procalcitonin was elevated


received  IV Cipro and  IV Vanco





8. Intubated for Airway Protection


on vent PRVC AC mode 12/500/5/30 % 


Continue respiratory toilet 


Stop presedex and started weaning trials today 


Received Cipro and Vanco IV 


Pulmonary on  consult 





9. Alcohol withdrawal, hx of Alcohol Abuse 


Acute  


on Presedex drip for sedation, Ativan 


IVF hydration





10. Hepatic Encephalopathy


Ammonia level was elevated


CT of the head : no bleed








11.DVT proph


SCD


no anticoag sec to GIB and coagulopathy & low Platelet

## 2018-05-07 NOTE — CP.PCM.PN
Subjective





- Date & Time of Evaluation


Date of Evaluation: 05/07/18


Time of Evaluation: 19:40





- Subjective


Subjective: 





Vented





Objective





- Vital Signs/Intake and Output


Vital Signs (last 24 hours): 


 











Temp Pulse Resp BP Pulse Ox


 


 99.2 F   115 H  20   120/83   100 


 


 05/07/18 20:00  05/07/18 21:00  05/07/18 21:00  05/07/18 21:00  05/07/18 21:00








Intake and Output: 


 











 05/07/18 05/08/18





 18:59 06:59


 


Intake Total 594 60


 


Output Total 1900 


 


Balance -1306 60














- Medications


Medications: 


 Current Medications





Acetaminophen (Tylenol 325mg/10.15ml Ud)  325 mg NG Q6 PRN


   PRN Reason: Temperature


   Last Admin: 04/29/18 21:37 Dose:  325 mg


Acetaminophen (Tylenol 650 Mg Supp)  650 mg ID ONCE PRN


   PRN Reason: Fever >100.4 F


   Last Admin: 05/06/18 22:24 Dose:  650 mg


Chlordiazepoxide (Librium)  50 mg PO Q8 [facility]


   Last Admin: 04/27/18 08:49 Dose:  50 mg


Folic Acid (Folic Acid)  1 mg PO DAILY [facility]


   Last Admin: 04/27/18 08:09 Dose:  1 mg


Furosemide (Lasix)  40 mg IVP BID [facility]


   Last Admin: 05/07/18 18:05 Dose:  40 mg


Pantoprazole Sodium 40 mg/ (Sodium Chloride)  100 mls @ 20 mls/hr IVPB Q5H [facility]


   PRN Reason: 8 MG/HR


   Last Admin: 05/07/18 18:04 Dose:  20 mls/hr


Tranexamic Acid 3,000 mg/ (Sodium Chloride)  280 mls @ 10.417 mls/hr IV .Q24H 

[facility]


   Last Admin: 05/06/18 21:36 Dose:  10.417 mls/hr


Iron Sucrose 200 mg/ Sodium (Chloride)  110 mls @ 110 mls/hr IVPB DAILY [facility]


   Stop: 05/11/18 09:01


   Last Admin: 05/07/18 10:19 Dose:  110 mls/hr


Dexmedetomidine HCl 400 mcg/ (Sodium Chloride)  100 mls @ 29.04 mls/hr IV 

.Q3H27M [facility]; 1.3 MCG/KG/HR


   PRN Reason: Protocol


   Last Titration: 05/07/18 10:05 Dose:  Infused


Sodium Phosphate 15 mmole/Sodium Acetate 60 meq/Magnesium Sulfate 20 meq/

Multivitamins/Vitamin C 10 ml/Chromium/Copper/Manganese/Zinc 3 ml/ Amino Acids/

Potassium Chloride  1,152.9261 mls @ 30 mls/hr IV .Q24H [facility]


   Stop: 05/08/18 14:29


   Last Admin: 05/07/18 16:28 Dose:  30 mls/hr


Midazolam HCl (Versed Inj)  2 mg IV Q6H PRN


   PRN Reason: Agitation


Thiamine HCl (Vitamin B1 Tab)  100 mg PO DAILY [facility]


   Last Admin: 04/27/18 08:09 Dose:  100 mg











- Labs


Labs: 


 





 05/07/18 04:10 





 05/07/18 04:10 





 











PT  17.4 Seconds (9.8-13.1)  H  05/07/18  09:30    


 


INR  1.6  (0.9-1.2)  H  05/07/18  09:30    


 


APTT  36.4 Seconds (25.6-37.1)   05/04/18  04:20    














- Head Exam


Head Exam: ATRAUMATIC





- Eye Exam


Eye Exam: Normal appearance





- ENT Exam


ENT Exam: Mucous Membranes Dry





- Respiratory Exam


Respiratory Exam: NORMAL BREATHING PATTERN





- Cardiovascular Exam


Cardiovascular Exam: +S1, +S2





- GI/Abdominal Exam


GI & Abdominal Exam: Normal Bowel Sounds





Assessment and Plan


(1) Acute GI bleeding


Assessment & Plan: 


apppears to be resolving


s/p esophageal varices banding


transfusion support


octreotide


antifibrinolysis


Status: Acute   





(2) Anemia


Assessment & Plan: 


H/H stable


secondary to GI bleeding


transfusion support


on IV iron given low iron stores


Status: Acute   





(3) Thrombocytopenia


Assessment & Plan: 


liver disease


Status: Acute   





(4) Coagulopathy


Assessment & Plan: 


liver disease


s/p vit k and DDAVP


FFP


Status: Acute

## 2018-05-07 NOTE — RAD
HISTORY:

r/o ileus  



COMPARISON:

Abdomen radiographs 04/27/2018.



FINDINGS:



BOWEL:

Interval gaseous distention of the stomach, large and small bowel is 

appreciate suggestive of an ileus rather than bowel obstruction.  

Clinically correlate further nevertheless.  No large free 

intraperitoneal gas collection is appreciate however small to medium 

bones inferior can be missed by portable technique.  Abdomen 

obstructive radiographs are more sensitive for free air. 



BONES:

Normal.



OTHER FINDINGS:

Right common femoral central venous line placed terminating at the 

region of the right common iliac vein.



IMPRESSION:

Bowel ileus suggested. Different diagnosis would be distal large 

bowel obstruction though this is not favored.  Clinically correlate 

further.

## 2018-05-08 NOTE — RAD
PROCEDURE:  CHEST RADIOGRAPH, 1 VIEW



HISTORY:

intubated



COMPARISON:

Portable chest 05/07/2018.



FINDINGS:



LUNGS:

History of motion degrades fine detail the lungs no interval 

infiltrate pleural effusion or pneumothorax identified bilaterally.  

Cardiomediastinal silhouette is stable including endotracheal tube 

placement.  No pulmonary vascular congestion appreciable.



PLEURA:

As above. 



CARDIOVASCULAR:

As above.



OSSEOUS STRUCTURES:

No significant abnormalities.



VISUALIZED UPPER ABDOMEN:

Normal.



OTHER FINDINGS:

None. 



IMPRESSION:

Stable nonacute appearing chest radiograph including endotracheal 

tube placement. No interval infiltrate pleural effusion or 

pneumothorax appreciable.

## 2018-05-08 NOTE — CP.PCM.PN
Subjective





- Date & Time of Evaluation


Date of Evaluation: 05/08/18


Time of Evaluation: 09:00





- Subjective


Subjective: 





Patient opening his eyes and appears more alert. No evidence of bleeding.





Objective





- Vital Signs/Intake and Output


Vital Signs (last 24 hours): 


 











Temp Pulse Resp BP Pulse Ox


 


 99.4 F   89   10 L  134/67   100 


 


 05/08/18 13:00  05/08/18 13:00  05/08/18 13:00  05/08/18 13:00  05/08/18 13:00








Intake and Output: 


 











 05/08/18 05/08/18





 06:59 18:59


 


Intake Total 780 460


 


Output Total 1800 


 


Balance -1020 460














- Medications


Medications: 


 Current Medications





Acetaminophen (Tylenol 325mg/10.15ml Ud)  325 mg NG Q6 PRN


   PRN Reason: Temperature


   Last Admin: 04/29/18 21:37 Dose:  325 mg


Acetaminophen (Tylenol 650 Mg Supp)  650 mg ID ONCE PRN


   PRN Reason: Fever >100.4 F


   Last Admin: 05/06/18 22:24 Dose:  650 mg


Chlordiazepoxide (Librium)  50 mg PO Q8 [facility]


   Last Admin: 04/27/18 08:49 Dose:  50 mg


Folic Acid (Folic Acid)  1 mg PO DAILY [facility]


   Last Admin: 04/27/18 08:09 Dose:  1 mg


Furosemide (Lasix)  40 mg IVP BID [facility]


   Last Admin: 05/08/18 08:33 Dose:  40 mg


Pantoprazole Sodium 40 mg/ (Sodium Chloride)  100 mls @ 20 mls/hr IVPB Q5H [facility]


   PRN Reason: 8 MG/HR


   Last Admin: 05/08/18 09:12 Dose:  20 mls/hr


Tranexamic Acid 3,000 mg/ (Sodium Chloride)  280 mls @ 10.417 mls/hr IV .Q24H 

[facility]


   Last Admin: 05/08/18 00:30 Dose:  10.417 mls/hr


Iron Sucrose 200 mg/ Sodium (Chloride)  110 mls @ 110 mls/hr IVPB DAILY [facility]


   Stop: 05/11/18 09:01


   Last Admin: 05/08/18 08:33 Dose:  110 mls/hr


Dexmedetomidine HCl 400 mcg/ (Sodium Chloride)  100 mls @ 29.04 mls/hr IV 

.Q3H27M [facility]; 1.3 MCG/KG/HR


   PRN Reason: Protocol


   Last Titration: 05/07/18 10:05 Dose:  Infused


Sodium Phosphate 8 mmole/Sodium Acetate 30 meq/Potassium Chloride 20 meq/

Magnesium Sulfate 10 meq/Chromium/Copper/Manganese/Zinc 3 ml/ Multivitamins/

Vitamin C 10 ml/ Amino Acids  1,043.1297 mls @ 60 mls/hr IV .[id number] ONE


   Stop: 05/09/18 07:23


Sodium Phosphate 8 mmole/Sodium Acetate 30 meq/Potassium Chloride 20 meq/

Magnesium Sulfate 10 meq/Amino Acids  1,030.1297 mls @ 60 mls/hr IV .[id number] ONE


   Stop: 05/10/18 01:10


Midazolam HCl (Versed Inj)  2 mg IV Q6H PRN


   PRN Reason: Agitation


Senna/Docusate Sodium (Senokot S 50 Mg-8.6 Mg)  1 tab PO HS [facility]


Thiamine HCl (Vitamin B1 Tab)  100 mg PO DAILY [facility]


   Last Admin: 04/27/18 08:09 Dose:  100 mg











- Labs


Labs: 


 





 05/08/18 04:40 





 05/08/18 04:40 





 











PT  17.4 Seconds (9.8-13.1)  H  05/07/18  09:30    


 


INR  1.6  (0.9-1.2)  H  05/07/18  09:30    


 


APTT  36.4 Seconds (25.6-37.1)   05/04/18  04:20    














- Head Exam


Head Exam: ATRAUMATIC





- Eye Exam


Pupil Exam: PERRL





- ENT Exam


ENT Exam: Normal Exam





- Neck Exam


Neck Exam: Full ROM





- Respiratory Exam


Respiratory Exam: Clear to Ausculation Bilateral





- Cardiovascular Exam


Cardiovascular Exam: REGULAR RHYTHM





- GI/Abdominal Exam


GI & Abdominal Exam: Distended, Normal Bowel Sounds





Assessment and Plan


(1) GI bleed


Assessment & Plan: 


No longer bleeding. KUB showed ileus. May introduce NG tube if needed for 

feeding. 


Status: Acute

## 2018-05-08 NOTE — CP.PCM.PN
Subjective





- Date & Time of Evaluation


Date of Evaluation: 05/08/18


Time of Evaluation: 09:30





- Subjective


Subjective: 





Pt is febrile


remains intubated on Mech Vent - plan to wean pt off Vent


Off Precedex drip


Off pressors


more awake


seem to understand what i an saying -


abd distended


H/H stable


had small amount of black  BM


On TPN for nutrition











Objective





- Vital Signs/Intake and Output


Vital Signs (last 24 hours): 


 











Temp Pulse Resp BP Pulse Ox


 


 101.0 F H  74   20   116/67   95 


 


 05/08/18 08:03  05/08/18 08:03  05/08/18 08:03  05/08/18 08:33  05/08/18 08:03








Intake and Output: 


 











 05/08/18 05/08/18





 06:59 18:59


 


Intake Total 780 


 


Output Total 1800 


 


Balance -1020 














- Medications


Medications: 


 Current Medications





Acetaminophen (Tylenol 325mg/10.15ml Ud)  325 mg NG Q6 PRN


   PRN Reason: Temperature


   Last Admin: 04/29/18 21:37 Dose:  325 mg


Acetaminophen (Tylenol 650 Mg Supp)  650 mg LA ONCE PRN


   PRN Reason: Fever >100.4 F


   Last Admin: 05/06/18 22:24 Dose:  650 mg


Chlordiazepoxide (Librium)  50 mg PO Q8 [facility]


   Last Admin: 04/27/18 08:49 Dose:  50 mg


Folic Acid (Folic Acid)  1 mg PO DAILY [facility]


   Last Admin: 04/27/18 08:09 Dose:  1 mg


Furosemide (Lasix)  40 mg IVP BID [facility]


   Last Admin: 05/08/18 08:33 Dose:  40 mg


Pantoprazole Sodium 40 mg/ (Sodium Chloride)  100 mls @ 20 mls/hr IVPB Q5H [facility]


   PRN Reason: 8 MG/HR


   Last Admin: 05/08/18 03:37 Dose:  20 mls/hr


Tranexamic Acid 3,000 mg/ (Sodium Chloride)  280 mls @ 10.417 mls/hr IV .Q24H 

[facility]


   Last Admin: 05/08/18 00:30 Dose:  10.417 mls/hr


Iron Sucrose 200 mg/ Sodium (Chloride)  110 mls @ 110 mls/hr IVPB DAILY [facility]


   Stop: 05/11/18 09:01


   Last Admin: 05/08/18 08:33 Dose:  110 mls/hr


Dexmedetomidine HCl 400 mcg/ (Sodium Chloride)  100 mls @ 29.04 mls/hr IV 

.Q3H27M [facility]; 1.3 MCG/KG/HR


   PRN Reason: Protocol


   Last Titration: 05/07/18 10:05 Dose:  Infused


Sodium Phosphate 15 mmole/Sodium Acetate 60 meq/Magnesium Sulfate 20 meq/

Multivitamins/Vitamin C 10 ml/Chromium/Copper/Manganese/Zinc 3 ml/ Amino Acids/

Potassium Chloride  1,152.9261 mls @ 30 mls/hr IV .Q24H [facility]


   Stop: 05/08/18 14:29


   Last Admin: 05/07/18 16:28 Dose:  30 mls/hr


Midazolam HCl (Versed Inj)  2 mg IV Q6H PRN


   PRN Reason: Agitation


Thiamine HCl (Vitamin B1 Tab)  100 mg PO DAILY [facility]


   Last Admin: 04/27/18 08:09 Dose:  100 mg











- Labs


Labs: 


 





 05/08/18 04:40 





 05/08/18 04:40 





 











PT  17.4 Seconds (9.8-13.1)  H  05/07/18  09:30    


 


INR  1.6  (0.9-1.2)  H  05/07/18  09:30    


 


APTT  36.4 Seconds (25.6-37.1)   05/04/18  04:20    

















- Constitutional


Appears: Other (Intubated on Mech Vent)





- Head Exam


Head Exam: NORMAL INSPECTION, NORMOCEPHALIC





- Eye Exam


Eye Exam: Normal appearance, PERRL





- ENT Exam


ENT Exam: Mucous Membranes Dry, Normal External Ear Exam








- Respiratory Exam


Respiratory Exam: Rales.  absent: Wheezes


Additional comments: 


Intubated on Vent





- Cardiovascular Exam


Cardiovascular Exam: REGULAR RHYTHM, +S1, +S2





- GI/Abdominal Exam


GI & Abdominal Exam: Distended, Normal BS








- Extremities Exam


Extremities Exam: Normal Capillary Refill





- Neurological Exam


awake


nodded to say he understood what i was saying





- Skin


Skin Exam: Dry, Normal Color, Warm





Assessment and Plan


(1) Acute blood loss anemia


Status: Acute   





(2) GI bleed


Status: Acute   





(3) Alcohol withdrawal


Status: Acute   





(4) Alcohol abuse


Status: Chronic   





(5) Coagulopathy


Status: Acute   





(6) Transaminitis


Status: Acute   





- Assessment and Plan (Free Text)


Assessment: 





38 y/o male with  known history  of Alcoholism and recent GI bleed, was brought 

in because of hematemesis and alteration in   mental status.


He was recently discharged from this hospital  after an episode of GI bleed .  

EGD done on 4/16 did not show any active bleed.  The patient was discharged 

home and again started drinking.


On day of admission, he  had hematemesis and was noted to be confused and 

agitated.  HGB=6.1.He was transfused with PRBC and FFP, intubated for airway 

protection.  Rpt EGD showed no active bleeding. Since Hgb continued  to drop 

bleeding scan was performed that showed no active source of bleeding .He was 

started on Levophed drip 4/27 for hypotension and underwent CTA of abdomen by 

IR that showed no active bleeding.


Transfused total to date  30  unit PRBC and 21 unit FFP and 3 Platelets 


At present intubated on MV PRVC / AC mode , sedated on Presedex drip ,  off 

Levophed 


On Protonix, Octreotide  and Tranesamix drip 


Underwent repeat CT abdomen 5/2 and EGD 5/1 that showed no bleeding 


s/p repeat EGD on  5/4 with banding of esophageal varices . No more bleeding 

episodes , melena or hematemesis since 5/4 and Hgb stable  . OGT has been 

removed because was   noted to be irritating the gastric mucosa on  repeat EGD 5 /1 


Developed transfusion reaction 5/5 with fever 


Hgb stable at present 








1. Ileus


noted to have abdominal distention 


CT abdomen showed:Distended large and small bowel loops with occasional air-

fluid levels in the central abdominal small bowel.  The stomach is mildly 

distended in the interval in overall pattern suggests ileus rather than small 

large-bowel obstruction.


Insert OGT


Keep NPO 


Continue TPN


Fleet Enema





2.Hypovolemic shock  secondary to GI bleed 


off  levophed drip 


Remains intubated-  weaning in progress 


Diuresed last couple of  days well. I/O last 24 hours 1594/3900


Hold off diureses since patient developed hypernatremia and hyperchloremia











3.Acute blood loss anemia sec to GI Bleed secondary  to Bleeding  Esophageal 

Varices


Acute   


s/p total 30 units PRBC and 21 unit FFP transfusion and 3 platelets


s/p 3 EGD and 2 CTA abdomen this admission that showed no active source of 

bleeding 


Underwent EGD  5/4  with banding of esophageal varices. No more bleeding since 

last EGD 


Continue transfusion support as needed.


Hgb stable 9.7 for now   


Continue Protonix, octreotide and Tranesamix drip


Hematology,GI and surgery on consult 


Received DDAVP and Vitamin K 


received   IV Cipro and Vanco  empirically 


keep NPO 


cont  TPN 





4. Transfusion reaction 


with fever episode during transfusion on 5/5


Hb 9.7 








5. Coagulopathy sec to Liver dis from alcoholism


received Vit K, DDAVP


s/p 21 unit FFP transfusion 


Hematology consulted and started tranesamix acid IV 


No more bleeding episodes and Hgb stable 








6.  Cirrhosis with ascites , coagulopathy, thrombocytopenia sec to ETOH abuse 


s/p abdominal paracentesis by IR  4/30  with removal 900 ml ascitic fluid


With anasarca 


CT abdomen today shoed small ascitic fluid 


Lasix PRN 








7. Pneumonia 


CXR : showed resolution of right apex residual consolidation 


procalcitonin was elevated


received  IV Cipro and  IV Vanco





8. Intubated for Airway Protection


on vent PRVC AC mode 12/500/5/30 % 


Continue respiratory toilet 


Off  Precedex and started weaning trials 


Received Cipro and Vanco IV 


Pulmonary on  consult 





9. Alcohol withdrawal, hx of Alcohol Abuse 


Acute  


Off Precedex drip since 5/7


IVF hydration





10. Hepatic Encephalopathy


Ammonia level was elevated


CT of the head : no bleed





11. Fever 


- Panculture - Blood , Sputum, urine


- rpt CXR neg


- WBC ct 13k today





12. DVT proph


SCD


no anticoag sec to GIB and coagulopathy & low Platelet

## 2018-05-08 NOTE — CP.CCUPN
CCU Subjective





- Physician Review


Events Since Last Encounter (Free Text): 





05/08/18 14:35


patient is alert and following commands.  Abdomen still distended.





CCU Objective





- Vital Signs / Intake & Output


Vital Signs (Last 4 hours): 


Vital Signs











  Temp Pulse Resp BP Pulse Ox


 


 05/08/18 13:00  99.4 F  89  10 L  134/67  100


 


 05/08/18 12:00  99.0 F  97 H  17  148/69  100











Intake and Output (Last 8hrs): 


 Intake & Output











 05/07/18 05/08/18 05/08/18





 22:59 06:59 14:59


 


Intake Total 462 600 460


 


Output Total 1900 1800 


 


Balance -1438 -1200 460


 


Intake:   


 


  IV 90 270 


 


  Intake, Piggyback 162 90 460


 


  Oral 0 0 


 


  TPN/ 240 


 


Output:   


 


  Urine 1900 1800 


 


    Urethral (Dunn) 1900 1800 


 


Other:   


 


  # Bowel Movements 1 1 














- Physical Exam


Head: Positive for: Atraumatic, Normocephalic


Pupils: Positive for: PERRL.  Negative for: Sluggish, Non-Reactive, Pinpoint


Extroacular Muscles: Positive for: EOMI


Conjunctiva: Negative for: Injected, Icteric, Other


Mouth: Positive for: Moist Mucous Membranes, Dry


Pharnyx: Positive for: Normal


Neck: Positive for: Normal Range of Motion


Respiratory/Chest: Positive for: Clear to Auscultation, Good Air Exchange.  

Negative for: Respiratory Distress, Accessory Muscle Use, Wheezes, Rhonchi


Cardiovascular: Positive for: Regular Rate and Rhythm, Normal S1, S2, Peripheal 

Pulses Present.  Negative for: Murmurs, Irregular Rhythm, Tachycardic, 

Bradycardic


Abdomen: Positive for: Distention, Normal Bowel Sounds.  Negative for: 

Tenderness


Upper Extremity: Positive for: Normal Inspection.  Negative for: Edema


Lower Extremity: Positive for: Normal Inspection.  Negative for: Edema


Neurological: Positive for: Speech Normal


Psychiatric: Positive for: Alert





- Medications


Active Medications: 


Active Medications











Generic Name Dose Route Start Last Admin





  Trade Name Freq  PRN Reason Stop Dose Admin


 


Acetaminophen  325 mg  04/27/18 10:24  04/29/18 21:37





  Tylenol 325mg/10.15ml Ud  NG   325 mg





  Q6 PRN   Administration





  Temperature   


 


Acetaminophen  650 mg  05/06/18 22:11  05/06/18 22:24





  Tylenol 650 Mg Supp  AK   650 mg





  ONCE PRN   Administration





  Fever >100.4 F   


 


Chlordiazepoxide  50 mg  04/26/18 17:00  04/27/18 08:49





  Librium  PO   50 mg





  Q8 JUAN LUIS   Administration


 


Folic Acid  1 mg  04/20/18 11:15  04/27/18 08:09





  Folic Acid  PO   1 mg





  DAILY JUAN LUIS   Administration


 


Furosemide  40 mg  05/05/18 18:45  05/08/18 08:33





  Lasix  IVP   40 mg





  BID JUAN LUIS   Administration


 


Pantoprazole Sodium 40 mg/  100 mls @ 20 mls/hr  04/27/18 12:15  05/08/18 09:12





  Sodium Chloride  IVPB   20 mls/hr





  Q5H JUAN LUIS   Administration





  8 MG/HR   


 


Tranexamic Acid 3,000 mg/  280 mls @ 10.417 mls/hr  05/05/18 19:45  05/08/18 00:

30





  Sodium Chloride  IV   10.417 mls/hr





  .Q24H JUAN LUIS   Administration


 


Iron Sucrose 200 mg/ Sodium  110 mls @ 110 mls/hr  05/06/18 09:00  05/08/18 08:

33





  Chloride  IVPB  05/11/18 09:01  110 mls/hr





  DAILY JUAN LUIS   Administration


 


Dexmedetomidine HCl 400 mcg/  100 mls @ 29.04 mls/hr  05/06/18 10:47  05/07/18 

10:05





  Sodium Chloride  IV   Infused





  .Q3H27M JUAN LUIS   Titration





  Protocol   





  1.3 MCG/KG/HR   


 


Sodium Phosphate 8 mmole/  1,043.1297 mls @ 60 mls/hr  05/08/18 14:00  





Sodium Acetate 30 meq/  IV  05/09/18 07:23  





Potassium Chloride 20 meq/  .[id number] ONE   





Magnesium Sulfate 10 meq/     





Chromium/Copper/Manganese/Zinc     





3 ml/ Multivitamins/Vitamin C     





10 ml/ Amino Acids     


 


Sodium Phosphate 8 mmole/  1,030.1297 mls @ 60 mls/hr  05/09/18 08:00  





Sodium Acetate 30 meq/  IV  05/10/18 01:10  





Potassium Chloride 20 meq/  .[id number] ONE   





Magnesium Sulfate 10 meq/     





Amino Acids     


 


Midazolam HCl  2 mg  05/07/18 18:24  





  Versed Inj  IV   





  Q6H PRN   





  Agitation   


 


Senna/Docusate Sodium  1 tab  05/08/18 22:00  





  Senokot S 50 Mg-8.6 Mg  PO   





  HS JUAN LUIS   


 


Thiamine HCl  100 mg  04/20/18 11:15  04/27/18 08:09





  Vitamin B1 Tab  PO   100 mg





  DAILY JUAN LUIS   Administration














- Patient Studies


Lab Studies: 


 Microbiology Studies











 05/05/18 14:00 Blood Culture - Preliminary





 Blood    NO GROWTH AFTER 48 HOURS





 Gram Stain - Final








 Lab Studies











  05/08/18 05/08/18 05/08/18 Range/Units





  05:22 04:40 04:40 


 


WBC   13.7 H D   (4.8-10.8)  K/uL


 


RBC   3.62 L   (4.40-5.90)  Mil/uL


 


Hgb   10.7 L   (12.0-18.0)  g/dL


 


Hct   32.7 L   (35.0-51.0)  %


 


MCV   90.4   (80.0-94.0)  fl


 


MCH   29.4   (27.0-31.0)  pg


 


MCHC   32.5 L   (33.0-37.0)  g/dL


 


RDW   18.4 H   (11.5-14.5)  %


 


Plt Count   188   (130-400)  K/uL


 


pCO2  41    (35-45)  mm/Hg


 


pO2  101 H    ()  mm/Hg


 


HCO3  26.4    (21-28)  mmol/L


 


ABG pH  7.42    (7.35-7.45)  


 


ABG Total CO2  27.9    (22-28)  mmol/L


 


ABG O2 Saturation  98.9 H    (95-98)  %


 


ABG O2 Content  14.7 L    (15-23)  ML/dL


 


ABG Base Excess  1.9    (-2.0-3.0)  mmol/L


 


ABG Hemoglobin  10.8 L    (11.7-17.4)  g/dL


 


ABG Carboxyhemoglobin  1.4    (0.5-1.5)  %


 


POC ABG HHb (Measured)  1.1    (0.0-5.0)  %


 


ABG Methemoglobin  1.7    (0.0-3.0)  %


 


ABG O2 Capacity  14.9 L    (16-24)  mL/dL


 


Anthony Test  Yes    


 


A-a O2 Difference  62.0    mm/Hg


 


Hgb O2 Saturation  95.9    (95.0-98.0)  %


 


Vent Mode  Prvc ac    


 


Mechanical Rate  12    


 


FiO2  30.0    %


 


Tidal Volume  500    


 


PEEP  5    


 


Sodium    146  (132-148)  mmol/l


 


Potassium    3.4 L  (3.6-5.0)  MMOL/L


 


Chloride    108 H  ()  mmol/L


 


Carbon Dioxide    29  (22-30)  mmol/L


 


Anion Gap    12  (10-20)  


 


BUN    16  (9-20)  mg/dl


 


Creatinine    1.0  (0.8-1.5)  mg/dl


 


Est GFR (African Amer)    > 60  


 


Est GFR (Non-Af Amer)    > 60  


 


Random Glucose    137 H  ()  mg/dL


 


Calcium    8.3 L  (8.4-10.2)  mg/dL


 


Phosphorus    3.5  (2.5-4.5)  mg/dl


 


Magnesium    2.0  (1.6-2.3)  MG/DL


 


Total Bilirubin    1.9 H  (0.2-1.3)  mg/dl


 


AST    234 H D  (17-59)  U/L


 


ALT    73 H D  (21-72)  U/L


 


Alkaline Phosphatase    178 H D  ()  U/L


 


Total Protein    6.8  (6.3-8.2)  G/DL


 


Albumin    3.1 L  (3.5-5.0)  g/dL


 


Globulin    3.8  (2.2-3.9)  gm/dL


 


Albumin/Globulin Ratio    0.8 L  (1.0-2.1)  


 


Triglycerides     (0-149)  mg/DL














  05/07/18 Range/Units





  18:45 


 


WBC   (4.8-10.8)  K/uL


 


RBC   (4.40-5.90)  Mil/uL


 


Hgb   (12.0-18.0)  g/dL


 


Hct   (35.0-51.0)  %


 


MCV   (80.0-94.0)  fl


 


MCH   (27.0-31.0)  pg


 


MCHC   (33.0-37.0)  g/dL


 


RDW   (11.5-14.5)  %


 


Plt Count   (130-400)  K/uL


 


pCO2   (35-45)  mm/Hg


 


pO2   ()  mm/Hg


 


HCO3   (21-28)  mmol/L


 


ABG pH   (7.35-7.45)  


 


ABG Total CO2   (22-28)  mmol/L


 


ABG O2 Saturation   (95-98)  %


 


ABG O2 Content   (15-23)  ML/dL


 


ABG Base Excess   (-2.0-3.0)  mmol/L


 


ABG Hemoglobin   (11.7-17.4)  g/dL


 


ABG Carboxyhemoglobin   (0.5-1.5)  %


 


POC ABG HHb (Measured)   (0.0-5.0)  %


 


ABG Methemoglobin   (0.0-3.0)  %


 


ABG O2 Capacity   (16-24)  mL/dL


 


Anthony Test   


 


A-a O2 Difference   mm/Hg


 


Hgb O2 Saturation   (95.0-98.0)  %


 


Vent Mode   


 


Mechanical Rate   


 


FiO2   %


 


Tidal Volume   


 


PEEP   


 


Sodium   (132-148)  mmol/l


 


Potassium   (3.6-5.0)  MMOL/L


 


Chloride   ()  mmol/L


 


Carbon Dioxide   (22-30)  mmol/L


 


Anion Gap   (10-20)  


 


BUN   (9-20)  mg/dl


 


Creatinine   (0.8-1.5)  mg/dl


 


Est GFR (African Amer)   


 


Est GFR (Non-Af Amer)   


 


Random Glucose   ()  mg/dL


 


Calcium   (8.4-10.2)  mg/dL


 


Phosphorus   (2.5-4.5)  mg/dl


 


Magnesium   (1.6-2.3)  MG/DL


 


Total Bilirubin   (0.2-1.3)  mg/dl


 


AST   (17-59)  U/L


 


ALT   (21-72)  U/L


 


Alkaline Phosphatase   ()  U/L


 


Total Protein   (6.3-8.2)  G/DL


 


Albumin   (3.5-5.0)  g/dL


 


Globulin   (2.2-3.9)  gm/dL


 


Albumin/Globulin Ratio   (1.0-2.1)  


 


Triglycerides  116  (0-149)  mg/DL








 Laboratory Results - last 24 hr











  05/07/18 05/08/18 05/08/18





  18:45 04:40 04:40


 


WBC    13.7 H D


 


RBC    3.62 L


 


Hgb    10.7 L


 


Hct    32.7 L


 


MCV    90.4


 


MCH    29.4


 


MCHC    32.5 L


 


RDW    18.4 H


 


Plt Count    188


 


pCO2   


 


pO2   


 


HCO3   


 


ABG pH   


 


ABG Total CO2   


 


ABG O2 Saturation   


 


ABG O2 Content   


 


ABG Base Excess   


 


ABG Hemoglobin   


 


ABG Carboxyhemoglobin   


 


POC ABG HHb (Measured)   


 


ABG Methemoglobin   


 


ABG O2 Capacity   


 


Anthony Test   


 


A-a O2 Difference   


 


Hgb O2 Saturation   


 


Vent Mode   


 


Mechanical Rate   


 


FiO2   


 


Tidal Volume   


 


PEEP   


 


Sodium   146 


 


Potassium   3.4 L 


 


Chloride   108 H 


 


Carbon Dioxide   29 


 


Anion Gap   12 


 


BUN   16 


 


Creatinine   1.0 


 


Est GFR ( Amer)   > 60 


 


Est GFR (Non-Af Amer)   > 60 


 


Random Glucose   137 H 


 


Calcium   8.3 L 


 


Phosphorus   3.5 


 


Magnesium   2.0 


 


Total Bilirubin   1.9 H 


 


AST   234 H D 


 


ALT   73 H D 


 


Alkaline Phosphatase   178 H D 


 


Total Protein   6.8 


 


Albumin   3.1 L 


 


Globulin   3.8 


 


Albumin/Globulin Ratio   0.8 L 


 


Triglycerides  116  














  05/08/18





  05:22


 


WBC 


 


RBC 


 


Hgb 


 


Hct 


 


MCV 


 


MCH 


 


MCHC 


 


RDW 


 


Plt Count 


 


pCO2  41


 


pO2  101 H


 


HCO3  26.4


 


ABG pH  7.42


 


ABG Total CO2  27.9


 


ABG O2 Saturation  98.9 H


 


ABG O2 Content  14.7 L


 


ABG Base Excess  1.9


 


ABG Hemoglobin  10.8 L


 


ABG Carboxyhemoglobin  1.4


 


POC ABG HHb (Measured)  1.1


 


ABG Methemoglobin  1.7


 


ABG O2 Capacity  14.9 L


 


Anthony Test  Yes


 


A-a O2 Difference  62.0


 


Hgb O2 Saturation  95.9


 


Vent Mode  Prvc ac


 


Mechanical Rate  12


 


FiO2  30.0


 


Tidal Volume  500


 


PEEP  5


 


Sodium 


 


Potassium 


 


Chloride 


 


Carbon Dioxide 


 


Anion Gap 


 


BUN 


 


Creatinine 


 


Est GFR ( Amer) 


 


Est GFR (Non-Af Amer) 


 


Random Glucose 


 


Calcium 


 


Phosphorus 


 


Magnesium 


 


Total Bilirubin 


 


AST 


 


ALT 


 


Alkaline Phosphatase 


 


Total Protein 


 


Albumin 


 


Globulin 


 


Albumin/Globulin Ratio 


 


Triglycerides 











Fingerstick Blood Sugar Results: 158





Critical Care Progress Note





- Nutrition


Nutrition: 


 Nutrition











 Category Date Time Status


 


 NPO Diet [DIET] Diets  04/22/18 Breakfast Active














Assessment/Plan


(1) Acute GI bleeding


Assessment and plan: 


37M re-admitted to ICU after being discharged from hospital 2 days ago for UGIB 

and found to have diffuse gastric inflammation with friability on EGD done 4/16/ 18.  Had recurrence of bloody emesis from grade 2 variceal bleed s/p banding (05 /04)





Neuro: alert,following commands.





Pulm: tolerating PS trials, will extubate.





CV: hemodynamically stable.





Hem: bleeding has successfully been stopped s/p variceal banding (05/04).  

Continue Venofer for chronic anemia.





Renal: anasarca, diuresing 40 lasix IV q12h, can stop tomorrow.





Endo: no acute issues.





GI:  Started on TPN (05/07).  CT abdomen shows ileus, minimal ascites.  Tube 

feeds, colace/senna to promote motility.  s/s eval tomorrow s/p extubation.





ID: no acute issues





DVT proph - SCD's, no a/c with recent severe bleeding


GI proph - protonix


dunn for strict I/O's during acute illness


Code status -  full code





Critical Care Time spent 35 minutes


Multi-disciplinary rounds were performed with house staff, nursing, speech 

therapy, respiratory therapy, pharmacy and nutrition with integrated input from 

the primary team/attending and other consulting services.  The documented time 

is cumulative and includes review of patient data/exams/labs/chart review and 

examination of the patient on rounds and throughout the day; time is exclusive 

of any procedures or teaching time.


Current Visit: Yes   Status: Acute

## 2018-05-08 NOTE — CP.PCM.PN
Subjective





- Date & Time of Evaluation


Date of Evaluation: 05/08/18


Time of Evaluation: 07:45





- Subjective


Subjective: 





Patient seen and examined this morning. Patient is alert and aware. Able to 

follow commands. No acute events over night. Tolerating CPAP trial. 





Objective





- Vital Signs/Intake and Output


Vital Signs (last 24 hours): 


 











Temp Pulse Resp BP Pulse Ox


 


 101.0 F H  74   20   116/67   95 


 


 05/08/18 08:03  05/08/18 08:03  05/08/18 08:03  05/08/18 08:33  05/08/18 08:03








Intake and Output: 


 











 05/08/18 05/08/18





 06:59 18:59


 


Intake Total 780 


 


Output Total 1800 


 


Balance -1020 














- Medications


Medications: 


 Current Medications





Acetaminophen (Tylenol 325mg/10.15ml Ud)  325 mg NG Q6 PRN


   PRN Reason: Temperature


   Last Admin: 04/29/18 21:37 Dose:  325 mg


Acetaminophen (Tylenol 650 Mg Supp)  650 mg MO ONCE PRN


   PRN Reason: Fever >100.4 F


   Last Admin: 05/06/18 22:24 Dose:  650 mg


Chlordiazepoxide (Librium)  50 mg PO Q8 [facility]IS


   Last Admin: 04/27/18 08:49 Dose:  50 mg


Folic Acid (Folic Acid)  1 mg PO DAILY JUAN LUIS


   Last Admin: 04/27/18 08:09 Dose:  1 mg


Furosemide (Lasix)  40 mg IVP BID [facility]


   Last Admin: 05/08/18 08:33 Dose:  40 mg


Pantoprazole Sodium 40 mg/ (Sodium Chloride)  100 mls @ 20 mls/hr IVPB Q5H JUAN LUIS


   PRN Reason: 8 MG/HR


   Last Admin: 05/08/18 03:37 Dose:  20 mls/hr


Tranexamic Acid 3,000 mg/ (Sodium Chloride)  280 mls @ 10.417 mls/hr IV .Q24H 

JUAN LUIS


   Last Admin: 05/08/18 00:30 Dose:  10.417 mls/hr


Iron Sucrose 200 mg/ Sodium (Chloride)  110 mls @ 110 mls/hr IVPB DAILY JUAN LUIS


   Stop: 05/11/18 09:01


   Last Admin: 05/08/18 08:33 Dose:  110 mls/hr


Dexmedetomidine HCl 400 mcg/ (Sodium Chloride)  100 mls @ 29.04 mls/hr IV 

.Q3H27M JUAN LUIS; 1.3 MCG/KG/HR


   PRN Reason: Protocol


   Last Titration: 05/07/18 10:05 Dose:  Infused


Sodium Phosphate 15 mmole/Sodium Acetate 60 meq/Magnesium Sulfate 20 meq/

Multivitamins/Vitamin C 10 ml/Chromium/Copper/Manganese/Zinc 3 ml/ Amino Acids/

Potassium Chloride  1,152.9261 mls @ 30 mls/hr IV .Q24H ONE


   Stop: 05/08/18 14:29


   Last Admin: 05/07/18 16:28 Dose:  30 mls/hr


Midazolam HCl (Versed Inj)  2 mg IV Q6H PRN


   PRN Reason: Agitation


Thiamine HCl (Vitamin B1 Tab)  100 mg PO DAILY JUAN LUIS


   Last Admin: 04/27/18 08:09 Dose:  100 mg











- Labs


Labs: 


 





 05/08/18 04:40 





 05/08/18 04:40 





 











PT  17.4 Seconds (9.8-13.1)  H  05/07/18  09:30    


 


INR  1.6  (0.9-1.2)  H  05/07/18  09:30    


 


APTT  36.4 Seconds (25.6-37.1)   05/04/18  04:20    














- Constitutional


Appears: No Acute Distress





- Head Exam


Head Exam: NORMOCEPHALIC





- Eye Exam


Eye Exam: EOMI, Normal appearance





- ENT Exam


ENT Exam: Mucous Membranes Moist





- Respiratory Exam


Respiratory Exam: NORMAL BREATHING PATTERN





- Cardiovascular Exam


Cardiovascular Exam: +S1, +S2





- GI/Abdominal Exam


GI & Abdominal Exam: Distended, Soft.  absent: Firm, Guarding, Rigid





- Neurological Exam


Neurological Exam: Alert, Awake





- Psychiatric Exam


Psychiatric exam: Normal Mood





- Skin


Skin Exam: Normal Color, Warm





Assessment and Plan





- Assessment and Plan (Free Text)


Assessment: 





37M with Upper GI bleed, s/p EGD with banding of 3 varices 


Total 30 PRBC 21 FFP 





Plan: 





No plan for surgical intervention


Plan for extubation


Medical management per ICU team


D/w Dr. Arabella Hernandez PGY2

## 2018-05-09 NOTE — CP.PCM.PN
Subjective





- Date & Time of Evaluation


Date of Evaluation: 05/09/18


Time of Evaluation: 19:00





- Subjective


Subjective: 





No complaints.





Objective





- Vital Signs/Intake and Output


Vital Signs (last 24 hours): 


 











Temp Pulse Resp BP Pulse Ox


 


 98.7 F   144 H  25 H  112/61   96 


 


 05/09/18 18:00  05/09/18 18:00  05/09/18 18:00  05/09/18 18:00  05/09/18 18:00








Intake and Output: 


 











 05/09/18 05/10/18





 18:59 06:59


 


Intake Total 1710 


 


Output Total 1400 


 


Balance 310 














- Medications


Medications: 


 Current Medications





Acetaminophen (Tylenol 325mg/10.15ml Ud)  325 mg NG Q6 PRN


   PRN Reason: Temperature


   Last Admin: 05/08/18 16:27 Dose:  325 mg


Acetaminophen (Tylenol 650 Mg Supp)  650 mg FL ONCE PRN


   PRN Reason: Fever >100.4 F


   Last Admin: 05/06/18 22:24 Dose:  650 mg


Chlordiazepoxide (Librium)  50 mg PO Q8 [facility]


   Last Admin: 04/27/18 08:49 Dose:  50 mg


Folic Acid (Folic Acid)  1 mg PO DAILY [facility]


   Last Admin: 04/27/18 08:09 Dose:  1 mg


Furosemide (Lasix)  40 mg IVP DAILY JUAN LUIS


Pantoprazole Sodium 40 mg/ (Sodium Chloride)  100 mls @ 20 mls/hr IVPB Q5H JUAN LUIS


   PRN Reason: 8 MG/HR


   Last Admin: 05/09/18 15:24 Dose:  20 mls/hr


Iron Sucrose 200 mg/ Sodium (Chloride)  110 mls @ 110 mls/hr IVPB DAILY JUAN LUIS


   Stop: 05/11/18 09:01


   Last Admin: 05/09/18 09:44 Dose:  110 mls/hr


Sodium Phosphate 8 mmole/Sodium Acetate 30 meq/Potassium Chloride 20 meq/

Magnesium Sulfate 10 meq/Amino Acids  1,030.1297 mls @ 60 mls/hr IV .[id number] ONE


   Stop: 05/10/18 01:10


   Last Admin: 05/09/18 15:38 Dose:  60 mls/hr


Ciprofloxacin (Cipro 400mg/200ml Dsw)  400 mg in 200 mls @ 200 mls/hr IVPB Q12 

JUAN LUIS


   PRN Reason: Protocol


   Last Admin: 05/09/18 15:22 Dose:  200 mls/hr


Vancomycin HCl 1 gm/ Sodium (Chloride)  250 mls @ 166.667 mls/hr IVPB DAILY JUAN LUIS


   PRN Reason: Protocol


   Last Admin: 05/09/18 14:11 Dose:  166.667 mls/hr


Potassium Chloride 40 meq/Potassium Phosphate 30 mmole/Magnesium Sulfate 5 meq/

Calcium Gluconate 4.5 meq/Multivitamins/Vitamin C 10 ml/Chromium/Copper/

Manganese/Zinc 3 ml/ Amino Acids  1,053.9089 mls @ 60 mls/hr IV .[id number] ONE


   Stop: 05/10/18 10:33


   Last Admin: 05/09/18 16:30 Dose:  60 mls/hr


Lorazepam (Ativan)  1 mg IVP Q4 PRN


   PRN Reason: Agitation


   Last Admin: 05/09/18 16:26 Dose:  1 mg


Senna/Docusate Sodium (Senokot S 50 Mg-8.6 Mg)  1 tab PO HS [facility]


   Last Admin: 05/08/18 21:48 Dose:  1 tab


Thiamine HCl (Vitamin B1 Tab)  100 mg PO DAILY [facility]


   Last Admin: 04/27/18 08:09 Dose:  100 mg











- Labs


Labs: 


 





 05/09/18 04:30 





 05/09/18 04:30 





 











PT  17.4 Seconds (9.8-13.1)  H  05/07/18  09:30    


 


INR  1.6  (0.9-1.2)  H  05/07/18  09:30    


 


APTT  36.4 Seconds (25.6-37.1)   05/04/18  04:20    














- Head Exam


Head Exam: ATRAUMATIC





- Eye Exam


Eye Exam: Normal appearance





- ENT Exam


ENT Exam: Mucous Membranes Dry





- Respiratory Exam


Respiratory Exam: NORMAL BREATHING PATTERN





- Cardiovascular Exam


Cardiovascular Exam: +S1, +S2





- GI/Abdominal Exam


GI & Abdominal Exam: Normal Bowel Sounds





Assessment and Plan


(1) Anemia


Assessment & Plan: 


H/H slightly dipped today


secondary to GI bleeding and iron deficiency


transfusion support


on IV iron given low iron stores


Status: Acute   





(2) Coagulopathy


Assessment & Plan: 


liver disease


nutritional


Status: Acute

## 2018-05-09 NOTE — CP.PCM.PN
Subjective





- Date & Time of Evaluation


Date of Evaluation: 05/09/18


Time of Evaluation: 10:45





- Subjective


Subjective: 





Patient seen and examined. Extubated. AAOx3.





Objective





- Vital Signs/Intake and Output


Vital Signs (last 24 hours): 


 











Temp Pulse Resp BP Pulse Ox


 


 99.3 F   120 H  14   133/74   97 


 


 05/09/18 08:38  05/09/18 08:38  05/09/18 08:38  05/09/18 08:46  05/09/18 08:38








Intake and Output: 


 











 05/09/18 05/09/18





 06:59 18:59


 


Intake Total 990 380


 


Output Total 1200 


 


Balance -210 380














- Medications


Medications: 


 Current Medications





Acetaminophen (Tylenol 325mg/10.15ml Ud)  325 mg NG Q6 PRN


   PRN Reason: Temperature


   Last Admin: 05/08/18 16:27 Dose:  325 mg


Acetaminophen (Tylenol 650 Mg Supp)  650 mg PA ONCE PRN


   PRN Reason: Fever >100.4 F


   Last Admin: 05/06/18 22:24 Dose:  650 mg


Chlordiazepoxide (Librium)  50 mg PO Q8 [facility]


   Last Admin: 04/27/18 08:49 Dose:  50 mg


Folic Acid (Folic Acid)  1 mg PO DAILY [facility]


   Last Admin: 04/27/18 08:09 Dose:  1 mg


Furosemide (Lasix)  40 mg IVP DAILY [facility]


Pantoprazole Sodium 40 mg/ (Sodium Chloride)  100 mls @ 20 mls/hr IVPB Q5H [facility]


   PRN Reason: 8 MG/HR


   Last Admin: 05/09/18 08:47 Dose:  20 mls/hr


Iron Sucrose 200 mg/ Sodium (Chloride)  110 mls @ 110 mls/hr IVPB DAILY [facility]


   Stop: 05/11/18 09:01


   Last Admin: 05/09/18 09:44 Dose:  110 mls/hr


Sodium Phosphate 8 mmole/Sodium Acetate 30 meq/Potassium Chloride 20 meq/

Magnesium Sulfate 10 meq/Amino Acids  1,030.1297 mls @ 60 mls/hr IV .[id number]


   Stop: 05/10/18 01:10


Potassium Chloride (Potassium Chloride 20 Meq/100 Ml)  100 mls @ 50 mls/hr IVPB 

Q2 [facility]


   Stop: 05/09/18 11:59


   Last Admin: 05/09/18 09:42 Dose:  50 mls/hr


Lorazepam (Ativan)  1 mg IVP Q4 PRN


   PRN Reason: Agitation


Senna/Docusate Sodium (Senokot S 50 Mg-8.6 Mg)  1 tab PO HS [facility]


   Last Admin: 05/08/18 21:48 Dose:  1 tab


Thiamine HCl (Vitamin B1 Tab)  100 mg PO DAILY [facility]


   Last Admin: 04/27/18 08:09 Dose:  100 mg











- Labs


Labs: 


 





 05/09/18 04:30 





 05/09/18 04:30 





 











PT  17.4 Seconds (9.8-13.1)  H  05/07/18  09:30    


 


INR  1.6  (0.9-1.2)  H  05/07/18  09:30    


 


APTT  36.4 Seconds (25.6-37.1)   05/04/18  04:20    














- Constitutional


Appears: No Acute Distress





- Head Exam


Head Exam: NORMOCEPHALIC





- Eye Exam


Eye Exam: EOMI, Normal appearance





- ENT Exam


ENT Exam: Mucous Membranes Moist





- Respiratory Exam


Respiratory Exam: NORMAL BREATHING PATTERN





- Cardiovascular Exam


Cardiovascular Exam: +S1, +S2





- GI/Abdominal Exam


GI & Abdominal Exam: Distended, Soft





- Neurological Exam


Neurological Exam: Alert, Awake, Oriented x3





- Psychiatric Exam


Psychiatric exam: Normal Mood





- Skin


Skin Exam: Dry, Normal Color, Warm





Assessment and Plan





- Assessment and Plan (Free Text)


Assessment: 





37M with Upper GI bleed, s/p EGD with banding of 3 varices 


Total 30 PRBC 21 FFP 





Plan: 





No plan for surgical intervention


Will sign off


Reconsult as needed


Medical management per ICU team


Further recs per Dr. Arabella Hernandez PGY2

## 2018-05-09 NOTE — CP.CCUPN
CCU Subjective





- Physician Review


Subjective (Free Text): 








Bleeding appears to have ceased, only on PPi drip, TXA drip, and TPN. He does 

have BMs according to Nursing documentation. Remains NPO and no NGT in  place. 

No rectal tube in place, BM output described as black-brown liquid. He is awake 

and exhibiting chills, denies any abdominal pain when repeatedly asked about 

this symptom. 








Other VS and I/Os reviewed.  Tmax 101F yesterday, now mostly 99F. Fluid 

balance negative 0.7 L last 24H.








ROS:  No other pertinent negs or positives on 10+  system review. 











PMSFH:    All other Nursing and physician documentation reviewed to date; no 

new pertinent info noted relevant to current medical problems.








EXAM-


HEENT: no icterus, no gaze preference, pupils equal and reactive


NECK: No JVD, supple, carotids equal upstroke bilat/no bruits


CHEST: decreased BS bases, no wheezes audible


HEART:  regular tachy, distant, S1S2, no rubs.


ABD:  soft, ++ distention, no tympany, no palp tenderness, BS hypoactive.


EXT: No peripheral/ digital cyanosis, no calf tenderness or palpable cords, 

distal pulses intact and symmetrical. 


GENIT:  +scrotal and penile edema-improved. 


NEURO:  no focal motor deficits, withdraws to deep pain.


SKIN:   no rashes,  warm and dry.











LABS: 





WBC= 8.6


HGB= 9.0   


PLTs=  154K











Kr=037


K= 2.6


LW=351


HCO3= 30


BUN/Cr= 12/0.8


IM=781





Phos= 2.4


Mg= 2.2





AXR: (my interp)  Markedly improved overall bowel gas pattern and distension, 

now mostly small bowel pattern as complared to last CTAP  film pattern. 








IMPRESSION / MAJOR PROBLEMS NOW:


1.  Recurrent UGIB, 2' Variceal bleeding with recent banding


2.  Acute Anemia 2 blood loss; and mild Coagulopathy


3.  AKA on admission: suspect binge ETOH use after last hospital discharge. 


4.  ETOH Withdrawal / Delirium


5.  Azotemia / Dehydration








PLAN:


1.  Continue TPN for another 24H; if he passes swallow eval, will start PO 

liquids and advance accordingly.


2.  Resume empiric abx coverage, will need PICC line, too and remove R femoral 

V TLC. 


3.  Stop TXA.


4.  Decrease Lasix to QD, consider adding Aldactone once abl;e to tolerate PO.


5.  TPN adjusted to account for hypernatremia, hypokalemia and 

hypophosphatemia. TG level pending. 


6.  Try to get OOB to chair. 





CCU Objective





- Vital Signs / Intake & Output


Vital Signs (Last 4 hours): 


Vital Signs











  Temp Pulse Resp BP Pulse Ox


 


 05/09/18 08:46     133/74 


 


 05/09/18 08:38  99.3 F  120 H  14  133/74  97


 


 05/09/18 07:53  99.6 F   18  131/77  100











Intake and Output (Last 8hrs): 


 Intake & Output











 05/08/18 05/09/18 05/09/18





 22:59 06:59 14:59


 


Intake Total 630 720 380


 


Output Total 1500 1200 


 


Balance -870 -480 380


 


Weight   212 lb


 


Intake:   


 


  IV   180


 


  Intake, Piggyback 330 240 200


 


  TPN/ 480 


 


Output:   


 


  Urine 1500 1200 


 


    Urethral (Burger) 1500 1200 


 


Other:   


 


  # Bowel Movements  1

## 2018-05-09 NOTE — RAD
HISTORY:

GI bleed, severe anemia.



COMPARISON:

05/07/2018.



FINDINGS:



BOWEL:

Interval improvement in small bowel dilatation.



No free air identified under the diaphragms. 



BONES:

Normal.



OTHER FINDINGS:

Vascular access catheter again identified.



IMPRESSION:

Improving in distention of small bowel loops identified on prior 

abdominal series 5737

## 2018-05-09 NOTE — CP.PCM.PN
Subjective





- Date & Time of Evaluation


Date of Evaluation: 05/09/18


Time of Evaluation: 12:30





- Subjective


Subjective: 





Pt was extubated yesterday afternoon


Off OGT


Low grade temp however temp measured by rectal


Pt is awake , alert, seem to understand , follows simple command


voice is barely audible


Abd seem less distended


shakes head when asked if in pain


Pt on TPN





 





Objective





- Vital Signs/Intake and Output


Vital Signs (last 24 hours): 


 











Temp Pulse Resp BP Pulse Ox


 


 99.4 F   122 H  14   124/97 H  100 


 


 05/09/18 10:00  05/09/18 11:53  05/09/18 11:53  05/09/18 11:53  05/09/18 11:53








Intake and Output: 


 











 05/09/18 05/09/18





 06:59 18:59


 


Intake Total 990 780


 


Output Total 1200 


 


Balance -210 780














- Medications


Medications: 


 Current Medications





Acetaminophen (Tylenol 325mg/10.15ml Ud)  325 mg NG Q6 PRN


   PRN Reason: Temperature


   Last Admin: 05/08/18 16:27 Dose:  325 mg


Acetaminophen (Tylenol 650 Mg Supp)  650 mg IL ONCE PRN


   PRN Reason: Fever >100.4 F


   Last Admin: 05/06/18 22:24 Dose:  650 mg


Chlordiazepoxide (Librium)  50 mg PO Q8 [facility]


   Last Admin: 04/27/18 08:49 Dose:  50 mg


Folic Acid (Folic Acid)  1 mg PO DAILY [facility]


   Last Admin: 04/27/18 08:09 Dose:  1 mg


Furosemide (Lasix)  40 mg IVP DAILY [facility]


Pantoprazole Sodium 40 mg/ (Sodium Chloride)  100 mls @ 20 mls/hr IVPB Q5H [facility] LUIS


   PRN Reason: 8 MG/HR


   Last Admin: 05/09/18 08:47 Dose:  20 mls/hr


Iron Sucrose 200 mg/ Sodium (Chloride)  110 mls @ 110 mls/hr IVPB DAILY [facility]


   Stop: 05/11/18 09:01


   Last Admin: 05/09/18 09:44 Dose:  110 mls/hr


Sodium Phosphate 8 mmole/Sodium Acetate 30 meq/Potassium Chloride 20 meq/

Magnesium Sulfate 10 meq/Amino Acids  1,030.1297 mls @ 60 mls/hr IV .[id number] ONE


   Stop: 05/10/18 01:10


Ciprofloxacin (Cipro 400mg/200ml Dsw)  400 mg in 200 mls @ 200 mls/hr IVPB Q12 

[facility]UAN LUIS


   PRN Reason: Protocol


Vancomycin HCl 1 gm/ Sodium (Chloride)  250 mls @ 166.667 mls/hr IVPB DAILY [facility]


   PRN Reason: Protocol


Potassium Chloride 40 meq/Potassium Phosphate 30 mmole/Magnesium Sulfate 5 meq/

Calcium Gluconate 4.5 meq/Multivitamins/Vitamin C 10 ml/Chromium/Copper/

Manganese/Zinc 3 ml/ Amino Acids  1,053.9089 mls @ 60 mls/hr IV .[id number] ONE


   Stop: 05/10/18 10:33


Lorazepam (Ativan)  1 mg IVP Q4 PRN


   PRN Reason: Agitation


Senna/Docusate Sodium (Senokot S 50 Mg-8.6 Mg)  1 tab PO HS [facility]


   Last Admin: 05/08/18 21:48 Dose:  1 tab


Thiamine HCl (Vitamin B1 Tab)  100 mg PO DAILY [facility]


   Last Admin: 04/27/18 08:09 Dose:  100 mg











- Labs


Labs: 


 





 05/09/18 04:30 





 05/09/18 04:30 





 











PT  17.4 Seconds (9.8-13.1)  H  05/07/18  09:30    


 


INR  1.6  (0.9-1.2)  H  05/07/18  09:30    


 


APTT  36.4 Seconds (25.6-37.1)   05/04/18  04:20    














- Constitutional


Appears: No Acute Distress, Older Than Stated Age, Chronically Ill





- Head Exam


Head Exam: ATRAUMATIC, NORMAL INSPECTION, NORMOCEPHALIC





- Eye Exam


Eye Exam: EOMI, Normal appearance, PERRL


Pupil Exam: NORMAL ACCOMODATION





- ENT Exam


ENT Exam: Mucous Membranes Dry, Normal External Ear Exam





- Neck Exam


Neck Exam: Full ROM.  absent: Meningismus





- Respiratory Exam


Respiratory Exam: Rales, Rhonchi.  absent: Respiratory Distress





- Cardiovascular Exam


Cardiovascular Exam: Tachycardia, REGULAR RHYTHM, +S1, +S2





- GI/Abdominal Exam


GI & Abdominal Exam: Distended, Normal Bowel Sounds





- Extremities Exam


Extremities Exam: Normal Capillary Refill, Pedal Edema.  absent: Calf Tenderness





- Neurological Exam


Neurological Exam: Alert, Awake


Additional comments: 





voice barely audible


moves all extremities 


follows simple commands





- Psychiatric Exam


Psychiatric exam: Flat Affect





- Skin


Skin Exam: Dry, Normal Color, Warm





Assessment and Plan


(1) Acute blood loss anemia


Status: Acute   





(2) GI bleed


Status: Acute   





(3) Alcohol withdrawal


Status: Acute   





(4) Alcohol abuse


Status: Chronic   





(5) Coagulopathy


Status: Acute   





(6) Transaminitis


Status: Acute   





(7) Ileus


Status: Acute   





- Assessment and Plan (Free Text)


Assessment: 








38 y/o male with  known history  of Alcoholism and recent GI bleed, was brought 

in because of hematemesis and alteration in   mental status.


He was recently discharged from this hospital  after an episode of GI bleed .  

EGD done on 4/16 did not show any active bleed.  The patient was discharged 

home and again started drinking.


On day of admission, he  had hematemesis and was noted to be confused and 

agitated.  HGB=6.1.He was transfused with PRBC and FFP, intubated for airway 

protection.  Rpt EGD showed no active bleeding. Since Hgb continued  to drop 

bleeding scan was performed that showed no active source of bleeding .He was 

started on Levophed drip 4/27 for hypotension and underwent CTA of abdomen by 

IR that showed no active bleeding.


Transfused total to date  30  unit PRBC and 21 unit FFP and 3 Platelets 


At present intubated on MV PRVC / AC mode , sedated on Presedex drip ,  off 

Levophed 


On Protonix, Octreotide  and Tranesamix drip 


Underwent repeat CT abdomen 5/2 and EGD 5/1 that showed no bleeding 


s/p repeat EGD on  5/4 with banding of esophageal varices . No more bleeding 

episodes , melena or hematemesis since 5/4 and Hgb stable  . OGT has been 

removed because was   noted to be irritating the gastric mucosa on  repeat EGD 5 /1 


Developed transfusion reaction 5/5 with fever 


5/8 : Extubated











1. Acute blood loss anemia sec to GI Bleed secondary  to Bleeding  Esophageal 

Varices


Acute   


s/p total 30 units PRBC and 21 unit FFP transfusion and 3 platelets


s/p 3 EGD and 2 CTA abdomen this admission that showed no active source of 

bleeding 


Underwent EGD  5/4  with banding of esophageal varices. No more bleeding since 

last EGD 


Continue transfusion support as needed.


Hgb stable  now   


Continue Protonix


Hematology,GI and surgery on consult 


Received DDAVP and Vitamin K , Tranexamic acid, Octreotide 


received   IV Cipro and Vanco  empirically 


keep NPO 


cont  TPN 





2. Ileus


noted to have abdominal distention 


CT abdomen showed:Distended large and small bowel loops with occasional air-

fluid levels in the central abdominal small bowel.  The stomach is mildly 

distended in the interval in overall pattern suggests ileus rather than small 

large-bowel obstruction.


OGT was inserted - now d/c , pt had BM yesterday 


Keep NPO 


Continue TPN








3.Hypovolemic shock  secondary to GI bleed , resolved 


off  levophed drip 


Pt had massive  transfusion





4. Transfusion reaction 


with fever episode during transfusion on 5/5








5. Coagulopathy sec to Liver dis from alcoholism


received Vit K, DDAVP, Tranexamic acid 


s/p 21 unit FFP transfusion 


Hematology consulted 


No more bleeding episodes and Hgb stable 








6.  Cirrhosis with ascites , coagulopathy, thrombocytopenia sec to ETOH abuse 


s/p abdominal paracentesis by IR  4/30  with removal 900 ml ascitic fluid


With anasarca 


CT abdomen today showed small ascitic fluid 


Lasix PRN 








7. Pneumonia 


CXR : showed resolution of right apex residual consolidation 


procalcitonin was elevated


received  IV Cipro and  IV Vanco





8. Intubated for Airway Protection


on vent PRVC AC mode 12/500/5/30 % 


Continue respiratory toilet 


Off  Precedex and started weaning trials 


Received Cipro and Vanco IV 


Pulmonary on  consult 





9. Alcohol withdrawal, hx of Alcohol Abuse 


Acute  


Off Precedex drip since 5/7


IVF hydration





10. Hepatic Encephalopathy


Ammonia level was elevated


CT of the head : no bleed





11.  Low grade  Fever 


- Panculture - Blood , Sputum, urine


- rpt CXR neg


- WBC ct normal 


- Pt on Cipro and  Vanco





12. DVT proph


SCD


no anticoag sec to GIB and coagulopathy & low Platelet

## 2018-05-10 NOTE — CT
EXAM:

  CT Abdomen and Pelvis With Intravenous Contrast



CLINICAL HISTORY:

  37 years old, male; Condition or disease; Other: Gi bleed, severe anemia; 

Additional info: Abdominal pain.



TECHNIQUE:

  Axial computed tomography images of the abdomen and pelvis with intravenous 

contrast.  All CT scans at this facility use one or more dose reduction 

techniques, viz.: automated exposure control; ma/kV adjustment per patient size 

(including targeted exams where dose is matched to indication; i.e. head); or 

iterative reconstruction technique.

  Coronal and sagittal reformatted images were created and reviewed.



CONTRAST:

  95 mL of [id number] administered intravenously.



COMPARISON:

  CT - ANGIO ABDOMEN   PELVIS W/CONT 2018-05-03 11:29



FINDINGS:

  Limitations:  Motion artifact - mild.

  Lung bases:  Mild peripheral consolidation within RIGHT lower lobe.  Minimal 

patchy peripheral airspace disease right middle lobe.

  Heart:  Borderline cardiomegaly.

  Mediastinum:  Small hiatal hernia.  Mild mural thickening vs underdistention 

of distal esophagus.



 ABDOMEN:

  Liver:  Slightly lobulated contour.  Moderately enlarged.  Mild heterogeneity.

  Gallbladder and bile ducts:  Gallbladder wall thickening.  No calcified 

gallstones.  No significant ductal dilation.

  Pancreas:  No ductal dilation.  No mass.

  Spleen:  Moderately enlarged.

  Adrenals:  No mass.

  Kidneys and ureters:  No mass.  No hydronephrosis.

  Stomach and bowel:  Segmental areas of mild-to-moderate mural thickening of 

large bowel/rectum.  No obstruction.  Small radiopaque density within cecum.  

No obstruction.



 PELVIS:

  Appendix:  No findings to suggest acute appendicitis.

  Bladder:  Burger catheter.

  Reproductive:  Unremarkable as visualized.



 ABDOMEN and PELVIS:

  Intraperitoneal space:  Moderate to large free fluid within abdomen and 

pelvis.  No free air.

  Bones/joints:  Probable bone islands.  Mild degenerative changes of hip 

joints.  No acute fracture.

  Soft tissues:  Mild gynecomastia.  Moderate to extensive diffuse stranding 

throughout subcutaneous tissues.

  Vasculature:  Mild atherosclerotic disease of iliac arteries.  Retroaortic 

LEFT renal vein.  No aneurysm.  Mild paraesophageal varices.  Mild varices 

within upper abdomen.  Collateral vessels within spinal canal.

  Lymph nodes:  No pathologically enlarged lymph nodes.

  Tubes, lines and devices:  Probe or catheter within rectum.



IMPRESSION:     

1.  Probable cirrhosis with portal hypertension.

2.  Segmental bowel wall thickening.  DDX: Colitis, portal colopathy.

3.  Gallbladder wall thickening, nonspecific.

4.  Probable right middle lobe pneumonia.  Cannot exclude right lower lobe 

pneumonia.

5.  Anasarca.

6.  Incidental/non-acute findings are described above.

## 2018-05-10 NOTE — CP.CCUPN
CCU Subjective





- Physician Review


Subjective (Free Text): 








No new signs of any recurrent GI bleeding, underwent repeat CTAP overnight for 

abdominal pain, no new unexpected findings. At 800Am, given Ativan again for 

"shaking." No other distress noted. He is still tremulous, and with more 

questioning, he is able to converse with full simple sentences, he admits to 

feeling cold, but Temps have been low grade. He denies any abd pain now, Burger 

in place and urine seen draining most proximally within the Burger tubing. H 

edenies any throat discomfort, feels thirsty and when aske about food, stated "

I want milk." 








Other VS and I/Os reviewed.  No new fever spikes, mostly 98-99F. Fluid balance 

positive approx 1.0 L last 24H.








ROS:  No other pertinent negs or positives on 10+  system review. 











PMSFH:    All other Nursing and physician documentation reviewed to date; no 

new pertinent info noted relevant to current medical problems.








EXAM-


HEENT: no icterus, no gaze preference, pupils equal and reactive


NECK: No JVD, supple, carotids equal upstroke bilat/no bruits


CHEST: decreased BS bases, no wheezes audible


HEART:  regular tachy, distant, S1S2, no rubs.


ABD:  soft, ++ distention, no tympany, no palp tenderness, BS hypoactive.


EXT: No peripheral/ digital cyanosis, no calf tenderness or palpable cords, 

distal pulses intact and symmetrical. 


GENIT:  +scrotal and penile edema-improved. 


NEURO:  severe weakness and decreased tone in all limbs. 


SKIN:   no rashes,  warm and dry.











LABS: 





WBC= 8.8


HGB= 9.2   


PLTs=  164K





+ 1 of 2 Blood Cx: +GPC in clusters.





Zi=465


K= 2.8


ET=109


HCO3= 29


BUN/Cr= 16/0.8


GF=035





Phos= 3.1


Mg= 2.0


TG= 85





CTAP results reviewed: as above.











IMPRESSION / MAJOR PROBLEMS NOW:


1.  Recurrent UGIB, 2' Variceal bleeding with recent banding


2.  Acute Anemia 2 blood loss; and mild Coagulopathy


3.  AKA on admission: suspect binge ETOH use after last hospital discharge. 


4.  ETOH Withdrawal / Delirium


5.  Azotemia / Dehydration








PLAN:


1.  He has not yet safely passed a Dysphagia eval, bowel function appears 

improved, there are BMs, but has had intermittent lower abdominal pain. 

Continue TPN for another 24H; if he passes swallow eval, will start PO liquids 

and advance accordingly.


2.  Resume empiric abx coverage, will need PICC line, too and remove R femoral 

V TLC. Repeat BCs. 


3.  Stopoed TXA.


4.  Decrease Lasix to QD, consider adding Aldactone once abl;e to tolerate PO.


5.  TPN adjusted to account for hypernatremia, hypokalemia. 


6.  Try to get OOB to chair. Needs PT eval for severe deconditioning and 

genenralized weakness. 














CCU Objective





- Vital Signs / Intake & Output


Vital Signs (Last 4 hours): 


Vital Signs











  Temp Pulse Resp BP Pulse Ox


 


 05/10/18 09:19     104/76 


 


 05/10/18 08:00  99.1 F  136 H  23  114/54 L  93 L


 


 05/10/18 07:51  99.5 F  135 H  15  107/52 L  96











Intake and Output (Last 8hrs): 


 Intake & Output











 05/09/18 05/10/18 05/10/18





 22:59 06:59 14:59


 


Intake Total 980 760 160


 


Output Total 1400 250 


 


Balance -420 510 160


 


Weight   212 lb 5 oz


 


Intake:   


 


    160


 


  Intake, Piggyback 480 280 


 


  TPN/ 480 


 


Output:   


 


  Urine 1400 250 


 


    Urethral (Burger) 1400 250

## 2018-05-10 NOTE — CP.PCM.PN
Subjective





- Date & Time of Evaluation


Date of Evaluation: 05/10/18


Time of Evaluation: 08:00





- Subjective


Subjective: 


Patient was seen and examined bedside.Chronically ill male lying in bed in NAD. 

With episodes of tremors / shaking , tachycardic  /76 saturating 93 

% on 2 L O2 via NC , febrile Tmax 100.7 


Lethargic  , opens eyes to name calling , not responding to any questions and 

does not follow any commands .


1 Blood Cx positive for gram positie cocci 


WbC 8.8 K Hgb 9.2 pLT 164 k  k 2.8 


I/O 2680/1650


CT ABDOMEN SHOWED 1.  Probable cirrhosis with portal hypertension.  Segmental 

bowel wall thickening.  DDX: Colitis, portal colopathy.  


 3.  Gallbladder wall thickening, nonspecific.    Probable right middle lobe 

pneumonia.  Cannot exclude right lower lobe   


 pneumonia.  Anasarca.  





Objective





- Vital Signs/Intake and Output


Vital Signs (last 24 hours): 


 











Temp Pulse Resp BP Pulse Ox


 


 99.1 F   136 H  23   104/76   93 L


 


 05/10/18 08:00  05/10/18 08:00  05/10/18 08:00  05/10/18 09:19  05/10/18 08:00








Intake and Output: 


 











 05/10/18 05/10/18





 06:59 18:59


 


Intake Total 970 160


 


Output Total 250 


 


Balance 720 160














- Medications


Medications: 


 Current Medications





Acetaminophen (Tylenol 325mg/10.15ml Ud)  325 mg NG Q6 PRN


   PRN Reason: Temperature


   Last Admin: 05/08/18 16:27 Dose:  325 mg


Acetaminophen (Tylenol 650 Mg Supp)  650 mg CO ONCE PRN


   PRN Reason: Fever >100.4 F


   Last Admin: 05/06/18 22:24 Dose:  650 mg


Chlordiazepoxide (Librium)  50 mg PO Q8 JUAN LUIS


   Last Admin: 04/27/18 08:49 Dose:  50 mg


Folic Acid (Folic Acid)  1 mg PO DAILY JUAN LUIS


   Last Admin: 04/27/18 08:09 Dose:  1 mg


Furosemide (Lasix)  40 mg IVP DAILY JUNA LUIS


   Last Admin: 05/10/18 09:19 Dose:  40 mg


Pantoprazole Sodium 40 mg/ (Sodium Chloride)  100 mls @ 20 mls/hr IVPB Q5H JUAN LUIS


   PRN Reason: 8 MG/HR


   Last Admin: 05/10/18 05:30 Dose:  20 mls/hr


Iron Sucrose 200 mg/ Sodium (Chloride)  110 mls @ 110 mls/hr IVPB DAILY JUAN LUIS


   Stop: 05/11/18 09:01


   Last Admin: 05/09/18 09:44 Dose:  110 mls/hr


Ciprofloxacin (Cipro 400mg/200ml Dsw)  400 mg in 200 mls @ 200 mls/hr IVPB Q12 

JUAN LUIS


   PRN Reason: Protocol


   Last Admin: 05/10/18 09:17 Dose:  200 mls/hr


Vancomycin HCl 1 gm/ Sodium (Chloride)  250 mls @ 166.667 mls/hr IVPB DAILY JUAN LUIS


   PRN Reason: Protocol


   Last Admin: 05/10/18 09:20 Dose:  166.667 mls/hr


Potassium Chloride 40 meq/Potassium Phosphate 30 mmole/Magnesium Sulfate 5 meq/

Calcium Gluconate 4.5 meq/Multivitamins/Vitamin C 10 ml/Chromium/Copper/

Manganese/Zinc 3 ml/ Amino Acids  1,053.9089 mls @ 60 mls/hr IV .[id number] ONE


   Stop: 05/10/18 10:33


   Last Admin: 05/09/18 16:30 Dose:  60 mls/hr


Potassium Chloride (Potassium Chloride 20 Meq/100 Ml)  100 mls @ 50 mls/hr IVPB 

Q2 JUAN LUIS


   Stop: 05/10/18 15:59


   Last Admin: 05/10/18 09:19 Dose:  50 mls/hr


Lorazepam (Ativan)  1 mg IVP Q4 PRN


   PRN Reason: Agitation


   Last Admin: 05/10/18 07:57 Dose:  1 mg


Senna/Docusate Sodium (Senokot S 50 Mg-8.6 Mg)  1 tab PO HS [facility]


   Last Admin: 05/09/18 22:23 Dose:  1 tab


Thiamine HCl (Vitamin B1 Tab)  100 mg PO DAILY [facility]


   Last Admin: 04/27/18 08:09 Dose:  100 mg











- Labs


Labs: 


 





 05/10/18 05:00 





 05/10/18 05:00 





 











PT  17.4 Seconds (9.8-13.1)  H  05/07/18  09:30    


 


INR  1.6  (0.9-1.2)  H  05/07/18  09:30    


 


APTT  36.4 Seconds (25.6-37.1)   05/04/18  04:20    














- Constitutional


Appears: Confused, Chronically Ill, Other (lethragic sickly looking)





- Head Exam


Head Exam: NORMOCEPHALIC





- Eye Exam


Eye Exam: PERRL





- ENT Exam


ENT Exam: Mucous Membranes Dry





- Neck Exam


Neck Exam: Normal Inspection





- Respiratory Exam


Respiratory Exam: Decreased Breath Sounds (bibasilar ).  absent: Rhonchi, 

Wheezes





- Cardiovascular Exam


Cardiovascular Exam: Tachycardia.  absent: JVD





- GI/Abdominal Exam


GI & Abdominal Exam: Soft.  absent: Guarding, Tenderness, Rebound





- Rectal Exam


Rectal Exam: Deferred





- Extremities Exam


Additional comments: 





Anasarca





- Neurological Exam


Neurological Exam: Alert, Awake


Additional comments: 





lethargic


not following any commands 








- Psychiatric Exam


Psychiatric exam: Flat Affect





- Skin


Skin Exam: Dry, Warm





Assessment and Plan





- Assessment and Plan (Free Text)


Assessment: 


38 y/o male with  known history  of Alcoholism and recent GI bleed, was brought 

in because of hematemesis and alteration in   mental status.


He was recently discharged from this hospital  after an episode of GI bleed .  

EGD done on 4/16 did not show any active bleed.  The patient was discharged 

home and again started drinking.


On day of admission, he  had hematemesis and was noted to be confused and 

agitated.  HGB=6.1.He was transfused with PRBC and FFP, intubated for airway 

protection. Underwent 2 EGD and 2  bleeding scan that failed to showan active 

source of bleeding .He was started on Levophed drip 4/27 for hypotension and 

underwent CTA of abdomen by IR that showed no active bleeding.


Transfused total to date  30  unit PRBC and 21 unit FFP and 3 Platelets 


Was intubated  sedated on Presedex drip , octreotide,tranesamix  and   Levophed 

drip 


Had repeat EGD on  5/4 with banding of esophageal varices . No more bleeding 

episodes , melena or hematemesis since 5/4 and Hgb stable.


Off Levophed , octreotide and Transemix drip. Extubated on 5/8 and maintaining 

his airway. On TPN for nutrition 


Still lethargic , with fever , tachycardic , tremors/ chills and blood cx 

positive for Gram positive cocci


CT abdomen showing right middle lobe infiltrtae and possible right lower lobe 

infiltrate 











1. Acute blood loss anemia sec to GI Bleed secondary  to Bleeding  Esophageal 

Varices


Acute   


s/p total 30 units PRBC and 21 unit FFP transfusion and 3 platelets


s/p 3 EGD and 2 CTA abdomen this admission 


Underwent EGD  5/4  with banding of esophageal varices. No more bleeding since 

last EGD 


Hgb stable  now   


Continue Protonix


Hematology,GI and surgery on consult 


Received DDAVP and Vitamin K , Tranexamic acid, Octreotide 


was on   IV Cipro and Vanco  empirically 


cont  TPN 


patient is too lethargic


Swallow eval 





2. Ileus -resolved 


noted to have abdominal distention 


CT abdomen showed:Distended large and small bowel loops with occasional air-

fluid levels in the central abdominal small bowel.  The stomach is mildly 

distended in the interval in overall pattern suggests ileus rather than small 

large-bowel obstruction.


OGT was inserted - now d/c , pt had BM yesterday 


Keep NPO 


Continue TPN








3.Suspected sepsis / bacteremia and Pneumonia


febrile, lethargic with shivers, tachycardic 


CT abdomen showing right middle lobe and lower lobe infiltrate


1 blood Cx positive for gram positive cocci


Will repeat blood cx


resumed vanco and Cipro IV


Check procalcitonin , lactic acid 


CT chest without contrast 


Will Place PICC line and remove TLC 








4.Hypovolemic shock  secondary to GI bleed , resolved 


off  levophed drip 


had massive  transfusion





5. Transfusion reaction 


with fever episode during transfusion on 5/5








6. Coagulopathy sec to Liver dis from alcoholism


received Vit K, DDAVP, Tranexamic acid 


s/p 21 unit FFP transfusion 


Hematology consulted 


No more bleeding episodes and Hgb stable 








7.  Cirrhosis with ascites , coagulopathy, thrombocytopenia sec to ETOH abuse 


s/p abdominal paracentesis by IR  4/30  with removal 900 ml ascitic fluid


With anasarca 


repeat CT abdomen showed small ascitic fluid 


Decreased Lasix to 40 mg Iv daily  








8. Pneumonia 


RML and RLL


resumed vanco and Cipro


CT chest ordered


check Procalcitonin 


extubated 5/8








9.  Alcohol Abuse 


has been on Presedex drip for more than 2 weeks


passed withdrawal period


off presedex








10. Hepatic Encephalopathy


CT of the head : no bleed


check ammonia level 





11.Hypernatremia/ Hypokalemia/ hyperchloremia


secondary to diuretic use 


Decreased Lasix to once daily 


monitor electrolytes daily since patient is on TPN 








12. DVT proph


SCD


no anticoag sec to GIB and coagulopathy & low Platelet

## 2018-05-11 NOTE — CARD
--------------- APPROVED REPORT --------------





EXAM: Two-dimensional and M-mode echocardiogram with Doppler and 

color Doppler.



Other Information 

Quality : ExcellentRhythm : NSR



INDICATION

Infection:



2D DIMENSIONS 

IVSd1.03   (0.7-1.1cm)LVDd5.22   (3.9-5.9cm)

LVOT Diameter2.40   (1.8-2.4cm)PWd0.84   (0.7-1.1cm)

IVSs1.56   (0.8-1.2cm)LVDs2.85   (2.5-4.0cm)

FS (%) 45.4   %PWs1.71   (0.8-1.2cm)



M-Mode DIMENSIONS 

Left Atrium (MM)4.76   (2.5-4.0cm)IVSd0.79   (0.7-1.1cm)

Aortic Root3.35   (2.2-3.7cm)LVDd5.56   (4.0-5.6cm)

Aortic Cusp Exc.2.29   (1.5-2.0cm)PWd0.88   (0.7-1.1cm)

IVSs1.56   cmFS (%) 45   %

LVDs3.06   (2.0-3.8cm)PWs1.53   cm



Mitral Valve

E/A ratio0.0



TDI

E/Lateral E'0.0E/Medial E'0.0



Pulmonary Valve

PV Peak [id number]/s



 LEFT VENTRICLE 

The left ventricle is normal size.

There is normal left ventricular wall thickness.

The left ventricular function is normal.

The left ventricular ejection fraction is  60%

There is normal LV segmental wall motion.

The left ventricular diastolic function is normal.

No left ventricle thrombus noted on this study.

There is no ventricular septal defect visualized.

There is no left ventricular aneurysm. 

There is no mass noted in the left ventricle.



 RIGHT VENTRICLE 

The right ventricle is normal size.

There is normal right ventricular wall thickness.

The right ventricular systolic function is normal.



 ATRIA 

The left atrium size is normal.

The right atrium size is normal.

The interatrial septum is intact with no evidence for an atrial 

septal defect.



 AORTIC VALVE 

The aortic valve is normal in structure.

No aortic regurgitation is present.

There is no aortic valvular stenosis. 

There is no aortic valvular vegetation.



 MITRAL VALVE 

The mitral valve is normal in structure.

There is no evidence of mitral valve prolapse.

There is no mitral valve stenosis.

There is no mitral valve regurgitation noted.



 TRICUSPID VALVE 

The tricuspid valve is normal in structure.

There is no tricuspid valve regurgitation noted.

There is no tricuspid valve prolapse or vegetation.

There is no tricuspid valve stenosis. 



 PULMONIC VALVE 

The pulmonary valve is normal in structure.

There is no pulmonic valvular regurgitation. 

There is no pulmonic valvular stenosis.



 GREAT VESSELS 

The aortic root is normal in size.

The ascending aorta is normal in size.

The IVC is normal in size and collapses >50% with inspiration.



 PERICARDIAL EFFUSION 

The pericardium appears normal.

There is no pleural effusion.



<Conclusion>

Normal Echocardiogram

## 2018-05-11 NOTE — CP.CCUPN
CCU Subjective





- Physician Review


Subjective (Free Text): 








Not as interactive today compared to yesterday. Also febrile now to 101.3F now, 

+exhibiting chills. Cleared by SLP for PO diet yesterday, but not today, too 

lethargic. 








Other VS and I/Os reviewed.  








ROS:  No other pertinent negs or positives on 10+  system review. 











PMSFH:    All other Nursing and physician documentation reviewed to date; no 

new pertinent info noted relevant to current medical problems.








EXAM-


HEENT: no icterus, no gaze preference, pupils equal and reactive


NECK: No JVD, supple, carotids equal upstroke bilat/no bruits


CHEST: decreased BS bases, no wheezes audible


HEART:  regular tachy, distant, S1S2, no rubs.


ABD:  soft, ++ distention, no tympany, no palp tenderness, BS hypoactive.


EXT: No peripheral/ digital cyanosis, no calf tenderness or palpable cords, 

distal pulses intact and symmetrical. 


GENIT:  +scrotal and penile edema-improved. 


NEURO:  severe weakness and decreased tone in all limbs. 


SKIN:   no rashes,  warm and dry.











LABS: 





WBC= 8.0


HGB= 8.5   


PLTs=  159K





+ 1 of 2 Blood Cx: +GPC in clusters 5/9/18





Jy=476


K= 2.9


PQ=852


HCO3= 27


BUN/Cr= 19/0.8


AH=420





Phos= 2.7


Mg= 2.1


TG= 76





CT chest reviewed: minor R sided infiltrates and R>L effusion. 











IMPRESSION / MAJOR PROBLEMS NOW:


1.  Recurrent UGIB, 2' Variceal bleeding with recent banding


2.  Acute Anemia 2 blood loss; and mild Coagulopathy


3.  AKA on admission: suspect binge ETOH use after last hospital discharge. 


4.  ETOH Withdrawal / Delirium


5.  Azotemia / Dehydration








PLAN:


1.  He has not yet safely passed Dysphagia eval. Continue TPN for another 24H; 

if he passes swallow eval, will start PO liquids and advance accordingly for 

combination  TPN and PO feeds. 


2.  Resume empiric abx coverage, needs PICC line, too and remove R femoral V 

TLC. Repeat BCs. 


3.  Decrease Lasix to QD, consider adding Aldactone once abl;e to tolerate PO.


4.  TPN adjusted to account for hypernatremia, hypokalemia. 


5.  Needs PT eval for severe deconditioning and generalized weakness. Not done 

yesterday, no fevers noted at the time of eval.  

















CCU Objective





- Vital Signs / Intake & Output


Vital Signs (Last 4 hours): 


Vital Signs











  Temp Pulse Resp BP Pulse Ox


 


 05/11/18 12:00  101.3 F H  119 H  24  109/63  100


 


 05/11/18 10:00   112 H  17  123/73  100











Intake and Output (Last 8hrs): 


 Intake & Output











 05/10/18 05/11/18 05/11/18





 22:59 06:59 14:59


 


Intake Total 


 


Output Total 700  


 


Balance 


 


Weight   212 lb


 


Intake:   


 


    460


 


  Intake, Piggyback 100  550


 


  Oral 240  25


 


  TPN/ 480 


 


Output:   


 


  Urine 700  


 


    Urethral (Burger) 700  


 


Other:   


 


  # Bowel Movements  1

## 2018-05-11 NOTE — CT
PROCEDURE:  CT chest dated 05/10/2018 



HISTORY:

Rule out pneumonia. 



COMPARISON:

No prior study available for comparison however correlation made with 

chest radiograph 05/08/2018



TECHNIQUE:

Contiguous axial images were obtained through the chest without 

intravenous contrast enhancement. Sagittal and coronal 

reconstructions were performed.







Radiation dose (DLP): 472.92 MGy-cm. 



This CT exam was performed using one or more of the following dose 

reduction techniques: Automated exposure control, adjustment of the 

mA and/or kV according to patient size, and/or use of iterative 

reconstruction technique. . 



Note that the examination is limited by streak and motion artifact. 



FINDINGS:



LUNGS:

There are small bilateral effusions with with right basilar 

atelectasis and or infiltrate. .  Minimal left basilar atelectasis. 

There are areas of apparent subsegmental atelectasis and/or 

infiltrate left upper lobe with curvilinear areas of atelectasis 

and/or scar. Ground-glass opacities are also present in the right 

middle lobe. 



MEDIASTINUM:

Heart appears enlarged. Trace pericardial effusion. . There is 

moderate size hiatal hernia. No significant mediastinal adenopathy.  

Evaluation for hilar adenopathy is limited due to the lack of 

circulating intravenous contrast material. 



Note that evaluation of the thyroid gland is limited due to streak 

and beam hardening artifact arising from dense clavicles and shoulder 

girdles. 



PLEURA:

Small bilateral effusions right larger than left. . 



BONES:

No fracture. No destructive lesion. 



UPPER ABDOMEN:

Upper abdominal ascites again noted. Splenomegaly. .  Note that the 

liver is incompletely seen though also appears enlarged. . 

Questionable intraluminal gallbladder calculus. Suspect persistent 

residual nephrograms related to intravenous contrast material on 

injected for prior CT scan abdomen pelvis earlier same day 



OTHER FINDINGS:

Mild changes of bilateral gynecomastia.



IMPRESSION:

Small bilateral effusions right larger than left with mild right 

basilar atelectasis and or infiltrate. .  There also subsegmental 

atelectatic and or infiltrate changes seen in the right upper lobe 

with linear atelectasis and or scarring.  Additionally, there are 

ground-glass opacities in the right middle lobe. 



Cardiomegaly.  Small with suspected trace pericardial effusion. 



Splenomegaly. Note that the liver is incompletely seen though also 

appears enlarged. 



Upper abdominal ascites again seen. .

## 2018-05-11 NOTE — PCM.SURG1
Surgeon's Initial Post Op Note





- Surgeon's Notes


Surgeon: Israel Rivas MD


Assistant: None


Type of Anesthesia: Local


Pre-Operative Diagnosis: bacteremia, UGIB


Operative Findings: patent right basilic vein.  catheter length: 38 cm.  

catheter tip: cavoatrial junction


Post-Operative Diagnosis: same


Operation Performed: RUE PICC Insertion


Specimen/Specimens Removed: n/a


Estimated Blood Loss: EBL {In ML}: 3


Date of Surgery/Procedure: 05/11/18


Time of Surgery/Procedure: 15:53

## 2018-05-11 NOTE — CP.PCM.PN
Subjective





- Date & Time of Evaluation


Date of Evaluation: 05/11/18


Time of Evaluation: 09:30





- Subjective


Subjective: 


Patient was seen and examined bedside.Chronically ill male, awake , alert , 

following simple commands , trying to communicate. With episodes of tremors / 

shaking , tachycardic -119 /63 saturating 100 % on 4 L O2 via NC , 

febrile Tmax 101.3 


Appears weak with very low unintelligible voice, very poor almost no PO intake  


1 Blood Cx positive for staph coag negative 


Sputum cx positive for yeast


no more bleeding episodes since the last EGD and Hgb stable


on TPN 


WbC 8.8 K Hgb 8.5 pLT 159 k  k 2.9


procalcitonin elevated 4.9  








Objective





- Vital Signs/Intake and Output


Vital Signs (last 24 hours): 


 











Temp Pulse Resp BP Pulse Ox


 


 97.7 F   112 H  17   123/73   100 


 


 05/11/18 08:00  05/11/18 10:00  05/11/18 10:00  05/11/18 10:00  05/11/18 10:00








Intake and Output: 


 











 05/11/18 05/11/18





 06:59 18:59


 


Intake Total 820 715


 


Balance 820 715














- Medications


Medications: 


 Current Medications





Acetaminophen (Tylenol 325mg/10.15ml Ud)  325 mg NG Q6 PRN


   PRN Reason: Temperature


   Last Admin: 05/08/18 16:27 Dose:  325 mg


Acetaminophen (Tylenol 650 Mg Supp)  650 mg GA ONCE PRN


   PRN Reason: Fever >100.4 F


   Last Admin: 05/10/18 23:22 Dose:  650 mg


Chlordiazepoxide (Librium)  50 mg PO Q8 JUAN LUIS


   Last Admin: 04/27/18 08:49 Dose:  50 mg


Folic Acid (Folic Acid)  1 mg PO DAILY JUAN LUIS


   Last Admin: 04/27/18 08:09 Dose:  1 mg


Furosemide (Lasix)  40 mg IVP DAILY JUAN LUIS


   Last Admin: 05/11/18 08:07 Dose:  40 mg


Pantoprazole Sodium 40 mg/ (Sodium Chloride)  100 mls @ 20 mls/hr IVPB Q5H JUAN LUIS


   PRN Reason: 8 MG/HR


   Last Admin: 05/11/18 08:06 Dose:  20 mls/hr


Ciprofloxacin (Cipro 400mg/200ml Dsw)  400 mg in 200 mls @ 200 mls/hr IVPB Q12 

JUAN LUIS


   PRN Reason: Protocol


   Last Admin: 05/11/18 08:06 Dose:  200 mls/hr


Vancomycin HCl 1 gm/ Sodium (Chloride)  250 mls @ 166.667 mls/hr IVPB DAILY JUAN LUIS


   PRN Reason: Protocol


   Last Admin: 05/11/18 08:10 Dose:  166.667 mls/hr


Lorazepam (Ativan)  1 mg IVP Q4 PRN


   PRN Reason: Agitation


   Last Admin: 05/10/18 07:57 Dose:  1 mg


Senna/Docusate Sodium (Senokot S 50 Mg-8.6 Mg)  1 tab PO HS JUAN LUIS


   Last Admin: 05/10/18 23:22 Dose:  1 tab


Thiamine HCl (Vitamin B1 Tab)  100 mg PO DAILY JUAN LUIS


   Last Admin: 04/27/18 08:09 Dose:  100 mg











- Labs


Labs: 


 





 05/11/18 04:30 





 05/11/18 04:30 





 











PT  17.4 Seconds (9.8-13.1)  H  05/07/18  09:30    


 


INR  1.6  (0.9-1.2)  H  05/07/18  09:30    


 


APTT  36.4 Seconds (25.6-37.1)   05/04/18  04:20    














- Constitutional


Appears: Chronically Ill, Other (weak ,shaking )





- Head Exam


Head Exam: NORMOCEPHALIC





- Eye Exam


Eye Exam: PERRL





- ENT Exam


ENT Exam: Mucous Membranes Dry





- Neck Exam


Neck Exam: Normal Inspection





- Respiratory Exam


Respiratory Exam: Decreased Breath Sounds (bibasilar ), NORMAL BREATHING 

PATTERN.  absent: Rhonchi, Wheezes


Additional comments: 





upper airway secretions unable to expectorate 





- Cardiovascular Exam


Cardiovascular Exam: Tachycardia.  absent: JVD





- GI/Abdominal Exam


GI & Abdominal Exam: Distended, Soft, Normal Bowel Sounds.  absent: Guarding, 

Tenderness, Rebound





- Rectal Exam


Rectal Exam: Deferred





- Extremities Exam


Extremities Exam: Pedal Edema (2+)


Additional comments: 





anasarca





- Neurological Exam


Neurological Exam: Alert, Awake


Additional comments: 





lethargic,tired








- Psychiatric Exam


Psychiatric exam: Flat Affect





- Skin


Skin Exam: Dry, Warm





Assessment and Plan





- Assessment and Plan (Free Text)


Assessment: 


38 y/o male with  known history  of Alcoholism and recent GI bleed, was brought 

in because of hematemesis and alteration in   mental status.


He was recently discharged from this hospital  after an episode of GI bleed .  

EGD done on 4/16 did not show any active bleed.  The patient was discharged 

home and again started drinking.


On day of admission, he  had hematemesis and was noted to be confused and 

agitated.  HGB=6.1.He was transfused with PRBC and FFP, intubated for airway 

protection. Underwent 2 EGD and 2  bleeding scan that failed to show an active 

source of bleeding .He was started on Levophed drip 4/27 for hypotension and 

underwent CTA of abdomen by IR that showed no active bleeding.


Transfused total to date  30  unit PRBC and 21 unit FFP and 3 Platelets 


Was intubated  sedated on Presedex drip , octreotide,tranesamix  and   Levophed 

drip 


Had repeat EGD on  5/4 with banding of esophageal varices . No more bleeding 

episodes , melena or hematemesis since 5/4 and Hgb stable.


Off Levophed , octreotide and Transemix drip. Extubated on 5/8 and maintaining 

his airway. On TPN for nutrition 


Still lethargic , with fever , tachycardic , tremors/ chills and blood cx 

positive for Staph coag negative and sputum positive for yeast 


CT abdomen showing right middle lobe infiltrate and possible right lower lobe 

infiltrate 


Ct chest Small bilateral effusions right larger than left with mild right 

basilar atelectasis and or infiltrate. .  There also subsegmental atelectatic 

and or infiltrate changes seen in the right upper lobe with linear atelectasis 

and or scarring.  Additionally, there are ground-glass opacities in the right 

middle lobe. Cardiomegaly.  Small with suspected trace pericardial effusion. 


Splenomegaly. Note that the liver is incompletely seen though also appears 

enlarged. Upper abdominal ascites again seen. .








3.Suspected sepsis / bacteremia and Pneumonia


febrile Tmax 101.3, tachycardic , lethargic with shivers,procalcitonin elevated 

4.9 , WBC 8 K 


CT abdomen showing right middle lobe and lower lobe infiltrate


1 blood Cx positive for Staph coag negative and sputum cx positive for yeast 


Follow up repeat blood cx


Restarted on vanco and Cipro IV. Will start Diflucan today 


CT chest showed possible right mid , right upper lobe  infiltrate vs 

atelectasis and right mid ground glass opacities


continue IV antibiotics


Will Place PICC line and remove TLC from groin


Follow up Echo results 











2. Acute blood loss anemia sec to GI Bleed secondary  to Bleeding  Esophageal 

Varices


Acute   


s/p total 30 units PRBC and 21 unit FFP transfusion and 3 platelets


s/p 3 EGD and 2 CTA abdomen this admission 


Underwent EGD  5/4  with banding of esophageal varices. No more bleeding since 

last EGD 


Hgb stable  now   


Continue Protonix


Hematology,GI and surgery on consult 


cont  TPN since he has very poor PO intake 


Monitor electrolytes daily and replace 








3. Ileus -resolved 


noted to had abdominal distention 


CT abdomen showed:Distended large and small bowel loops with occasional air-

fluid levels in the central abdominal small bowel.  The stomach is mildly 

distended in the interval in overall pattern suggests ileus rather than small 

large-bowel obstruction.


Continue nectar thick liquid  diet 


Continue TPN











4.Hypovolemic shock  secondary to GI bleed , resolved 


off  levophed drip 


had massive  transfusion





5. Transfusion reaction 


with fever episode during transfusion on 5/5








6. Coagulopathy sec to Liver dis from alcoholism


received Vit K, DDAVP, Tranexamic acid 


s/p 21 unit FFP transfusion 


Hematology consulted 


No more bleeding episodes and Hgb stable 








7.  Cirrhosis with ascites , coagulopathy, thrombocytopenia sec to ETOH abuse 


s/p abdominal paracentesis by IR  4/30  with removal 900 ml ascitic fluid


With anasarca 


repeat CT abdomen showed small ascitic fluid 


Decreased Lasix to 40 mg Iv daily  


Guarded prognosis








8.Suspected  Pneumonia 


Suspected RML , RUL infiltrate as above


sputum Cx positive for yeast 


Will start Diflucan


Continue  vanco and Cipro


CT chest as above and procalcitonin elevated


extubated 5/8








9.  Alcohol Abuse 


has been on Presedex drip for more than 2 weeks


at present out of acute  withdrawal period


off presedex


Ativan prn 








10. Hepatic Encephalopathy


CT of the head : no bleed


Check ammonia level today





11.Hypernatremia/ Hypokalemia/ hyperchloremia


secondary to diuretic use 


Decreased Lasix to once daily 


monitor electrolytes daily since patient is on TPN 


K 2.9 today. Will replace with 60 MEQ 








12. DVT proph


SCD


no anticoag sec to GIB and coagulopathy & low Platelet

## 2018-05-12 NOTE — CP.CCUPN
CCU Subjective





- Physician Review


Events Since Last Encounter (Free Text): 





05/12/18 10:57


alert, no complaints, very weak appearing.





CCU Objective





- Vital Signs / Intake & Output


Vital Signs (Last 4 hours): 


Vital Signs











  Temp Pulse Resp BP Pulse Ox


 


 05/12/18 10:00  99 F  87  17  117/59 L  100


 


 05/12/18 09:17     111/64 


 


 05/12/18 07:51  100.0 F H  97 H  19  110/50 L  100











Intake and Output (Last 8hrs): 


 Intake & Output











 05/11/18 05/12/18 05/12/18





 22:59 06:59 14:59


 


Intake Total 1380 640 920


 


Output Total 1800 510 


 


Balance -420 130 920


 


Weight   215 lb


 


Intake:   


 


    320


 


  Intake, Piggyback 630 160 600


 


  Oral 290 480 


 


Output:   


 


  Urine 1800 500 


 


    Urethral (Burger) 1800 500 


 


  Stool  10 


 


Other:   


 


  # Bowel Movements  1 














- Physical Exam


Head: Positive for: Atraumatic, Normocephalic


Pupils: Positive for: PERRL.  Negative for: Sluggish, Non-Reactive, Pinpoint


Extroacular Muscles: Positive for: EOMI


Conjunctiva: Negative for: Injected, Icteric, Other


Mouth: Positive for: Moist Mucous Membranes, Dry


Pharnyx: Positive for: Normal


Neck: Positive for: Normal Range of Motion


Respiratory/Chest: Positive for: Clear to Auscultation, Good Air Exchange.  

Negative for: Respiratory Distress, Accessory Muscle Use, Wheezes, Rhonchi


Cardiovascular: Positive for: Regular Rate and Rhythm, Normal S1, S2, Peripheal 

Pulses Present.  Negative for: Murmurs, Irregular Rhythm, Tachycardic, 

Bradycardic


Abdomen: Positive for: Distention, Normal Bowel Sounds.  Negative for: 

Tenderness


Upper Extremity: Positive for: Normal Inspection.  Negative for: Edema


Lower Extremity: Positive for: Normal Inspection.  Negative for: Edema


Neurological: Positive for: Speech Normal


Psychiatric: Positive for: Alert





- Medications


Active Medications: 


Active Medications











Generic Name Dose Route Start Last Admin





  Trade Name Freq  PRN Reason Stop Dose Admin


 


Acetaminophen  650 mg  05/06/18 22:11  05/11/18 11:54





  Tylenol 650 Mg Supp  OR   650 mg





  ONCE PRN   Administration





  Fever >100.4 F   


 


Acetaminophen  325 mg  05/11/18 11:48  





  Tylenol 325 Mg Supp  OR   





  Q6 PRN   





  Fever >100.4 F   


 


Chlordiazepoxide  50 mg  04/26/18 17:00  04/27/18 08:49





  Librium  PO   50 mg





  Q8 JUAN LUIS   Administration


 


Folic Acid  1 mg  04/20/18 11:15  04/27/18 08:09





  Folic Acid  PO   1 mg





  DAILY JUAN LUIS   Administration


 


Furosemide  40 mg  05/10/18 09:00  05/12/18 09:17





  Lasix  IVP   40 mg





  DAILY JUAN LUIS   Administration


 


Heparin Sodium (Porcine)  5,000 units  05/12/18 11:00  





  Heparin  SC   





  Q12 JUAN LUIS   





  Protocol   


 


Pantoprazole Sodium 40 mg/  100 mls @ 20 mls/hr  04/27/18 12:15  05/12/18 06:34





  Sodium Chloride  IVPB   20 mls/hr





  Q5H JUAN LUIS   Administration





  8 MG/HR   


 


Fluconazole  50 mls @ 50 mls/hr  05/11/18 13:15  05/12/18 09:18





  Diflucan Iv 100 Mg/50 Ml Ns  IVPB   50 mls/hr





  DAILY JUAN LUIS   Administration





  Protocol   


 


Doxycycline Hyclate 100 mg/  100 mls @ 100 mls/hr  05/12/18 11:00  





  Sodium Chloride  IVPB  05/19/18 11:01  





  Q12 [facility]   





  Protocol   


 


Lactobacillus Acidophilus  1 cap  05/12/18 10:30  





  Bacid Acidophilus  PO   





  BID [facility]   


 


Senna/Docusate Sodium  1 tab  05/08/18 22:00  05/11/18 21:45





  Senokot S 50 Mg-8.6 Mg  PO   Not Given





  HS [facility]   


 


Thiamine HCl  100 mg  04/20/18 11:15  04/27/18 08:09





  Vitamin B1 Tab  PO   100 mg





  DAILY JUAN LUIS   Administration














- Patient Studies


Lab Studies: 


 Microbiology Studies











 05/10/18 06:00 Urine Culture - Final





 Urine,Burger    No Growth (<1,000 CFU/ML)


 


 05/10/18 16:40 Blood Culture - Preliminary





 Blood    NO GROWTH AFTER 24 HOURS


 


 05/10/18 16:40 Blood Culture - Preliminary





 Blood    NO GROWTH AFTER 24 HOURS


 


 05/08/18 12:30 Blood Culture - Preliminary





 Blood-Venous    NO GROWTH AFTER 3 DAYS


 


 05/08/18 12:40 S.aureus & Coag-Neg Staph PNA FISH - Final





 Blood-Venous Blood Culture - Final





    Coagulase Neg Staphylococcus





 Gram Stain - Final








 Lab Studies











  05/12/18 05/12/18 05/11/18 Range/Units





  04:35 04:35 13:49 


 


WBC  7.9    (4.8-10.8)  K/uL


 


RBC  2.85 L    (4.40-5.90)  Mil/uL


 


Hgb  8.6 L    (12.0-18.0)  g/dL


 


Hct  26.1 L    (35.0-51.0)  %


 


MCV  91.6    (80.0-94.0)  fl


 


MCH  30.2    (27.0-31.0)  pg


 


MCHC  33.0    (33.0-37.0)  g/dL


 


RDW  20.5 H    (11.5-14.5)  %


 


Plt Count  149    (130-400)  K/uL


 


PT    18.9 H  (9.8-13.1)  Seconds


 


INR    1.7 H  (0.9-1.2)  


 


Sodium   146   (132-148)  mmol/l


 


Potassium   3.4 L   (3.6-5.0)  MMOL/L


 


Chloride   109 H   ()  mmol/L


 


Carbon Dioxide   27   (22-30)  mmol/L


 


Anion Gap   13   (10-20)  


 


BUN   18   (9-20)  mg/dl


 


Creatinine   0.8   (0.8-1.5)  mg/dl


 


Est GFR (African Amer)   > 60   


 


Est GFR (Non-Af Amer)   > 60   


 


Random Glucose   135 H   ()  mg/dL


 


Calcium   8.1 L   (8.4-10.2)  mg/dL


 


Phosphorus   3.2   (2.5-4.5)  mg/dl


 


Magnesium   2.0   (1.6-2.3)  MG/DL


 


Total Bilirubin   1.4 H   (0.2-1.3)  mg/dl


 


AST   144 H   (17-59)  U/L


 


ALT   65   (21-72)  U/L


 


Alkaline Phosphatase   197 H D   ()  U/L


 


Ammonia     (16-60)  umo/L


 


Total Protein   6.2 L   (6.3-8.2)  G/DL


 


Albumin   2.4 L   (3.5-5.0)  g/dL


 


Globulin   3.8   (2.2-3.9)  gm/dL


 


Albumin/Globulin Ratio   0.6 L   (1.0-2.1)  


 


Triglycerides   85   (0-149)  mg/DL














  05/11/18 Range/Units





  13:49 


 


WBC   (4.8-10.8)  K/uL


 


RBC   (4.40-5.90)  Mil/uL


 


Hgb   (12.0-18.0)  g/dL


 


Hct   (35.0-51.0)  %


 


MCV   (80.0-94.0)  fl


 


MCH   (27.0-31.0)  pg


 


MCHC   (33.0-37.0)  g/dL


 


RDW   (11.5-14.5)  %


 


Plt Count   (130-400)  K/uL


 


PT   (9.8-13.1)  Seconds


 


INR   (0.9-1.2)  


 


Sodium   (132-148)  mmol/l


 


Potassium   (3.6-5.0)  MMOL/L


 


Chloride   ()  mmol/L


 


Carbon Dioxide   (22-30)  mmol/L


 


Anion Gap   (10-20)  


 


BUN   (9-20)  mg/dl


 


Creatinine   (0.8-1.5)  mg/dl


 


Est GFR (African Amer)   


 


Est GFR (Non-Af Amer)   


 


Random Glucose   ()  mg/dL


 


Calcium   (8.4-10.2)  mg/dL


 


Phosphorus   (2.5-4.5)  mg/dl


 


Magnesium   (1.6-2.3)  MG/DL


 


Total Bilirubin   (0.2-1.3)  mg/dl


 


AST   (17-59)  U/L


 


ALT   (21-72)  U/L


 


Alkaline Phosphatase   ()  U/L


 


Ammonia  29  (16-60)  umo/L


 


Total Protein   (6.3-8.2)  G/DL


 


Albumin   (3.5-5.0)  g/dL


 


Globulin   (2.2-3.9)  gm/dL


 


Albumin/Globulin Ratio   (1.0-2.1)  


 


Triglycerides   (0-149)  mg/DL








 Laboratory Results - last 24 hr











  05/11/18 05/11/18 05/12/18





  13:49 13:49 04:35


 


WBC   


 


RBC   


 


Hgb   


 


Hct   


 


MCV   


 


MCH   


 


MCHC   


 


RDW   


 


Plt Count   


 


PT   18.9 H 


 


INR   1.7 H 


 


Sodium    146


 


Potassium    3.4 L


 


Chloride    109 H


 


Carbon Dioxide    27


 


Anion Gap    13


 


BUN    18


 


Creatinine    0.8


 


Est GFR ( Amer)    > 60


 


Est GFR (Non-Af Amer)    > 60


 


Random Glucose    135 H


 


Calcium    8.1 L


 


Phosphorus    3.2


 


Magnesium    2.0


 


Total Bilirubin    1.4 H


 


AST    144 H


 


ALT    65


 


Alkaline Phosphatase    197 H D


 


Ammonia  29  


 


Total Protein    6.2 L


 


Albumin    2.4 L


 


Globulin    3.8


 


Albumin/Globulin Ratio    0.6 L


 


Triglycerides    85














  05/12/18





  04:35


 


WBC  7.9


 


RBC  2.85 L


 


Hgb  8.6 L


 


Hct  26.1 L


 


MCV  91.6


 


MCH  30.2


 


MCHC  33.0


 


RDW  20.5 H


 


Plt Count  149


 


PT 


 


INR 


 


Sodium 


 


Potassium 


 


Chloride 


 


Carbon Dioxide 


 


Anion Gap 


 


BUN 


 


Creatinine 


 


Est GFR ( Amer) 


 


Est GFR (Non-Af Amer) 


 


Random Glucose 


 


Calcium 


 


Phosphorus 


 


Magnesium 


 


Total Bilirubin 


 


AST 


 


ALT 


 


Alkaline Phosphatase 


 


Ammonia 


 


Total Protein 


 


Albumin 


 


Globulin 


 


Albumin/Globulin Ratio 


 


Triglycerides 











Fingerstick Blood Sugar Results: 158





Review of Systems





- Review of Systems


All systems: reviewed and no additional remarkable complaints except (no 

complaints)





Critical Care Progress Note





- Nutrition


Nutrition: 


 Nutrition











 Category Date Time Status


 


 Liquid Diet [DIET] Diets  05/10/18 Dinner Active














Assessment/Plan


(1) Acute GI bleeding


Assessment and plan: 


37M re-admitted to ICU after being discharged from hospital 2 days ago for UGIB 

and found to have diffuse gastric inflammation with friability on EGD done 4/16/ 18.  Had recurrence of bloody emesis from grade 2 variceal bleed s/p banding (05 /04).  Extubated (05/08).





Neuro: alert,following commands.





Pulm: no acute issues, breathing spontaneously on nasal canula.





CV: hemodynamically stable.





Hem: bleeding has successfully been stopped s/p variceal banding (05/04).  

Continue Venofer for chronic anemia.





Renal: anasarca, diuresing 40 lasix IV daily, still fluid positive.





Endo: no acute issues.





GI:  Started on TPN (05/07).  CT abdomen shows ileus, minimal ascites.  Tube 

feeds, colace/senna to promote motility.  bacid po.





ID: coag negative staph blood culuture and yeast in sputum, switiching to 

doxycyline, continue fluconazole.





DVT proph - heparin sq


GI proph - protonix


OOB to chair


mona for strict I/O's during acute illness


Code status -  full code





Critical Care Time spent 35 minutes


Multi-disciplinary rounds were performed with house staff, nursing, speech 

therapy, respiratory therapy, pharmacy and nutrition with integrated input from 

the primary team/attending and other consulting services.  The documented time 

is cumulative and includes review of patient data/exams/labs/chart review and 

examination of the patient on rounds and throughout the day; time is exclusive 

of any procedures or teaching time.


Current Visit: Yes   Status: Acute

## 2018-05-12 NOTE — CP.PCM.PN
Subjective





- Date & Time of Evaluation


Date of Evaluation: 05/12/18


Time of Evaluation: 08:30





- Subjective


Subjective: 


Patient was seen and examined bedside.Chronically ill male, awake , alert , 

following simple commands , tachycardic ,apperas weak with weak and almsot 

unintelligible voice .


Tachycardic  /71 saturating 100 % on 4 L O2 via NC , febrile Tmax 

101 last 24 hours  


Poor Po intake 


1 Blood Cx positive for staph coag negative 


Sputum cx positive for yeast


no more bleeding episodes since the last EGD and Hgb stable


on TPN 


WbC 7.9K Hgb 8.6 pLT 149 k  k 3.9


procalcitonin elevated 4.9  








Objective





- Vital Signs/Intake and Output


Vital Signs (last 24 hours): 


 











Temp Pulse Resp BP Pulse Ox


 


 100.1 F H  103 H  21   129/71   100 


 


 05/12/18 04:00  05/12/18 06:00  05/12/18 06:00  05/12/18 06:00  05/12/18 06:00








Intake and Output: 


 











 05/12/18 05/12/18





 06:59 18:59


 


Intake Total 1160 


 


Output Total 510 


 


Balance 650 














- Medications


Medications: 


 Current Medications





Acetaminophen (Tylenol 650 Mg Supp)  650 mg IA ONCE PRN


   PRN Reason: Fever >100.4 F


   Last Admin: 05/11/18 11:54 Dose:  650 mg


Acetaminophen (Tylenol 325 Mg Supp)  325 mg IA Q6 PRN


   PRN Reason: Fever >100.4 F


Chlordiazepoxide (Librium)  50 mg PO Q8 [facility]


   Last Admin: 04/27/18 08:49 Dose:  50 mg


Folic Acid (Folic Acid)  1 mg PO DAILY [facility]


   Last Admin: 04/27/18 08:09 Dose:  1 mg


Furosemide (Lasix)  40 mg IVP DAILY [facility]


   Last Admin: 05/11/18 08:07 Dose:  40 mg


Pantoprazole Sodium 40 mg/ (Sodium Chloride)  100 mls @ 20 mls/hr IVPB Q5H [facility]


   PRN Reason: 8 MG/HR


   Last Admin: 05/12/18 06:34 Dose:  20 mls/hr


Ciprofloxacin (Cipro 400mg/200ml Dsw)  400 mg in 200 mls @ 200 mls/hr IVPB Q12 

[facility]


   PRN Reason: Protocol


   Last Admin: 05/11/18 20:33 Dose:  200 mls/hr


Vancomycin HCl 1 gm/ Sodium (Chloride)  250 mls @ 166.667 mls/hr IVPB DAILY [facility]


   PRN Reason: Protocol


   Last Admin: 05/11/18 08:10 Dose:  166.667 mls/hr


Fluconazole (Diflucan Iv 100 Mg/50 Ml Ns)  50 mls @ 50 mls/hr IVPB DAILY [facility]


   PRN Reason: Protocol


   Last Admin: 05/11/18 16:40 Dose:  50 mls/hr


Senna/Docusate Sodium (Senokot S 50 Mg-8.6 Mg)  1 tab PO HS [facility]


   Last Admin: 05/11/18 21:45 Dose:  Not Given


Thiamine HCl (Vitamin B1 Tab)  100 mg PO DAILY [facility]


   Last Admin: 04/27/18 08:09 Dose:  100 mg











- Labs


Labs: 


 





 05/12/18 04:35 





 05/12/18 04:35 





 











PT  18.9 Seconds (9.8-13.1)  H  05/11/18  13:49    


 


INR  1.7  (0.9-1.2)  H  05/11/18  13:49    


 


APTT  36.4 Seconds (25.6-37.1)   05/04/18  04:20    














- Constitutional


Appears: Chronically Ill, Other (weak with soft unitelligeble voice)





- Head Exam


Head Exam: NORMOCEPHALIC





- ENT Exam


ENT Exam: Mucous Membranes Dry


Additional comments: 





dry lips 





- Neck Exam


Neck Exam: Normal Inspection





- Respiratory Exam


Respiratory Exam: absent: Rales, Wheezes


Additional comments: 





upper respiratory secretions unable to expectorate 


weak cough reflex





- Cardiovascular Exam


Cardiovascular Exam: REGULAR RHYTHM, RRR, +S1, +S2.  absent: JVD





- GI/Abdominal Exam


GI & Abdominal Exam: Distended, Soft, Normal Bowel Sounds.  absent: Guarding, 

Tenderness, Rebound





- Rectal Exam


Rectal Exam: Deferred





- Extremities Exam


Extremities Exam: Pedal Edema (3 +, anasarca)





- Neurological Exam


Neurological Exam: Alert, Awake


Additional comments: 





moves all  4 extremities, follows commands


weak 


communicating with very weak , soft voice





- Psychiatric Exam


Psychiatric exam: Flat Affect





- Skin


Skin Exam: Dry, Warm





Assessment and Plan





- Assessment and Plan (Free Text)


Assessment: 


38 y/o male with  known history  of Alcoholism and recent GI bleed, was brought 

in because of hematemesis and alteration in   mental status.


He was recently discharged from this hospital  after an episode of GI bleed .  

EGD done on 4/16 did not show any active bleed.  The patient was discharged 

home and again started drinking.


On day of admission, he  had hematemesis and was noted to be confused and 

agitated.  HGB=6.1.He was transfused with PRBC and FFP, intubated for airway 

protection. Underwent 2 EGD and 2  bleeding scan that failed to show an active 

source of bleeding .He was started on Levophed drip 4/27 for hypotension and 

underwent CTA of abdomen by IR that showed no active bleeding.


Transfused total to date  30  unit PRBC and 21 unit FFP and 3 Platelets 


Was intubated  sedated on Presedex drip , octreotide,tranesamix  and   Levophed 

drip 


Had repeat EGD on  5/4 with banding of esophageal varices . No more bleeding 

episodes , melena or hematemesis since 5/4 and Hgb stable.


Off Levophed , octreotide and Transemix drip. Extubated on 5/8 and maintaining 

his airway. On TPN for nutrition 


With fever , tachycardic  and blood cx positive for Staph coag negative and 

sputum positive for yeast . More awake today but very weak with almost 

unintelligible voice 


CT abdomen showing right middle lobe infiltrate and possible right lower lobe 

infiltrate 








3.Suspected sepsis / bacteremia and Pneumonia


Tmax 101 last 24 hours , tachycardic ,procalcitonin elevated 4.9 , WBC 8 K 


CT abdomen showed right middle lobe / upper and lower lobe infiltrate


CT chest showed possible right mid , right upper lobe  infiltrate vs 

atelectasis and right mid ground glass opacities1 blood Cx positive for Staph 

coag negative and sputum cx positive for yeast 


continue vanco , cipro and diflucan 


Repeat blood cultures with no growth so far 


continue IV antibiotics


removed Tight groin TLC 5/11 and PICC line placed to RUE 


Echo shoed no vegetations


Start PT and promote ambulation  











2. Acute blood loss anemia sec to GI Bleed secondary  to Bleeding  Esophageal 

Varices


Acute   


s/p total 30 units PRBC and 21 unit FFP transfusion and 3 platelets


s/p 3 EGD and 2 CTA abdomen this admission 


Underwent EGD  5/4  with banding of esophageal varices. No more bleeding since 

last EGD 


Hgb stable  now   


Continue Protonix


Hematology,GI and surgery on consult 


cont  TPN since he has very poor PO intake 


Monitor electrolytes daily and replace 








3. Ileus -resolved 


noted to had abdominal distention 


CT abdomen showed:Distended large and small bowel loops with occasional air-

fluid levels in the central abdominal small bowel.  The stomach is mildly 

distended in the interval in overall pattern suggests ileus rather than small 

large-bowel obstruction.


Continue nectar thick liquid  diet and advance as tolerated 


Continue TPN











4.Hypovolemic shock  secondary to GI bleed , resolved 


off  levophed drip 


had massive  transfusion





5. Transfusion reaction 


with fever episode during transfusion on 5/5








6. Coagulopathy sec to Liver dis from alcoholism


received Vit K, DDAVP, Tranexamic acid 


s/p 21 unit FFP transfusion 


Hematology consulted 


No more bleeding episodes and Hgb stable 








7.  Cirrhosis with ascites , coagulopathy, thrombocytopenia sec to ETOH abuse 


s/p abdominal paracentesis by IR  4/30  with removal 900 ml ascitic fluid


With anasarca 


repeat CT abdomen showed small ascitic fluid 


Decreased Lasix to 40 mg Iv daily  


Guarded prognosis








8.Suspected  Pneumonia 


Suspected RML , RUL infiltrate as above


sputum Cx positive for yeast 


started Diflucan


Continue  vanco and Cipro


CT chest as above and procalcitonin elevated


extubated 5/8








9.  Alcohol Abuse 


has been on Presedex drip for more than 2 weeks


at present out of acute  withdrawal period


off presedex


Ativan prn 








10. Hepatic Encephalopathy


CT of the head : no bleed


ammonia level- low





11.Hypernatremia/ Hypokalemia/ hyperchloremia


secondary to diuretic use 


Decreased Lasix to once daily 


monitor electrolytes daily since patient is on TPN 


K 3.9 today. Will replace with 20 MEQ 








12. DVT proph


SCD


no anticoag sec to GIB and coagulopathy & low Platelet

## 2018-05-13 NOTE — CP.PCM.PN
Subjective





- Date & Time of Evaluation


Date of Evaluation: 05/13/18


Time of Evaluation: 09:00





- Subjective


Subjective: 





Patient was seen and examined at bedside. 


Black stools overnight and Hg drop from 8.6 to 7.4.


Appears similar to yesterday. He is responsive to questions but speech is 

almost unintelligible.


At times borderline tachycardic but otherwise hemodynamically stable.  





Objective





- Vital Signs/Intake and Output


Vital Signs (last 24 hours): 


 











Temp Pulse Resp BP Pulse Ox


 


 97.5 F L  105 H  21   109/70   98 


 


 05/13/18 07:25  05/13/18 07:25  05/13/18 07:25  05/13/18 09:04  05/13/18 07:25








Intake and Output: 


 











 05/13/18 05/13/18





 06:59 18:59


 


Intake Total 720 160


 


Balance 720 160














- Medications


Medications: 


 Current Medications





Acetaminophen (Tylenol 650 Mg Supp)  650 mg CT ONCE PRN


   PRN Reason: Fever >100.4 F


   Last Admin: 05/11/18 11:54 Dose:  650 mg


Acetaminophen (Tylenol 325 Mg Supp)  325 mg CT Q6 PRN


   PRN Reason: Fever >100.4 F


Chlordiazepoxide (Librium)  50 mg PO Q8 [facility]


   Last Admin: 04/27/18 08:49 Dose:  50 mg


Folic Acid (Folic Acid)  1 mg PO DAILY [facility]


   Last Admin: 04/27/18 08:09 Dose:  1 mg


Furosemide (Lasix)  40 mg IVP DAILY [facility]


   Last Admin: 05/13/18 09:04 Dose:  40 mg


Heparin Sodium (Porcine) (Heparin)  5,000 units SC Q12 [facility]


   PRN Reason: Protocol


   Last Admin: 05/13/18 09:03 Dose:  Not Given


Pantoprazole Sodium 40 mg/ (Sodium Chloride)  100 mls @ 20 mls/hr IVPB Q5H [facility]


   PRN Reason: 8 MG/HR


   Last Admin: 05/13/18 09:05 Dose:  20 mls/hr


Fluconazole (Diflucan Iv 100 Mg/50 Ml Ns)  50 mls @ 50 mls/hr IVPB DAILY [facility]


   PRN Reason: Protocol


   Last Admin: 05/13/18 09:03 Dose:  50 mls/hr


Doxycycline Hyclate 100 mg/ (Sodium Chloride)  100 mls @ 100 mls/hr IVPB Q12 [facility]


   PRN Reason: Protocol


   Stop: 05/19/18 11:01


   Last Admin: 05/13/18 09:06 Dose:  100 mls/hr


Potassium Chloride 60 meq/Potassium Phosphate 15 meq/Magnesium Sulfate 5 meq/

Calcium Gluconate 4.5 meq/Multivitamins/Vitamin C 10 ml/Chromium/Copper/

Manganese/Zinc 3 ml/ Amino Acids  1,327.318 mls @ 60 mls/hr IV .Q22H8M [facility]


   Stop: 05/13/18 12:07


   Last Admin: 05/12/18 15:34 Dose:  60 mls/hr


Lactobacillus Acidophilus (Bacid Acidophilus)  1 cap PO BID [facility]


   Last Admin: 05/13/18 09:02 Dose:  1 cap


Senna/Docusate Sodium (Senokot S 50 Mg-8.6 Mg)  1 tab PO HS [facility]


   Last Admin: 05/12/18 21:23 Dose:  1 tab


Thiamine HCl (Vitamin B1 Tab)  100 mg PO DAILY [facility]


   Last Admin: 04/27/18 08:09 Dose:  100 mg











- Labs


Labs: 


 





 05/13/18 04:38 





 05/13/18 04:38 





 











PT  18.9 Seconds (9.8-13.1)  H  05/11/18  13:49    


 


INR  1.7  (0.9-1.2)  H  05/11/18  13:49    


 


APTT  36.4 Seconds (25.6-37.1)   05/04/18  04:20    














- Additional Findings


Additional findings: 





Physical exam:





Constitutional- cooperative, awake, responds to voice but is not intelligible


Head- NCAT, PERRL


Eye- PERRL, EOMI


ENT- normal exam, MMM. Upper respiratory secretions noted. Weak cough reflex


Neck- normal inspection, supple, no JVD


Respiratory- CTAB, no wheezes rales rhonchi


Cardiovascular- RRR, +S1, +S2 no MRG


GI/Abdominal- normal bowel sounds, soft, no mass, no hsm


Skin- warm, dry


Extremities Exam- +3 bilateral pitting edema, anasarca.  Normal capillary refill

, normal inspection


Neurological Exam- alert, awake, unintelligible voice. Moving all 4 

extremities. 





Assessment and Plan





- Assessment and Plan (Free Text)


Plan: 





Assessment: 


36 y/o male with  known history  of Alcoholism and recent GI bleed, was brought 

in because of hematemesis and alteration in   mental status.


He was recently discharged from this hospital  after an episode of GI bleed .  

EGD done on 4/16 did not show any active bleed.  The patient was discharged 

home and again started drinking.


On day of admission, he  had hematemesis and was noted to be confused and 

agitated.  HGB=6.1.He was transfused with PRBC and FFP, intubated for airway 

protection. Underwent 2 EGD and 2  bleeding scan that failed to show an active 

source of bleeding .He was started on Levophed drip 4/27 for hypotension and 

underwent CTA of abdomen by IR that showed no active bleeding.


Transfused total to date  30  unit PRBC and 21 unit FFP and 3 Platelets 


Was intubated  sedated on Presedex drip , octreotide,tranesamix  and   Levophed 

drip 


Had repeat EGD on  5/4 with banding of esophageal varices . No more bleeding 

episodes , melena or hematemesis since 5/4 and Hgb stable.


Off Levophed , octreotide and Transemix drip. Extubated on 5/8 and maintaining 

his airway. On TPN for nutrition 


With fever , tachycardic  and blood cx positive for Staph coag negative and 

sputum positive for yeast . More awake today but very weak with almost 

unintelligible voice 


CT abdomen showing right middle lobe infiltrate and possible right lower lobe 

infiltrate 








3.Suspected sepsis / bacteremia and Pneumonia


Afebrile since 5/12 AM , tachycardic ,procalcitonin elevated 4.9 , WBC 9.4 today


CT abdomen showed right middle lobe / upper and lower lobe infiltrate


CT chest showed possible right mid , right upper lobe  infiltrate vs 

atelectasis and right mid ground glass opacities1 blood Cx positive for Staph 

coag negative and sputum cx positive for yeast 


continue vanco , cipro and diflucan 


Repeat blood cultures with no growth so far 


continue IV antibiotics


removed Tight groin TLC 5/11 and PICC line placed to RUE 


Echo shoed no vegetations


Start PT and promote ambulation  











2. Acute blood loss anemia sec to GI Bleed secondary  to Bleeding  Esophageal 

Varices


Acute   


s/p total 30 units PRBC and 21 unit FFP transfusion and 3 platelets


s/p 3 EGD and 2 CTA abdomen this admission 


Underwent EGD  5/4  with banding of esophageal varices. No more bleeding since 

last EGD 


Hgb from 8.6 to 7.4 today, will repeat and transfuse as necessary


Continue Protonix


Hematology,GI and surgery on consult 


cont  TPN since he has very poor PO intake 


Monitor electrolytes daily and replace 








3. Ileus -resolved 


noted to had abdominal distention 


CT abdomen showed:Distended large and small bowel loops with occasional air-

fluid levels in the central abdominal small bowel.  The stomach is mildly 

distended in the interval in overall pattern suggests ileus rather than small 

large-bowel obstruction.


Continue nectar thick liquid  diet and advance as tolerated 


Continue TPN











4.Hypovolemic shock  secondary to GI bleed , resolved 


off  levophed drip 


had massive  transfusion





5. Transfusion reaction 


with fever episode during transfusion on 5/5








6. Coagulopathy sec to Liver dis from alcoholism


received Vit K, DDAVP, Tranexamic acid 


s/p 21 unit FFP transfusion 


Hematology consulted 


No more bleeding episodes and Hgb stable 








7.  Cirrhosis with ascites , coagulopathy, thrombocytopenia sec to ETOH abuse 


s/p abdominal paracentesis by IR  4/30  with removal 900 ml ascitic fluid


With anasarca 


repeat CT abdomen showed small ascitic fluid 


Decreased Lasix to 40 mg Iv daily  


Guarded prognosis








8.Suspected  Pneumonia 


Suspected RML , RUL infiltrate as above


sputum Cx positive for yeast 


started Diflucan


Continue  vanco and Cipro


CT chest as above and procalcitonin elevated


extubated 5/8








9.  Alcohol Abuse 


has been on Presedex drip for more than 2 weeks


at present out of acute  withdrawal period


off presedex


Ativan prn 








10. Hepatic Encephalopathy


CT of the head : no bleed


ammonia level- low





11.Hypernatremia/ Hypokalemia/ hyperchloremia


secondary to diuretic use 


Decreased Lasix to once daily 


monitor electrolytes daily since patient is on TPN 


K 3.9 today. Will replace with 20 MEQ 








12. DVT proph


SCD


no anticoag sec to GIB and coagulopathy & low Platelet

## 2018-05-13 NOTE — CP.CCUPN
CCU Subjective





- Physician Review


Events Since Last Encounter (Free Text): 





05/13/18 11:38


alert, lethargic, weak appearing still.





CCU Objective





- Vital Signs / Intake & Output


Vital Signs (Last 4 hours): 


Vital Signs











  Pulse Resp BP Pulse Ox


 


 05/13/18 10:00  108 H  22  117/72  100


 


 05/13/18 09:04    109/70 











Intake and Output (Last 8hrs): 


 Intake & Output











 05/12/18 05/13/18 05/13/18





 22:59 06:59 14:59


 


Intake Total 780 480 470


 


Output Total 1800  200


 


Balance -1020 480 270


 


Weight   214 lb


 


Intake:   


 


    80


 


  Intake, Piggyback   270


 


  Oral 220  


 


  TPN/ 480 120


 


Output:   


 


  Urine 1800  200


 


    Urethral (Burger) 1400  


 


    Urine, Voided 400  200


 


Other:   


 


  # Bowel Movements  1 1














- Physical Exam


Head: Positive for: Atraumatic, Normocephalic


Pupils: Positive for: PERRL.  Negative for: Sluggish, Non-Reactive, Pinpoint


Extroacular Muscles: Positive for: EOMI


Conjunctiva: Negative for: Injected, Icteric, Other


Mouth: Positive for: Moist Mucous Membranes, Dry


Pharnyx: Positive for: Normal


Neck: Positive for: Normal Range of Motion


Respiratory/Chest: Positive for: Clear to Auscultation, Good Air Exchange.  

Negative for: Respiratory Distress, Accessory Muscle Use, Wheezes, Rhonchi


Cardiovascular: Positive for: Regular Rate and Rhythm, Normal S1, S2, Peripheal 

Pulses Present.  Negative for: Murmurs, Irregular Rhythm, Tachycardic, 

Bradycardic


Abdomen: Positive for: Distention, Normal Bowel Sounds.  Negative for: 

Tenderness


Upper Extremity: Positive for: Normal Inspection.  Negative for: Edema


Lower Extremity: Positive for: Normal Inspection.  Negative for: Edema


Neurological: Positive for: Speech Normal


Psychiatric: Positive for: Alert





- Medications


Active Medications: 


Active Medications











Generic Name Dose Route Start Last Admin





  Trade Name Freq  PRN Reason Stop Dose Admin


 


Acetaminophen  650 mg  05/06/18 22:11  05/11/18 11:54





  Tylenol 650 Mg Supp  IN   650 mg





  ONCE PRN   Administration





  Fever >100.4 F   


 


Acetaminophen  325 mg  05/11/18 11:48  





  Tylenol 325 Mg Supp  IN   





  Q6 PRN   





  Fever >100.4 F   


 


Chlordiazepoxide  50 mg  04/26/18 17:00  04/27/18 08:49





  Librium  PO   50 mg





  Q8 JUAN LUIS   Administration


 


Folic Acid  1 mg  04/20/18 11:15  04/27/18 08:09





  Folic Acid  PO   1 mg





  DAILY JUAN LUIS   Administration


 


Furosemide  40 mg  05/10/18 09:00  05/13/18 09:04





  Lasix  IVP   40 mg





  DAILY JUAN LUIS   Administration


 


Pantoprazole Sodium 40 mg/  100 mls @ 20 mls/hr  04/27/18 12:15  05/13/18 09:05





  Sodium Chloride  IVPB   20 mls/hr





  Q5H JUAN LUIS   Administration





  8 MG/HR   


 


Fluconazole  50 mls @ 50 mls/hr  05/11/18 13:15  05/13/18 09:03





  Diflucan Iv 100 Mg/50 Ml Ns  IVPB   50 mls/hr





  DAILY JUAN LUIS   Administration





  Protocol   


 


Doxycycline Hyclate 100 mg/  100 mls @ 100 mls/hr  05/12/18 11:00  05/13/18 09:

06





  Sodium Chloride  IVPB  05/19/18 11:01  100 mls/hr





  Q12 JUAN LUIS   Administration





  Protocol   


 


Potassium Chloride 60 meq/  1,327.318 mls @ 60 mls/hr  05/12/18 14:00  05/12/18 

15:34





Potassium Phosphate 15 meq/  IV  05/13/18 12:07  60 mls/hr





Magnesium Sulfate 5 meq/  .Q22H8M ONE   Administration





Calcium Gluconate 4.5 meq/     





Multivitamins/Vitamin C 10 ml/     





Chromium/Copper/Manganese/     





Zinc 3 ml/ Amino Acids     


 


Lactobacillus Acidophilus  1 cap  05/12/18 10:30  05/13/18 09:02





  Bacid Acidophilus  PO   1 cap





  BID JUAN LUIS   Administration


 


Senna/Docusate Sodium  1 tab  05/08/18 22:00  05/12/18 21:23





  Senokot S 50 Mg-8.6 Mg  PO   1 tab





  HS JUAN LUIS   Administration


 


Thiamine HCl  100 mg  04/20/18 11:15  04/27/18 08:09





  Vitamin B1 Tab  PO   100 mg





  DAILY JUAN LUIS   Administration














- Patient Studies


Lab Studies: 


 Microbiology Studies











 05/10/18 16:40 Blood Culture - Preliminary





 Blood    NO GROWTH AFTER 48 HOURS


 


 05/10/18 16:40 Blood Culture - Preliminary





 Blood    NO GROWTH AFTER 48 HOURS


 


 05/08/18 12:30 Blood Culture - Preliminary





 Blood-Venous    NO GROWTH AFTER 4 DAYS


 


 05/10/18 06:00 Urine Culture - Final





 Urine,Burger    No Growth (<1,000 CFU/ML)








 Lab Studies











  05/13/18 05/13/18 05/13/18 Range/Units





  10:20 10:20 10:00 


 


WBC    8.4  (4.8-10.8)  K/uL


 


RBC    2.35 L  (4.40-5.90)  Mil/uL


 


Hgb    7.2 L  (12.0-18.0)  g/dL


 


Hct    22.1 L  (35.0-51.0)  %


 


MCV    93.9  D  (80.0-94.0)  fl


 


MCH    30.7  (27.0-31.0)  pg


 


MCHC    32.7 L  (33.0-37.0)  g/dL


 


RDW    21.1 H  (11.5-14.5)  %


 


Plt Count    130  (130-400)  K/uL


 


PT  17.6 H    (9.8-13.1)  Seconds


 


INR  1.6 H    (0.9-1.2)  


 


APTT  43.0 H    (25.6-37.1)  Seconds


 


Sodium     (132-148)  mmol/l


 


Potassium     (3.6-5.0)  MMOL/L


 


Chloride     ()  mmol/L


 


Carbon Dioxide     (22-30)  mmol/L


 


Anion Gap     (10-20)  


 


BUN     (9-20)  mg/dl


 


Creatinine     (0.8-1.5)  mg/dl


 


Est GFR (African Amer)     


 


Est GFR (Non-Af Amer)     


 


Random Glucose     ()  mg/dL


 


Calcium     (8.4-10.2)  mg/dL


 


Blood Type   O POSITIVE   


 


Antibody Screen   Negative   


 


Crossmatch   See Detail   


 


BBK History Checked   Patient has bt   














  05/13/18 05/13/18 Range/Units





  04:38 04:38 


 


WBC   9.4  (4.8-10.8)  K/uL


 


RBC   2.47 L  (4.40-5.90)  Mil/uL


 


Hgb   7.4 L  (12.0-18.0)  g/dL


 


Hct   22.6 L  (35.0-51.0)  %


 


MCV   91.7  (80.0-94.0)  fl


 


MCH   30.0  (27.0-31.0)  pg


 


MCHC   32.7 L  (33.0-37.0)  g/dL


 


RDW   20.3 H  (11.5-14.5)  %


 


Plt Count   139  (130-400)  K/uL


 


PT    (9.8-13.1)  Seconds


 


INR    (0.9-1.2)  


 


APTT    (25.6-37.1)  Seconds


 


Sodium  148   (132-148)  mmol/l


 


Potassium  3.6   (3.6-5.0)  MMOL/L


 


Chloride  112 H   ()  mmol/L


 


Carbon Dioxide  26   (22-30)  mmol/L


 


Anion Gap  14   (10-20)  


 


BUN  21 H   (9-20)  mg/dl


 


Creatinine  0.7 L   (0.8-1.5)  mg/dl


 


Est GFR (African Amer)  > 60   


 


Est GFR (Non-Af Amer)  > 60   


 


Random Glucose  153 H   ()  mg/dL


 


Calcium  8.0 L   (8.4-10.2)  mg/dL


 


Blood Type    


 


Antibody Screen    


 


Crossmatch    


 


BBK History Checked    








 Laboratory Results - last 24 hr











  05/13/18 05/13/18 05/13/18





  04:38 04:38 10:00


 


WBC  9.4   8.4


 


RBC  2.47 L   2.35 L


 


Hgb  7.4 L   7.2 L


 


Hct  22.6 L   22.1 L


 


MCV  91.7   93.9  D


 


MCH  30.0   30.7


 


MCHC  32.7 L   32.7 L


 


RDW  20.3 H   21.1 H


 


Plt Count  139   130


 


PT   


 


INR   


 


APTT   


 


Sodium   148 


 


Potassium   3.6 


 


Chloride   112 H 


 


Carbon Dioxide   26 


 


Anion Gap   14 


 


BUN   21 H 


 


Creatinine   0.7 L 


 


Est GFR ( Amer)   > 60 


 


Est GFR (Non-Af Amer)   > 60 


 


Random Glucose   153 H 


 


Calcium   8.0 L 


 


Blood Type   


 


Antibody Screen   


 


Crossmatch   


 


BBK History Checked   














  05/13/18 05/13/18





  10:20 10:20


 


WBC  


 


RBC  


 


Hgb  


 


Hct  


 


MCV  


 


MCH  


 


MCHC  


 


RDW  


 


Plt Count  


 


PT   17.6 H


 


INR   1.6 H


 


APTT   43.0 H


 


Sodium  


 


Potassium  


 


Chloride  


 


Carbon Dioxide  


 


Anion Gap  


 


BUN  


 


Creatinine  


 


Est GFR ( Amer)  


 


Est GFR (Non-Af Amer)  


 


Random Glucose  


 


Calcium  


 


Blood Type  O POSITIVE 


 


Antibody Screen  Negative 


 


Crossmatch  See Detail 


 


BBK History Checked  Patient has bt 











Fingerstick Blood Sugar Results: 158





Review of Systems





- Review of Systems


All systems: reviewed and no additional remarkable complaints except





- Gastrointestinal


Gastrointestinal: Hematochezia, Melena





Critical Care Progress Note





- Nutrition


Nutrition: 


 Nutrition











 Category Date Time Status


 


 Liquid Diet [DIET] Diets  05/10/18 Dinner Active














Assessment/Plan


(1) Acute GI bleeding


Assessment and plan: 


37M re-admitted to ICU after being discharged from hospital 2 days ago for UGIB 

and found to have diffuse gastric inflammation with friability on EGD done 4/16/ 18.  Had recurrence of bloody emesis from grade 2 variceal bleed s/p banding (05 /04).  CT abdomen shows ileus, minimal ascites (05/07).  Extubated (05/08).





Neuro: alert,following commands.





Pulm: no acute issues, breathing spontaneously on nasal canula.





CV: hemodynamically stable.





Hem: bleeding has successfully been stopped s/p variceal banding (05/04).  

Continue Venofer for chronic anemia.  Started low dose sq heparin for DVT (05/12

) after 8 days of no bleeding s/p stabilization.  Unfortunately, after only 2 

doses of heparin sq, patient has now immediately started having melanotic stool 

and hematochezia >> stopped prophylactic heparin, transfusing 2 units PRBC.  If 

this bleeding does not stop, will have to reconsult GI to repeat endoscopy.





Renal: anasarca, diuresing 40 lasix IV daily, still fluid positive.





Endo: no acute issues.





GI:  Started on TPN (05/07).  Ileus resolving, continue colace/senna to promote 

motility.  bacid po.





ID: coag negative staph blood culuture and yeast in sputum, switiching to 

doxycyline, continue fluconazole.





DVT proph - heparin sq


GI proph - protonix


OOB to chair/PT consulted


mona for strict I/O's during acute illness


Code status -  full code





Critical Care Time spent 35 minutes


Multi-disciplinary rounds were performed with house staff, nursing, speech 

therapy, respiratory therapy, pharmacy and nutrition with integrated input from 

the primary team/attending and other consulting services.  The documented time 

is cumulative and includes review of patient data/exams/labs/chart review and 

examination of the patient on rounds and throughout the day; time is exclusive 

of any procedures or teaching time.


Current Visit: Yes   Status: Acute

## 2018-05-14 NOTE — CP.PCM.PN
Subjective





- Date & Time of Evaluation


Date of Evaluation: 05/14/18


Time of Evaluation: 10:00





- Subjective


Subjective: 





Patient was seen and examined at bedside. Still appears weak but speech was 

more intelligible today. 


Continues to have melanotic stool.


S/p 2 transfusions PRBC yesterday.


For repeat upper endoscopy in AM.





Objective





- Vital Signs/Intake and Output


Vital Signs (last 24 hours): 


 











Temp Pulse Resp BP Pulse Ox


 


 98.3 F   95 H  18   129/83   99 


 


 05/14/18 12:00  05/14/18 12:00  05/14/18 12:00  05/14/18 12:00  05/14/18 12:00








Intake and Output: 


 











 05/14/18 05/14/18





 06:59 18:59


 


Intake Total 720 680


 


Balance 720 680














- Medications


Medications: 


 Current Medications





Acetaminophen (Tylenol 650 Mg Supp)  650 mg AL ONCE PRN


   PRN Reason: Fever >100.4 F


   Last Admin: 05/11/18 11:54 Dose:  650 mg


Acetaminophen (Tylenol 325 Mg Supp)  325 mg AL Q6 PRN


   PRN Reason: Fever >100.4 F


Chlordiazepoxide (Librium)  50 mg PO Q8 [facility]


   Last Admin: 04/27/18 08:49 Dose:  50 mg


Fat Emulsion Intravenous (Intralipid 20%)  250 ml IV ONCE ONE


   Stop: 05/14/18 13:31


Folic Acid (Folic Acid)  1 mg PO DAILY [facility]


   Last Admin: 04/27/18 08:09 Dose:  1 mg


Furosemide (Lasix)  40 mg IVP DAILY [facility]


   Last Admin: 05/14/18 08:11 Dose:  40 mg


Pantoprazole Sodium 40 mg/ (Sodium Chloride)  100 mls @ 20 mls/hr IVPB Q5H JUAN LUIS


   PRN Reason: 8 MG/HR


   Last Admin: 05/14/18 09:27 Dose:  20 mls/hr


Fluconazole (Diflucan Iv 100 Mg/50 Ml Ns)  50 mls @ 50 mls/hr IVPB DAILY JUAN LUIS


   PRN Reason: Protocol


   Last Admin: 05/14/18 09:28 Dose:  50 mls/hr


Doxycycline Hyclate 100 mg/ (Sodium Chloride)  100 mls @ 100 mls/hr IVPB Q12 JUAN LUIS


   PRN Reason: Protocol


   Stop: 05/19/18 11:01


   Last Admin: 05/14/18 08:11 Dose:  100 mls/hr


Lactobacillus Acidophilus (Bacid Acidophilus)  1 cap PO BID [facility]


   Last Admin: 05/14/18 09:01 Dose:  1 cap


Senna/Docusate Sodium (Senokot S 50 Mg-8.6 Mg)  1 tab PO HS [facility]


   Last Admin: 05/13/18 22:00 Dose:  Not Given


Thiamine HCl (Vitamin B1 Tab)  100 mg PO DAILY [facility]


   Last Admin: 04/27/18 08:09 Dose:  100 mg











- Labs


Labs: 


 





 05/14/18 06:24 





 05/14/18 06:24 





 











PT  17.6 Seconds (9.8-13.1)  H  05/13/18  10:20    


 


INR  1.6  (0.9-1.2)  H  05/13/18  10:20    


 


APTT  43.0 Seconds (25.6-37.1)  H  05/13/18  10:20    














- Additional Findings


Additional findings: 





Physical exam:





Constitutional- cooperative, awake, responds to voice but is not intelligible


Head- NCAT, PERRL


Eye- PERRL, EOMI


ENT- normal exam, MMM. Upper respiratory secretions noted. Weak cough reflex


Neck- normal inspection, supple, no JVD


Respiratory- CTAB, no wheezes rales rhonchi


Cardiovascular- RRR, +S1, +S2 no MRG


GI/Abdominal- normal bowel sounds, soft, no mass, no hsm


Skin- warm, dry


Extremities Exam- +3 bilateral pitting edema, anasarca.  Normal capillary refill

, normal inspection


Neurological Exam- alert, awake, unintelligible voice. Moving all 4 

extremities. 








Assessment and Plan





- Assessment and Plan (Free Text)


Plan: 





Assessment: 


38 y/o male with  known history  of Alcoholism and recent GI bleed, was brought 

in because of hematemesis and alteration in   mental status.


He was recently discharged from this hospital  after an episode of GI bleed .  

EGD done on 4/16 did not show any active bleed.  The patient was discharged 

home and again started drinking.


On day of admission, he  had hematemesis and was noted to be confused and 

agitated.  HGB=6.1.He was transfused with PRBC and FFP, intubated for airway 

protection. Underwent 2 EGD and 2  bleeding scan that failed to show an active 

source of bleeding .He was started on Levophed drip 4/27 for hypotension and 

underwent CTA of abdomen by IR that showed no active bleeding.


Transfused total to date  30  unit PRBC and 21 unit FFP and 3 Platelets 


Was intubated  sedated on Presedex drip , octreotide,tranesamix  and   Levophed 

drip 


Had repeat EGD on  5/4 with banding of esophageal varices . No more bleeding 

episodes , melena or hematemesis since 5/4 and Hgb stable.


Off Levophed , octreotide and Transemix drip. Extubated on 5/8 and maintaining 

his airway. On TPN for nutrition 


With fever , tachycardic  and blood cx positive for Staph coag negative and 

sputum positive for yeast . More awake today but very weak with almost 

unintelligible voice 


CT abdomen showing right middle lobe infiltrate and possible right lower lobe 

infiltrate 





1. Acute blood loss anemia sec to GI Bleed secondary  to Bleeding  Esophageal 

Varices


Acute   


s/p total 32 units PRBC and 21 unit FFP transfusion and 3 platelets


s/p 3 EGD and 2 CTA abdomen this admission 


Underwent EGD  5/4  with banding of esophageal varices. No more bleeding since 

last EGD 


Hgb from 8.6 to 7.4 yesterday, now 8.7 s/p 2 units PRBC


For upper endoscopy 5/15


Continue Protonix


Hematology,GI and surgery on consult 


cont  TPN since he has very poor PO intake 


Monitor electrolytes daily and replace 





2. Suspected sepsis / bacteremia and Pneumonia


Afebrile since 5/12 AM , tachycardic ,procalcitonin elevated 4.9


No leukocytosis


CT abdomen showed right middle lobe / upper and lower lobe infiltrate


CT chest showed possible right mid , right upper lobe  infiltrate vs 

atelectasis and right mid ground glass opacities1 blood Cx positive for Staph 

coag negative and sputum cx positive for yeast 


continue vanco , cipro and diflucan 


Repeat blood cultures with no growth so far 


continue IV antibiotics


removed Tight groin TLC 5/11 and PICC line placed to RUE 


Echo shoed no vegetations


Start PT and promote ambulation  








3. Ileus -resolved 


noted to had abdominal distention 


CT abdomen showed:Distended large and small bowel loops with occasional air-

fluid levels in the central abdominal small bowel.  The stomach is mildly 

distended in the interval in overall pattern suggests ileus rather than small 

large-bowel obstruction.


Continue nectar thick liquid  diet and advance as tolerated 


Continue TPN











4.Hypovolemic shock  secondary to GI bleed , resolved 


off  levophed drip 


had massive  transfusion





5. Transfusion reaction 


with fever episode during transfusion on 5/5








6. Coagulopathy sec to Liver dis from alcoholism


received Vit K, DDAVP, Tranexamic acid 


s/p 21 unit FFP transfusion 


Hematology consulted 


No more bleeding episodes and Hgb stable 








7.  Cirrhosis with ascites , coagulopathy, thrombocytopenia sec to ETOH abuse 


s/p abdominal paracentesis by IR  4/30  with removal 900 ml ascitic fluid


With anasarca 


repeat CT abdomen showed small ascitic fluid 


Decreased Lasix to 40 mg Iv daily  


Guarded prognosis








8.Suspected  Pneumonia 


Suspected RML , RUL infiltrate as above


sputum Cx positive for yeast 


started Diflucan


Continue  vanco and Cipro


CT chest as above and procalcitonin elevated


extubated 5/8








9.  Alcohol Abuse 


has been on Presedex drip for more than 2 weeks


at present out of acute  withdrawal period


off presedex


Ativan prn 








10. Hepatic Encephalopathy


CT of the head : no bleed


ammonia level- low





11.Hypernatremia/ Hypokalemia/ hyperchloremia


secondary to diuretic use 


Decreased Lasix to once daily 


monitor electrolytes daily since patient is on TPN 


K 3.9 today. Will replace with 20 MEQ 








12. DVT proph


SCD


no anticoag sec to GIB and coagulopathy & low Platelet

## 2018-05-14 NOTE — VASCULAR
PROCEDURE:  PERIPHERALLY INSERTED CENTRAL VENOUS CATHETER INSERTION



CLINICAL HISTORY:  37-year-old male with bacteremia requiring long 

term intravenous antibiotics is referred to Interventional Radiology 

for PICC insertion.



COMPARISON:

None.



PROCEDURE:  1. Focused ultrasound of the right upper extremity 

vasculature.



2. Ultrasound-guided access.



3. Insertion of peripherally inserted central venous catheter.



4. Fluoroscopic localization of catheter tip.



PRE-PROCEDURE FINDINGS:  1. Patent right basilic vein.



POST-PROCEDURE FINDINGS:  1. Placement of 5 Dominican double-lumen PICC.



2. Catheter length: 38 cm.



3. Catheter tip at cavoatrial junction.



INTERVENTIONAL RADIOLOGIST:  Israel Rivas M.D. (the attending was 

present for the entire procedure)



ANESTHESIA:  None.



MEDICATION:  Lidocaine 1% for local subcutaneous analgesia.



COMPLICATIONS:  None.



RADIATION DOSE:  Fluoroscopy Time: 9.4 seconds



Cumulative Dose: 2.01 mGy



PROCEDURE DESCRIPTION AND FINDINGS:  The risks, benefits, 

alternatives and possible complications of the procedure were fully 

discussed; all questions were answered and informed consent was 

obtained. The patient was brought into the interventional suite and a 

pre-procedure 'time-out' was performed. 



The patient was placed on the fluoroscopy table in the supine 

position. The right upper extremity was prepped and draped in the 

usual sterile fashion. Maximum sterile barrier precautions were 

maintained throughout the entire procedure. Preliminary ultrasound 

images of the right upper extremity vasculature demonstrate patency 

of the right basilic vein. Following subcutaneous infiltration of 1% 

lidocaine for local analgesia, under ultrasound guidance, a 21-gauge 

needle was advanced into the right basilic vein with real-time 

visualization of needle entry. The ultrasound images were permanently 

recorded and submitted to the PACS. A 0.018 guidewire was advanced 

centrally to the cavoatrial junction. A 5 Dominican peel-away sheath was 

advanced over the guidewire. After obtaining length measurement, a 5 

Dominican double-lumen PICC was placed with the tip of the catheter at 

the cavoatrial junction. The total length of the catheter is 38 cm. 

The hub of the PICC was secured to the skin using a sterile adhesive 

bandage.



The patient tolerated the procedure well without immediate 

post-procedure complications and was transferred back to the ICU in 

stable condition.



IMPRESSION:

SUCCESSFUL INSERTION OF RIGHT UPPER EXTREMITY PICC.



PICC OK TO USE.

## 2018-05-14 NOTE — CP.CCUPN
CCU Subjective





- Physician Review


Subjective (Free Text): 





04/30/18 17:21


The patient was Seen and examined by me at the bedside, 


Medical records reviewed and Management issues were discussed and formulated 

with the house staff.


Events reviewed 


Continue to have H/H drop despite 2Units od PRBC transfusion over the weekend


Continue to active bleeding overnight, and today apperent by massive episode of 

hematemesis today, requiring intubation for airway protection and emergent 

Endoscopy


Pt now Sedaed with propofol drip and orally intubated


Afebrile, NSR on the monitor 


Ordered 2 more unites packed RBC, 1U Platelets and 1U FFP 


Afebrile 


NSR on the monitor, tachcardic 


Pt hypotensive, started on Phenylephrine.  


NG tube placement placed to suction, NG tube with intially light pink color 

aspirate, now dark  


NPO, on IV Fluids WITH 0.9% ns AT 125CC/h


Remains on pantoprazole and Restarted octreotide drip


Scheduled for repeat Endoscopy tomorrow




















Critical Care Time Spent (in minutes): 40





CCU Objective





- Vital Signs / Intake & Output


Vital Signs (Last 4 hours): 


Vital Signs











  Temp Pulse Resp BP Pulse Ox


 


 05/14/18 10:00   108 H  20  126/92 H  98


 


 05/14/18 08:11     132/95 H 


 


 05/14/18 07:59  98.0 F  85  17  132/95 H  96











Intake and Output (Last 8hrs): 


 Intake & Output











 05/13/18 05/14/18 05/14/18





 22:59 06:59 14:59


 


Intake Total 1295 480 470


 


Output Total 200  


 


Balance 1095 480 470


 


Weight   196 lb 4.8 oz


 


Intake:   


 


    


 


  Intake, Piggyback 50  180


 


  Oral   50


 


  TPN/ 480 240


 


  Blood Product 325  


 


Output:   


 


  Urine 200  


 


    Urine, Voided 200  


 


Other:   


 


  # Voids   


 


    Urine, Voided   2


 


  # Bowel Movements 1  














- Physical Exam


Head: Positive for: Atraumatic, Normocephalic


Pupils: Positive for: PERRL.  Negative for: Sluggish, Non-Reactive, Pinpoint


Extroacular Muscles: Positive for: EOMI


Conjunctiva: Negative for: Injected, Icteric, Other


Mouth: Positive for: Moist Mucous Membranes, Dry


Pharnyx: Positive for: Normal


Neck: Positive for: Normal Range of Motion


Respiratory/Chest: Positive for: Clear to Auscultation, Good Air Exchange.  

Negative for: Respiratory Distress, Accessory Muscle Use, Wheezes, Rhonchi


Cardiovascular: Positive for: Regular Rate and Rhythm, Normal S1, S2, Peripheal 

Pulses Present.  Negative for: Murmurs, Irregular Rhythm, Tachycardic, 

Bradycardic


Abdomen: Positive for: Distention, Normal Bowel Sounds.  Negative for: 

Tenderness


Upper Extremity: Positive for: Normal Inspection.  Negative for: Edema


Lower Extremity: Positive for: Normal Inspection.  Negative for: Edema


Neurological: Positive for: Speech Normal


Psychiatric: Positive for: Alert





- Medications


Active Medications: 


Active Medications











Generic Name Dose Route Start Last Admin





  Trade Name Freq  PRN Reason Stop Dose Admin


 


Acetaminophen  650 mg  05/06/18 22:11  05/11/18 11:54





  Tylenol 650 Mg Supp  NH   650 mg





  ONCE PRN   Administration





  Fever >100.4 F   


 


Acetaminophen  325 mg  05/11/18 11:48  





  Tylenol 325 Mg Supp  NH   





  Q6 PRN   





  Fever >100.4 F   


 


Chlordiazepoxide  50 mg  04/26/18 17:00  04/27/18 08:49





  Librium  PO   50 mg





  Q8 JUAN LUIS   Administration


 


Fat Emulsion Intravenous  250 ml  05/14/18 13:30  





  Intralipid 20%  IV  05/14/18 13:31  





  ONCE ONE   


 


Folic Acid  1 mg  04/20/18 11:15  04/27/18 08:09





  Folic Acid  PO   1 mg





  DAILY JUAN LUIS   Administration


 


Furosemide  40 mg  05/10/18 09:00  05/14/18 08:11





  Lasix  IVP   40 mg





  DAILY JUAN LUIS   Administration


 


Pantoprazole Sodium 40 mg/  100 mls @ 20 mls/hr  04/27/18 12:15  05/14/18 09:27





  Sodium Chloride  IVPB   20 mls/hr





  Q5H JUAN LUIS   Administration





  8 MG/HR   


 


Fluconazole  50 mls @ 50 mls/hr  05/11/18 13:15  05/14/18 09:28





  Diflucan Iv 100 Mg/50 Ml Ns  IVPB   50 mls/hr





  DAILY JUAN LUIS   Administration





  Protocol   


 


Doxycycline Hyclate 100 mg/  100 mls @ 100 mls/hr  05/12/18 11:00  05/14/18 08:

11





  Sodium Chloride  IVPB  05/19/18 11:01  100 mls/hr





  Q12 JUAN LUIS   Administration





  Protocol   


 


Lactobacillus Acidophilus  1 cap  05/12/18 10:30  05/14/18 09:01





  Bacid Acidophilus  PO   1 cap





  BID JUAN LUIS   Administration


 


Senna/Docusate Sodium  1 tab  05/08/18 22:00  05/13/18 22:00





  Senokot S 50 Mg-8.6 Mg  PO   Not Given





  HS JUAN LUIS   


 


Thiamine HCl  100 mg  04/20/18 11:15  04/27/18 08:09





  Vitamin B1 Tab  PO   100 mg





  DAILY JUAN LUIS   Administration














- Patient Studies


Lab Studies: 


 Microbiology Studies











 05/10/18 16:40 Blood Culture - Preliminary





 Blood    NO GROWTH AFTER 3 DAYS


 


 05/10/18 16:40 Blood Culture - Preliminary





 Blood    NO GROWTH AFTER 3 DAYS


 


 05/08/18 12:30 Blood Culture - Final





 Blood-Venous    NO GROWTH AFTER 5 DAYS








 Lab Studies











  05/14/18 05/14/18 05/13/18 Range/Units





  06:24 06:24 10:20 


 


WBC   8.3   (4.8-10.8)  K/uL


 


RBC   2.84 L   (4.40-5.90)  Mil/uL


 


Hgb   8.7 L   (12.0-18.0)  g/dL


 


Hct   26.0 L   (35.0-51.0)  %


 


MCV   91.8  D   (80.0-94.0)  fl


 


MCH   30.6   (27.0-31.0)  pg


 


MCHC   33.3   (33.0-37.0)  g/dL


 


RDW   18.9 H   (11.5-14.5)  %


 


Plt Count   108 L D   (130-400)  K/uL


 


Sodium  148    (132-148)  mmol/l


 


Potassium  3.7    (3.6-5.0)  MMOL/L


 


Chloride  114 H    ()  mmol/L


 


Carbon Dioxide  24    (22-30)  mmol/L


 


Anion Gap  14    (10-20)  


 


BUN  20    (9-20)  mg/dl


 


Creatinine  0.6 L    (0.8-1.5)  mg/dl


 


Est GFR (African Amer)  > 60    


 


Est GFR (Non-Af Amer)  > 60    


 


Random Glucose  120 H    ()  mg/dL


 


Calcium  8.4    (8.4-10.2)  mg/dL


 


Phosphorus  2.4 L    (2.5-4.5)  mg/dl


 


Magnesium  2.0    (1.6-2.3)  MG/DL


 


Total Bilirubin  1.2    (0.2-1.3)  mg/dl


 


AST  135 H    (17-59)  U/L


 


ALT  70    (21-72)  U/L


 


Alkaline Phosphatase  211 H    ()  U/L


 


Total Protein  6.2 L    (6.3-8.2)  G/DL


 


Albumin  2.5 L    (3.5-5.0)  g/dL


 


Globulin  3.7    (2.2-3.9)  gm/dL


 


Albumin/Globulin Ratio  0.7 L    (1.0-2.1)  


 


Triglycerides  82    (0-149)  mg/DL


 


Blood Type    O POSITIVE  


 


Antibody Screen    Negative  


 


Crossmatch    See Detail  


 


BBK History Checked    Patient has bt  








 Laboratory Results - last 24 hr











  05/13/18 05/14/18 05/14/18





  10:20 06:24 06:24


 


WBC   8.3 


 


RBC   2.84 L 


 


Hgb   8.7 L 


 


Hct   26.0 L 


 


MCV   91.8  D 


 


MCH   30.6 


 


MCHC   33.3 


 


RDW   18.9 H 


 


Plt Count   108 L D 


 


Sodium    148


 


Potassium    3.7


 


Chloride    114 H


 


Carbon Dioxide    24


 


Anion Gap    14


 


BUN    20


 


Creatinine    0.6 L


 


Est GFR ( Amer)    > 60


 


Est GFR (Non-Af Amer)    > 60


 


Random Glucose    120 H


 


Calcium    8.4


 


Phosphorus    2.4 L


 


Magnesium    2.0


 


Total Bilirubin    1.2


 


AST    135 H


 


ALT    70


 


Alkaline Phosphatase    211 H


 


Total Protein    6.2 L


 


Albumin    2.5 L


 


Globulin    3.7


 


Albumin/Globulin Ratio    0.7 L


 


Triglycerides    82


 


Blood Type  O POSITIVE  


 


Antibody Screen  Negative  


 


Crossmatch  See Detail  


 


BBK History Checked  Patient has bt  











Fingerstick Blood Sugar Results: 158





Critical Care Progress Note





- Nutrition


Nutrition: 


 Nutrition











 Category Date Time Status


 


 Liquid Diet [DIET] Diets  05/10/18 Dinner Active














Assessment/Plan


(1) Acute respiratory failure


Current Visit: Yes   Status: Acute   Comment: 


Full vent support for now till bleeding under control


PRN Ativan, Librium 


fall/aspiration/seizure precautions  


Wean off FIO2


Vent weaning when bleeding stops   





(2) Acute blood loss anemia


Current Visit: Yes   Status: Acute   Comment: 


CBCs unstable


Scheduled fot transfusion 2U packed RBC, 1U Plat and 1U FFP 


CT angiogram and bleeding scan did not revealed the source of bleeding 


Frequent CBC monitoring   





(3) GI bleed


Current Visit: Yes   Status: Acute   Comment: 


Scheduled for repeat Endoscopy tomorrow


frequent H/H monitoring


CT angiogram and bleeding scan did not revealed the source of bleeding 


PICC line and Two large bore peripheral catheters


PANTOPRAZOLE drip 


OCTREOTIDE drip


Vent support


NPO


IV fluids   





(4) Hepatic encephalopathy


Current Visit: Yes   Status: Acute   Comment: 


Altered mental status sec to ETOH Withdrawal, Delirium Vs Hepatic Encephalopathy


CT of the head: no bleed


Chech Ammonia level


Continue foluic acid and thiamine   





(5) Chronic alcoholism


Current Visit: No   Status: Acute   Comment: 


Banana Bag then Intravenous thiamine and Folate


Continue Librium 


PRN Ativan


fall/aspiration/seizure precautions

## 2018-05-14 NOTE — RAD
PROCEDURE:  CHEST RADIOGRAPH, 1 VIEW



HISTORY:

Post Intubation



COMPARISON:

Comparison made with prior radiographs chest 05/08/2018 and CT scan 

chest dated 05/10/2018 



FINDINGS:

In situ ETT, tip of which lies approximately 2.59 cm above carole. . 

Interval placement right-sided PICC line tip in the SVC/RA junction. 



LUNGS:

Low lung volumes with minor crowded bronchovascular markings and 

minor bibasilar atelectasis



PLEURA:

No pneumothorax or pleural fluid seen.



CARDIOVASCULAR:

Normal.



OSSEOUS STRUCTURES:

No significant abnormalities.



VISUALIZED UPPER ABDOMEN:

Normal.



OTHER FINDINGS:

None. 



IMPRESSION:

Support lines and tubes as above. Low lung volumes with minor crowded 

bronchovascular markings and bibasilar atelectasis.

## 2018-05-14 NOTE — CP.PCM.PN
Subjective





- Date & Time of Evaluation


Date of Evaluation: 05/14/18


Time of Evaluation: 15:39





- Subjective


Subjective: 





Patient had been doing well today and began having large volume hematemesis 

with drop in Hgb and blood pressure. Patient was intubated.





Objective





- Vital Signs/Intake and Output


Vital Signs (last 24 hours): 


 











Temp Pulse Resp BP Pulse Ox


 


 98.3 F   124 H  28 H  108/67   97 


 


 05/14/18 12:00  05/14/18 14:53  05/14/18 14:53  05/14/18 14:53  05/14/18 14:53








Intake and Output: 


 











 05/14/18 05/14/18





 06:59 18:59


 


Intake Total 720 780


 


Balance 720 780














- Medications


Medications: 


 Current Medications





Acetaminophen (Tylenol 650 Mg Supp)  650 mg TX ONCE PRN


   PRN Reason: Fever >100.4 F


   Last Admin: 05/11/18 11:54 Dose:  650 mg


Acetaminophen (Tylenol 325 Mg Supp)  325 mg TX Q6 PRN


   PRN Reason: Fever >100.4 F


Chlordiazepoxide (Librium)  50 mg PO Q8 JUAN LUIS


   Last Admin: 04/27/18 08:49 Dose:  50 mg


Folic Acid (Folic Acid)  1 mg PO DAILY JUAN LUIS


   Last Admin: 04/27/18 08:09 Dose:  1 mg


Furosemide (Lasix)  40 mg IVP DAILY [facility]


   Last Admin: 05/14/18 08:11 Dose:  40 mg


Pantoprazole Sodium 40 mg/ (Sodium Chloride)  100 mls @ 20 mls/hr IVPB Q5H JUAN LUIS


   PRN Reason: 8 MG/HR


   Last Admin: 05/14/18 09:27 Dose:  20 mls/hr


Doxycycline Hyclate 100 mg/ (Sodium Chloride)  100 mls @ 100 mls/hr IVPB Q12 JUAN LUIS


   PRN Reason: Protocol


   Stop: 05/19/18 11:01


   Last Admin: 05/14/18 08:11 Dose:  100 mls/hr


Propofol (Diprivan)  1,000 mg in 100 mls @ 2.671 mls/hr IV .Q24H JUAN LUIS; 5 MCG/KG/

MIN


   PRN Reason: Protocol


   Stop: 05/15/18 14:21


Phenylephrine HCl 10 mg/ (Sodium Chloride)  251 mls @ 37.65 mls/hr IV .Q6H40M 

JUAN LUIS; 25 MCG/MIN


   PRN Reason: Protocol


   Stop: 05/15/18 14:04


Lactobacillus Acidophilus (Bacid Acidophilus)  1 cap PO BID [facility]


   Last Admin: 05/14/18 09:01 Dose:  1 cap


Senna/Docusate Sodium (Senokot S 50 Mg-8.6 Mg)  1 tab PO HS [facility]


   Last Admin: 05/13/18 22:00 Dose:  Not Given


Thiamine HCl (Vitamin B1 Tab)  100 mg PO DAILY [facility]


   Last Admin: 04/27/18 08:09 Dose:  100 mg











- Labs


Labs: 


 





 05/14/18 14:45 





 05/14/18 14:45 





 











PT  17.6 Seconds (9.8-13.1)  H  05/13/18  10:20    


 


INR  1.6  (0.9-1.2)  H  05/13/18  10:20    


 


APTT  43.0 Seconds (25.6-37.1)  H  05/13/18  10:20    














- Head Exam


Head Exam: ATRAUMATIC





- Respiratory Exam


Respiratory Exam: Clear to Ausculation Bilateral





- Cardiovascular Exam


Cardiovascular Exam: +S1, +S2





- GI/Abdominal Exam


GI & Abdominal Exam: Distended





Assessment and Plan


(1) GI bleed


Assessment & Plan: 


Emergency upper endoscopy after intubation was done. Large amount of blood in 

stomach and esophagus was seen. Approximately half of the circumference of the 

esophagus was seen and only small varices was seen without active bleeding. 

Attempts at suctioning the blood and irrigating the esophagus were 

unsuccessful. At this point procedure halted due to the futility of continuing. 


Recommend FFP, DDAVP, Octreotide. Consultation with Interventional radiology 

for possible TIPs. Replace lost RBC. Repeat upper endoscopy once bleeding has 

slowed.


Status: Acute

## 2018-05-14 NOTE — PCM.ANES
Anesthesia Emergent Intubation





- Diagnosis


Working Diagnosis:: GI bleed





- Consult


Reason for Consult:: Intubation for airway protection





- Intubation Attempts


Previous Number of Intubation Attempts:: 0





- Pre-Intubation Vital Signs


Blood Pressure: 108/67


Heart Rate: 124


Respiratory Rate: 28


O2 Sat: 97


FIO2: 21


Oxygen Delivery Method: Room Air


Level Of Consciousness: Alert


Intubation Meds Given: Versed, Propofol





- Airway Management


Oropharyngeal Area Suctioned: Yes


PreOxygenation: 100


Inhalation: No


Rapid Sequence: Yes


Cricoid Pressure: Yes


Possible Aspiration: No





- Method of Intubation


Intubation Method: Oral ETT


ETT Size: 7.5


Lipline@: 23


Easy: Yes


Atramatic: Yes





- Intubation Devices


Lacy Blade Size Used: 3


Cristhian Forcepts Used: No


Glide Scope Used: No


Fiber Optic Scope: No





- Placement Confirmation


Breath Sounds Present & Equal Bilaterally: Yes


Gurgling Sounds Not Audible at Epigastrum: Yes


Positive EtCO2: Yes


Portable CXR: Yes


Recommendations: Ventilator, Chest X Ray, ABG





- Post-Intubation Vital Signs


Blood Pressure: 99/55


Heart Rate: 117


Respiratory Rate: 18


O2 Sat: 100


FIO2: 50

## 2018-05-15 NOTE — CP.PCM.PN
Subjective





- Date & Time of Evaluation


Date of Evaluation: 05/15/18


Time of Evaluation: 08:30





- Subjective


Subjective: 


Patient seen and evaluated bedside in ICU unit. Intubated yesterday for airway 

protection and at present  on MV PRVC Ac mode 12/500 /5/40 % with ABG 31/121/25/

7.5 


Sedated on Propofol drip but awake, responds to verbal command and follows 

simple commands


With massive bleeding again yesterday with  hematemesis and melena and drop in 

Hgb to 6.7 requiring emergent EGD and blood transfusion and was intubate for 

airway protection.


On Protonix ,octreotide, Diprivan  and phenylephrine drip 


BP stable 120/58 HR 90 afebile 


OGT and NGT in place with minimal bloody output 


WBC 15 K Hgb 7.2 Plt 149 L 





 





Objective





- Vital Signs/Intake and Output


Vital Signs (last 24 hours): 


 











Temp Pulse Resp BP Pulse Ox


 


 98.4 F   91 H  19   120/58 L  100 


 


 05/15/18 08:00  05/15/18 08:00  05/15/18 08:00  05/15/18 08:00  05/15/18 08:00








Intake and Output: 


 











 05/15/18 05/15/18





 06:59 18:59


 


Intake Total 3009 0


 


Output Total 300 


 


Balance 2709 0














- Medications


Medications: 


 Current Medications





Acetaminophen (Tylenol 650 Mg Supp)  650 mg VT ONCE PRN


   PRN Reason: Fever >100.4 F


   Last Admin: 05/11/18 11:54 Dose:  650 mg


Acetaminophen (Tylenol 325 Mg Supp)  325 mg VT Q6 PRN


   PRN Reason: Fever >100.4 F


Chlordiazepoxide (Librium)  50 mg PO Q8 [facility]


   Last Admin: 04/27/18 08:49 Dose:  50 mg


Folic Acid (Folic Acid)  1 mg PO DAILY [facility]


   Last Admin: 04/27/18 08:09 Dose:  1 mg


Furosemide (Lasix)  40 mg IVP DAILY [facility]


   Last Admin: 05/14/18 08:11 Dose:  40 mg


Pantoprazole Sodium 40 mg/ (Sodium Chloride)  100 mls @ 20 mls/hr IVPB Q5H [facility] LUIS


   PRN Reason: 8 MG/HR


   Last Admin: 05/15/18 06:47 Dose:  20 mls/hr


Doxycycline Hyclate 100 mg/ (Sodium Chloride)  100 mls @ 100 mls/hr IVPB Q12 JUAN LUIS


   PRN Reason: Protocol


   Stop: 05/19/18 11:01


   Last Admin: 05/14/18 20:13 Dose:  100 mls/hr


Propofol (Diprivan)  1,000 mg in 100 mls @ 2.671 mls/hr IV .Q24H JUAN LUIS; 5 MCG/KG/

MIN


   PRN Reason: Protocol


   Stop: 05/15/18 14:21


   Last Titration: 05/15/18 08:24 Dose:  20 mcg/kg/min, 10.685 mls/hr


Phenylephrine HCl 10 mg/ (Sodium Chloride)  251 mls @ 37.65 mls/hr IV .Q6H40M 

JUAN LUIS; 25 MCG/MIN


   PRN Reason: Protocol


   Stop: 05/15/18 14:04


   Last Admin: 05/15/18 06:48 Dose:  25 mcg/min, 37.65 mls/hr


Octreotide Acetate 1,250 mcg/ (Sodium Chloride)  252.5 mls @ 10.1 mls/hr IV 

.Q24H JUAN LUIS


   PRN Reason: 50 MCG/HR


   Stop: 05/19/18 15:59


   Last Admin: 05/14/18 17:30 Dose:  10.1 mls/hr


Sodium Chloride (Sodium Chloride 0.9%)  1,000 mls @ 125 mls/hr IV .Q8H [facility]


   Stop: 05/15/18 17:21


   Last Admin: 05/15/18 03:04 Dose:  125 mls/hr


Piperacillin Sod/Tazobactam (Sod 3.375 gm/ Sodium Chloride)  100 mls @ 100 mls/

hr IVPB Q6 [facility]


   PRN Reason: Protocol


   Last Admin: 05/15/18 03:06 Dose:  100 mls/hr


Lactobacillus Acidophilus (Bacid Acidophilus)  1 cap PO BID [facility]


   Last Admin: 05/14/18 17:42 Dose:  Not Given


Senna/Docusate Sodium (Senokot S 50 Mg-8.6 Mg)  1 tab PO HS [facility]


   Last Admin: 05/14/18 22:50 Dose:  Not Given


Thiamine HCl (Vitamin B1 Tab)  100 mg PO DAILY [facility]


   Last Admin: 04/27/18 08:09 Dose:  100 mg











- Labs


Labs: 


 





 05/15/18 04:20 





 05/15/18 04:20 





 











PT  17.6 Seconds (9.8-13.1)  H  05/13/18  10:20    


 


INR  1.6  (0.9-1.2)  H  05/13/18  10:20    


 


APTT  43.0 Seconds (25.6-37.1)  H  05/13/18  10:20    














- Constitutional


Appears: No Acute Distress, Other (intubated on MV , sedated but follows 

commands and awake )





- Head Exam


Head Exam: ATRAUMATIC, NORMOCEPHALIC





- Eye Exam


Eye Exam: EOMI, PERRL





- ENT Exam


ENT Exam: Mucous Membranes Dry


Additional comments: 





dry lips 





- Neck Exam


Neck Exam: Normal Inspection





- Respiratory Exam


Respiratory Exam: Clear to Ausculation Bilateral.  absent: Rhonchi, Wheezes





- Cardiovascular Exam


Cardiovascular Exam: Tachycardia, +S1, +S2.  absent: JVD





- GI/Abdominal Exam


GI & Abdominal Exam: Distended, Soft, Normal Bowel Sounds.  absent: Guarding, 

Tenderness, Rebound





- Rectal Exam


Rectal Exam: Deferred





- Extremities Exam


Extremities Exam: Pedal Edema (1 +)





- Neurological Exam


Neurological Exam: Awake


Additional comments: 





moves extremities 


follows commands 





- Psychiatric Exam


Psychiatric exam: Flat Affect





- Skin


Skin Exam: Dry, Warm





Assessment and Plan





- Assessment and Plan (Free Text)


Assessment: 


38 y/o male with  known history  of Alcoholism and recent GI bleed, was brought 

in because of hematemesis and alteration in   mental status.


He was recently discharged from this hospital  after an episode of GI bleed .  

EGD done on 4/16 did not show any active bleed.  The patient was discharged 

home and again started drinking.


On day of admission, he  had hematemesis and was noted to be confused and 

agitated.  HGB=6.1.He was transfused with PRBC and FFP, intubated for airway 

protection. Underwent 2 EGD and 2  bleeding scan that failed to show an active 

source of bleeding .He was started on Levophed drip 4/27 for hypotension and 

underwent CTA of abdomen by IR that showed no active bleeding.


Was intubated  sedated on Presedex drip , octreotide,tranesamix  and   Levophed 

drip 


Had repeat EGD on  5/4 with banding of esophageal varices  and remained stable 

with no episodes of bleeding and stable Hgb until 5/14. He was extubated , off 

levophed, octreotide and tranexamic drip  and was started on TPN and diet.


Antibiotics were started for staph coag negative in blood and possible 

pneumonia.


With large hematemesis and melena yesterday 5/14 and drop in Hgb 6.7 requiring 

more transfusion and emergent EGD that failed to show an active source of 

bleeding due to large amount od blood pooling in the stomach


to date s/p toatl 34 unit PRBC transfusion and 22 unit FFP 


Intubated again 5/14 for airway protection and started on phenylephrine drip 





1. Acute blood loss anemia sec to GI Bleed secondary  to Bleeding  Esophageal 

Varices


Acute , still active 


OGT and NGT in place with minimal output  


s/p total 34 units PRBC and 22 unit FFP transfusion and 3 platelets


s/p 4 EGD and 2 CTA abdomen this admission 


With massive hematemesis and melena again yesterday 5/14 after remaining stable 

for 10 days since prior EGD of 5/4 with banding of esophageal varices 


Repeat  EGD  yesterday 5/14 failed to show active source of bleeding   


Hgb 7.2 today. will transfuse 2 more unit PRBC 


Continue octreotide and protonix drip 


GI on board . Plan to refer patient for TIPS by IR 


Hematology and surgery on consult 


cont  TPN s


Monitor electrolytes daily and replace 








2.Hypovolemic shock  secondary to GI bleed


back phenylephrine drip 


Continue transfusion PRN and IVF 


Will try to turner off pressors 











3. Suspected sepsis / bacteremia and Pneumonia


Afebrile since 5/12 AM , tachycardic ,procalcitonin elevated 4.9


No leukocytosis


CT abdomen showed right middle lobe / upper and lower lobe infiltrate


CT chest showed possible right mid , right upper lobe  infiltrate vs 

atelectasis and right mid ground glass opacities1 blood Cx positive for Staph 

coag negative and sputum cx positive for yeast 


continue vanco and doxycycline ( was on Cipro and diflucan before )


Repeat blood cultures with no growth so far 


continue IV antibiotics


removed Tight groin TLC 5/11 and PICC line placed to RUE 


Echo showed no vegetations








4. Ileus -resolved 


noted to had abdominal distention 


CT abdomen showed:Distended large and small bowel loops with occasional air-

fluid levels in the central abdominal small bowel.  The stomach is mildly 

distended in the interval in overall pattern suggests ileus rather than small 

large-bowel obstruction.


NPO 


Continue TPN








5. Coagulopathy sec to Liver dis from alcoholism


received Vit K, DDAVP, Tranexamic acid 


s/p 22 unit FFP transfusion 


Hematology on consult 


Transfusion support 








6. Cirrhosis with ascites , coagulopathy, thrombocytopenia sec to ETOH abuse 


s/p abdominal paracentesis by IR  4/30  with removal 900 ml ascitic fluid


With anasarca 


repeat CT abdomen showed small ascitic fluid 


on Lasix to 40 mg Iv daily  


Guarded prognosis





7. Transfusion reaction 


with fever episode during transfusion on 5/5











8.Suspected  Pneumonia 


Suspected RML , RUL infiltrate as above


sputum Cx positive for yeast 


Continue  vanco and Doxycycline


CT chest as above and procalcitonin was elevated











9.  Alcohol Abuse 


was on Presedex drip for more than 2 weeks


at present out of acute  withdrawal period


on Propofol for sedation 











10. Hepatic Encephalopathy


CT of the head : no bleed


ammonia level- low





11.Hypernatremia/ Hypokalemia/ hyperchloremia


secondary to diuretic use 


Decreased Lasix to once daily 


monitor electrolytes daily since patient is on TPN 





12. DVT proph


SCD


no anticoag sec to GIB and coagulopathy & low Platelet

## 2018-05-15 NOTE — RAD
HISTORY:

vented  



COMPARISON:

Chest radiograph dated 05/14/2018 



FINDINGS:



LUNGS:

Low lung volumes. No active pulmonary disease.



PLEURA:

No significant pleural effusion identified, no pneumothorax apparent.



CARDIOVASCULAR:

Normal.



OSSEOUS STRUCTURES:

No significant abnormalities.



VISUALIZED UPPER ABDOMEN:

Normal.



OTHER FINDINGS:

Endotracheal tube, unchanged. Right upper extremity PICC, unchanged.



IMPRESSION:

No active disease.

## 2018-05-15 NOTE — CP.PCM.PN
Subjective





- Date & Time of Evaluation


Date of Evaluation: 05/15/18


Time of Evaluation: 09:00





- Subjective


Subjective: 





Patient on ventillator and sedated.





Objective





- Vital Signs/Intake and Output


Vital Signs (last 24 hours): 


 











Temp Pulse Resp BP Pulse Ox


 


 97.8 F   93 H  13   118/67   100 


 


 05/15/18 16:00  05/15/18 17:00  05/15/18 17:00  05/15/18 17:00  05/15/18 17:00








Intake and Output: 


 











 05/15/18 05/15/18





 06:59 18:59


 


Intake Total 3009 1425


 


Output Total 300 1850


 


Balance 2974 -565














- Medications


Medications: 


 Current Medications





Acetaminophen (Tylenol 650 Mg Supp)  650 mg MA ONCE PRN


   PRN Reason: Fever >100.4 F


   Last Admin: 05/11/18 11:54 Dose:  650 mg


Acetaminophen (Tylenol 325 Mg Supp)  325 mg MA Q6 PRN


   PRN Reason: Fever >100.4 F


Folic Acid (Folic Acid)  1 mg PO DAILY [facility]


   Last Admin: 04/27/18 08:09 Dose:  1 mg


Furosemide (Lasix)  40 mg IVP DAILY [facility]


   Last Admin: 05/15/18 08:43 Dose:  40 mg


Pantoprazole Sodium 40 mg/ (Sodium Chloride)  100 mls @ 20 mls/hr IVPB Q5H [facility]


   PRN Reason: 8 MG/HR


   Last Admin: 05/15/18 17:08 Dose:  20 mls/hr


Doxycycline Hyclate 100 mg/ (Sodium Chloride)  100 mls @ 100 mls/hr IVPB Q12 [facility]


   PRN Reason: Protocol


   Stop: 05/19/18 11:01


   Last Admin: 05/15/18 08:45 Dose:  100 mls/hr


Octreotide Acetate 1,250 mcg/ (Sodium Chloride)  252.5 mls @ 10.1 mls/hr IV 

.Q24H [facility]


   PRN Reason: 50 MCG/HR


   Stop: 05/19/18 15:59


   Last Admin: 05/14/18 17:30 Dose:  10.1 mls/hr


Piperacillin Sod/Tazobactam (Sod 3.375 gm/ Sodium Chloride)  100 mls @ 100 mls/

hr IVPB Q6 [facility]


   PRN Reason: Protocol


   Last Admin: 05/15/18 16:17 Dose:  100 mls/hr


Fluconazole (Diflucan Iv 100 Mg/50 Ml Ns)  50 mls @ 50 mls/hr IVPB DAILY [facility]


   PRN Reason: Protocol


   Last Admin: 05/15/18 12:11 Dose:  50 mls/hr


Tranexamic Acid 3,000 mg/ (Sodium Chloride)  100 mls @ 4.16 mls/hr IVPB ONCE ONE


   PRN Reason: 125 MG/HR


   Stop: 05/16/18 12:01


   Last Admin: 05/15/18 13:57 Dose:  4.16 mls/hr


Potassium Chloride 40 meq/Potassium Phosphate 30 mmole/Magnesium Sulfate 5 meq/

Calcium Gluconate 4.25 meq/Multivitamins/Vitamin C 10 ml/Chromium/Copper/

Manganese/Zinc 3 ml/ Amino Acids  1,053.3712 mls @ 70 mls/hr IV .Q15H3M ONE


   Stop: 05/16/18 05:32


   Last Admin: 05/15/18 16:19 Dose:  70 mls/hr


Potassium Chloride 30 meq/Magnesium Sulfate 5 meq/Calcium Gluconate 4.25 meq/

Amino Acids  1,025.3712 mls @ 70 mls/hr IV .[id number] ONE


   Stop: 05/16/18 20:08


Phenylephrine HCl 30 mg/ (Sodium Chloride)  253 mls @ 10.12 mls/hr IV .Q24H [facility]

; 20 MCG/MIN


   PRN Reason: Protocol


   Stop: 05/16/18 15:05


   Last Admin: 05/15/18 15:26 Dose:  20 mcg/min, 10.12 mls/hr


Lactobacillus Acidophilus (Bacid Acidophilus)  1 cap PO BID [facility]


   Last Admin: 05/15/18 16:15 Dose:  Not Given


Thiamine HCl (Vitamin B1 Tab)  100 mg PO DAILY [facility]


   Last Admin: 04/27/18 08:09 Dose:  100 mg











- Labs


Labs: 


 





 05/15/18 04:20 





 05/15/18 04:20 





 











PT  17.6 Seconds (9.8-13.1)  H  05/13/18  10:20    


 


INR  1.6  (0.9-1.2)  H  05/13/18  10:20    


 


APTT  43.0 Seconds (25.6-37.1)  H  05/13/18  10:20    














- Head Exam


Head Exam: NORMOCEPHALIC





- Respiratory Exam


Respiratory Exam: Clear to Ausculation Bilateral





- Cardiovascular Exam


Cardiovascular Exam: REGULAR RHYTHM





- GI/Abdominal Exam


GI & Abdominal Exam: Distended





Assessment and Plan


(1) GI bleed


Assessment & Plan: 


No further red or maroon blood today. BP is stable. On octreotide aand 

pantoprazole. Upper endsoscopy 8 AM Wednesday morning.


Status: Acute

## 2018-05-15 NOTE — CP.PCM.PN
Subjective





- Date & Time of Evaluation


Date of Evaluation: 05/15/18


Time of Evaluation: 09:30





- Subjective


Subjective: 





Intubated overnight


massive hematemesis








Objective





- Vital Signs/Intake and Output


Vital Signs (last 24 hours): 


 











Temp Pulse Resp BP Pulse Ox


 


 97.8 F   92 H  14   112/59 L  100 


 


 05/15/18 16:00  05/15/18 18:00  05/15/18 18:00  05/15/18 18:00  05/15/18 18:00








Intake and Output: 


 











 05/15/18 05/16/18





 18:59 06:59


 


Intake Total 1815 


 


Output Total 2150 


 


Balance -335 














- Medications


Medications: 


 Current Medications





Acetaminophen (Tylenol 650 Mg Supp)  650 mg OK ONCE PRN


   PRN Reason: Fever >100.4 F


   Last Admin: 05/11/18 11:54 Dose:  650 mg


Acetaminophen (Tylenol 325 Mg Supp)  325 mg OK Q6 PRN


   PRN Reason: Fever >100.4 F


Folic Acid (Folic Acid)  1 mg PO DAILY [facility]


   Last Admin: 04/27/18 08:09 Dose:  1 mg


Furosemide (Lasix)  40 mg IVP DAILY [facility]


   Last Admin: 05/15/18 08:43 Dose:  40 mg


Pantoprazole Sodium 40 mg/ (Sodium Chloride)  100 mls @ 20 mls/hr IVPB Q5H [facility]


   PRN Reason: 8 MG/HR


   Last Admin: 05/15/18 17:08 Dose:  20 mls/hr


Doxycycline Hyclate 100 mg/ (Sodium Chloride)  100 mls @ 100 mls/hr IVPB Q12 [facility]


   PRN Reason: Protocol


   Stop: 05/19/18 11:01


   Last Admin: 05/15/18 08:45 Dose:  100 mls/hr


Octreotide Acetate 1,250 mcg/ (Sodium Chloride)  252.5 mls @ 10.1 mls/hr IV 

.Q24H [facility]


   PRN Reason: 50 MCG/HR


   Stop: 05/19/18 15:59


   Last Admin: 05/14/18 17:30 Dose:  10.1 mls/hr


Piperacillin Sod/Tazobactam (Sod 3.375 gm/ Sodium Chloride)  100 mls @ 100 mls/

hr IVPB Q6 [facility]


   PRN Reason: Protocol


   Last Admin: 05/15/18 16:17 Dose:  100 mls/hr


Fluconazole (Diflucan Iv 100 Mg/50 Ml Ns)  50 mls @ 50 mls/hr IVPB DAILY [facility]


   PRN Reason: Protocol


   Last Admin: 05/15/18 12:11 Dose:  50 mls/hr


Tranexamic Acid 3,000 mg/ (Sodium Chloride)  100 mls @ 4.16 mls/hr IVPB ONCE ONE


   PRN Reason: 125 MG/HR


   Stop: 05/16/18 12:01


   Last Admin: 05/15/18 13:57 Dose:  4.16 mls/hr


Potassium Chloride 40 meq/Potassium Phosphate 30 mmole/Magnesium Sulfate 5 meq/

Calcium Gluconate 4.25 meq/Multivitamins/Vitamin C 10 ml/Chromium/Copper/

Manganese/Zinc 3 ml/ Amino Acids  1,053.3712 mls @ 70 mls/hr IV .Q15H3M ONE


   Stop: 05/16/18 05:32


   Last Admin: 05/15/18 16:19 Dose:  70 mls/hr


Potassium Chloride 30 meq/Magnesium Sulfate 5 meq/Calcium Gluconate 4.25 meq/

Amino Acids  1,025.3712 mls @ 70 mls/hr IV .[id number] ONE


   Stop: 05/16/18 20:08


Phenylephrine HCl 30 mg/ (Sodium Chloride)  253 mls @ 10.12 mls/hr IV .Q24H [facility]

; 20 MCG/MIN


   PRN Reason: Protocol


   Stop: 05/16/18 15:05


   Last Admin: 05/15/18 15:26 Dose:  20 mcg/min, 10.12 mls/hr


Lactobacillus Acidophilus (Bacid Acidophilus)  1 cap PO BID [facility]


   Last Admin: 05/15/18 16:15 Dose:  Not Given


Thiamine HCl (Vitamin B1 Tab)  100 mg PO DAILY [facility]


   Last Admin: 04/27/18 08:09 Dose:  100 mg











- Labs


Labs: 


 





 05/15/18 04:20 





 05/15/18 04:20 





 











PT  17.6 Seconds (9.8-13.1)  H  05/13/18  10:20    


 


INR  1.6  (0.9-1.2)  H  05/13/18  10:20    


 


APTT  43.0 Seconds (25.6-37.1)  H  05/13/18  10:20    














- Head Exam


Head Exam: ATRAUMATIC





- Eye Exam


Eye Exam: Normal appearance





- ENT Exam


ENT Exam: Mucous Membranes Dry





- Respiratory Exam


Respiratory Exam: NORMAL BREATHING PATTERN





- Cardiovascular Exam


Cardiovascular Exam: +S1, +S2





- GI/Abdominal Exam


GI & Abdominal Exam: Normal Bowel Sounds





Assessment and Plan


(1) Anemia


Assessment & Plan: 


GI bleeding


DDAVP and antifibrinolysis


transfusion support


? TIPS


prior iron deficiency s/p Venofer


Status: Acute   





(2) Coagulopathy


Assessment & Plan: 


liver disease


will check fibrinogen


Status: Acute

## 2018-05-15 NOTE — CP.CCUPN
CCU Subjective





- Physician Review


Subjective (Free Text): 





05/15/18 


The patient was Seen and examined by me at the bedside, 


Medical records reviewed and Management issues were discussed and formulated 

with the house staff.


Events reviewed 





Mr Davila is 37 Years old Male with PMHx  of Alcoholism and recent GI bleed, was 

initially admitted for hematemesis and altered mental status.


Pt was recently discharged from [facility] after an episode of GI bleed, Underwent 

EGD on 4/16 did not show any evidence of active bleed, He went home and started 

drinking again, 


Returned back to the Emergency department on 04/19 with active GI bleeding..


Hospital course noted for recurrent hematemesis from variceal bleeding with 

esophageal banding on 05/04, requiring multiple blood products transfusions, 

ETOH Withdrawal, Delirium, Azotemia and prolonged intubation for airway 

protection, and he was successfully extubated on 05/08th.





Continue to have H/H drop despite 2Units od PRBC transfusion over the weekend


Continue to active bleeding yesterday apperent by black jennifer stool and massive 

episode of hematemesis yesterday, requiring intubation for airway protection 

and emergent Endoscopy


The EJD showed Large amount of blood in stomach and esophagus and only small 

varices was seen without active bleeding.


He received over the last 24H 2 more unites packed RBC, 1U Platelets and 1U FFP 





Pt now Sedated with propofol drip and orally intubated


He was hypotensive, started on Phenylephrine.  


Afebrile, NSR on the monitor 


Afebrile 


NSR on the monitor, less tachcardic 


hemodynamically improved, but still requiring small dose of Phenylephrine @

c25mcg/min.  








NG tube placement placed to suction, NG tube with intially light pink color 

aspirate, now dark return. 


NPO, on IV Fluids WITH 0.9% ns AT 125CC/h


Remains on pantoprazole and octreotide drips


Scheduled for repeat Endoscopy today or tomorrow


Last 24H I&O 5748/[id number]


This morning labs revealed Leucocytosis trending down (WBC 15 K), Hgb only went 

up to 7.2 from 6.7 after 2U PRBC, and Plt 149 and minimal worsening renal 

function BUN/Cr up to 29/1.0.


Ordered 1 more unites packed RBC, TXA 






































CCU Objective





- Vital Signs / Intake & Output


Vital Signs (Last 4 hours): 


Vital Signs











  Temp Pulse Resp BP Pulse Ox


 


 05/15/18 06:48   94 H   106/60 


 


 05/15/18 06:00   89  14  104/58 L  100


 


 05/15/18 05:00   91 H  16  113/61  100


 


 05/15/18 04:00  99.4 F  94 H  15  101/51 L  100











Intake and Output (Last 8hrs): 


 Intake & Output











 05/14/18 05/15/18 05/15/18





 22:59 06:59 14:59


 


Intake Total 2865 1935 


 


Output Total 935 300 


 


Balance 1930 1635 


 


Weight  200 lb 


 


Intake:   


 


  IV 1843 1101 


 


  Intake, Piggyback 367 300 


 


  Blood Product 655 534 


 


Output:   


 


  Gastric Amount 200  


 


    Stomach 200  


 


  Urine 35 300 


 


    Urethral (Burger)  300 


 


    Urine, Voided 35  


 


  Emesis 700  


 


Other:   


 


  # Bowel Movements  1 














- Physical Exam


Head: Positive for: Atraumatic, Normocephalic


Pupils: Positive for: PERRL.  Negative for: Sluggish, Non-Reactive, Pinpoint


Extroacular Muscles: Positive for: EOMI


Conjunctiva: Negative for: Injected, Icteric, Other


Mouth: Positive for: Moist Mucous Membranes, Dry


Pharnyx: Positive for: Normal


Neck: Positive for: Normal Range of Motion


Respiratory/Chest: Positive for: Clear to Auscultation, Good Air Exchange.  

Negative for: Respiratory Distress, Accessory Muscle Use, Wheezes, Rhonchi


Cardiovascular: Positive for: Regular Rate and Rhythm, Normal S1, S2, Peripheal 

Pulses Present.  Negative for: Murmurs, Irregular Rhythm, Tachycardic, 

Bradycardic


Abdomen: Positive for: Distention, Normal Bowel Sounds.  Negative for: 

Tenderness


Upper Extremity: Positive for: Normal Inspection.  Negative for: Edema


Lower Extremity: Positive for: Normal Inspection.  Negative for: Edema


Psychiatric: Positive for: Other (Pt Sedaed with propofol drip and orally 

intubated)





- Medications


Active Medications: 


Active Medications











Generic Name Dose Route Start Last Admin





  Trade Name Freq  PRN Reason Stop Dose Admin


 


Acetaminophen  650 mg  05/06/18 22:11  05/11/18 11:54





  Tylenol 650 Mg Supp  MN   650 mg





  ONCE PRN   Administration





  Fever >100.4 F   


 


Acetaminophen  325 mg  05/11/18 11:48  





  Tylenol 325 Mg Supp  MN   





  Q6 PRN   





  Fever >100.4 F   


 


Chlordiazepoxide  50 mg  04/26/18 17:00  04/27/18 08:49





  Librium  PO   50 mg





  Q8 JUAN LUIS   Administration


 


Folic Acid  1 mg  04/20/18 11:15  04/27/18 08:09





  Folic Acid  PO   1 mg





  DAILY JUAN LUIS   Administration


 


Furosemide  40 mg  05/10/18 09:00  05/14/18 08:11





  Lasix  IVP   40 mg





  DAILY JUAN LUIS   Administration


 


Pantoprazole Sodium 40 mg/  100 mls @ 20 mls/hr  04/27/18 12:15  05/15/18 06:47





  Sodium Chloride  IVPB   20 mls/hr





  Q5H JUAN LUIS   Administration





  8 MG/HR   


 


Doxycycline Hyclate 100 mg/  100 mls @ 100 mls/hr  05/12/18 11:00  05/14/18 20:

13





  Sodium Chloride  IVPB  05/19/18 11:01  100 mls/hr





  Q12 JUAN LUIS   Administration





  Protocol   


 


Propofol  1,000 mg in 100 mls @ 2.671 mls/hr  05/14/18 14:30  05/15/18 06:57





  Diprivan  IV  05/15/18 14:21  15 mcg/kg/min





  .Q24H JUAN LUIS   8.014 mls/hr





  Protocol   Titration





  5 MCG/KG/MIN   


 


Phenylephrine HCl 10 mg/  251 mls @ 37.65 mls/hr  05/14/18 14:30  05/15/18 06:48





  Sodium Chloride  IV  05/15/18 14:04  25 mcg/min





  .Q6H40M JUAN LUIS   37.65 mls/hr





  Protocol   Administration





  25 MCG/MIN   


 


Octreotide Acetate 1,250 mcg/  252.5 mls @ 10.1 mls/hr  05/14/18 16:00  05/14/ 18 17:30





  Sodium Chloride  IV  05/19/18 15:59  10.1 mls/hr





  .Q24H JUAN LUIS   Administration





  50 MCG/HR   


 


Sodium Chloride  1,000 mls @ 125 mls/hr  05/14/18 17:30  05/15/18 03:04





  Sodium Chloride 0.9%  IV  05/15/18 17:21  125 mls/hr





  .Q8H JUAN LUIS   Administration


 


Piperacillin Sod/Tazobactam  100 mls @ 100 mls/hr  05/14/18 22:00  05/15/18 03:

06





  Sod 3.375 gm/ Sodium Chloride  IVPB   100 mls/hr





  Q6 JUAN LUIS   Administration





  Protocol   


 


Lactobacillus Acidophilus  1 cap  05/12/18 10:30  05/14/18 17:42





  Bacid Acidophilus  PO   Not Given





  BID JUAN LUIS   


 


Senna/Docusate Sodium  1 tab  05/08/18 22:00  05/14/18 22:50





  Senokot S 50 Mg-8.6 Mg  PO   Not Given





  [facility]   


 


Thiamine HCl  100 mg  04/20/18 11:15  04/27/18 08:09





  Vitamin B1 Tab  PO   100 mg





  DAILY JUAN LUIS   Administration














- Patient Studies


Lab Studies: 


 Microbiology Studies











 05/10/18 16:40 Blood Culture - Preliminary





 Blood    NO GROWTH AFTER 4 DAYS


 


 05/10/18 16:40 Blood Culture - Preliminary





 Blood    NO GROWTH AFTER 4 DAYS








 Lab Studies











  05/15/18 05/15/18 05/15/18 Range/Units





  04:24 04:20 04:20 


 


WBC   15.2 H   (4.8-10.8)  K/uL


 


RBC   2.39 L   (4.40-5.90)  Mil/uL


 


Hgb   7.2 L   (12.0-18.0)  g/dL


 


Hct   21.5 L   (35.0-51.0)  %


 


MCV   90.0   (80.0-94.0)  fl


 


MCH   30.0   (27.0-31.0)  pg


 


MCHC   33.4   (33.0-37.0)  g/dL


 


RDW   18.0 H   (11.5-14.5)  %


 


Plt Count   149   (130-400)  K/uL


 


pCO2  31 L    (35-45)  mm/Hg


 


pO2  121 H    ()  mm/Hg


 


HCO3  25.7    (21-28)  mmol/L


 


ABG pH  7.50 H    (7.35-7.45)  


 


ABG Total CO2  25.2    (22-28)  mmol/L


 


ABG O2 Saturation  99.3 H    (95-98)  %


 


ABG O2 Content  9.3 L    (15-23)  ML/dL


 


ABG Base Excess  1.0    (-2.0-3.0)  mmol/L


 


ABG Hemoglobin  6.6 L    (11.7-17.4)  g/dL


 


ABG Carboxyhemoglobin  0.8    (0.5-1.5)  %


 


POC ABG HHb (Measured)  0.7    (0.0-5.0)  %


 


ABG Methemoglobin  1.4    (0.0-3.0)  %


 


ABG O2 Capacity  9.4 L    (16-24)  mL/dL


 


Anthony Test  Yes    


 


A-a O2 Difference  125.0    mm/Hg


 


Hgb O2 Saturation  97.1    (95.0-98.0)  %


 


Vent Mode  Prvc ac    


 


Mechanical Rate  12    


 


FiO2  40.0    %


 


Tidal Volume  500    


 


PEEP  5    


 


Sodium    148  (132-148)  mmol/l


 


Potassium    4.5  (3.6-5.0)  MMOL/L


 


Chloride    115 H  ()  mmol/L


 


Carbon Dioxide    23  (22-30)  mmol/L


 


Anion Gap    15  (10-20)  


 


BUN    29 H  (9-20)  mg/dl


 


Creatinine    1.0  (0.8-1.5)  mg/dl


 


Est GFR (African Amer)    > 60  


 


Est GFR (Non-Af Amer)    > 60  


 


Random Glucose    96  ()  mg/dL


 


Calcium    7.8 L  (8.4-10.2)  mg/dL


 


Phosphorus    3.4  (2.5-4.5)  mg/dl


 


Magnesium    1.7  (1.6-2.3)  MG/DL


 


Total Bilirubin    1.2  (0.2-1.3)  mg/dl


 


AST    192 H D  (17-59)  U/L


 


ALT    94 H D  (21-72)  U/L


 


Alkaline Phosphatase    178 H  ()  U/L


 


Total Protein    5.1 L  (6.3-8.2)  G/DL


 


Albumin    2.2 L  (3.5-5.0)  g/dL


 


Globulin    3.0  (2.2-3.9)  gm/dL


 


Albumin/Globulin Ratio    0.7 L  (1.0-2.1)  


 


Blood Type     


 


Antibody Screen     


 


Crossmatch     


 


BBK History Checked     














  05/15/18 05/14/18 05/14/18 Range/Units





  03:10 14:45 14:45 


 


WBC  14.5 H   17.3 H D  (4.8-10.8)  K/uL


 


RBC  2.38 L   2.21 L  (4.40-5.90)  Mil/uL


 


Hgb  7.1 L   6.7 L D  (12.0-18.0)  g/dL


 


Hct  21.5 L   20.5 L  (35.0-51.0)  %


 


MCV  90.5  D   92.6  (80.0-94.0)  fl


 


MCH  29.7   30.5  (27.0-31.0)  pg


 


MCHC  32.9 L   32.9 L  (33.0-37.0)  g/dL


 


RDW  17.6 H   18.8 H  (11.5-14.5)  %


 


Plt Count  120 L D   158  (130-400)  K/uL


 


pCO2     (35-45)  mm/Hg


 


pO2     ()  mm/Hg


 


HCO3     (21-28)  mmol/L


 


ABG pH     (7.35-7.45)  


 


ABG Total CO2     (22-28)  mmol/L


 


ABG O2 Saturation     (95-98)  %


 


ABG O2 Content     (15-23)  ML/dL


 


ABG Base Excess     (-2.0-3.0)  mmol/L


 


ABG Hemoglobin     (11.7-17.4)  g/dL


 


ABG Carboxyhemoglobin     (0.5-1.5)  %


 


POC ABG HHb (Measured)     (0.0-5.0)  %


 


ABG Methemoglobin     (0.0-3.0)  %


 


ABG O2 Capacity     (16-24)  mL/dL


 


Anthony Test     


 


A-a O2 Difference     mm/Hg


 


Hgb O2 Saturation     (95.0-98.0)  %


 


Vent Mode     


 


Mechanical Rate     


 


FiO2     %


 


Tidal Volume     


 


PEEP     


 


Sodium   148   (132-148)  mmol/l


 


Potassium   4.1   (3.6-5.0)  MMOL/L


 


Chloride   113 H   ()  mmol/L


 


Carbon Dioxide   24   (22-30)  mmol/L


 


Anion Gap   15   (10-20)  


 


BUN   22 H   (9-20)  mg/dl


 


Creatinine   0.7 L   (0.8-1.5)  mg/dl


 


Est GFR (African Amer)   > 60   


 


Est GFR (Non-Af Amer)   > 60   


 


Random Glucose   125 H   ()  mg/dL


 


Calcium   7.9 L   (8.4-10.2)  mg/dL


 


Phosphorus     (2.5-4.5)  mg/dl


 


Magnesium     (1.6-2.3)  MG/DL


 


Total Bilirubin     (0.2-1.3)  mg/dl


 


AST     (17-59)  U/L


 


ALT     (21-72)  U/L


 


Alkaline Phosphatase     ()  U/L


 


Total Protein     (6.3-8.2)  G/DL


 


Albumin     (3.5-5.0)  g/dL


 


Globulin     (2.2-3.9)  gm/dL


 


Albumin/Globulin Ratio     (1.0-2.1)  


 


Blood Type     


 


Antibody Screen     


 


Crossmatch     


 


BBK History Checked     














  05/13/18 Range/Units





  10:20 


 


WBC   (4.8-10.8)  K/uL


 


RBC   (4.40-5.90)  Mil/uL


 


Hgb   (12.0-18.0)  g/dL


 


Hct   (35.0-51.0)  %


 


MCV   (80.0-94.0)  fl


 


MCH   (27.0-31.0)  pg


 


MCHC   (33.0-37.0)  g/dL


 


RDW   (11.5-14.5)  %


 


Plt Count   (130-400)  K/uL


 


pCO2   (35-45)  mm/Hg


 


pO2   ()  mm/Hg


 


HCO3   (21-28)  mmol/L


 


ABG pH   (7.35-7.45)  


 


ABG Total CO2   (22-28)  mmol/L


 


ABG O2 Saturation   (95-98)  %


 


ABG O2 Content   (15-23)  ML/dL


 


ABG Base Excess   (-2.0-3.0)  mmol/L


 


ABG Hemoglobin   (11.7-17.4)  g/dL


 


ABG Carboxyhemoglobin   (0.5-1.5)  %


 


POC ABG HHb (Measured)   (0.0-5.0)  %


 


ABG Methemoglobin   (0.0-3.0)  %


 


ABG O2 Capacity   (16-24)  mL/dL


 


Anthony Test   


 


A-a O2 Difference   mm/Hg


 


Hgb O2 Saturation   (95.0-98.0)  %


 


Vent Mode   


 


Mechanical Rate   


 


FiO2   %


 


Tidal Volume   


 


PEEP   


 


Sodium   (132-148)  mmol/l


 


Potassium   (3.6-5.0)  MMOL/L


 


Chloride   ()  mmol/L


 


Carbon Dioxide   (22-30)  mmol/L


 


Anion Gap   (10-20)  


 


BUN   (9-20)  mg/dl


 


Creatinine   (0.8-1.5)  mg/dl


 


Est GFR (African Amer)   


 


Est GFR (Non-Af Amer)   


 


Random Glucose   ()  mg/dL


 


Calcium   (8.4-10.2)  mg/dL


 


Phosphorus   (2.5-4.5)  mg/dl


 


Magnesium   (1.6-2.3)  MG/DL


 


Total Bilirubin   (0.2-1.3)  mg/dl


 


AST   (17-59)  U/L


 


ALT   (21-72)  U/L


 


Alkaline Phosphatase   ()  U/L


 


Total Protein   (6.3-8.2)  G/DL


 


Albumin   (3.5-5.0)  g/dL


 


Globulin   (2.2-3.9)  gm/dL


 


Albumin/Globulin Ratio   (1.0-2.1)  


 


Blood Type  O POSITIVE  


 


Antibody Screen  Negative  


 


Crossmatch  See Detail  


 


BBK History Checked  Patient has bt  








 Laboratory Results - last 24 hr











  05/13/18 05/14/18 05/14/18





  10:20 14:45 14:45


 


WBC   17.3 H D 


 


RBC   2.21 L 


 


Hgb   6.7 L D 


 


Hct   20.5 L 


 


MCV   92.6 


 


MCH   30.5 


 


MCHC   32.9 L 


 


RDW   18.8 H 


 


Plt Count   158 


 


pCO2   


 


pO2   


 


HCO3   


 


ABG pH   


 


ABG Total CO2   


 


ABG O2 Saturation   


 


ABG O2 Content   


 


ABG Base Excess   


 


ABG Hemoglobin   


 


ABG Carboxyhemoglobin   


 


POC ABG HHb (Measured)   


 


ABG Methemoglobin   


 


ABG O2 Capacity   


 


Anthony Test   


 


A-a O2 Difference   


 


Hgb O2 Saturation   


 


Vent Mode   


 


Mechanical Rate   


 


FiO2   


 


Tidal Volume   


 


PEEP   


 


Sodium    148


 


Potassium    4.1


 


Chloride    113 H


 


Carbon Dioxide    24


 


Anion Gap    15


 


BUN    22 H


 


Creatinine    0.7 L


 


Est GFR ( Amer)    > 60


 


Est GFR (Non-Af Amer)    > 60


 


Random Glucose    125 H


 


Calcium    7.9 L


 


Phosphorus   


 


Magnesium   


 


Total Bilirubin   


 


AST   


 


ALT   


 


Alkaline Phosphatase   


 


Total Protein   


 


Albumin   


 


Globulin   


 


Albumin/Globulin Ratio   


 


Blood Type  O POSITIVE  


 


Antibody Screen  Negative  


 


Crossmatch  See Detail  


 


BBK History Checked  Patient has bt  














  05/15/18 05/15/18 05/15/18





  03:10 04:20 04:20


 


WBC  14.5 H   15.2 H


 


RBC  2.38 L   2.39 L


 


Hgb  7.1 L   7.2 L


 


Hct  21.5 L   21.5 L


 


MCV  90.5  D   90.0


 


MCH  29.7   30.0


 


MCHC  32.9 L   33.4


 


RDW  17.6 H   18.0 H


 


Plt Count  120 L D   149


 


pCO2   


 


pO2   


 


HCO3   


 


ABG pH   


 


ABG Total CO2   


 


ABG O2 Saturation   


 


ABG O2 Content   


 


ABG Base Excess   


 


ABG Hemoglobin   


 


ABG Carboxyhemoglobin   


 


POC ABG HHb (Measured)   


 


ABG Methemoglobin   


 


ABG O2 Capacity   


 


Anthony Test   


 


A-a O2 Difference   


 


Hgb O2 Saturation   


 


Vent Mode   


 


Mechanical Rate   


 


FiO2   


 


Tidal Volume   


 


PEEP   


 


Sodium   148 


 


Potassium   4.5 


 


Chloride   115 H 


 


Carbon Dioxide   23 


 


Anion Gap   15 


 


BUN   29 H 


 


Creatinine   1.0 


 


Est GFR ( Amer)   > 60 


 


Est GFR (Non-Af Amer)   > 60 


 


Random Glucose   96 


 


Calcium   7.8 L 


 


Phosphorus   3.4 


 


Magnesium   1.7 


 


Total Bilirubin   1.2 


 


AST   192 H D 


 


ALT   94 H D 


 


Alkaline Phosphatase   178 H 


 


Total Protein   5.1 L 


 


Albumin   2.2 L 


 


Globulin   3.0 


 


Albumin/Globulin Ratio   0.7 L 


 


Blood Type   


 


Antibody Screen   


 


Crossmatch   


 


BBK History Checked   














  05/15/18





  04:24


 


WBC 


 


RBC 


 


Hgb 


 


Hct 


 


MCV 


 


MCH 


 


MCHC 


 


RDW 


 


Plt Count 


 


pCO2  31 L


 


pO2  121 H


 


HCO3  25.7


 


ABG pH  7.50 H


 


ABG Total CO2  25.2


 


ABG O2 Saturation  99.3 H


 


ABG O2 Content  9.3 L


 


ABG Base Excess  1.0


 


ABG Hemoglobin  6.6 L


 


ABG Carboxyhemoglobin  0.8


 


POC ABG HHb (Measured)  0.7


 


ABG Methemoglobin  1.4


 


ABG O2 Capacity  9.4 L


 


Anthony Test  Yes


 


A-a O2 Difference  125.0


 


Hgb O2 Saturation  97.1


 


Vent Mode  Prvc ac


 


Mechanical Rate  12


 


FiO2  40.0


 


Tidal Volume  500


 


PEEP  5


 


Sodium 


 


Potassium 


 


Chloride 


 


Carbon Dioxide 


 


Anion Gap 


 


BUN 


 


Creatinine 


 


Est GFR ( Amer) 


 


Est GFR (Non-Af Amer) 


 


Random Glucose 


 


Calcium 


 


Phosphorus 


 


Magnesium 


 


Total Bilirubin 


 


AST 


 


ALT 


 


Alkaline Phosphatase 


 


Total Protein 


 


Albumin 


 


Globulin 


 


Albumin/Globulin Ratio 


 


Blood Type 


 


Antibody Screen 


 


Crossmatch 


 


BBK History Checked 











Fingerstick Blood Sugar Results: 158





Review of Systems





- Review of Systems


Systems not reviewed;Unavailable: Intubated





Critical Care Progress Note





- Ventilator Checklist


Head of Bed 30 Degrees: Yes


Daily Sedation Vacation: Yes


Daily Assessment of Readiness to Wean: Yes


Daily Spontaneous Breathing Trial: Yes


PUD Prophalyxis: Yes


DVT Prophylaxis: Yes


Oral Care with Chlorhexidine Gluconate {CHG}: Yes





- Extremities/Vascular


Does the Patient have a Central Venous Catheter?: Yes


Does the Patient need a Central Venous Catheter?: Yes


Does the Patient have a Burger Catheter?: Yes


Does the Patient need a Burger Catheter?: Yes





- Prophylaxis GI


Prophylaxis GI: PPI





- Prophylaxis DVT


Prophylaxis DVT: SCDs





- Nutrition


Nutrition: 


 Nutrition











 Category Date Time Status


 


 Dysphagia/Modified Consistency Diet [DIET] Diets  05/14/18 Lunch Active














Assessment/Plan


(1) Acute respiratory failure


Current Visit: Yes   Status: Acute   Comment: 


Full vent support for now till bleeding under control


Pt now Sedated with propofol drip and orally intubated


fall/aspiration/seizure precautions  


Wean off FIO2


Vent weaning when bleeding stops and hemodynamically improved   





(2) Acute blood loss anemia


Current Visit: Yes   Status: Acute   Comment: 


CBCs unstable, This morning labs revealed Leucocytosis trending down (WBC 15 K)

, Hgb only went up to 7.2 from 6.7 after 2U PRBC, and Plt 149 


Scheduled fot transfusion 1 more packed RBC today


CT angiogram and bleeding scan did not revealed the source of bleeding 


Frequent CBC monitoring   





(3) GI bleed


Current Visit: Yes   Status: Acute   Comment: 


The repeat Endoscopy 5/14 showed Large amount of blood in stomach and esophagus 

and only small varices was seen without active bleeding.


Scheduled for repeat Endoscopy 


frequent H/H monitoring


CT angiogram and bleeding scan did not revealed the source of bleeding 


PICC line and Two large bore peripheral catheters


PANTOPRAZOLE drip 


OCTREOTIDE drip


Vent support


NPO


IV fluids   





(4) Hepatic encephalopathy


Current Visit: Yes   Status: Acute   Comment: 


Altered mental status sec to ETOH Withdrawal, Delirium Vs Hepatic Encephalopathy


CT of the head: no bleed


Chech Ammonia level


Continue foluic acid and thiamine   





(5) Chronic alcoholism


Current Visit: No   Status: Acute   Comment: 


Banana Bag then Intravenous thiamine and Folate


Continue Librium 


PRN Ativan


fall/aspiration/seizure precautions  


   





- Assessment and Plan (Free Text)


Assessment: 





# HOB maintained at 30 degrees. 


# GI/DVT PPX  


# Stress Ulcer prophylaxis, Pt on Protonix drip


# DVT prophylaxis with SCD, AC Not currently indicated.


# Code Status: Full code


Total critical care time 58 minutes

## 2018-05-16 NOTE — CP.PCM.PN
Subjective





- Date & Time of Evaluation


Date of Evaluation: 05/16/18


Time of Evaluation: 07:45





- Subjective


Subjective: 


Patient seen and evaluated bedside. Intubated on MV PRVC AC mode 12/500/5/40 % 

with ABG 38/90/25/7.4 , sedated on propofol drip


With massive black maroon bowel movement yesterday again.


On Protonix ,octreotide, Diprivan, Tranesamix  and phenylephrine drip 


For Repeat OGT this AM


Hgb 7.5 


BP stable 114/69 HR 73  HR 90 afebile 


CXR pending 








Objective





- Vital Signs/Intake and Output


Vital Signs (last 24 hours): 


 











Temp Pulse Resp BP Pulse Ox


 


 97.4 F L  73   14   114/69   100 


 


 05/16/18 07:46  05/16/18 07:46  05/16/18 07:46  05/16/18 07:46  05/16/18 07:46








Intake and Output: 


 











 05/16/18 05/16/18





 06:59 18:59


 


Intake Total 1460 


 


Output Total 800 


 


Balance 660 














- Medications


Medications: 


 Current Medications





Acetaminophen (Tylenol 650 Mg Supp)  650 mg [place] ONCE PRN


   PRN Reason: Fever >100.4 F


   Last Admin: 05/11/18 11:54 Dose:  650 mg


Acetaminophen (Tylenol 325 Mg Supp)  325 mg [place] Q6 PRN


   PRN Reason: Fever >100.4 F


   Last Admin: 05/16/18 00:05 Dose:  325 mg


Folic Acid (Folic Acid)  1 mg PO DAILY [facility]


   Last Admin: 04/27/18 08:09 Dose:  1 mg


Furosemide (Lasix)  40 mg IVP DAILY [facility]


   Last Admin: 05/15/18 08:43 Dose:  40 mg


Pantoprazole Sodium 40 mg/ (Sodium Chloride)  100 mls @ 20 mls/hr IVPB Q5H [facility]


   PRN Reason: 8 MG/HR


   Last Admin: 05/16/18 03:11 Dose:  20 mls/hr


Doxycycline Hyclate 100 mg/ (Sodium Chloride)  100 mls @ 100 mls/hr IVPB Q12 [facility]


   PRN Reason: Protocol


   Stop: 05/19/18 11:01


   Last Admin: 05/15/18 20:14 Dose:  100 mls/hr


Octreotide Acetate 1,250 mcg/ (Sodium Chloride)  252.5 mls @ 10.1 mls/hr IV 

.Q24H [facility]


   PRN Reason: 50 MCG/HR


   Stop: 05/19/18 15:59


   Last Admin: 05/15/18 20:10 Dose:  10.1 mls/hr


Piperacillin Sod/Tazobactam (Sod 3.375 gm/ Sodium Chloride)  100 mls @ 100 mls/

hr IVPB Q6 [facility]


   PRN Reason: Protocol


   Last Admin: 05/16/18 03:10 Dose:  100 mls/hr


Fluconazole (Diflucan Iv 100 Mg/50 Ml Ns)  50 mls @ 50 mls/hr IVPB DAILY [facility]


   PRN Reason: Protocol


   Last Admin: 05/15/18 12:11 Dose:  50 mls/hr


Tranexamic Acid 3,000 mg/ (Sodium Chloride)  100 mls @ 4.16 mls/hr IVPB ONCE ONE


   PRN Reason: 125 MG/HR


   Stop: 05/16/18 12:01


   Last Admin: 05/15/18 13:57 Dose:  4.16 mls/hr


Potassium Chloride 30 meq/Magnesium Sulfate 5 meq/Calcium Gluconate 4.25 meq/

Amino Acids  1,025.3712 mls @ 70 mls/hr IV .[id number] ONE


   Stop: 05/16/18 20:08


Phenylephrine HCl 30 mg/ (Sodium Chloride)  253 mls @ 10.12 mls/hr IV .Q24H [facility]

; 20 MCG/MIN


   PRN Reason: Protocol


   Stop: 05/16/18 15:05


   Last Admin: 05/15/18 15:26 Dose:  20 mcg/min, 10.12 mls/hr


Propofol (Diprivan)  1,000 mg in 100 mls @ 10.886 mls/hr IV .Q9H12M [facility]; 20 MCG/

KG/MIN


   PRN Reason: Protocol


   Stop: 05/16/18 20:12


   Last Admin: 05/16/18 05:33 Dose:  20 mcg/kg/min, 10.886 mls/hr


Lactobacillus Acidophilus (Bacid Acidophilus)  1 cap PO BID [facility]


   Last Admin: 05/15/18 16:15 Dose:  Not Given


Thiamine HCl (Vitamin B1 Tab)  100 mg PO DAILY [facility]


   Last Admin: 04/27/18 08:09 Dose:  100 mg











- Labs


Labs: 


 





 05/16/18 04:20 





 05/16/18 04:20 





 











PT  13.9 Seconds (9.8-13.1)  H  05/16/18  07:14    


 


INR  1.3  (0.9-1.2)  H  05/16/18  07:14    


 


APTT  35.7 Seconds (25.6-37.1)  D 05/16/18  07:14    














- Constitutional


Appears: Chronically Ill, Other (intubated on MV sedated )





- Head Exam


Head Exam: ATRAUMATIC





- Eye Exam


Eye Exam: PERRL





- ENT Exam


ENT Exam: Mucous Membranes Dry


Additional comments: 





dry lips 





- Neck Exam


Neck Exam: Normal Inspection





- Respiratory Exam


Respiratory Exam: Clear to Ausculation Bilateral, NORMAL BREATHING PATTERN.  

absent: Rhonchi, Wheezes





- Cardiovascular Exam


Cardiovascular Exam: REGULAR RHYTHM, RRR, +S1, +S2.  absent: JVD





- GI/Abdominal Exam


GI & Abdominal Exam: Distended, Soft.  absent: Guarding, Tenderness, Rebound





- Rectal Exam


Rectal Exam: Deferred





- Extremities Exam


Extremities Exam: Pedal Edema (2 + bilateral feet )





- Neurological Exam


Additional comments: 





sedated but responds to name calling 





- Skin


Skin Exam: Dry, Warm





Assessment and Plan





- Assessment and Plan (Free Text)


Assessment: 


36 y/o male with  known history  of Alcoholism and recent GI bleed, was brought 

in because of hematemesis and alteration in   mental status.


He was recently discharged from this hospital  after an episode of GI bleed .  

EGD done on 4/16 did not show any active bleed.  The patient was discharged 

home and again started drinking.


On day of admission, he  had hematemesis and was noted to be confused and 

agitated.  HGB=6.1.He was transfused with PRBC and FFP, intubated for airway 

protection. Underwent 2 EGD and 2  bleeding scan that failed to show an active 

source of bleeding .He was started on Levophed drip 4/27 for hypotension and 

underwent CTA of abdomen by IR that showed no active bleeding.


Was intubated  sedated on Presedex drip , octreotide,tranesamix  and   Levophed 

drip 


Had repeat EGD on  5/4 with banding of esophageal varices  and remained stable 

with no episodes of bleeding and stable Hgb until 5/14. He was extubated , off 

levophed, octreotide and tranexamic drip  and was started on TPN and diet.


Antibiotics were started for staph coag negative in blood and possible 

pneumonia.


With large hematemesis and melena on  5/14 and drop in Hgb 6.7 requiring more 

transfusion and emergent EGD that failed to show an active source of bleeding 

due to large amount of blood pooling in the stomach.


to date s/p total 35 unit PRBC transfusion and 22 unit FFP 


Intubated again 5/14 for airway protection and started on phenylephrine drip . 


With large maroon, black bowel movements yesterday and Hgb 7.5 today 





1. Acute blood loss anemia sec to GI Bleed secondary  to Bleeding  Esophageal 

Varices


Acute , still active 


OGT and NGT in place with minimal output  


s/p total 35 units PRBC and 22 unit FFP transfusion and 3 platelets


s/p 4 EGD and 2 CTA abdomen this admission 


With massive hematemesis and melena again  5/14 after remaining stable for 10 

days since prior EGD of 5/4 with banding of esophageal varices 


Repeat  EGD   5/14 failed to show active source of bleeding . large maroon 

bowel movement yesterday and Hgb 7.5 today. For repeat EGD today  


Continue octreotide ,protonix , tranexamic drip 


GI, hematolofy andf surgery  on board


For repeat EGD today.Continue Transfusion PRN 


cont  TPN 


Monitor electrolytes daily and replace 








2.Hypovolemic shock  secondary to GI bleed


on  phenylephrine drip 


Continue transfusion PRN and IVF 


Will try to turner off pressors 











3. Suspected sepsis / bacteremia and Pneumonia


Afebrile since 5/12 AM ,was  tachycardic ,procalcitonin elevated 4.9


WBC 12 K


CT abdomen showed right middle lobe / upper and lower lobe infiltrate


CT chest showed possible right mid , right upper lobe  infiltrate vs 

atelectasis and right mid ground glass opacities1 blood Cx positive for Staph 

coag negative and sputum cx positive for yeast 


continue vanco and doxycycline ( was on Cipro and diflucan before )


Repeat blood cultures with no growth so far 


continue IV antibiotics


removed Tight groin TLC 5/11 and PICC line placed to RUE 


Echo showed no vegetations








4. Ileus -resolved 


noted to had abdominal distention 


CT abdomen showed:Distended large and small bowel loops with occasional air-

fluid levels in the central abdominal small bowel.  The stomach is mildly 

distended in the interval in overall pattern suggests ileus rather than small 

large-bowel obstruction.


NPO 


Continue TPN








5. Coagulopathy sec to Liver dis from alcoholism


received Vit K, DDAVP, Tranexamic acid 


s/p 22 unit FFP transfusion 


Hematology on consult 


Transfusion support 








6. Cirrhosis with ascites , coagulopathy, thrombocytopenia sec to ETOH abuse 


s/p abdominal paracentesis by IR  4/30  with removal 900 ml ascitic fluid


With anasarca 


repeat CT abdomen showed small ascitic fluid 


on Lasix to 40 mg Iv daily but will hold today due to hypernatremia  


Guarded prognosis





7. Transfusion reaction 


with fever episode during transfusion on 5/5











8.Suspected  Pneumonia 


Suspected RML , RUL infiltrate as above


sputum Cx positive for yeast 


Continue  vanco and Doxycycline


CT chest as above and procalcitonin was elevated











9.  Alcohol Abuse 


was on Presedex drip for more than 2 weeks


at present out of acute  withdrawal period


on Propofol for sedation 











10. Hepatic Encephalopathy


CT of the head : no bleed


ammonia level- low





11.Hypernatremia/ Hypokalemia/ hyperchloremia


secondary to diuretic use 


Hold lasix today


monitor electrolytes daily since patient is on TPN 





12. DVT proph


SCD


no anticoag sec to GIB and coagulopathy & low Platelet

## 2018-05-16 NOTE — RAD
PROCEDURE:  CHEST RADIOGRAPH, 1 VIEW



HISTORY:

intubated



COMPARISON:

5/15/2018 chest x-ray and CT chest 5/10/2018



FINDINGS:



LUNGS:

Shallow lung volumes. The pulmonary vascular markings appear 

prominent attributed to crowding/ shallow lung volumes.



Prior CT noted right ascites 



PLEURA:

No pneumothorax.  Other findings as above. .



CARDIOVASCULAR:

Probably top-normal given inspiration



OSSEOUS STRUCTURES:

No significant abnormalities.



VISUALIZED UPPER ABDOMEN:

Normal.



OTHER FINDINGS:

Endotracheal tube tip approximately 2 cm above the carole.



Right upper extremity PICC line tip cavoatrial junction as before. 



IMPRESSION:

Interval increasing right basal infiltrate/atelectasis with or 

without right pleural effusion/right subdiaphragmatic ascites

## 2018-05-16 NOTE — CP.CCUPN
CCU Subjective





- Physician Review


Subjective (Free Text): 








Sedated on MV support since 5/14. Just had EGD and underwent esoph variceal 

banding. Drips now include Ohenyephrine at 20 mcg dose, Propofol at 20 mcg dose 

s well, and PPi drip, and TPN at 70 ml/hr. MGT removed as per GI, noted to be 

curled with in the esophagus.








Other VS and I/Os reviewed.  








ROS:  No other pertinent negs or positives on 10+  system review. 











PMSFH:    All other Nursing and physician documentation reviewed to date; no 

new pertinent info noted relevant to current medical problems.








EXAM-


HEENT: no icterus, no gaze preference, pupils equal and reactive


NECK: No JVD, supple, carotids equal upstroke bilat/no bruits


CHEST: decreased BS bases, no wheezes audible


HEART:  regular tachy, distant, S1S2, no rubs.


ABD:  soft, ++ distention, no tympany, no palp tenderness, BS hypoactive.


EXT: No peripheral/ digital cyanosis, no calf tenderness or palpable cords, 

distal pulses intact and symmetrical. 


GENIT:  +scrotal  / penile edema


NEURO:  severe weakness and decreased tone in all limbs. 


SKIN:   no rashes,  warm and dry.











LABS: 





WBC= 12.4


HGB= 7.5   


PLTs=  128K





+ 1 of 2 Blood Cx: +GPC in clusters 5/9/18





Ij=755


K= 3.7


OS=456


HCO3= 22


BUN/Cr= 261.0


XV=356





Phos= 2.7


Mg= 2.1


TG= 76





CXR:  ETT position high above carole. No new infiltrates, hypoexpanded lung 

fields  ( my interp). 











IMPRESSION / MAJOR PROBLEMS NOW:


1.  Recurrent UGIB, 2' Variceal bleeding with recent banding


2.  Acute Anemia 2 blood loss; and mild Coagulopathy


3.  AKA on admission: suspect binge ETOH use after last hospital discharge. 


4.  ETOH Withdrawal / Delirium


5.  Azotemia / Dehydration








PLAN:


1.   No mew coags since 5/13, rpeeat today. 


2.   Check repeat CBC today at 12Noon; if stable,  will stop TXA drip. 


3.   On vasopressors and LAsix IV, stop lasix for now.


4.    Albumin, IV. Hold on PRBCs. 


5.    TPN, watch for overfeeding, adjust mixture for hypernatremia.


6.    On Zosyn / Doxy for abx coverage.


7.    Discussed with patient's father at the bedside. 


8.    Needs PT eval for severe deconditioning and generalized weakness. PROM as 

tolerated  for now as he is intubated.








CCU Objective





- Vital Signs / Intake & Output


Vital Signs (Last 4 hours): 


Vital Signs











  Temp Pulse Resp BP Pulse Ox


 


 05/16/18 07:46  97.4 F L  73  14  114/69  100


 


 05/16/18 07:00   78  19  114/69  100


 


 05/16/18 06:00   73  15  118/68  100











Intake and Output (Last 8hrs): 


 Intake & Output











 05/15/18 05/16/18 05/16/18





 22:59 06:59 14:59


 


Intake Total 1184 916 50


 


Output Total 500 800 


 


Balance 684 116 50


 


Intake:   


 


   356 50


 


  TPN/ 560 


 


Output:   


 


  Urine 500 800 


 


    Urethral (Burger) 500 800

## 2018-05-17 NOTE — CP.PCM.PN
Subjective





- Date & Time of Evaluation


Date of Evaluation: 05/17/18


Time of Evaluation: 14:20





- Subjective


Subjective: 





Clinically stable. No bleeding since  Wednesday.





Objective





- Vital Signs/Intake and Output


Vital Signs (last 24 hours): 


 











Temp Pulse Resp BP Pulse Ox


 


 98.3 F   75   18   114/74   100 


 


 05/17/18 12:00  05/17/18 14:00  05/17/18 14:00  05/17/18 14:00  05/17/18 14:00








Intake and Output: 


 











 05/17/18 05/17/18





 06:59 18:59


 


Intake Total 1230 1010


 


Balance 1230 1010














- Medications


Medications: 


 Current Medications





Acetaminophen (Tylenol 650 Mg Supp)  650 mg [place] ONCE PRN


   PRN Reason: Fever >100.4 F


   Last Admin: 05/11/18 11:54 Dose:  650 mg


Acetaminophen (Tylenol 325 Mg Supp)  325 mg [place] Q6 PRN


   PRN Reason: Fever >100.4 F


   Last Admin: 05/16/18 00:05 Dose:  325 mg


Fat Emulsion Intravenous (Intralipid 20%)  250 ml IV MWF [facility]


Folic Acid (Folic Acid)  1 mg PO DAILY [facility]


   Last Admin: 04/27/18 08:09 Dose:  1 mg


Furosemide (Lasix)  40 mg IVP DAILY [facility]


   Last Admin: 05/15/18 08:43 Dose:  40 mg


Pantoprazole Sodium 40 mg/ (Sodium Chloride)  100 mls @ 20 mls/hr IVPB Q5H [facility]


   PRN Reason: 8 MG/HR


   Last Admin: 05/17/18 10:54 Dose:  20 mls/hr


Octreotide Acetate 1,250 mcg/ (Sodium Chloride)  252.5 mls @ 10.1 mls/hr IV 

.Q24H [facility]


   PRN Reason: 50 MCG/HR


   Stop: 05/19/18 15:59


   Last Admin: 05/16/18 21:55 Dose:  10.1 mls/hr


Piperacillin Sod/Tazobactam (Sod 3.375 gm/ Sodium Chloride)  100 mls @ 100 mls/

hr IVPB Q6 [facility]


   PRN Reason: Protocol


   Last Admin: 05/17/18 11:56 Dose:  100 mls/hr


Fluconazole (Diflucan Iv 100 Mg/50 Ml Ns)  50 mls @ 50 mls/hr IVPB DAILY [facility]


   PRN Reason: Protocol


   Last Admin: 05/17/18 09:20 Dose:  50 mls/hr


Potassium Phosphate 15 mmole/Potassium Acetate 60 meq/Magnesium Sulfate 5 meq/

Calcium Gluconate 4.5 meq/Chromium/Copper/Manganese/Zinc 3 ml/ Multivitamins/

Vitamin C 10 ml/ Amino Acids  1,058.9089 mls @ 60 mls/hr IV .[id number] ONE


   Stop: 05/18/18 13:38


Lactobacillus Acidophilus (Bacid Acidophilus)  1 cap PO BID [facility]


   Last Admin: 05/17/18 09:20 Dose:  Not Given


Thiamine HCl (Vitamin B1 Tab)  100 mg PO DAILY [facility]


   Last Admin: 04/27/18 08:09 Dose:  100 mg











- Labs


Labs: 


 





 05/17/18 04:20 





 05/17/18 04:20 





 











PT  13.9 Seconds (9.8-13.1)  H  05/16/18  07:14    


 


INR  1.3  (0.9-1.2)  H  05/16/18  07:14    


 


APTT  35.7 Seconds (25.6-37.1)  D 05/16/18  07:14    














- Head Exam


Head Exam: NORMOCEPHALIC





- Respiratory Exam


Respiratory Exam: Clear to Ausculation Bilateral





- Cardiovascular Exam


Cardiovascular Exam: REGULAR RHYTHM





- GI/Abdominal Exam


GI & Abdominal Exam: Distended





Assessment and Plan


(1) GI bleed


Assessment & Plan: 


No active bleeding at this time. If patient remains stable  endoscopy next week 

for ongoing treatment  of varices.


Status: Acute

## 2018-05-17 NOTE — RAD
PROCEDURE:  CHEST RADIOGRAPH, 1 VIEW



HISTORY:

intubated



COMPARISON:

5/16/2028



FINDINGS:



LUNGS:

Clear.



PLEURA:

Nonspecific mild elevation of right hemidiaphragm. No evidence of 

pleural effusion or pneumothorax. Right apical opacity new since 

prior examination.  This could represent right apical pleural fluid.  

This may also be somewhat artifactual due to oblique positioning of 

the patient.  However, followup is advised.



CARDIOVASCULAR:

Endotracheal tube unchanged.  Right PICC catheter unchanged.



OSSEOUS STRUCTURES:

No significant abnormalities.



VISUALIZED UPPER ABDOMEN:

Normal.



OTHER FINDINGS:

None. 



IMPRESSION:

Right apical opacity.  Possible apical pleural fluid. Follow-up 

advised.  Otherwise no significant change.

## 2018-05-17 NOTE — RAD
HISTORY:

re-assess for RUL Atelectasis  



COMPARISON:

5/17/2018 at 4:42 a.m. 



FINDINGS:



LUNGS:

Right upper lobe opacity with concave border possibly representing 

upper lobe atelectasis. No infiltrate elsewhere.



PLEURA:

No significant pleural effusion identified, no pneumothorax apparent.



CARDIOVASCULAR:

Normal heart size.  ET tube and right PICC catheter unchanged.



OSSEOUS STRUCTURES:

No significant abnormalities.



VISUALIZED UPPER ABDOMEN:

Normal.



OTHER FINDINGS:

None.



IMPRESSION:

Possible right upper lobe atelectasis.  Followup advised.

## 2018-05-17 NOTE — CP.CCUPN
CCU Subjective





- Physician Review


Subjective (Free Text): 








Still sedated on MV support since 5/14; had EGD yesterday  and underwent esoph 

variceal banding. Drips now include Propofol at 30 mcg dose s well, and PPi drip

, and TPN at 60 ml/hr. and Octreotide at 5- mcg dose. NGT removed as per GI and 

kept out. Weaned off Phenylephrine since 8PM  last evening. 








Other VS and I/Os reviewed. No new fever spikes. + multiple liquid melanotic 

BMs overnight. 








ROS:  No other pertinent negs or positives on 10+  system review. 











PMSFH:    All other Nursing and physician documentation reviewed to date; no 

new pertinent info noted relevant to current medical problems.








EXAM-


HEENT: no icterus, no gaze preference, pupils equal and reactive


NECK: No JVD, supple, carotids equal upstroke bilat/no bruits


CHEST: decreased BS bases, no wheezes audible


HEART:  regular tachy, distant, S1S2, no rubs.


ABD:  soft, ++ distention, no tympany, no palp tenderness, BS hypoactive.


EXT: No peripheral/ digital cyanosis, no calf tenderness or palpable cords, 

distal pulses intact and symmetrical. 


GENIT:  +scrotal  / penile edema


NEURO:  severe weakness and decreased tone in all limbs. 


SKIN:   no rashes,  warm and dry.











LABS: 





WBC= 8.8


HGB= 7.6


PLTs=  105K











Dm=845


K= 3.6


LY=057


HCO3= 22


BUN/Cr= 16/0.7


PA=860





Phos= 2.7


Mg= 1.9


TG= 82 on 5/14. 





CXR:  ETT position high above carole. RUL atelectasis.  ( my interp). 











IMPRESSION / MAJOR PROBLEMS NOW:


1.  Recurrent UGIB, 2' Variceal bleeding with recent banding


2.  Acute Anemia 2 blood loss; and mild Coagulopathy


3.  AKA on admission: suspect binge ETOH use after last hospital discharge. 


4.  ETOH Withdrawal / Delirium


5.  Azotemia / Dehydration








PLAN:


1.    Increase ETT suctioning, may need pulm sport bed for chect vibrotherapy. 

Pulm f/u for any need for FOB. 


2.    Coordinatre with GI regarding any further looks via EGD today or tomoorow

, if not, would stop Propofol and extubate if CXR improves after interventions 

as mentioned in #1. 


3.    Lasix PRN


4.    TPN adjusted with improvement in hypernatremia, continue extra K and Mg 

supplements.


5.    Watch platelet counts, off TXA, Hgb stable over the last 24-36H.


6.    On Zosyn / Doxy for abx coverage.


7.    Discussed with patient's father at the bedside. 


8.    Needs PT eval for severe deconditioning and generalized weakness. PROM as 

tolerated  for now as he is intubated.





























CCU Objective





- Vital Signs / Intake & Output


Vital Signs (Last 4 hours): 


Vital Signs











  Temp Pulse Resp BP Pulse Ox


 


 05/17/18 04:00  98.7 F  77  14  106/69  100











Intake and Output (Last 8hrs): 


 Intake & Output











 05/16/18 05/16/18 05/17/18





 14:59 22:59 06:59


 


Intake Total 50 520 810


 


Output Total  800 


 


Balance 50 -280 810


 


Intake:   


 


  IV 50 250 260


 


  Intake, Piggyback  200 


 


  TPN/PPN  70 550


 


Output:   


 


  Urine  800 


 


    Urethral (Burger)  800 


 


Other:   


 


  # Bowel Movements  1 














- Physical Exam


Psychiatric: Positive for: Other (Pt Sedaed with propofol drip and orally 

intubated)





- Medications


Active Medications: 


Active Medications











Generic Name Dose Route Start Last Admin





  Trade Name Freq  PRN Reason Stop Dose Admin


 


Acetaminophen  650 mg  05/06/18 22:11  05/11/18 11:54





  Tylenol 650 Mg Supp  DC   650 mg





  ONCE PRN   Administration





  Fever >100.4 F   


 


Acetaminophen  325 mg  05/11/18 11:48  05/16/18 00:05





  Tylenol 325 Mg Supp  DC   325 mg





  Q6 PRN   Administration





  Fever >100.4 F   


 


Fat Emulsion Intravenous  250 ml  05/18/18 09:00  





  Intralipid 20%  IV   





  MWF [facility]UAN LUIS   


 


Folic Acid  1 mg  04/20/18 11:15  04/27/18 08:09





  Folic Acid  PO   1 mg





  DAILY JUAN LUIS   Administration


 


Furosemide  40 mg  05/10/18 09:00  05/15/18 08:43





  Lasix  IVP   40 mg





  DAILY JUAN LUIS   Administration


 


Pantoprazole Sodium 40 mg/  100 mls @ 20 mls/hr  04/27/18 12:15  05/17/18 02:15





  Sodium Chloride  IVPB   20 mls/hr





  Q5H JUAN LUIS   Administration





  8 MG/HR   


 


Doxycycline Hyclate 100 mg/  100 mls @ 100 mls/hr  05/12/18 11:00  05/16/18 21:

54





  Sodium Chloride  IVPB  05/19/18 11:01  100 mls/hr





  Q12 JUAN LUIS   Administration





  Protocol   


 


Octreotide Acetate 1,250 mcg/  252.5 mls @ 10.1 mls/hr  05/14/18 16:00  05/16/ 18 21:55





  Sodium Chloride  IV  05/19/18 15:59  10.1 mls/hr





  .Q24H JUAN LUIS   Administration





  50 MCG/HR   


 


Piperacillin Sod/Tazobactam  100 mls @ 100 mls/hr  05/14/18 22:00  05/17/18 04:

24





  Sod 3.375 gm/ Sodium Chloride  IVPB   100 mls/hr





  Q6 JUAN LUIS   Administration





  Protocol   


 


Fluconazole  50 mls @ 50 mls/hr  05/15/18 10:45  05/16/18 09:30





  Diflucan Iv 100 Mg/50 Ml Ns  IVPB   50 mls/hr





  DAILY JUAN LUIS   Administration





  Protocol   


 


Potassium Acetate 60 meq/  1,058.9089 mls @ 60 mls/hr  05/16/18 16:30  05/17/18 

04:28





Potassium Phosphate 15 mmole/  IV  05/17/18 10:08  60 mls/hr





Magnesium Sulfate 5 meq/  .[id number]   Administration





Calcium Gluconate 4.5 meq/     





Multivitamins/Vitamin C 10 ml/     





Chromium/Copper/Manganese/     





Zinc 3 ml/ Amino Acids     


 


Propofol  1,000 mg in 100 mls @ 10.886 mls/hr  05/17/18 00:45  05/17/18 01:10





  Diprivan  IV  05/18/18 00:45  30 mcg/kg/min





  .Q9H12M JUAN LUIS   16.329 mls/hr





  Protocol   Titration





  20 MCG/KG/MIN   


 


Lactobacillus Acidophilus  1 cap  05/12/18 10:30  05/16/18 16:59





  Bacid Acidophilus  PO   Not Given





  BID JUAN LUIS   


 


Thiamine HCl  100 mg  04/20/18 11:15  04/27/18 08:09





  Vitamin B1 Tab  PO   100 mg





  DAILY JUAN LUIS   Administration














- Patient Studies


Lab Studies: 


 Lab Studies











  05/17/18 05/17/18 05/17/18 Range/Units





  05:12 04:20 04:20 


 


WBC   8.8   (4.8-10.8)  K/uL


 


RBC   2.46 L   (4.40-5.90)  Mil/uL


 


Hgb   7.6 L   (12.0-18.0)  g/dL


 


Hct   23.5 L   (35.0-51.0)  %


 


MCV   95.4 H D   (80.0-94.0)  fl


 


MCH   30.8   (27.0-31.0)  pg


 


MCHC   32.3 L   (33.0-37.0)  g/dL


 


RDW   18.6 H   (11.5-14.5)  %


 


Plt Count   105 L D   (130-400)  K/uL


 


PT     (9.8-13.1)  Seconds


 


INR     (0.9-1.2)  


 


APTT     (25.6-37.1)  Seconds


 


pCO2  40    (35-45)  mm/Hg


 


pO2  112 H    ()  mm/Hg


 


HCO3  25.0    (21-28)  mmol/L


 


ABG pH  7.40    (7.35-7.45)  


 


ABG Total CO2  26.0    (22-28)  mmol/L


 


ABG O2 Saturation  100.7 H    (95-98)  %


 


ABG O2 Content  10.9 L    (15-23)  ML/dL


 


ABG Base Excess  0    (-2.0-3.0)  mmol/L


 


ABG Hemoglobin  7.7 L    (11.7-17.4)  g/dL


 


ABG Carboxyhemoglobin  1.6 H    (0.5-1.5)  %


 


POC ABG HHb (Measured)  -0.7 L    (0.0-5.0)  %


 


ABG Methemoglobin  0.6    (0.0-3.0)  %


 


ABG O2 Capacity  10.8 L    (16-24)  mL/dL


 


Anthony Test  Yes    


 


A-a O2 Difference  123.0    mm/Hg


 


Hgb O2 Saturation  98.6 H    (95.0-98.0)  %


 


Mechanical Rate  12    


 


FiO2  40.0    %


 


Tidal Volume  500    


 


PEEP  5    


 


Sodium    148  (132-148)  mmol/l


 


Potassium    3.6  (3.6-5.0)  MMOL/L


 


Chloride    116 H  ()  mmol/L


 


Carbon Dioxide    22  (22-30)  mmol/L


 


Anion Gap    14  (10-20)  


 


BUN    16  (9-20)  mg/dl


 


Creatinine    0.7 L  (0.8-1.5)  mg/dl


 


Est GFR (African Amer)    > 60  


 


Est GFR (Non-Af Amer)    > 60  


 


POC Glucose (mg/dL)     ()  mg/dL


 


Random Glucose    114 H  ()  mg/dL


 


Calcium    7.9 L  (8.4-10.2)  mg/dL


 


Phosphorus    2.7  (2.5-4.5)  mg/dl


 


Magnesium    1.9  (1.6-2.3)  MG/DL


 


Total Bilirubin    0.9  (0.2-1.3)  mg/dl


 


AST    146 H  (17-59)  U/L


 


ALT    81 H  (21-72)  U/L


 


Alkaline Phosphatase    212 H  ()  U/L


 


Total Protein    5.7 L  (6.3-8.2)  G/DL


 


Albumin    2.4 L  (3.5-5.0)  g/dL


 


Globulin    3.3  (2.2-3.9)  gm/dL


 


Albumin/Globulin Ratio    0.7 L  (1.0-2.1)  














  05/17/18 05/16/18 05/16/18 Range/Units





  04:19 13:20 07:14 


 


WBC   10.8   (4.8-10.8)  K/uL


 


RBC   2.53 L   (4.40-5.90)  Mil/uL


 


Hgb   7.9 L   (12.0-18.0)  g/dL


 


Hct   23.3 L   (35.0-51.0)  %


 


MCV   92.0   (80.0-94.0)  fl


 


MCH   31.1 H   (27.0-31.0)  pg


 


MCHC   33.8   (33.0-37.0)  g/dL


 


RDW   17.9 H   (11.5-14.5)  %


 


Plt Count   134   (130-400)  K/uL


 


PT    13.9 H  (9.8-13.1)  Seconds


 


INR    1.3 H  (0.9-1.2)  


 


APTT    35.7  D  (25.6-37.1)  Seconds


 


pCO2     (35-45)  mm/Hg


 


pO2     ()  mm/Hg


 


HCO3     (21-28)  mmol/L


 


ABG pH     (7.35-7.45)  


 


ABG Total CO2     (22-28)  mmol/L


 


ABG O2 Saturation     (95-98)  %


 


ABG O2 Content     (15-23)  ML/dL


 


ABG Base Excess     (-2.0-3.0)  mmol/L


 


ABG Hemoglobin     (11.7-17.4)  g/dL


 


ABG Carboxyhemoglobin     (0.5-1.5)  %


 


POC ABG HHb (Measured)     (0.0-5.0)  %


 


ABG Methemoglobin     (0.0-3.0)  %


 


ABG O2 Capacity     (16-24)  mL/dL


 


Anthony Test     


 


A-a O2 Difference     mm/Hg


 


Hgb O2 Saturation     (95.0-98.0)  %


 


Mechanical Rate     


 


FiO2     %


 


Tidal Volume     


 


PEEP     


 


Sodium     (132-148)  mmol/l


 


Potassium     (3.6-5.0)  MMOL/L


 


Chloride     ()  mmol/L


 


Carbon Dioxide     (22-30)  mmol/L


 


Anion Gap     (10-20)  


 


BUN     (9-20)  mg/dl


 


Creatinine     (0.8-1.5)  mg/dl


 


Est GFR (African Amer)     


 


Est GFR (Non-Af Amer)     


 


POC Glucose (mg/dL)  156 H    ()  mg/dL


 


Random Glucose     ()  mg/dL


 


Calcium     (8.4-10.2)  mg/dL


 


Phosphorus     (2.5-4.5)  mg/dl


 


Magnesium     (1.6-2.3)  MG/DL


 


Total Bilirubin     (0.2-1.3)  mg/dl


 


AST     (17-59)  U/L


 


ALT     (21-72)  U/L


 


Alkaline Phosphatase     ()  U/L


 


Total Protein     (6.3-8.2)  G/DL


 


Albumin     (3.5-5.0)  g/dL


 


Globulin     (2.2-3.9)  gm/dL


 


Albumin/Globulin Ratio     (1.0-2.1)  








 Laboratory Results - last 24 hr











  05/16/18 05/16/18 05/17/18





  07:14 13:20 04:19


 


WBC   10.8 


 


RBC   2.53 L 


 


Hgb   7.9 L 


 


Hct   23.3 L 


 


MCV   92.0 


 


MCH   31.1 H 


 


MCHC   33.8 


 


RDW   17.9 H 


 


Plt Count   134 


 


PT  13.9 H  


 


INR  1.3 H  


 


APTT  35.7  D  


 


pCO2   


 


pO2   


 


HCO3   


 


ABG pH   


 


ABG Total CO2   


 


ABG O2 Saturation   


 


ABG O2 Content   


 


ABG Base Excess   


 


ABG Hemoglobin   


 


ABG Carboxyhemoglobin   


 


POC ABG HHb (Measured)   


 


ABG Methemoglobin   


 


ABG O2 Capacity   


 


Anthony Test   


 


A-a O2 Difference   


 


Hgb O2 Saturation   


 


Mechanical Rate   


 


FiO2   


 


Tidal Volume   


 


PEEP   


 


Sodium   


 


Potassium   


 


Chloride   


 


Carbon Dioxide   


 


Anion Gap   


 


BUN   


 


Creatinine   


 


Est GFR ( Amer)   


 


Est GFR (Non-Af Amer)   


 


POC Glucose (mg/dL)    156 H


 


Random Glucose   


 


Calcium   


 


Phosphorus   


 


Magnesium   


 


Total Bilirubin   


 


AST   


 


ALT   


 


Alkaline Phosphatase   


 


Total Protein   


 


Albumin   


 


Globulin   


 


Albumin/Globulin Ratio   














  05/17/18 05/17/18 05/17/18





  04:20 04:20 05:12


 


WBC   8.8 


 


RBC   2.46 L 


 


Hgb   7.6 L 


 


Hct   23.5 L 


 


MCV   95.4 H D 


 


MCH   30.8 


 


MCHC   32.3 L 


 


RDW   18.6 H 


 


Plt Count   105 L D 


 


PT   


 


INR   


 


APTT   


 


pCO2    40


 


pO2    112 H


 


HCO3    25.0


 


ABG pH    7.40


 


ABG Total CO2    26.0


 


ABG O2 Saturation    100.7 H


 


ABG O2 Content    10.9 L


 


ABG Base Excess    0


 


ABG Hemoglobin    7.7 L


 


ABG Carboxyhemoglobin    1.6 H


 


POC ABG HHb (Measured)    -0.7 L


 


ABG Methemoglobin    0.6


 


ABG O2 Capacity    10.8 L


 


Anthony Test    Yes


 


A-a O2 Difference    123.0


 


Hgb O2 Saturation    98.6 H


 


Mechanical Rate    12


 


FiO2    40.0


 


Tidal Volume    500


 


PEEP    5


 


Sodium  148  


 


Potassium  3.6  


 


Chloride  116 H  


 


Carbon Dioxide  22  


 


Anion Gap  14  


 


BUN  16  


 


Creatinine  0.7 L  


 


Est GFR ( Amer)  > 60  


 


Est GFR (Non-Af Amer)  > 60  


 


POC Glucose (mg/dL)   


 


Random Glucose  114 H  


 


Calcium  7.9 L  


 


Phosphorus  2.7  


 


Magnesium  1.9  


 


Total Bilirubin  0.9  


 


AST  146 H  


 


ALT  81 H  


 


Alkaline Phosphatase  212 H  


 


Total Protein  5.7 L  


 


Albumin  2.4 L  


 


Globulin  3.3  


 


Albumin/Globulin Ratio  0.7 L  











Fingerstick Blood Sugar Results: 158

## 2018-05-17 NOTE — PCM.PROC
Procedures


Attestation:: I certify that I have explained the specified Operation(s) or 

Procedure(s), risks, benefits and reasonable alternatives to the Patient and/or 

other person responsible. The opportunity was given to ask questions and all 

questions answered





- Extubation


RSBI Score: 78


Clinical Parameters: Resolution/Stabilization of disease process, 

Hemodynamically Stable, Intact Cough/Gag Reflex, Spontaneous Respirations, 

Acceptable Vent Settings (FIO2<50%, PEEP<8, PaO2>75, pH>7.25)


Weaning Criteria Met: Yes


General Weaning Approaches: Pressure Support Ventilation (PSV) Weaning


Patient Condition: Patient has been successfully extubated and assessed


Oxygen Therapy: O2 via Venti Mask


Patient Tolerated Procedure: Well, No Complications

## 2018-05-17 NOTE — CP.PCM.PN
Subjective





- Date & Time of Evaluation


Date of Evaluation: 05/17/18


Time of Evaluation: 07:19





- Subjective


Subjective: 





pt intubated, sedated, likely extubate today


vitals reviewed


Hgb stable


continue to monitor closely


no acute distress


no active signs of bleeding overnight





Objective





- Vital Signs/Intake and Output


Vital Signs (last 24 hours): 


 











Temp Pulse Resp BP Pulse Ox


 


 98.7 F   77   14   106/69   100 


 


 05/17/18 04:00  05/17/18 04:00  05/17/18 04:00  05/17/18 04:00  05/17/18 04:00








Intake and Output: 


 











 05/17/18 05/17/18





 06:59 18:59


 


Intake Total 1230 


 


Balance 1230 














- Medications


Medications: 


 Current Medications





Acetaminophen (Tylenol 650 Mg Supp)  650 mg PA ONCE PRN


   PRN Reason: Fever >100.4 F


   Last Admin: 05/11/18 11:54 Dose:  650 mg


Acetaminophen (Tylenol 325 Mg Supp)  325 mg PA Q6 PRN


   PRN Reason: Fever >100.4 F


   Last Admin: 05/16/18 00:05 Dose:  325 mg


Fat Emulsion Intravenous (Intralipid 20%)  250 ml IV MWF [facility]


Folic Acid (Folic Acid)  1 mg PO DAILY [facility]


   Last Admin: 04/27/18 08:09 Dose:  1 mg


Furosemide (Lasix)  40 mg IVP DAILY [facility]


   Last Admin: 05/15/18 08:43 Dose:  40 mg


Pantoprazole Sodium 40 mg/ (Sodium Chloride)  100 mls @ 20 mls/hr IVPB Q5H JUAN LUIS


   PRN Reason: 8 MG/HR


   Last Admin: 05/17/18 02:15 Dose:  20 mls/hr


Doxycycline Hyclate 100 mg/ (Sodium Chloride)  100 mls @ 100 mls/hr IVPB Q12 JUAN LUIS


   PRN Reason: Protocol


   Stop: 05/19/18 11:01


   Last Admin: 05/16/18 21:54 Dose:  100 mls/hr


Octreotide Acetate 1,250 mcg/ (Sodium Chloride)  252.5 mls @ 10.1 mls/hr IV 

.Q24H JUAN LUIS


   PRN Reason: 50 MCG/HR


   Stop: 05/19/18 15:59


   Last Admin: 05/16/18 21:55 Dose:  10.1 mls/hr


Piperacillin Sod/Tazobactam (Sod 3.375 gm/ Sodium Chloride)  100 mls @ 100 mls/

hr IVPB Q6 JUAN LUIS


   PRN Reason: Protocol


   Last Admin: 05/17/18 04:24 Dose:  100 mls/hr


Fluconazole (Diflucan Iv 100 Mg/50 Ml Ns)  50 mls @ 50 mls/hr IVPB DAILY JUAN LUIS


   PRN Reason: Protocol


   Last Admin: 05/16/18 09:30 Dose:  50 mls/hr


Potassium Acetate 60 meq/Potassium Phosphate 15 mmole/Magnesium Sulfate 5 meq/

Calcium Gluconate 4.5 meq/Multivitamins/Vitamin C 10 ml/Chromium/Copper/

Manganese/Zinc 3 ml/ Amino Acids  1,058.9089 mls @ 60 mls/hr IV .[id number]


   Stop: 05/17/18 10:08


   Last Admin: 05/17/18 04:28 Dose:  60 mls/hr


Propofol (Diprivan)  1,000 mg in 100 mls @ 10.886 mls/hr IV .Q9H12M JUAN LUIS; 20 MCG/

KG/MIN


   PRN Reason: Protocol


   Stop: 05/18/18 00:45


   Last Titration: 05/17/18 01:10 Dose:  30 mcg/kg/min, 16.329 mls/hr


Lactobacillus Acidophilus (Bacid Acidophilus)  1 cap PO BID JUAN LUIS


   Last Admin: 05/16/18 16:59 Dose:  Not Given


Thiamine HCl (Vitamin B1 Tab)  100 mg PO DAILY [facility]


   Last Admin: 04/27/18 08:09 Dose:  100 mg











- Labs


Labs: 


 





 05/17/18 04:20 





 05/17/18 04:20 





 











PT  13.9 Seconds (9.8-13.1)  H  05/16/18  07:14    


 


INR  1.3  (0.9-1.2)  H  05/16/18  07:14    


 


APTT  35.7 Seconds (25.6-37.1)  D 05/16/18  07:14    














- Constitutional


Appears: Non-toxic, No Acute Distress





- Head Exam


Head Exam: ATRAUMATIC, NORMOCEPHALIC





- Eye Exam


Eye Exam: EOMI, Normal appearance, PERRL





- ENT Exam


ENT Exam: Mucous Membranes Moist, Normal Oropharynx





- Neck Exam


Neck Exam: Normal Inspection.  absent: Lymphadenopathy





- Respiratory Exam


Respiratory Exam: Clear to Ausculation Bilateral, NORMAL BREATHING PATTERN





- Cardiovascular Exam


Cardiovascular Exam: RRR, +S1, +S2





- GI/Abdominal Exam


GI & Abdominal Exam: Distended, Soft, Normal Bowel Sounds





- Extremities Exam


Extremities Exam: Normal Capillary Refill.  absent: Joint Swelling





- Back Exam


Back Exam: absent: CVA tenderness (L), CVA tenderness (R)





- Neurological Exam


Neurological Exam: Reflexes Normal


Additional comments: 





intubated and sedated   








- Psychiatric Exam


Additional comments: 





unable to assess, intubated and sedated


   





- Skin


Skin Exam: Dry, Warm





Assessment and Plan





- Assessment and Plan (Free Text)


Plan: 





38 y/o male with  known history  of Alcoholism and recent GI bleed, was brought 

in because of hematemesis and alteration in   mental status.


He was recently discharged from this hospital  after an episode of GI bleed .  

EGD done on 4/16 did not show any active bleed.  The patient was discharged 

home and again started drinking.


On day of admission, he  had hematemesis and was noted to be confused and 

agitated.  HGB=6.1.He was transfused with PRBC and FFP, intubated for airway 

protection. Underwent 2 EGD and 2  bleeding scan that failed to show an active 

source of bleeding .He was started on Levophed drip 4/27 for hypotension and 

underwent CTA of abdomen by IR that showed no active bleeding.


Was intubated  sedated on Presedex drip , octreotide,tranesamix  and   Levophed 

drip 


Had repeat EGD on  5/4 with banding of esophageal varices  and remained stable 

with no episodes of bleeding and stable Hgb until 5/14. He was extubated , off 

levophed, octreotide and tranexamic drip  and was started on TPN and diet.


Antibiotics were started for staph coag negative in blood and possible 

pneumonia.


With large hematemesis and melena on  5/14 and drop in Hgb 6.7 requiring more 

transfusion and emergent EGD that failed to show an active source of bleeding 

due to large amount of blood pooling in the stomach.


to date s/p total 35 unit PRBC transfusion and 22 unit FFP 


Intubated again 5/14 for airway protection and started on phenylephrine drip . 


With large maroon, black bowel movements yesterday and Hgb 7.6, Hct 23.5 today 





Likely extubate today 5/17/18





1. Acute blood loss anemia sec to GI Bleed secondary  to Bleeding  Esophageal 

Varices


Acute, no signs of bleed overnight


no OGT


s/p total 35 units PRBC and 22 unit FFP transfusion and 3 platelets


s/p 4 EGD and 2 CTA abdomen this admission 


With massive hematemesis and melena again 5/14 after remaining stable for 10 

days since prior EGD of 5/4 with banding of esophageal varices 


Repeat  EGD   5/14 failed to show active source of bleeding. large maroon bowel 

movement 2 days ago. For repeat EGD in 5 to 6 days per GI  


Continue octreotide ,protonix , tranexamic drip 


GI, hematology and surgery  on board


Transfusion PRN 


cont  TPN 


Monitor electrolytes daily and replace 








2.Hypovolemic shock  secondary to GI bleed


on  phenylephrine drip 


Continue transfusion PRN and IVF 


Will try to taper off pressors 








3. Suspected sepsis / bacteremia and Pneumonia


Afebrile since 5/12 AM, was tachycardic, procalcitonin elevated 4.9


WBC 12 K


CT abdomen showed right middle lobe / upper and lower lobe infiltrate


CT chest showed possible right mid , right upper lobe  infiltrate vs 

atelectasis and right mid ground glass opacities1 blood Cx positive for Staph 

coag negative and sputum cx positive for yeast 


continue vanco and doxycycline ( was on Cipro and diflucan before )


Repeat blood cultures with no growth so far 


continue IV antibiotics


removed Tight groin TLC 5/11 and PICC line placed to RUE 


Echo showed no vegetations








4. Ileus -resolved 


noted to had abdominal distention 


CT abdomen showed: Distended large and small bowel loops with occasional air-

fluid levels in the central abdominal small bowel.  The stomach is mildly 

distended in the interval in overall pattern suggests ileus rather than small 

large-bowel obstruction.


NPO 


Continue TPN








5. Coagulopathy sec to Liver dis from alcoholism


received Vit K, DDAVP, Tranexamic acid 


s/p 22 unit FFP transfusion 


Hematology on consult 


Transfusion support 








6. Cirrhosis with ascites , coagulopathy, thrombocytopenia sec to ETOH abuse 


s/p abdominal paracentesis by IR  4/30  with removal 900 ml ascitic fluid


With anasarca 


repeat CT abdomen showed small ascitic fluid 


on Lasix to 40 mg Iv daily but will hold today due to hypernatremia  


Guarded prognosis








7. Transfusion reaction 


with fever episode during transfusion on 5/5








8.Suspected  Pneumonia 


Suspected RML , RUL infiltrate as above


sputum Cx positive for yeast 


Continue  vanco and Doxycycline


CT chest as above and procalcitonin was elevated








9.  Alcohol Abuse 


was on Presedex drip for more than 2 weeks


at present out of acute  withdrawal period


on Propofol for sedation 








10. Hepatic Encephalopathy


CT of the head : no bleed


ammonia level- low








11.Hypernatremia/ Hypokalemia/ hyperchloremia


secondary to diuretic use 


Hold lasix today


monitor electrolytes daily since patient is on TPN 








12. DVT proph


SCD


no anticoag sec to GIB and coagulopathy & low Platelet

## 2018-05-18 NOTE — CP.PCM.PN
Subjective





- Date & Time of Evaluation


Date of Evaluation: 05/18/18


Time of Evaluation: 12:00





- Subjective


Subjective: 





Pt is awake , alert, oriented


follows commands


no signs of active GI bleed at present - no melena, no hematochezia


H/H stable


Afebrile


denies pain











Objective





- Vital Signs/Intake and Output


Vital Signs (last 24 hours): 


 











Temp Pulse Resp BP Pulse Ox


 


 98.0 F   70   20   115/71   100 


 


 05/18/18 12:00  05/18/18 12:00  05/18/18 12:00  05/18/18 12:00  05/18/18 12:00








Intake and Output: 


 











 05/18/18 05/18/18





 06:59 18:59


 


Intake Total 860 560


 


Output Total 900 


 


Balance -40 560














- Medications


Medications: 


 Current Medications





Albuterol/Ipratropium (Duoneb 3 Mg/0.5 Mg (3 Ml) Ud)  3 ml INH RQ6 [facility]


   Last Admin: 05/18/18 08:33 Dose:  3 ml


Fat Emulsion Intravenous (Intralipid 20%)  250 ml IV [facility] [place] [facility]


Folic Acid (Folic Acid)  1 mg PO DAILY [facility]


   Last Admin: 04/27/18 08:09 Dose:  1 mg


Folic Acid (Folic Acid)  1 mg PO DAILY [facility]


Furosemide (Lasix)  40 mg IVP DAILY [facility]


   Last Admin: 05/15/18 08:43 Dose:  40 mg


Pantoprazole Sodium 40 mg/ (Sodium Chloride)  100 mls @ 20 mls/hr IVPB Q5H [facility]


   PRN Reason: 8 MG/HR


   Last Admin: 05/18/18 08:15 Dose:  20 mls/hr


Octreotide Acetate 1,250 mcg/ (Sodium Chloride)  252.5 mls @ 10.1 mls/hr IV 

.Q24H [facility]


   PRN Reason: 50 MCG/HR


   Stop: 05/19/18 15:59


   Last Admin: 05/17/18 20:54 Dose:  10.1 mls/hr


Fluconazole (Diflucan Iv 100 Mg/50 Ml Ns)  50 mls @ 50 mls/hr IVPB DAILY [facility]


   PRN Reason: Protocol


   Last Admin: 05/18/18 08:41 Dose:  50 mls/hr


Potassium Phosphate 15 mmole/Potassium Acetate 60 meq/Magnesium Sulfate 5 meq/

Calcium Gluconate 4.5 meq/Chromium/Copper/Manganese/Zinc 3 ml/ Multivitamins/

Vitamin C 10 ml/ Amino Acids  1,058.9089 mls @ 60 mls/hr IV .[id number]


   Stop: 05/18/18 13:38


   Last Admin: 05/17/18 22:08 Dose:  60 mls/hr


Piperacillin Sod/Tazobactam (Sod 3.375 gm/ Sodium Chloride)  100 mls @ 100 mls/

hr IVPB Q6 [facility]


   PRN Reason: Protocol


   Last Admin: 05/18/18 09:38 Dose:  100 mls/hr


Lactobacillus Acidophilus (Bacid Acidophilus)  1 cap PO BID [facility]


   Last Admin: 05/18/18 08:40 Dose:  Not Given


Multivitamins/Vitamin C (Multi-Delyn Liquid)  15 ml PO DAILY [facility]


Thiamine HCl (Vitamin B1 Tab)  100 mg PO DAILY [facility]


   Last Admin: 04/27/18 08:09 Dose:  100 mg











- Labs


Labs: 


 





 05/18/18 04:45 





 05/18/18 04:45 





 











PT  13.9 Seconds (9.8-13.1)  H  05/16/18  07:14    


 


INR  1.3  (0.9-1.2)  H  05/16/18  07:14    


 


APTT  35.7 Seconds (25.6-37.1)  D 05/16/18  07:14    














- Constitutional


Appears: No Acute Distress, Chronically Ill





- Head Exam


Head Exam: NORMAL INSPECTION, NORMOCEPHALIC





- Eye Exam


Eye Exam: EOMI, Normal appearance, PERRL


Pupil Exam: NORMAL ACCOMODATION





- ENT Exam


ENT Exam: Mucous Membranes Dry, Normal External Ear Exam





- Neck Exam


Neck Exam: Full ROM.  absent: Meningismus





- Respiratory Exam


Respiratory Exam: Rhonchi, NORMAL BREATHING PATTERN.  absent: Wheezes, 

Respiratory Distress





- Cardiovascular Exam


Cardiovascular Exam: REGULAR RHYTHM, +S1, +S2





- GI/Abdominal Exam


GI & Abdominal Exam: Distended, Soft, Normal Bowel Sounds.  absent: Tenderness





- Extremities Exam


Extremities Exam: Normal Capillary Refill, Pedal Edema.  absent: Calf Tenderness





- Back Exam


Back Exam: absent: CVA tenderness (L), CVA tenderness (R)





- Neurological Exam


Neurological Exam: Alert, Awake, CN II-XII Intact, Oriented x3





- Psychiatric Exam


Psychiatric exam: Flat Affect





- Skin


Skin Exam: Dry, Normal Color, Warm





Assessment and Plan


(1) Acute blood loss anemia


Status: Acute   





(2) GI bleed


Status: Acute   





(3) Alcohol withdrawal


Status: Acute   





(4) Alcohol abuse


Status: Chronic   





(5) Coagulopathy


Status: Acute   





(6) Transaminitis


Status: Acute   





(7) Ileus


Status: Acute   





- Assessment and Plan (Free Text)


Assessment: 








38 y/o male with  known history  of Alcoholism and recent GI bleed, was brought 

in because of hematemesis and alteration in   mental status.


He was recently discharged from this hospital  after an episode of GI bleed .  

EGD done on 4/16 did not show any active bleed.  The patient was discharged 

home and again started drinking.


On day of admission, he  had hematemesis and was noted to be confused and 

agitated.  HGB=6.1.He was transfused with PRBC and FFP, intubated for airway 

protection. Underwent 2 EGD and 2  bleeding scan that failed to show an active 

source of bleeding .He was started on Levophed drip 4/27 for hypotension and 

underwent CTA of abdomen by IR that showed no active bleeding.


Was intubated  sedated on Presedex drip , octreotide,tranesamix  and   Levophed 

drip 


Had repeat EGD on  5/4 with banding of esophageal varices  and remained stable 

with no episodes of bleeding and stable Hgb until 5/14. He was extubated , off 

levophed, octreotide and tranexamic drip  and was started on TPN and diet.


Antibiotics were started for staph coag negative in blood and possible 

pneumonia.


With large hematemesis and melena on  5/14 and drop in Hgb 6.7 requiring more 

transfusion and emergent EGD that failed to show an active source of bleeding 

To date s/p total 35 unit PRBC transfusion and 22 unit FFP 


Intubated again 5/14 for airway protection and started on phenylephrine drip . 

  Extubated 5/17.











1. Acute blood loss anemia sec to GI Bleed secondary  to Bleeding  Esophageal 

Varices


Acute, no signs of bleed overnight


s/p total 35 units PRBC and 22 unit FFP transfusion and 3 platelets


s/p 4 EGD and 2 CTA abdomen this admission 


With massive hematemesis and melena again 5/14 after remaining stable for 10 

days since prior EGD of 5/4 with banding of esophageal varices 


Repeat  EGD   5/14 failed to show active source of bleeding.   For repeat EGD 

on Tuesday.


Pt on Protonix  and Octreotide drip 


GI, hematology and surgery  on board


Transfusion PRN 


Pt NPO, cont  TPN 


Monitor electrolytes daily and replace 








2.Hypovolemic shock  secondary to GI bleed


Continue transfusion PRN and IVF 


Off pressors








3. Suspected sepsis / bacteremia and Pneumonia


Afebrile since 5/12 AM, was tachycardic, procalcitonin elevated 4.9


WBC 12 K


CT abdomen showed right middle lobe / upper and lower lobe infiltrate


CT chest showed possible right mid , right upper lobe  infiltrate vs 

atelectasis and right mid ground glass opacities1 blood Cx positive for Staph 

coag negative and sputum cx positive for yeast 


continue IV Zosyn and Diflucan


Repeat blood cultures with no growth so far 


continue IV antibiotics


removed Right groin TLC 5/11 and PICC line placed to RUE 


Echo showed no vegetations








4. Ileus -resolved 


noted to had abdominal distention 


CT abdomen showed: Distended large and small bowel loops with occasional air-

fluid levels in the central abdominal small bowel.  The stomach is mildly 

distended in the interval in overall pattern suggests ileus rather than small 

large-bowel obstruction.


NPO 


Continue TPN








5. Coagulopathy sec to Liver dis from alcoholism


received Vit K, DDAVP, Tranexamic acid 


s/p 22 unit FFP transfusion 


Hematology on consult 


Transfusion support 








6. Cirrhosis with ascites , coagulopathy, thrombocytopenia sec to ETOH abuse 


s/p abdominal paracentesis by IR  4/30  with removal 900 ml ascitic fluid


With anasarca 


repeat CT abdomen showed small ascitic fluid 





7. Transfusion reaction 


with fever episode during transfusion on 5/5








8.Suspected  Pneumonia 


Suspected RML , RUL infiltrate as above


sputum Cx positive for yeast 


Continue  Zosyn and Diflucan


CT chest as above and procalcitonin was elevated








9.  Alcohol Abuse 


was on PreCedex drip for more than 2 weeks


at present out of acute  withdrawal period











10. Hepatic Encephalopathy


CT of the head : no bleed


ammonia level- low








11.Hypernatremia/ Hypokalemia/ hyperchloremia


secondary to diuretic use 


Held lasix


monitor electrolytes daily since patient is on TPN 








12. DVT proph


SCD


no anticoag sec to GIB and coagulopathy & low Platelet

## 2018-05-18 NOTE — CP.PCM.PN
Subjective





- Date & Time of Evaluation


Date of Evaluation: 05/18/18


Time of Evaluation: 14:56





- Subjective


Subjective: 





Clinically stable. Has Hgb today 8.5. No evidence of active bleeding since 

vaiceal banding 2 days ago.





Objective





- Vital Signs/Intake and Output


Vital Signs (last 24 hours): 


 











Temp Pulse Resp BP Pulse Ox


 


 98.0 F   68   12   106/71   100 


 


 05/18/18 12:00  05/18/18 13:57  05/18/18 13:57  05/18/18 13:57  05/18/18 13:57








Intake and Output: 


 











 05/18/18 05/18/18





 06:59 18:59


 


Intake Total 860 1020


 


Output Total 900 


 


Balance -40 1020














- Medications


Medications: 


 Current Medications





Albuterol/Ipratropium (Duoneb 3 Mg/0.5 Mg (3 Ml) Ud)  3 ml INH RQ6 [facility]


   Last Admin: 05/18/18 13:17 Dose:  3 ml


Fat Emulsion Intravenous (Intralipid 20%)  250 ml IV MWF [facility]


Folic Acid (Folic Acid)  1 mg PO DAILY [facility]


   Last Admin: 04/27/18 08:09 Dose:  1 mg


Folic Acid (Folic Acid)  1 mg PO DAILY [facility]


Furosemide (Lasix)  40 mg IVP DAILY [facility]


   Last Admin: 05/15/18 08:43 Dose:  40 mg


Pantoprazole Sodium 40 mg/ (Sodium Chloride)  100 mls @ 20 mls/hr IVPB Q5H [facility]


   PRN Reason: 8 MG/HR


   Last Admin: 05/18/18 13:18 Dose:  20 mls/hr


Octreotide Acetate 1,250 mcg/ (Sodium Chloride)  252.5 mls @ 10.1 mls/hr IV 

.Q24H [facility]


   PRN Reason: 50 MCG/HR


   Stop: 05/19/18 15:59


   Last Admin: 05/17/18 20:54 Dose:  10.1 mls/hr


Fluconazole (Diflucan Iv 100 Mg/50 Ml Ns)  50 mls @ 50 mls/hr IVPB DAILY [facility]


   PRN Reason: Protocol


   Last Admin: 05/18/18 08:41 Dose:  50 mls/hr


Piperacillin Sod/Tazobactam (Sod 3.375 gm/ Sodium Chloride)  100 mls @ 100 mls/

hr IVPB Q6 [facility]


   PRN Reason: Protocol


   Last Admin: 05/18/18 09:38 Dose:  100 mls/hr


Potassium Phosphate 15 mmole/Potassium Acetate 60 meq/Magnesium Sulfate 5 meq/

Calcium Gluconate 4.5 meq/Multivitamins/Vitamin C 10 ml/Chromium/Copper/

Manganese/Zinc 3 ml/ Amino Acids  1,058.9089 mls @ 60 mls/hr IV .[id number] [facility]


   Stop: 05/19/18 08:38


Lactobacillus Acidophilus (Bacid Acidophilus)  1 cap PO BID [facility]


   Last Admin: 05/18/18 08:40 Dose:  Not Given


Multivitamins/Vitamin C (Multi-Delyn Liquid)  15 ml PO DAILY [facility]


Thiamine HCl (Vitamin B1 Tab)  100 mg PO DAILY [facility]


   Last Admin: 04/27/18 08:09 Dose:  100 mg











- Labs


Labs: 


 





 05/18/18 04:45 





 05/18/18 04:45 





 











PT  13.9 Seconds (9.8-13.1)  H  05/16/18  07:14    


 


INR  1.3  (0.9-1.2)  H  05/16/18  07:14    


 


APTT  35.7 Seconds (25.6-37.1)  D 05/16/18  07:14    














- Head Exam


Head Exam: ATRAUMATIC





- ENT Exam


ENT Exam: Mucous Membranes Moist





- Neck Exam


Neck Exam: Normal Inspection





- Respiratory Exam


Respiratory Exam: Clear to Ausculation Bilateral





- Cardiovascular Exam


Cardiovascular Exam: REGULAR RHYTHM





- GI/Abdominal Exam


GI & Abdominal Exam: Distended, Normal Bowel Sounds





Assessment and Plan


(1) GI bleed


Assessment & Plan: 


Has been banded twice so far with good clinical response each time. Will repeat 

endoscopy next week


Status: Acute

## 2018-05-19 NOTE — CP.PCM.PN
Subjective





- Date & Time of Evaluation


Date of Evaluation: 05/19/18


Time of Evaluation: 12:00





- Subjective


Subjective: 





Pt awake , alert, oriented


tolerated Liquid diet


had BM today - not melena, dark green


denies abd pain


no CP


no SOB








Objective





- Vital Signs/Intake and Output


Vital Signs (last 24 hours): 


 











Temp Pulse Resp BP Pulse Ox


 


 98.3 F   80   20   115/75   100 


 


 05/19/18 08:00  05/19/18 10:00  05/19/18 10:00  05/19/18 10:00  05/19/18 10:00








Intake and Output: 


 











 05/19/18 05/19/18





 06:59 18:59


 


Intake Total 775 704


 


Output Total 950 


 


Balance -175 704














- Medications


Medications: 


 Current Medications





Albuterol/Ipratropium (Duoneb 3 Mg/0.5 Mg (3 Ml) Ud)  3 ml INH RQ6 [facility]


   Last Admin: 05/19/18 07:27 Dose:  3 ml


Fat Emulsion Intravenous (Intralipid 20%)  250 ml IV MWF [facility]


   Last Admin: 05/18/18 16:18 Dose:  250 ml


Folic Acid (Folic Acid)  1 mg PO DAILY [facility]


   Last Admin: 04/27/18 08:09 Dose:  1 mg


Folic Acid (Folic Acid)  1 mg PO DAILY [facility]


   Last Admin: 05/19/18 09:11 Dose:  Not Given


Furosemide (Lasix)  40 mg IVP DAILY [facility]


   Last Admin: 05/15/18 08:43 Dose:  40 mg


Pantoprazole Sodium 40 mg/ (Sodium Chloride)  100 mls @ 20 mls/hr IVPB Q5H [facility]


   PRN Reason: 8 MG/HR


   Last Admin: 05/19/18 11:10 Dose:  20 mls/hr


Octreotide Acetate 1,250 mcg/ (Sodium Chloride)  252.5 mls @ 10.1 mls/hr IV 

.Q24H [facility]


   PRN Reason: 50 MCG/HR


   Stop: 05/19/18 15:59


   Last Admin: 05/19/18 00:24 Dose:  10.1 mls/hr


Fluconazole (Diflucan Iv 100 Mg/50 Ml Ns)  50 mls @ 50 mls/hr IVPB DAILY [facility]


   PRN Reason: Protocol


   Last Admin: 05/19/18 11:09 Dose:  50 mls/hr


Piperacillin Sod/Tazobactam (Sod 3.375 gm/ Sodium Chloride)  100 mls @ 100 mls/

hr IVPB Q6 [facility]


   PRN Reason: Protocol


   Last Admin: 05/19/18 09:13 Dose:  100 mls/hr


Lactobacillus Acidophilus (Bacid Acidophilus)  1 cap PO BID [facility]


   Last Admin: 05/19/18 09:11 Dose:  Not Given


Multivitamins/Vitamin C (Multi-Delyn Liquid)  15 ml PO DAILY [facility]


   Last Admin: 05/19/18 08:30 Dose:  15 ml


Thiamine HCl (Vitamin B1 Tab)  100 mg PO DAILY [facility]


   Last Admin: 04/27/18 08:09 Dose:  100 mg











- Labs


Labs: 


 





 05/19/18 04:49 





 05/19/18 04:49 





 











PT  16.4 Seconds (9.8-13.1)  H  05/19/18  04:49    


 


INR  1.5  (0.9-1.2)  H  05/19/18  04:49    


 


APTT  43.3 Seconds (25.6-37.1)  H D 05/19/18  04:49    

















- Constitutional


Appears: No Acute Distress, Chronically Ill





- Head Exam


Head Exam: NORMAL INSPECTION, NORMOCEPHALIC





- Eye Exam


Eye Exam: EOMI, Normal appearance, PERRL


Pupil Exam: NORMAL ACCOMODATION





- ENT Exam


ENT Exam: Mucous Membranes Dry, Normal External Ear Exam





- Neck Exam


Neck Exam: Full ROM.  absent: Meningismus





- Respiratory Exam


Respiratory Exam: Rhonchi, NORMAL BREATHING PATTERN.  absent: Wheezes, 

Respiratory Distress





- Cardiovascular Exam


Cardiovascular Exam: REGULAR RHYTHM, +S1, +S2





- GI/Abdominal Exam


GI & Abdominal Exam: Distended, Soft, Normal Bowel Sounds.  absent: Tenderness





- Extremities Exam


Extremities Exam: Normal Capillary Refill, Pedal Edema.  absent: Calf Tenderness





- Back Exam


Back Exam: absent: CVA tenderness (L), CVA tenderness (R)





- Neurological Exam


Neurological Exam: Alert, Awake, CN II-XII Intact, Oriented x3





- Psychiatric Exam


Psychiatric exam: Flat Affect





- Skin


Skin Exam: Dry, Normal Color, Warm





Assessment and Plan


(1) Acute blood loss anemia


Status: Acute   





(2) GI bleed


Status: Acute   





(3) Alcohol withdrawal


Status: Acute   





(4) Alcohol abuse


Status: Chronic   





(5) Coagulopathy


Status: Acute   





(6) Transaminitis


Status: Acute   





(7) Ileus


Status: Acute   





- Assessment and Plan (Free Text)


Assessment: 











38 y/o male with  known history  of Alcoholism and recent GI bleed, was brought 

in because of hematemesis and alteration in   mental status.


He was recently discharged from this hospital  after an episode of GI bleed .  

EGD done on 4/16 did not show any active bleed.  The patient was discharged 

home and again started drinking.


On day of admission, he  had hematemesis and was noted to be confused and 

agitated.  HGB=6.1.He was transfused with PRBC and FFP, intubated for airway 

protection. Underwent 2 EGD and 2  bleeding scan that failed to show an active 

source of bleeding .He was started on Levophed drip 4/27 for hypotension and 

underwent CTA of abdomen by IR that showed no active bleeding.


Was intubated  sedated on Presedex drip , octreotide,tranesamix  and   Levophed 

drip 


Had repeat EGD on  5/4 with banding of esophageal varices  and remained stable 

with no episodes of bleeding and stable Hgb until 5/14. He was extubated , off 

levophed, octreotide and tranexamic drip  and was started on TPN and diet.


Antibiotics were started for staph coag negative in blood and possible 

pneumonia.


With large hematemesis and melena on  5/14 and drop in Hgb 6.7 requiring more 

transfusion and emergent EGD that failed to show an active source of bleeding 

To date s/p total 35 unit PRBC transfusion and 22 unit FFP 


Intubated again 5/14 for airway protection and started on phenylephrine drip . 

  Extubated 5/17.











1. Acute blood loss anemia sec to GI Bleed secondary  to Bleeding  Esophageal 

Varices


Acute, no signs of bleed overnight


s/p total 35 units PRBC and 22 unit FFP transfusion and 3 platelets


s/p 4 EGD and 2 CTA abdomen this admission 


With massive hematemesis and melena again 5/14 after remaining stable for 10 

days since prior EGD of 5/4 with banding of esophageal varices 


Repeat  EGD   5/14 failed to show active source of bleeding.   For repeat EGD 

on Tuesday.


Pt on Protonix  and Octreotide drip 


GI, hematology and surgery  on board


Transfusion PRN 


cont  TPN 


Monitor electrolytes daily and replace 


Pt tolerated Liquid diet 








2.Hypovolemic shock  secondary to GI bleed


Continue transfusion PRN and IVF 


Off pressors








3. Suspected sepsis / bacteremia and Pneumonia


Afebrile since 5/12 AM, was tachycardic, procalcitonin elevated 4.9


WBC 12 K


CT abdomen showed right middle lobe / upper and lower lobe infiltrate


CT chest showed possible right mid , right upper lobe  infiltrate vs 

atelectasis and right mid ground glass opacities1 blood Cx positive for Staph 

coag negative and sputum cx positive for yeast 


continue IV Zosyn and Diflucan


Repeat blood cultures with no growth so far 


continue IV antibiotics


removed Right groin TLC 5/11 and PICC line placed to RUE 


Echo showed no vegetations








4. Ileus -resolved 


noted to had abdominal distention 


CT abdomen showed: Distended large and small bowel loops with occasional air-

fluid levels in the central abdominal small bowel.  The stomach is mildly 

distended in the interval in overall pattern suggests ileus rather than small 

large-bowel obstruction.


Continue TPN


started on Liquid diet








5. Coagulopathy sec to Liver dis from alcoholism


received Vit K, DDAVP, Tranexamic acid 


s/p 22 unit FFP transfusion 


Hematology on consult 


Transfusion support 








6. Cirrhosis with ascites , coagulopathy, thrombocytopenia sec to ETOH abuse 


s/p abdominal paracentesis by IR  4/30  with removal 900 ml ascitic fluid


With anasarca 


repeat CT abdomen showed small ascitic fluid 





7. Transfusion reaction 


with fever episode during transfusion on 5/5








8.Suspected  Pneumonia 


Suspected RML , RUL infiltrate as above


sputum Cx positive for yeast 


Continue  Zosyn and Diflucan


CT chest as above and procalcitonin was elevated








9.  Alcohol Abuse 


was on PreCedex drip for more than 2 weeks


at present out of acute  withdrawal period











10. Hepatic Encephalopathy


CT of the head : no bleed


ammonia level- low








11.Hypernatremia/ Hypokalemia/ hyperchloremia


secondary to diuretic use 


Held lasix


monitor electrolytes daily since patient is on TPN 








12. DVT proph


SCD


no anticoag sec to GIB and coagulopathy & low Platelet

## 2018-05-19 NOTE — PN
DATE:  05/19/2018



CRITICAL CARE PROGRESS NOTE



LOCATION:  The patient in ICU, bed 430.



TIME SPENT:  35 minutes.



SUBJECTIVE:  The patient is seen and evaluated at the bedside.  A

37-year-old male with a history of alcohol dependence, admitted with

hematemesis and acute blood loss anemia, treated with multiple units of

packed red blood cells and fresh frozen plasma, status post EGD and

banding.  Remains alert and awake, follows commands appropriate.  Denies

nausea, vomiting OR abdominal pain.  No further hematemesis.  Stool with

still in Black intermittently with melena.



OBJECTIVE:

VITAL SIGNS:  Temperature 98.9, heart rate 62-80 and regular, blood

pressure 134/94, mean arterial pressure 107, respiratory rate 13, oxygen

saturations 100%, oxygen supplement 3 liters.

INTAKE AND OUTPUT:  Intake 2055, output 3250, negative balance 1195.

HEENT:  Pupils reactive.  Conjunctivae pale.  Oral mucosa moist.

LUNGS:  Bilateral breath sounds clear to auscultation.

HEART:  Rhythm regular.  S1 and S2 normal.

ABDOMEN:  Mildly distended, soft.  Normal bowel sounds.

EXTREMITIES:  Normal capillary refill.



CURRENT MEDICATIONS:  Albuterol/Atrovent inhalation every 6 hours,

Intralipid 20% 3 times a week, Diflucan 100 mg IV push daily, folic acid 1

mg daily, furosemide 40 mg IV daily, lactobacillus 1 capsule twice daily,

multivitamin with vitamin C 15 mL daily, Sandostatin at 10.5 mL/hour, Zosyn

3.375 g IV every 6 hours, and thiamine 100 mg daily.



LABORATORY DATA:  WBC 8, hemoglobin 8.5, hematocrit 26.1, and platelet

count 81,000.  PT 16.5, INR 1.5, and PTT 43.3.  ABG, pH of 7.40, pCO2 of

40, pO2 112, and saturation 100.7 on FiO2 of 40%.  SMA-7; sodium 143,

potassium 3.9, chloride 107, CO2 25, BUN 10, creatinine 0.6, glucose 122,

calcium 8.3, phosphorus _____, magnesium 1.7, total bilirubin 1.1, ,

ALT 72, alkaline phosphatase 222, total protein 6.1, and albumin 2.5. 

Urinalysis negative.  Microbiology, blood cultures positive for

Staphylococcus aureus and coagulase negative.  Sputum positive for yeast

species.  Chest x-ray from 05/17/2018, possible right upper lobe

atelectasis.



IMPRESSION AND PLAN:

1.  Neuro:  Alert and awake, follows commands appropriate.

2.  Pulmonary:  Status post extubation, on oxygen supplement, upper lobe

atelectasis on chest x-ray on 05/17/2018.  Continue bronchodilator.  Repeat

chest x-ray to assess resolution of atelectasis.

3.  Cardiac, on Levophed drip, remains normotensive now.

4.  Gastrointestinal:  History of liver cirrhosis with coagulopathy and

thrombocytopenia, status post transfusion multiple units of packed red

blood cells 35 units and FFP 22 units status post multiple EGD and banding.

Hemoglobin and hematocrit remains currently stable.  No acute hematemesis

or melena noted now, hemoglobin and hematocrit remains stable.  Continue to

monitor closely.  Continue with the current octreotide and Levophed.

5.  Renal:  No acute issues noted, magnesium and phosphorus within normal

limits.

6.  Continue DVT and GI prophylaxis.

7.  Out of bed to chair as tolerated.  Keep the head of bed 30 degrees up.





__________________________________________

Gilbert Mckeon MD





DD:  05/19/2018 14:11:39

DT:  05/19/2018 14:17:52

Job # [id number]



MTDD

## 2018-05-19 NOTE — CP.PCM.PN
<Saturnino Baldwin - Last Filed: 05/19/18 14:03>





Subjective





- Date & Time of Evaluation


Date of Evaluation: 05/19/18


Time of Evaluation: 09:30





- Subjective


Subjective: 


PGY4 GI follow-up 





Pt seen and examined bedside 


denies nay abd pain 


tolerating liquids 


denies nay nausea or vomiting 


no overnight events 


no melena, hematemsis or coffee-ground emesis





ROS: 12 point ROS conducted, neg other than above








Objective





- Vital Signs/Intake and Output


Vital Signs (last 24 hours): 


 











Temp Pulse Resp BP Pulse Ox


 


 98.9 F   62   13   134/94 H  100 


 


 05/19/18 12:00  05/19/18 12:00  05/19/18 12:00  05/19/18 12:00  05/19/18 12:00








Intake and Output: 


 











 05/19/18 05/19/18





 06:59 18:59


 


Intake Total 775 984


 


Output Total 950 240


 


Balance -175 744














- Medications


Medications: 


 Current Medications





Albuterol/Ipratropium (Duoneb 3 Mg/0.5 Mg (3 Ml) Ud)  3 ml INH RQ6 [facility]


   Last Admin: 05/19/18 13:10 Dose:  3 ml


Fat Emulsion Intravenous (Intralipid 20%)  250 ml IV MWF [facility]


   Last Admin: 05/18/18 16:18 Dose:  250 ml


Folic Acid (Folic Acid)  1 mg PO DAILY [facility]


   Last Admin: 04/27/18 08:09 Dose:  1 mg


Folic Acid (Folic Acid)  1 mg PO DAILY [facility]


   Last Admin: 05/19/18 09:11 Dose:  Not Given


Furosemide (Lasix)  40 mg IVP DAILY [facility]


   Last Admin: 05/15/18 08:43 Dose:  40 mg


Pantoprazole Sodium 40 mg/ (Sodium Chloride)  100 mls @ 20 mls/hr IVPB Q5H [facility]


   PRN Reason: 8 MG/HR


   Last Admin: 05/19/18 11:10 Dose:  20 mls/hr


Octreotide Acetate 1,250 mcg/ (Sodium Chloride)  252.5 mls @ 10.1 mls/hr IV 

.Q24H [facility]


   PRN Reason: 50 MCG/HR


   Stop: 05/19/18 15:59


   Last Admin: 05/19/18 00:24 Dose:  10.1 mls/hr


Fluconazole (Diflucan Iv 100 Mg/50 Ml Ns)  50 mls @ 50 mls/hr IVPB DAILY [facility]


   PRN Reason: Protocol


   Last Admin: 05/19/18 11:09 Dose:  50 mls/hr


Piperacillin Sod/Tazobactam (Sod 3.375 gm/ Sodium Chloride)  100 mls @ 100 mls/

hr IVPB Q6 [facility]


   PRN Reason: Protocol


   Last Admin: 05/19/18 09:13 Dose:  100 mls/hr


Lactobacillus Acidophilus (Bacid Acidophilus)  1 cap PO BID [facility]


   Last Admin: 05/19/18 09:11 Dose:  Not Given


Multivitamins/Vitamin C (Multi-Delyn Liquid)  15 ml PO DAILY [facility]


   Last Admin: 05/19/18 08:30 Dose:  15 ml


Thiamine HCl (Vitamin B1 Tab)  100 mg PO DAILY [facility]


   Last Admin: 04/27/18 08:09 Dose:  100 mg











- Labs


Labs: 


 





 05/19/18 04:49 





 05/19/18 04:49 





 











PT  16.4 Seconds (9.8-13.1)  H  05/19/18  04:49    


 


INR  1.5  (0.9-1.2)  H  05/19/18  04:49    


 


APTT  43.3 Seconds (25.6-37.1)  H D 05/19/18  04:49    














- Constitutional


Appears: Well, No Acute Distress





- Head Exam


Head Exam: ATRAUMATIC, NORMOCEPHALIC





- Eye Exam


Eye Exam: Normal appearance


Pupil Exam: NORMAL ACCOMODATION





- ENT Exam


ENT Exam: Mucous Membranes Moist, Normal Exam





- Neck Exam


Neck Exam: Normal Inspection





- Respiratory Exam


Respiratory Exam: Clear to Ausculation Bilateral, NORMAL BREATHING PATTERN.  

absent: Rales, Rhonchi, Wheezes, Respiratory Distress





- Cardiovascular Exam


Cardiovascular Exam: REGULAR RHYTHM, +S1, +S2





- GI/Abdominal Exam


GI & Abdominal Exam: Soft, Normal Bowel Sounds.  absent: Distended, Firm, 

Guarding, Rigid, Tenderness, Organomegaly





- Extremities Exam


Extremities Exam: absent: Joint Swelling, Pedal Edema





- Neurological Exam


Neurological Exam: Alert, Awake, Oriented x3





- Psychiatric Exam


Psychiatric exam: Normal Affect, Normal Mood





- Skin


Skin Exam: Dry, Intact, Normal Color, Warm





Assessment and Plan





- Assessment and Plan (Free Text)


Assessment: 


38 y/o male with MHx significant for EtOH abuse who presents to the ED acutely 

intoxicated and having a large volume of hematemesis. s/p EGD  5/1, 5/4, 5/14/, 

5/16 s/p band x3 then x2





Esophageal Varicies s/p banding x2


Cirrhosis etiology likely 2/2 ETOH 


elevated LFTs 2/2 above 


Anasarca 2/2 above 





Plan: 


-continue clears for now 


-continue octreotide and PPI 


-transfuse to keep hgb > 7 


-repeat EGD on tues with Dr Owen 


-monitor h/h daily 


-maintain two large bore IVs 


-continue ICU care 





d/W Dr. mcgee








<Johan Mcgee - Last Filed: 05/19/18 18:35>





Objective





- Vital Signs/Intake and Output


Vital Signs (last 24 hours): 


 











Temp Pulse Resp BP Pulse Ox


 


 98.4 F   86   17   129/76   100 


 


 05/19/18 16:00  05/19/18 16:00  05/19/18 16:00  05/19/18 16:00  05/19/18 16:00








Intake and Output: 


 











 05/19/18 05/19/18





 06:59 18:59


 


Intake Total 775 1404


 


Output Total 950 240


 


Balance -175 1164














- Medications


Medications: 


 Current Medications





Albuterol/Ipratropium (Duoneb 3 Mg/0.5 Mg (3 Ml) Ud)  3 ml INH RQ6 [facility]


   Last Admin: 05/19/18 13:10 Dose:  3 ml


Fat Emulsion Intravenous (Intralipid 20%)  250 ml IV MWF [facility]


   Last Admin: 05/18/18 16:18 Dose:  250 ml


Folic Acid (Folic Acid)  1 mg PO DAILY [facility]


   Last Admin: 04/27/18 08:09 Dose:  1 mg


Folic Acid (Folic Acid)  1 mg PO DAILY [facility]


   Last Admin: 05/19/18 09:11 Dose:  Not Given


Furosemide (Lasix)  40 mg IVP DAILY [facility]


   Last Admin: 05/15/18 08:43 Dose:  40 mg


Pantoprazole Sodium 40 mg/ (Sodium Chloride)  100 mls @ 20 mls/hr IVPB Q5H [facility]


   PRN Reason: 8 MG/HR


   Last Admin: 05/19/18 16:23 Dose:  20 mls/hr


Fluconazole (Diflucan Iv 100 Mg/50 Ml Ns)  50 mls @ 50 mls/hr IVPB DAILY [facility]


   PRN Reason: Protocol


   Last Admin: 05/19/18 11:09 Dose:  50 mls/hr


Piperacillin Sod/Tazobactam (Sod 3.375 gm/ Sodium Chloride)  100 mls @ 100 mls/

hr IVPB Q6 [facility]


   PRN Reason: Protocol


   Last Admin: 05/19/18 16:22 Dose:  100 mls/hr


Potassium Phosphate 15 mmole/Potassium Acetate 60 meq/Magnesium Sulfate 5 meq/

Calcium Gluconate 4.5 meq/Multivitamins/Vitamin C 10 ml/Chromium/Copper/

Manganese/Zinc 3 ml/ Amino Acids  1,058.9089 mls @ 60 mls/hr IV .[id number]


   Stop: 05/20/18 09:08


   Last Admin: 05/19/18 17:02 Dose:  60 mls/hr


Lactobacillus Acidophilus (Bacid Acidophilus)  1 cap PO BID [facility]


   Last Admin: 05/19/18 16:20 Dose:  Not Given


Multivitamins/Vitamin C (Multi-Delyn Liquid)  15 ml PO DAILY [facility]


   Last Admin: 05/19/18 08:30 Dose:  15 ml


Thiamine HCl (Vitamin B1 Tab)  100 mg PO DAILY [facility]


   Last Admin: 04/27/18 08:09 Dose:  100 mg











- Labs


Labs: 


 





 05/19/18 04:49 





 05/19/18 04:49 





 











PT  16.4 Seconds (9.8-13.1)  H  05/19/18  04:49    


 


INR  1.5  (0.9-1.2)  H  05/19/18  04:49    


 


APTT  43.3 Seconds (25.6-37.1)  H D 05/19/18  04:49    














Attending/Attestation





- Attestation


I have personally seen and examined this patient.: Yes


I have fully participated in the care of the patient.: Yes


I have reviewed all pertinent clinical information, including history, physical 

exam and plan: Yes


Notes (Text): 





05/19/18 18:35


37 year old male with etoh cirrhosis and variceal bleeding s/p banding. 

Continue medical management as above. No current bleeding. EGD next week per 

tasha.

## 2018-05-20 NOTE — CP.PCM.PN
<Saturnino Baldwin - Last Filed: 05/20/18 11:40>





Subjective





- Date & Time of Evaluation


Date of Evaluation: 05/20/18


Time of Evaluation: 09:10





- Subjective


Subjective: 


PGY4 GI Consult 





Pt seen and examined bedside 


Denies any and pain 


Denies any nausea and vomiting


wants to upgrade diet 


Denies any rectal bleeding or melena





ROS: 12 point ROS conducted, neg other than above 








Objective





- Vital Signs/Intake and Output


Vital Signs (last 24 hours): 


 











Temp Pulse Resp BP Pulse Ox


 


 98.6 F   85   31 H  128/77   96 


 


 05/20/18 08:00  05/20/18 10:00  05/20/18 10:00  05/20/18 10:00  05/20/18 10:00








Intake and Output: 


 











 05/20/18 05/20/18





 06:59 18:59


 


Intake Total 1170 640


 


Output Total 1 


 


Balance 1169 640














- Medications


Medications: 


 Current Medications





Albuterol/Ipratropium (Duoneb 3 Mg/0.5 Mg (3 Ml) Ud)  3 ml INH RQ6 [facility]


   Last Admin: 05/20/18 07:43 Dose:  3 ml


Folic Acid (Folic Acid)  1 mg PO DAILY [facility]


   Last Admin: 04/27/18 08:09 Dose:  1 mg


Folic Acid (Folic Acid)  1 mg PO DAILY [facility]


   Last Admin: 05/20/18 08:25 Dose:  1 mg


Furosemide (Lasix)  40 mg IVP DAILY [facility]


   Last Admin: 05/15/18 08:43 Dose:  40 mg


Pantoprazole Sodium 40 mg/ (Sodium Chloride)  100 mls @ 20 mls/hr IVPB Q5H [facility]


   PRN Reason: 8 MG/HR


   Last Admin: 05/20/18 08:38 Dose:  20 mls/hr


Fluconazole (Diflucan Iv 100 Mg/50 Ml Ns)  50 mls @ 50 mls/hr IVPB DAILY [facility]


   PRN Reason: Protocol


   Last Admin: 05/20/18 08:59 Dose:  50 mls/hr


Piperacillin Sod/Tazobactam (Sod 3.375 gm/ Sodium Chloride)  100 mls @ 100 mls/

hr IVPB Q6 [facility]


   PRN Reason: Protocol


   Last Admin: 05/20/18 09:00 Dose:  100 mls/hr


Lactobacillus Acidophilus (Bacid Acidophilus)  1 cap PO BID [facility]


   Last Admin: 05/20/18 08:24 Dose:  1 cap


Multivitamins/Minerals (Therapeutic-M Tab)  1 tab PO DAILY [facility]


   Last Admin: 05/20/18 09:00 Dose:  1 tab


Thiamine HCl (Vitamin B1 Tab)  100 mg PO DAILY [facility]











- Labs


Labs: 


 





 05/20/18 04:39 





 05/20/18 04:39 





 











PT  16.4 Seconds (9.8-13.1)  H  05/19/18  04:49    


 


INR  1.5  (0.9-1.2)  H  05/19/18  04:49    


 


APTT  43.3 Seconds (25.6-37.1)  H D 05/19/18  04:49    














- Constitutional


Appears: Well, No Acute Distress





- Head Exam


Head Exam: ATRAUMATIC, NORMOCEPHALIC





- Eye Exam


Eye Exam: Normal appearance





- Neck Exam


Neck Exam: Normal Inspection





- Respiratory Exam


Respiratory Exam: Clear to Ausculation Bilateral, NORMAL BREATHING PATTERN.  

absent: Prolonged Expiratory Phase, Rales, Rhonchi, Wheezes, Respiratory 

Distress





- Cardiovascular Exam


Cardiovascular Exam: REGULAR RHYTHM, +S1, +S2





- GI/Abdominal Exam


GI & Abdominal Exam: Soft, Normal Bowel Sounds.  absent: Distended, Firm, 

Guarding, Rigid, Tenderness, Organomegaly, Rebound





- Extremities Exam


Extremities Exam: Pedal Edema





- Neurological Exam


Neurological Exam: Alert, Awake, Oriented x3





- Psychiatric Exam


Psychiatric exam: Normal Affect, Normal Mood





- Skin


Skin Exam: Dry, Intact, Normal Color, Warm





Assessment and Plan





- Assessment and Plan (Free Text)


Assessment: 





36 y/o male with MHx significant for EtOH abuse who presents to the ED acutely 

intoxicated and having a large volume of hematemesis. s/p EGD  5/1, 5/4, 5/14/, 

5/16 s/p band x3 then x2





Esophageal Varicies s/p banding x2


Cirrhosis etiology likely 2/2 ETOH 


elevated LFTs 2/2 above 


Anasarca 2/2 above 





Plan: 


-advance to soft diet


-continue octreotide and PPI 


-transfuse to keep hgb > 7 


-repeat EGD on tues with Dr Owen 


-monitor h/h daily 


-maintain two large bore IVs 


-continue ICU care 





d/W Dr. mcgee





<Johan Mcgee - Last Filed: 05/20/18 19:25>





Objective





- Vital Signs/Intake and Output


Vital Signs (last 24 hours): 


 











Temp Pulse Resp BP Pulse Ox


 


 98.0 F   98 H  17   117/81   100 


 


 05/20/18 16:00  05/20/18 18:00  05/20/18 18:00  05/20/18 18:00  05/20/18 16:00








Intake and Output: 


 











 05/20/18 05/21/18





 18:59 06:59


 


Intake Total 1540 


 


Output Total 900 


 


Balance 640 














- Medications


Medications: 


 Current Medications





Albuterol/Ipratropium (Duoneb 3 Mg/0.5 Mg (3 Ml) Ud)  3 ml INH RQ6 [facility]


   Last Admin: 05/20/18 19:01 Dose:  3 ml


Folic Acid (Folic Acid)  1 mg PO DAILY [facility]


   Last Admin: 04/27/18 08:09 Dose:  1 mg


Folic Acid (Folic Acid)  1 mg PO DAILY [facility]


   Last Admin: 05/20/18 08:25 Dose:  1 mg


Furosemide (Lasix)  40 mg IVP DAILY [facility]


   Last Admin: 05/15/18 08:43 Dose:  40 mg


Pantoprazole Sodium 40 mg/ (Sodium Chloride)  100 mls @ 20 mls/hr IVPB Q5H [facility]


   PRN Reason: 8 MG/HR


   Last Admin: 05/20/18 18:42 Dose:  20 mls/hr


Fluconazole (Diflucan Iv 100 Mg/50 Ml Ns)  50 mls @ 50 mls/hr IVPB DAILY [facility]


   PRN Reason: Protocol


   Last Admin: 05/20/18 08:59 Dose:  50 mls/hr


Piperacillin Sod/Tazobactam (Sod 3.375 gm/ Sodium Chloride)  100 mls @ 100 mls/

hr IVPB Q6 [facility]


   PRN Reason: Protocol


   Last Admin: 05/20/18 16:32 Dose:  100 mls/hr


Lactobacillus Acidophilus (Bacid Acidophilus)  1 cap PO BID [facility]


   Last Admin: 05/20/18 16:32 Dose:  1 cap


Multivitamins/Minerals (Therapeutic-M Tab)  1 tab PO DAILY [facility]


   Last Admin: 05/20/18 09:00 Dose:  1 tab


Thiamine HCl (Vitamin B1 Tab)  100 mg PO DAILY [facility]











- Labs


Labs: 


 





 05/20/18 17:35 





 05/20/18 04:39 





 











PT  16.4 Seconds (9.8-13.1)  H  05/19/18  04:49    


 


INR  1.5  (0.9-1.2)  H  05/19/18  04:49    


 


APTT  43.3 Seconds (25.6-37.1)  H D 05/19/18  04:49    














Attending/Attestation





- Attestation


I have personally seen and examined this patient.: Yes


I have fully participated in the care of the patient.: Yes


I have reviewed all pertinent clinical information, including history, physical 

exam and plan: Yes


Notes (Text): 





05/20/18 19:24


37 year old male with etoh cirrhosis and variceal bleeding s/p banding. 

Continue medical management as above. EGD later this week.

## 2018-05-20 NOTE — CP.PCM.PN
Subjective





- Date & Time of Evaluation


Date of Evaluation: 05/20/18


Time of Evaluation: 09:30





- Subjective


Subjective: 





No fever


Pt is eating breakfast


denies CP


no SOB


no abd pain


had dark green BM last night








Objective





- Vital Signs/Intake and Output


Vital Signs (last 24 hours): 


 











Temp Pulse Resp BP Pulse Ox


 


 98.6 F   85   31 H  128/77   96 


 


 05/20/18 08:00  05/20/18 10:00  05/20/18 10:00  05/20/18 10:00  05/20/18 10:00








Intake and Output: 


 











 05/20/18 05/20/18





 06:59 18:59


 


Intake Total 1170 640


 


Output Total 1 


 


Balance 1169 640














- Medications


Medications: 


 Current Medications





Albuterol/Ipratropium (Duoneb 3 Mg/0.5 Mg (3 Ml) Ud)  3 ml INH RQ6 [facility]


   Last Admin: 05/20/18 07:43 Dose:  3 ml


Folic Acid (Folic Acid)  1 mg PO DAILY [facility]


   Last Admin: 04/27/18 08:09 Dose:  1 mg


Folic Acid (Folic Acid)  1 mg PO DAILY [facility]


   Last Admin: 05/20/18 08:25 Dose:  1 mg


Furosemide (Lasix)  40 mg IVP DAILY [facility]


   Last Admin: 05/15/18 08:43 Dose:  40 mg


Pantoprazole Sodium 40 mg/ (Sodium Chloride)  100 mls @ 20 mls/hr IVPB Q5H [facility]


   PRN Reason: 8 MG/HR


   Last Admin: 05/20/18 08:38 Dose:  20 mls/hr


Fluconazole (Diflucan Iv 100 Mg/50 Ml Ns)  50 mls @ 50 mls/hr IVPB DAILY [facility]


   PRN Reason: Protocol


   Last Admin: 05/20/18 08:59 Dose:  50 mls/hr


Piperacillin Sod/Tazobactam (Sod 3.375 gm/ Sodium Chloride)  100 mls @ 100 mls/

hr IVPB Q6 [facility]


   PRN Reason: Protocol


   Last Admin: 05/20/18 09:00 Dose:  100 mls/hr


Lactobacillus Acidophilus (Bacid Acidophilus)  1 cap PO BID [facility]


   Last Admin: 05/20/18 08:24 Dose:  1 cap


Multivitamins/Minerals (Therapeutic-M Tab)  1 tab PO DAILY [facility]


   Last Admin: 05/20/18 09:00 Dose:  1 tab


Thiamine HCl (Vitamin B1 Tab)  100 mg PO DAILY [facility]











- Labs


Labs: 


 





 05/20/18 04:39 





 05/20/18 04:39 





 











PT  16.4 Seconds (9.8-13.1)  H  05/19/18  04:49    


 


INR  1.5  (0.9-1.2)  H  05/19/18  04:49    


 


APTT  43.3 Seconds (25.6-37.1)  H D 05/19/18  04:49    














- Constitutional


Appears: No Acute Distress





- Head Exam


Head Exam: NORMAL INSPECTION, NORMOCEPHALIC





- Eye Exam


Eye Exam: EOMI, Normal appearance, PERRL


Pupil Exam: NORMAL ACCOMODATION





- ENT Exam


ENT Exam: Mucous Membranes Dry, Normal External Ear Exam





- Neck Exam


Neck Exam: Full ROM.  absent: Meningismus





- Respiratory Exam


Respiratory Exam: Rhonchi, NORMAL BREATHING PATTERN.  absent: Wheezes, 

Respiratory Distress





- Cardiovascular Exam


Cardiovascular Exam: REGULAR RHYTHM, +S1, +S2





- GI/Abdominal Exam


GI & Abdominal Exam: Distended, Soft, Normal Bowel Sounds.  absent: Tenderness





- Extremities Exam


Extremities Exam: Normal Capillary Refill, + Pedal Edema.  absent: Calf 

Tenderness





- Back Exam


Back Exam: absent: CVA tenderness (L), CVA tenderness (R)





- Neurological Exam


Neurological Exam: Alert, Awake, CN II-XII Intact, Oriented x3





- Psychiatric Exam


Psychiatric exam: Flat Affect





- Skin


Skin Exam: Dry, Normal Color, Warm





Assessment and Plan


(1) Acute blood loss anemia


Status: Acute   





(2) GI bleed


Status: Acute   





(3) Alcohol withdrawal


Status: Acute   





(4) Alcohol abuse


Status: Chronic   





(5) Coagulopathy


Status: Acute   





(6) Transaminitis


Status: Acute   





(7) Ileus


Status: Acute   





- Assessment and Plan (Free Text)


Assessment: 








36 y/o male with  known history  of Alcoholism and recent GI bleed, was brought 

in because of hematemesis and alteration in   mental status.


He was recently discharged from this hospital  after an episode of GI bleed .  

EGD done on 4/16 did not show any active bleed.  The patient was discharged 

home and again started drinking.


On day of admission, he  had hematemesis and was noted to be confused and 

agitated.  HGB=6.1.He was transfused with PRBC and FFP, intubated for airway 

protection. Underwent 2 EGD and 2  bleeding scan that failed to show an active 

source of bleeding .He was started on Levophed drip 4/27 for hypotension and 

underwent CTA of abdomen by IR that showed no active bleeding.


Was intubated  sedated on Presedex drip , octreotide,tranesamix  and   Levophed 

drip 


Had repeat EGD on  5/4 with banding of esophageal varices  and remained stable 

with no episodes of bleeding and stable Hgb until 5/14. He was extubated , off 

levophed, octreotide and tranexamic drip  and was started on TPN and diet.


Antibiotics were started for staph coag negative in blood and possible 

pneumonia.


With large hematemesis and melena on  5/14 and drop in Hgb 6.7 requiring more 

transfusion and emergent EGD that failed to show an active source of bleeding 

To date s/p total 35 unit PRBC transfusion and 22 unit FFP 


Intubated again 5/14 for airway protection and started on phenylephrine drip . 

  Extubated 5/17.











1. Acute blood loss anemia sec to GI Bleed secondary  to Bleeding  Esophageal 

Varices


Acute, no signs of bleed overnight


s/p total 35 units PRBC and 22 unit FFP transfusion and 3 platelets


s/p 4 EGD and 2 CTA abdomen this admission 


With massive hematemesis and melena again 5/14 after remaining stable for 10 

days since prior EGD of 5/4 with banding of esophageal varices 


Repeat  EGD   5/14 failed to show active source of bleeding.   For repeat EGD 

on Tuesday.


Hgb today is 7.8 though no overt signs of any bleeding at present, will rpt cbc 

tonight


Pt on Protonix  and Octreotide drip 


GI, hematology and surgery  on board


Transfusion PRN 


Monitor electrolytes daily and replace 


Pt tolerated Liquid diet , started on Soft diet , will d/c TPN if pt tolerates 

diet








2.Hypovolemic shock  secondary to GI bleed


Continue transfusion PRN and IVF 


Off pressors








3. Suspected sepsis / bacteremia and Pneumonia


Afebrile since 5/12 AM, was tachycardic, procalcitonin elevated 4.9


CT abdomen showed right middle lobe / upper and lower lobe infiltrate


CT chest showed possible right mid , right upper lobe  infiltrate vs 

atelectasis and right mid ground glass opacities1 blood Cx positive for Staph 

coag negative and sputum cx positive for yeast 


continue IV Zosyn and Diflucan


Repeat blood cultures with no growth so far 


removed Right groin TLC 5/11 and PICC line placed to RUE 


Echo showed no vegetations








4. Ileus -resolved 


noted to had abdominal distention 


CT abdomen showed: Distended large and small bowel loops with occasional air-

fluid levels in the central abdominal small bowel.  The stomach is mildly 

distended in the interval in overall pattern suggests ileus rather than small 

large-bowel obstruction.


started on PO diet- will d/c TPN if he tolerates diet








5. Coagulopathy sec to Liver dis from alcoholism


received Vit K, DDAVP, Tranexamic acid 


s/p 22 unit FFP transfusion 


Hematology on consult 


Transfusion support 








6. Cirrhosis with ascites , coagulopathy, thrombocytopenia sec to ETOH abuse 


s/p abdominal paracentesis by IR  4/30  with removal 900 ml ascitic fluid


With anasarca 


repeat CT abdomen showed small ascitic fluid 





7. Transfusion reaction 


with fever episode during transfusion on 5/5








8.  Alcohol Abuse 


was on PreCedex drip for more than 2 weeks


at present out of acute  withdrawal period











9. Hepatic Encephalopathy


CT of the head : no bleed


ammonia level- low








10.Hypernatremia/ Hypokalemia/ hyperchloremia


secondary to diuretic use 


Held lasix


monitor electrolytes daily since patient is on TPN 








11. DVT proph


SCD


no anticoag sec to GIB and coagulopathy & low Platelet

## 2018-05-20 NOTE — PN
DATE:  05/20/2018



LOCATION:  The patient in ICU, bed 430.



TIME SPENT:  35 minutes.



SUBJECTIVE:  The patient is seen and evaluated at the bedside.



Past medical, surgical, family and social history reviewed.  A 37-year-old

male with alcohol dependence, admitted with hematemesis and acute blood

loss anemia status post transfusion, multiple units of packed red blood

cells and fresh frozen plasma.  Status post EGD with banding, overnight

uneventful, normotensive, afebrile.  No further active bleeding.  This

morning alert and awake, follows commands appropriate.  Denies headache,

shortness of breath or chest pain, palpitation.  No abdominal discomfort. 

No frequent bowel movements.



OBJECTIVE:

VITAL SIGNS:  Temperature 98.6, heart rate 85 and regular, blood pressure

128/77, respiratory rate 18-20 thoracoabdominal, saturations 96% on oxygen

supplement 2 liters nasal cannula.

INTAKE AND OUTPUT:  Intake 3274, output 621.  Positive balance 2653. 

Weight 208 pounds.

HEENT:  Pupils reactive.  Conjunctivae pale.  Sclerae icteric.

LUNGS:  Bilateral breath sounds clear to auscultation.

HEART:  Rhythm regular.  S1 and S2 normal.

ABDOMEN:  Bowel sounds are present.  Soft, distended, nontender.



CURRENT MEDICATIONS:  Albuterol/Atrovent inhalation every 6 hours,

Intralipid at 20% 250 mL three times per week, Diflucan 100 mg daily, folic

acid 1 mg daily, furosemide 40 mg daily, lactobacillus capsule twice daily,

multivitamin with vitamin C 15 mL daily, Sandostatin at 10.5 mL per hour,

Zosyn 3.375 g IV every 6 hours, and thiamine 100 mg daily.



LABORATORY DATA:  WBC 7, hemoglobin 7.8, hematocrit 23.7, and platelet

count 75,000.  PT 16.4, INR 1.5, and PTT 43.3.  SMA-7; sodium 140,

potassium 3.9, chloride 104, CO2 26, BUN 9, creatinine of 0.6, random

glucose 118, calcium 8.2, phosphorus 3.1, magnesium 1.8, , ALT 72,

alkaline phosphatase 222, and albumin 2.5.  Urinalysis is negative. 

Toxicology screen negative.  Alcohol level less than 10.  Serum ketones

negative.  Microbiology; blood culture coagulase negative.  Sputum with

yeast species.







IMPRESSION AND PLAN:

1.  Neurologic:  Alert and awake, follows commands appropriate.

2.  Pulmonary:  Status post extubation, now on oxygen supplement,

saturating over 94%.  Chest x-ray with upper lobe atelectasis.  Continue

bronchodilator.

3.  Cardiac:  On Levophed drip, remains normotensive now.

4.  GI:  History of liver cirrhosis with coagulopathy and thrombocytopenia,

status post transfusion with multiple units of packed red blood cells and

fresh frozen plasma.  Current hemoglobin 7.8.  No acute hematemesis or

melena noted now.  Stool reportedly dark green in color.  Continue current

treatment.  Monitor for further drop in hemoglobin/melena.

5.  Continue DVT and GI prophylaxis.  Out of bed to chair as tolerated. 

Keep the head of bed by 30 degrees up.





__________________________________________

Gilbert Mckeon MD





DD:  05/20/2018 11:37:25

DT:  05/20/2018 11:42:37

Job # [id number]



MTDD

## 2018-05-21 NOTE — CP.CCUPN
CCU Subjective





- Physician Review


Subjective (Free Text): 





05/21/18 09:51


The patient was Seen and examined by me at the bedside, 


Medical records reviewed and Management issues were discussed and formulated 

with the house staff.


Events reviewed 





Mr Davila is 37 Years old Male with PMHx  of Alcoholism and recent GI bleed, was 

initially admitted for hematemesis and altered mental status.


Pt was recently Hospitalized at [facility] after an episode of GI bleed, Underwent 

EGD on 4/16 did not show any evidence of active bleed, He went home and started 

drinking again, 


Returned back to the Emergency department on 04/19 with active GI bleeding..


Hospital course noted for recurrent hematemesis from variceal bleeding with 

esophageal banding on 05/04, requiring multiple blood products transfusions, 

ETOH Withdrawal, Delirium, Azotemia and prolonged intubation for airway 

protection, and he was successfully extubated on 05/08th.





05/14th; Continued to have H/H drop despite multiple PRBC transfusion


He continued to have active bleeding yesterday apperent by black jennifer stool 

and massive episode of hematemesis yesterday, requiring intubation for airway 

protection and emergent Endoscopy on 05/14


The EJD showed Large amount of blood in stomach and esophagus and only small 

varices was seen without active bleeding.


He received additional 2 more unites packed RBC, 1U Platelets and 1U FFP 


Repeat Endoscopy revealed variceal bleeding, underwent banding.


Intially he was hypotensive, required Phenylephrine.  


Patient was successfully Extubated on 05/17


Remains on pantoprazole drips and octreotide was discontinued 


Afebrile, NSR on the monitor 


NSR on the monitor, less tachycardia 


Hemodynamically improved, Off Phenylephrine 


Pt tolerating PO diet 


Had non bloody BM this morning


Last 24H I&O 1960/1450


This morning labs revealed No Leucocytosis (WBC 7.5 K), Hgb stable 8/23.8, and 

Plt 76 and Improved renal function BUN/Cr 8/0.7


Scheduled for repeat Endoscopy on Wednesday 



























































CCU Objective





- Vital Signs / Intake & Output


Vital Signs (Last 4 hours): 


Vital Signs











  Temp Pulse Resp BP Pulse Ox


 


 05/21/18 08:00  98.6 F  107 H  20  137/83  95


 


 05/21/18 06:00   99 H  20  123/88  95











Intake and Output (Last 8hrs): 


 Intake & Output











 05/20/18 05/21/18 05/21/18





 22:59 06:59 14:59


 


Intake Total 580 260 620


 


Output Total 400 550 


 


Balance 180 -290 620


 


Intake:   


 


   160 20


 


  Intake, Piggyback 200 100 


 


  Oral 240  600


 


Output:   


 


  Urine 400 550 


 


    Urine, Voided 400 550 


 


Other:   


 


  # Bowel Movements   1














- Physical Exam


Head: Positive for: Atraumatic, Normocephalic


Pupils: Positive for: PERRL.  Negative for: Sluggish, Non-Reactive, Pinpoint


Extroacular Muscles: Positive for: EOMI


Conjunctiva: Negative for: Injected, Icteric, Other


Mouth: Positive for: Moist Mucous Membranes, Dry


Pharnyx: Positive for: Normal


Neck: Positive for: Normal Range of Motion


Respiratory/Chest: Positive for: Clear to Auscultation, Good Air Exchange.  

Negative for: Respiratory Distress, Accessory Muscle Use, Wheezes, Rhonchi


Cardiovascular: Positive for: Regular Rate and Rhythm, Normal S1, S2, Peripheal 

Pulses Present.  Negative for: Murmurs, Irregular Rhythm, Tachycardic, 

Bradycardic


Abdomen: Positive for: Distention, Normal Bowel Sounds.  Negative for: 

Tenderness


Upper Extremity: Positive for: Normal Inspection.  Negative for: Edema


Lower Extremity: Positive for: Normal Inspection.  Negative for: Edema


Neurological: Positive for: GCS=15, CN II-XII Intact, Speech Normal, Motor Func 

Grossly Intact, Normal Sensory Function


Skin: Positive for: Warm.  Negative for: Rashes


Psychiatric: Positive for: Alert, Oriented x 3, Normal Insight, Other (Pt 

Sedaed with propofol drip and orally intubated)





- Medications


Active Medications: 


Active Medications











Generic Name Dose Route Start Last Admin





  Trade Name Antwonq  PRN Reason Stop Dose Admin


 


Albuterol/Ipratropium  3 ml  05/18/18 08:00  05/21/18 07:35





  Duoneb 3 Mg/0.5 Mg (3 Ml) Ud  INH   3 ml





  RQ6 JUAN LUIS   Administration


 


Folic Acid  1 mg  04/20/18 11:15  04/27/18 08:09





  Folic Acid  PO   1 mg





  DAILY JUAN LUIS   Administration


 


Folic Acid  1 mg  05/19/18 09:00  05/21/18 08:25





  Folic Acid  PO   1 mg





  DAILY JUAN LUIS   Administration


 


Furosemide  40 mg  05/10/18 09:00  05/15/18 08:43





  Lasix  IVP   40 mg





  DAILY JUAN LUIS   Administration


 


Pantoprazole Sodium 40 mg/  100 mls @ 20 mls/hr  04/27/18 12:15  05/21/18 06:08





  Sodium Chloride  IVPB   20 mls/hr





  Q5H JUAN LUIS   Administration





  8 MG/HR   


 


Fluconazole  50 mls @ 50 mls/hr  05/15/18 10:45  05/20/18 08:59





  Diflucan Iv 100 Mg/50 Ml Ns  IVPB   50 mls/hr





  DAILY JUAN LUIS   Administration





  Protocol   


 


Piperacillin Sod/Tazobactam  100 mls @ 100 mls/hr  05/18/18 05:19  05/21/18 09:

00





  Sod 3.375 gm/ Sodium Chloride  IVPB   100 mls/hr





  Q6 JUAN LUIS   Administration





  Protocol   


 


Lactobacillus Acidophilus  1 cap  05/12/18 10:30  05/21/18 08:27





  Bacid Acidophilus  PO   1 cap





  BID JUAN LUIS   Administration


 


Multivitamins/Minerals  1 tab  05/20/18 09:00  05/20/18 09:00





  Therapeutic-M Tab  PO   1 tab





  DAILY JUAN LUIS   Administration


 


Thiamine HCl  100 mg  05/21/18 09:00  05/21/18 08:26





  Vitamin B1 Tab  PO   100 mg





  DAILY JUAN LUIS   Administration














- Patient Studies


Lab Studies: 


 Lab Studies











  05/21/18 05/21/18 05/20/18 Range/Units





  04:20 04:20 17:35 


 


WBC   7.5  8.6  (4.8-10.8)  K/uL


 


RBC   2.50 L  2.57 L  (4.40-5.90)  Mil/uL


 


Hgb   8.0 L  8.0 L  (12.0-18.0)  g/dL


 


Hct   23.8 L  24.3 L  (35.0-51.0)  %


 


MCV   95.4 H  94.7 H  (80.0-94.0)  fl


 


MCH   31.8 H  31.2 H  (27.0-31.0)  pg


 


MCHC   33.4  33.0  (33.0-37.0)  g/dL


 


RDW   20.5 H  20.1 H  (11.5-14.5)  %


 


Plt Count   76 L  82 L  (130-400)  K/uL


 


Sodium  138    (132-148)  mmol/l


 


Potassium  3.7    (3.6-5.0)  MMOL/L


 


Chloride  104    ()  mmol/L


 


Carbon Dioxide  24    (22-30)  mmol/L


 


Anion Gap  14    (10-20)  


 


BUN  8 L    (9-20)  mg/dl


 


Creatinine  0.7 L    (0.8-1.5)  mg/dl


 


Est GFR (African Amer)  > 60    


 


Est GFR (Non-Af Amer)  > 60    


 


Random Glucose  84    ()  mg/dL


 


Calcium  8.5    (8.4-10.2)  mg/dL








 Laboratory Results - last 24 hr











  05/20/18 05/21/18 05/21/18





  17:35 04:20 04:20


 


WBC  8.6  7.5 


 


RBC  2.57 L  2.50 L 


 


Hgb  8.0 L  8.0 L 


 


Hct  24.3 L  23.8 L 


 


MCV  94.7 H  95.4 H 


 


MCH  31.2 H  31.8 H 


 


MCHC  33.0  33.4 


 


RDW  20.1 H  20.5 H 


 


Plt Count  82 L  76 L 


 


Sodium    138


 


Potassium    3.7


 


Chloride    104


 


Carbon Dioxide    24


 


Anion Gap    14


 


BUN    8 L


 


Creatinine    0.7 L


 


Est GFR ( Amer)    > 60


 


Est GFR (Non-Af Amer)    > 60


 


Random Glucose    84


 


Calcium    8.5











Fingerstick Blood Sugar Results: 158





Review of Systems





- Cardiovascular


Cardiovascular: absent: Chest Pain, Chest Pain at Rest, Chest Pain with Activity

, Claudication





- Respiratory


Respiratory: absent: Cough, Dyspnea, Hemoptysis





- Gastrointestinal


Gastrointestinal: absent: Abdominal Pain, Coffee Ground Emesis, Melena, Nausea, 

Vomiting





Critical Care Progress Note





- Extremities/Vascular


Does the Patient have a Central Venous Catheter?: Yes


Does the Patient need a Central Venous Catheter?: Yes


Does the Patient have a Burger Catheter?: No


Does the Patient need a Burger Catheter?: No





- Nutrition


Nutrition: 


 Nutrition











 Category Date Time Status


 


 Altered GI/Hepatic Diet [DIET] Diets  05/20/18 Dinner Active














Assessment/Plan


(1) GI bleed


Current Visit: Yes   Status: Acute   Comment: 


Remains on pantoprazole drip, Will switch to IV BID


Off octreotide 


Hemodynamically improved, Off Phenylephrine 


Pt tolerating PO diet 


Had non bloody BM this morning


This morning labs revealed No Leucocytosis (WBC 7.5 K), Hgb stable 8/23.8, and 

Plt 76 


Scheduled for repeat Endoscopy on Wednesday


frequent H/H monitoring


CT angiogram and bleeding scan did not revealed the source of bleeding 


PICC line and Two large bore peripheral catheters   





(2) Acute blood loss anemia


Current Visit: Yes   Status: Acute   Comment: 





   





(3) Acute respiratory failure


Current Visit: Yes   Status: Acute   Comment: 


Successfully extubated on 05/17


HOB maintained at 30 degrees. 


Maintain aspiration precution   





(4) Hepatic encephalopathy


Current Visit: Yes   Status: Acute   Comment: 


Altered mental status sec to ETOH Withdrawal, Delirium Vs Hepatic Encephalopathy


CT of the head: no bleed


Chech Ammonia level


Continue foluic acid and thiamine   





(5) Chronic alcoholism


Current Visit: No   Status: Acute   Comment: 


Banana Bag then Intravenous thiamine and Folate


Continue Librium 


PRN Ativan


fall/aspiration/seizure precautions

## 2018-05-21 NOTE — CP.PCM.PN
Subjective





- Date & Time of Evaluation


Date of Evaluation: 05/21/18


Time of Evaluation: 09:30





- Subjective


Subjective: 


Patient seen and examined . Siting in chair in NAD . Feeling well, 

hemodynamically stable, afebrile, no acute issues overnight. Hgb stable, no 

more active bleeding.


Feeling sad and wants to be discharged. Denies being suicidal


/88 HR 99 afbrile


WBC 7.5 Hgb 8  Plt 76 K    





Objective





- Vital Signs/Intake and Output


Vital Signs (last 24 hours): 


 











Temp Pulse Resp BP Pulse Ox


 


 98.3 F   99 H  20   123/88   95 


 


 05/21/18 04:00  05/21/18 06:00  05/21/18 06:00  05/21/18 06:00  05/21/18 06:00








Intake and Output: 


 











 05/21/18 05/21/18





 06:59 18:59


 


Intake Total 420 


 


Output Total 550 


 


Balance -130 














- Medications


Medications: 


 Current Medications





Albuterol/Ipratropium (Duoneb 3 Mg/0.5 Mg (3 Ml) Ud)  3 ml INH RQ6 JUAN LUIS


   Last Admin: 05/21/18 07:35 Dose:  3 ml


Folic Acid (Folic Acid)  1 mg PO DAILY JUAN LUIS


   Last Admin: 04/27/18 08:09 Dose:  1 mg


Folic Acid (Folic Acid)  1 mg PO DAILY JUAN LUIS


   Last Admin: 05/20/18 08:25 Dose:  1 mg


Furosemide (Lasix)  40 mg IVP DAILY JUAN LUIS


   Last Admin: 05/15/18 08:43 Dose:  40 mg


Pantoprazole Sodium 40 mg/ (Sodium Chloride)  100 mls @ 20 mls/hr IVPB Q5H JUAN LUIS


   PRN Reason: 8 MG/HR


   Last Admin: 05/21/18 06:08 Dose:  20 mls/hr


Fluconazole (Diflucan Iv 100 Mg/50 Ml Ns)  50 mls @ 50 mls/hr IVPB DAILY JUAN LUIS


   PRN Reason: Protocol


   Last Admin: 05/20/18 08:59 Dose:  50 mls/hr


Piperacillin Sod/Tazobactam (Sod 3.375 gm/ Sodium Chloride)  100 mls @ 100 mls/

hr IVPB Q6 JUAN LUIS


   PRN Reason: Protocol


   Last Admin: 05/21/18 04:00 Dose:  100 mls/hr


Lactobacillus Acidophilus (Bacid Acidophilus)  1 cap PO BID JUAN LUIS


   Last Admin: 05/20/18 16:32 Dose:  1 cap


Multivitamins/Minerals (Therapeutic-M Tab)  1 tab PO DAILY JUAN LUIS


   Last Admin: 05/20/18 09:00 Dose:  1 tab


Thiamine HCl (Vitamin B1 Tab)  100 mg PO DAILY JUAN LUIS











- Labs


Labs: 


 





 05/21/18 04:20 





 05/21/18 04:20 





 











PT  16.4 Seconds (9.8-13.1)  H  05/19/18  04:49    


 


INR  1.5  (0.9-1.2)  H  05/19/18  04:49    


 


APTT  43.3 Seconds (25.6-37.1)  H D 05/19/18  04:49    














- Constitutional


Appears: Non-toxic, No Acute Distress





- Head Exam


Head Exam: ATRAUMATIC, NORMAL INSPECTION, NORMOCEPHALIC





- Eye Exam


Eye Exam: EOMI, Normal appearance, PERRL


Pupil Exam: NORMAL ACCOMODATION





- ENT Exam


ENT Exam: Mucous Membranes Moist, Normal Exam





- Neck Exam


Neck Exam: Normal Inspection





- Respiratory Exam


Respiratory Exam: Clear to Ausculation Bilateral, NORMAL BREATHING PATTERN.  

absent: Rhonchi, Wheezes, Respiratory Distress





- Cardiovascular Exam


Cardiovascular Exam: REGULAR RHYTHM, RRR, +S1, +S2.  absent: JVD





- GI/Abdominal Exam


GI & Abdominal Exam: Distended, Soft, Normal Bowel Sounds.  absent: Guarding, 

Tenderness, Rebound





- Rectal Exam


Rectal Exam: Deferred





- Extremities Exam


Extremities Exam: Pedal Edema (3 + bilaterally )





- Back Exam


Back Exam: NORMAL INSPECTION





- Neurological Exam


Neurological Exam: Alert, Awake, CN II-XII Intact, Oriented x3





- Psychiatric Exam


Psychiatric exam: Flat Affect





- Skin


Skin Exam: Dry, Normal Color, Warm





Assessment and Plan





- Assessment and Plan (Free Text)


Assessment: 


36 y/o male with  known history  of Alcoholism and recent GI bleed, was brought 

in because of hematemesis and alteration in   mental status.


He was recently discharged from this hospital  after an episode of GI bleed .  

EGD done on 4/16 did not show any active bleed.  The patient was discharged 

home and again started drinking.


On day of admission, he  had hematemesis and was noted to be confused and 

agitated.  HGB=6.1.He was transfused with PRBC and FFP, intubated for airway 

protection. Underwent 2 EGD and 2  bleeding scan that failed to show an active 

source of bleeding .He was started on Levophed drip 4/27 for hypotension and 

underwent CTA of abdomen by IR that showed no active bleeding.


Was intubated  sedated on Presedex drip , octreotide,tranesamix  and   Levophed 

drip 


Had repeat EGD on  5/4 with banding of esophageal varices  and remained stable 

with no episodes of bleeding and stable Hgb until 5/14. He was extubated , off 

levophed, octreotide and tranexamic drip  and was started on TPN and diet.


Antibiotics were started for staph coag negative in blood and possible 

pneumonia.


With large hematemesis and melena on  5/14 and drop in Hgb 6.7 requiring more 

transfusion and emergent EGD that failed to show an active source of bleeding 

but more banding was done .To date s/p total 35 unit PRBC transfusion and 22 

unit FFP 


Intubated again 5/14 for airway protection and started on phenylephrine drip . 

  Extubated 5/17.


Stable at present with no more bleeding episodes and tolerating Po intake. 

Participating with PT 











1. Acute blood loss anemia sec to GI Bleed secondary  to Bleeding  Esophageal 

Varices


Acute, no signs of bleed since 5/14 


s/p total 35 units PRBC and 22 unit FFP transfusion and 3 platelets


s/p 4 EGD and 2 CTA abdomen this admission 


With massive hematemesis and melena again 5/14 after remaining stable for 10 

days since prior EGD of 5/4 with banding of esophageal varices 


Repeat  EGD   5/14 failed to show active source of bleeding but more banding 

was placed.   For repeat EGD on Wednesday as per GI 


Hgb stable 8


Still on Protonix  drip  


GI, hematology and surgery  on board


Tolerating diet 


Promote ambulation with PT 








2.Hypovolemic shock  secondary to GI bleed-- resolved 


Off pressors








3. Sepsis / bacteremia and Pneumonia


Afebrile since 5/12 AM, was tachycardic, procalcitonin elevated 4.9


CT abdomen showed right middle lobe / upper and lower lobe infiltrate


CT chest showed possible right mid , right upper lobe  infiltrate vs 

atelectasis and right mid ground glass opacities1 blood Cx positive for Staph 

coag negative and sputum cx positive for yeast 


continue IV Zosyn and Diflucan


Repeat blood cultures with no growth so far 


removed Right groin TLC 5/11 and PICC line placed to RUE 


Echo showed no vegetations


Repeat CXR showed right upper lobe atelectasis 








4. Ileus -resolved 


noted to had abdominal distention 


CT abdomen showed: Distended large and small bowel loops with occasional air-

fluid levels in the central abdominal small bowel.  The stomach is mildly 

distended in the interval in overall pattern suggests ileus rather than small 

large-bowel obstruction.


started on PO diet and tolerating 


TPN discontinued 








5. Coagulopathy sec to Liver dis from alcoholism


received Vit K, DDAVP, Tranexamic acid 


s/p 22 unit FFP transfusion 


Hematology on consult 


Transfusion support 








6. Cirrhosis with ascites , coagulopathy, thrombocytopenia sec to ETOH abuse 


s/p abdominal paracentesis by IR  4/30  with removal 900 ml ascitic fluid


With anasarca 


repeat CT abdomen showed small ascitic fluid 





7. Transfusion reaction 


with fever episode during transfusion on 5/5








8.  Alcohol Abuse 


was on PreCedex drip for more than 2 weeks


at present out of acute  withdrawal period











9. Hepatic Encephalopathy


CT of the head : no bleed


ammonia level- low








10.Hypernatremia/ Hypokalemia/ hyperchloremia


secondary to diuretic use 


Resume lasix today 


monitor electrolytes daily








11. DVT proph


SCD


no anticoag sec to GIB and coagulopathy & low Platelet

## 2018-05-22 NOTE — CP.PCM.PN
Subjective





- Date & Time of Evaluation


Date of Evaluation: 05/22/18


Time of Evaluation: 09:00





- Subjective


Subjective: 


Patient seen and examined . Feeling well, hemodynamically stable, afebrile. No 

acute issues overnight. Tolerating diet . No more bleeding episodes and Hgb 

stable 8 .


Participating with PT .


WBC 7.6 Hgb 8 Plt 82 


/77 HR 97  T 98.7





Objective





- Vital Signs/Intake and Output


Vital Signs (last 24 hours): 


 











Temp Pulse Resp BP Pulse Ox


 


 98.7 F   97 H  18   114/77   98 


 


 05/22/18 07:40  05/22/18 07:40  05/22/18 07:40  05/22/18 08:28  05/22/18 07:40











- Medications


Medications: 


 Current Medications





Albuterol/Ipratropium (Duoneb 3 Mg/0.5 Mg (3 Ml) Ud)  3 ml INH RQ6 [facility]


   Last Admin: 05/22/18 07:15 Dose:  3 ml


Folic Acid (Folic Acid)  1 mg PO DAILY [facility]


   Last Admin: 05/22/18 08:28 Dose:  1 mg


Furosemide (Lasix)  40 mg IVP DAILY [facility]


   Last Admin: 05/22/18 08:28 Dose:  40 mg


Fluconazole (Diflucan Iv 100 Mg/50 Ml Ns)  50 mls @ 50 mls/hr IVPB DAILY JUAN LUIS


   PRN Reason: Protocol


   Last Admin: 05/22/18 08:32 Dose:  50 mls/hr


Piperacillin Sod/Tazobactam (Sod 3.375 gm/ Sodium Chloride)  100 mls @ 100 mls/

hr IVPB Q6 JUAN LUIS


   PRN Reason: Protocol


   Last Admin: 05/22/18 04:06 Dose:  100 mls/hr


Lactobacillus Acidophilus (Bacid Acidophilus)  1 cap PO BID JUAN LUIS


   Last Admin: 05/22/18 08:32 Dose:  1 cap


Multivitamins/Minerals (Therapeutic-M Tab)  1 tab PO DAILY [facility]


   Last Admin: 05/22/18 08:28 Dose:  1 tab


Pantoprazole Sodium (Protonix Inj)  40 mg IVP Q12 [facility] LUIS


   Last Admin: 05/22/18 08:27 Dose:  40 mg


Thiamine HCl (Vitamin B1 Tab)  100 mg PO DAILY [facility]


   Last Admin: 05/22/18 08:28 Dose:  100 mg











- Labs


Labs: 


 





 05/22/18 04:20 





 05/22/18 04:20 





 











PT  16.4 Seconds (9.8-13.1)  H  05/19/18  04:49    


 


INR  1.5  (0.9-1.2)  H  05/19/18  04:49    


 


APTT  43.3 Seconds (25.6-37.1)  H D 05/19/18  04:49    














- Constitutional


Appears: Non-toxic, No Acute Distress, Chronically Ill





- Head Exam


Head Exam: ATRAUMATIC, NORMAL INSPECTION, NORMOCEPHALIC





- Eye Exam


Eye Exam: EOMI, PERRL


Pupil Exam: NORMAL ACCOMODATION





- ENT Exam


ENT Exam: Mucous Membranes Moist, Normal Exam





- Neck Exam


Neck Exam: Normal Inspection





- Respiratory Exam


Respiratory Exam: Clear to Ausculation Bilateral.  absent: Rales, Rhonchi, 

Wheezes, Respiratory Distress





- Cardiovascular Exam


Cardiovascular Exam: REGULAR RHYTHM, RRR, +S1, +S2.  absent: JVD





- GI/Abdominal Exam


GI & Abdominal Exam: Distended (ascites), Soft, Normal Bowel Sounds.  absent: 

Guarding, Tenderness, Rebound





- Rectal Exam


Rectal Exam: Deferred





- Extremities Exam


Extremities Exam: Pedal Edema (3 +)





- Neurological Exam


Neurological Exam: Alert, Awake, CN II-XII Intact, Oriented x3





- Psychiatric Exam


Psychiatric exam: Flat Affect





- Skin


Skin Exam: Dry, Normal Color, Warm





Assessment and Plan





- Assessment and Plan (Free Text)


Assessment: 


\36 y/o male with  known history  of Alcoholism and recent GI bleed, was 

brought in because of hematemesis and alteration in   mental status.


He was recently discharged from this hospital  after an episode of GI bleed .  

EGD done on 4/16 did not show any active bleed.  The patient was discharged 

home and again started drinking.


On day of admission, he  had hematemesis and was noted to be confused and 

agitated.  HGB=6.1.He was transfused with PRBC and FFP, intubated for airway 

protection. Underwent 2 EGD and 2  bleeding scan that failed to show an active 

source of bleeding .He was started on Levophed drip 4/27 for hypotension and 

underwent CTA of abdomen by IR that showed no active bleeding.


Was intubated  sedated on Presedex drip , octreotide,tranesamix  and   Levophed 

drip 


Had repeat EGD on  5/4 with banding of esophageal varices  and remained stable 

with no episodes of bleeding and stable Hgb until 5/14. He was extubated , off 

levophed, octreotide and tranexamic drip  and was started on TPN and diet.


Antibiotics were started for staph coag negative in blood and possible 

pneumonia.


With large hematemesis and melena on  5/14 and drop in Hgb 6.7 requiring more 

transfusion and emergent EGD that failed to show an active source of bleeding 

but more banding was done .To date s/p total 35 unit PRBC transfusion and 22 

unit FFP 


Intubated again 5/14 for airway protection and started on phenylephrine drip . 

  Extubated 5/17.


Stable at present with no more bleeding episodes and tolerating Po intake. 

Participating with PT 


Plan for EGD on Wednesday.











1. Acute blood loss anemia sec to GI Bleed secondary  to Bleeding  Esophageal 

Varices


Acute, no signs of bleed since 5/14 


s/p total 35 units PRBC and 22 unit FFP transfusion and 3 platelets


s/p 4 EGD and 2 CTA abdomen this admission 


With massive hematemesis and melena again 5/14 after remaining stable for 10 

days since prior EGD of 5/4 with banding of esophageal varices 


Repeat  EGD   5/14 failed to show active source of bleeding but more banding 

was placed.   For repeat EGD on Wednesday as per GI 


Hgb stable 8


Off octreotide and protonix drip 


placed on Protonix 40 mg PO BID  


GI, hematology and surgery  on board


Tolerating diet 


participating with PT and ambulating  








2.Hypovolemic shock  secondary to GI bleed-- resolved 


Off pressors








3. Sepsis / bacteremia and Pneumonia


Afebrile since 5/12 AM, was tachycardic, procalcitonin elevated 4.9


CT abdomen showed right middle lobe / upper and lower lobe infiltrate


CT chest showed possible right mid , right upper lobe  infiltrate vs 

atelectasis and right mid ground glass opacities1 blood Cx positive for Staph 

coag negative and sputum cx positive for yeast 


on IV Zosyn and Diflucan 


Repeat blood cultures with no growth so far 


removed Right groin TLC 5/11 and PICC line placed to RUE 


Echo showed no vegetations


Repeat CXR showed right upper lobe atelectasis 








4. Ileus -resolved 


noted to had abdominal distention 


CT abdomen showed: Distended large and small bowel loops with occasional air-

fluid levels in the central abdominal small bowel.  The stomach is mildly 

distended in the interval in overall pattern suggests ileus rather than small 

large-bowel obstruction.


started on PO diet and tolerating 


TPN discontinued 








5. Coagulopathy sec to Liver dis from alcoholism


received Vit K, DDAVP, Tranexamic acid 


s/p 22 unit FFP transfusion 


Hematology on consult 


Transfusion support 








6. Cirrhosis with ascites , coagulopathy, thrombocytopenia sec to ETOH abuse 


s/p abdominal paracentesis by IR  4/30  with removal 900 ml ascitic fluid


With anasarca 


repeat CT abdomen showed small ascitic fluid 





7. Transfusion reaction 


with fever episode during transfusion on 5/5








8.  Alcohol Abuse 


was on Presedex drip for more than 2 weeks


at present out of acute  withdrawal period











9. Hepatic Encephalopathy


CT of the head : no bleed


ammonia level- low








10.Hypernatremia/ Hypokalemia/ hyperchloremia


secondary to diuretic use 


Resumed lasix  


Replace K with 10 meq KCL PO today 


monitor electrolytes daily








11. DVT proph


SCD


no anticoag sec to GIB and coagulopathy & low Platelet

## 2018-05-22 NOTE — CP.PCM.PN
Subjective





- Date & Time of Evaluation


Date of Evaluation: 05/22/18


Time of Evaluation: 10:40





- Subjective


Subjective: 





Patient awake and alert, no bleeding , and tolerating food.





Objective





- Vital Signs/Intake and Output


Vital Signs (last 24 hours): 


 











Temp Pulse Resp BP Pulse Ox


 


 98.7 F   97 H  18   114/77   98 


 


 05/22/18 07:40  05/22/18 07:40  05/22/18 07:40  05/22/18 08:28  05/22/18 07:40











- Medications


Medications: 


 Current Medications





Albuterol/Ipratropium (Duoneb 3 Mg/0.5 Mg (3 Ml) Ud)  3 ml INH RQ6 [facility]


   Last Admin: 05/22/18 07:15 Dose:  3 ml


Folic Acid (Folic Acid)  1 mg PO DAILY [facility]


   Last Admin: 05/22/18 08:28 Dose:  1 mg


Furosemide (Lasix)  40 mg IVP DAILY [facility]


   Last Admin: 05/22/18 08:28 Dose:  40 mg


Fluconazole (Diflucan Iv 100 Mg/50 Ml Ns)  50 mls @ 50 mls/hr IVPB DAILY [facility]


   PRN Reason: Protocol


   Last Admin: 05/22/18 08:32 Dose:  50 mls/hr


Piperacillin Sod/Tazobactam (Sod 3.375 gm/ Sodium Chloride)  100 mls @ 100 mls/

hr IVPB Q6 [facility]


   PRN Reason: Protocol


   Last Admin: 05/22/18 09:04 Dose:  100 mls/hr


Lactobacillus Acidophilus (Bacid Acidophilus)  1 cap PO BID [facility]


   Last Admin: 05/22/18 08:32 Dose:  1 cap


Multivitamins/Minerals (Therapeutic-M Tab)  1 tab PO DAILY [facility]


   Last Admin: 05/22/18 08:28 Dose:  1 tab


Pantoprazole Sodium (Protonix Inj)  40 mg IVP Q12 [facility]


   Last Admin: 05/22/18 08:27 Dose:  40 mg


Thiamine HCl (Vitamin B1 Tab)  100 mg PO DAILY [facility]


   Last Admin: 05/22/18 08:28 Dose:  100 mg











- Labs


Labs: 


 





 05/22/18 04:20 





 05/22/18 04:20 





 











PT  16.4 Seconds (9.8-13.1)  H  05/19/18  04:49    


 


INR  1.5  (0.9-1.2)  H  05/19/18  04:49    


 


APTT  43.3 Seconds (25.6-37.1)  H D 05/19/18  04:49    














- Head Exam


Head Exam: ATRAUMATIC





- ENT Exam


ENT Exam: Normal Exam





- Neck Exam


Neck Exam: Full ROM





- Respiratory Exam


Respiratory Exam: Clear to Ausculation Bilateral





- Cardiovascular Exam


Cardiovascular Exam: REGULAR RHYTHM





- GI/Abdominal Exam


GI & Abdominal Exam: Soft, Normal Bowel Sounds.  absent: Tenderness





Assessment and Plan


(1) GI bleed


Assessment & Plan: 


Clinically well. No bleeding since last banding. Upper endoscopy tomorrow AM. 

Likely stable for discharge this week.


Status: Acute

## 2018-05-23 NOTE — CP.PCM.PN
Subjective





- Date & Time of Evaluation


Date of Evaluation: 05/23/18


Time of Evaluation: 10:00





- Subjective


Subjective: 


Patient seen and examined bedside. Feeling a little dizzy post EGD today. 

Hemodynamically stable, afebrile


Found sitting on the ground early and stated that sat on the floor because felt 

a little dizzy.


No acute issues overnight


Hgb stable 8


No more bleeding noted 


Having normal BM  





Objective





- Vital Signs/Intake and Output


Vital Signs (last 24 hours): 


 











Temp Pulse Resp BP Pulse Ox


 


 98.5 F   99 H  21   126/70   99 


 


 05/23/18 09:15  05/23/18 09:15  05/23/18 09:15  05/23/18 09:15  05/23/18 09:15








Intake and Output: 


 











 05/23/18 05/23/18





 06:59 18:59


 


Intake Total 100 150


 


Balance 100 150














- Medications


Medications: 


 Current Medications





Albuterol/Ipratropium (Duoneb 3 Mg/0.5 Mg (3 Ml) Ud)  3 ml INH RQ6 [facility]


   Last Admin: 05/23/18 07:30 Dose:  3 ml


Folic Acid (Folic Acid)  1 mg PO DAILY [facility]


   Last Admin: 05/23/18 08:34 Dose:  Not Given


Furosemide (Lasix)  40 mg IVP DAILY [facility]


   Last Admin: 05/23/18 08:34 Dose:  Not Given


Fluconazole (Diflucan Iv 100 Mg/50 Ml Ns)  50 mls @ 50 mls/hr IVPB DAILY [facility]


   PRN Reason: Protocol


   Last Admin: 05/23/18 08:33 Dose:  Not Given


Piperacillin Sod/Tazobactam (Sod 3.375 gm/ Sodium Chloride)  100 mls @ 100 mls/

hr IVPB Q6 [facility]


   PRN Reason: Protocol


   Last Admin: 05/23/18 05:03 Dose:  100 mls/hr


Lactobacillus Acidophilus (Bacid Acidophilus)  1 cap PO BID [facility]


   Last Admin: 05/23/18 08:33 Dose:  Not Given


Multivitamins/Minerals (Therapeutic-M Tab)  1 tab PO DAILY [facility]


   Last Admin: 05/23/18 08:34 Dose:  Not Given


Pantoprazole Sodium (Protonix Inj)  40 mg IVP Q12 [facility]


   Last Admin: 05/23/18 08:34 Dose:  Not Given


Thiamine HCl (Vitamin B1 Tab)  100 mg PO DAILY [facility]


   Last Admin: 05/23/18 08:34 Dose:  Not Given











- Labs


Labs: 


 





 05/23/18 04:20 





 05/23/18 04:20 





 











PT  16.4 Seconds (9.8-13.1)  H  05/19/18  04:49    


 


INR  1.5  (0.9-1.2)  H  05/19/18  04:49    


 


APTT  43.3 Seconds (25.6-37.1)  H D 05/19/18  04:49    














- Constitutional


Appears: Non-toxic, No Acute Distress, Chronically Ill





- Head Exam


Head Exam: ATRAUMATIC, NORMOCEPHALIC





- Eye Exam


Eye Exam: EOMI, Normal appearance, PERRL


Pupil Exam: NORMAL ACCOMODATION





- ENT Exam


ENT Exam: Normal Exam





- Neck Exam


Neck Exam: Normal Inspection





- Respiratory Exam


Respiratory Exam: Clear to Ausculation Bilateral, NORMAL BREATHING PATTERN.  

absent: Rales, Rhonchi, Wheezes





- Cardiovascular Exam


Cardiovascular Exam: REGULAR RHYTHM, RRR, +S1, +S2.  absent: JVD





- GI/Abdominal Exam


GI & Abdominal Exam: Distended (ascites), Soft, Normal Bowel Sounds.  absent: 

Guarding, Tenderness, Rebound





- Rectal Exam


Rectal Exam: Deferred





- Extremities Exam


Extremities Exam: Pedal Edema (3 + bilaterally )





- Back Exam


Back Exam: NORMAL INSPECTION





- Neurological Exam


Neurological Exam: Alert, Awake, CN II-XII Intact, Oriented x3





- Psychiatric Exam


Psychiatric exam: Normal Affect





- Skin


Skin Exam: Dry, Warm





Assessment and Plan





- Assessment and Plan (Free Text)


Assessment: 


38 y/o male with  known history  of Alcoholism and recent GI bleed, was brought 

in because of hematemesis and alteration in   mental status.


He was recently discharged from this hospital  after an episode of GI bleed .  

EGD done on 4/16 did not show any active bleed.  The patient was discharged 

home and again started drinking.


On day of admission, he  had hematemesis and was noted to be confused and 

agitated.  HGB=6.1.He was transfused with PRBC and FFP, intubated for airway 

protection. Underwent 2 EGD and 2  bleeding scan that failed to show an active 

source of bleeding .He was started on Levophed drip 4/27 for hypotension and 

underwent CTA of abdomen by IR that showed no active bleeding.


Was intubated  sedated on Presedex drip , octreotide,tranesamix  and   Levophed 

drip 


Had repeat EGD on  5/4 with banding of esophageal varices  and remained stable 

with no episodes of bleeding and stable Hgb until 5/14. He was extubated , off 

levophed, octreotide and tranexamic drip  and was started on TPN and diet.


Antibiotics were started for staph coag negative in blood and possible 

pneumonia.


With large hematemesis and melena on  5/14 and drop in Hgb 6.7 requiring more 

transfusion and emergent EGD that failed to show an active source of bleeding 

but more banding was done .To date s/p total 35 unit PRBC transfusion and 22 

unit FFP 


Intubated again 5/14 for airway protection and started on phenylephrine drip . 

  Extubated 5/17.


Stable at present with no more bleeding episodes and tolerating Po intake. 

Participating with PT 


Underwent EGD today .











1. Acute blood loss anemia sec to GI Bleed secondary  to Bleeding  Esophageal 

Varices


Acute, no signs of bleed since 5/14 


s/p total 35 units PRBC and 22 unit FFP transfusion and 3 platelets


s/p 4 EGD and 2 CTA abdomen this admission 


With massive hematemesis and melena again 5/14 after remaining stable for 10 

days since prior EGD of 5/4 with banding of esophageal varices 


Repeat  EGD   5/14 failed to show active source of bleeding but more banding 

was placed. EGD repeated today that showed no active bleeding , 1 small varices 

that was banded 


Will start liquid diet post EGD as per GI and continue protonix 40 mg po bid


Hgb stable 8


GI, hematology and surgery  on board


Possible repeat EGD in  1week .


g  








2.Hypovolemic shock  secondary to GI bleed-- resolved 


Off pressors








3. Sepsis / bacteremia and Pneumonia


Afebrile since 5/12 AM, was tachycardic, procalcitonin elevated 4.9


CT abdomen showed right middle lobe / upper and lower lobe infiltrate


CT chest showed possible right mid , right upper lobe  infiltrate vs 

atelectasis and right mid ground glass opacities1 blood Cx positive for Staph 

coag negative and sputum cx positive for yeast 


on IV Zosyn and Diflucan 


Repeat blood cultures with no growth so far 


removed Right groin TLC 5/11 and PICC line placed to RUE 


Echo showed no vegetations


Repeat CXR showed right upper lobe atelectasis 








4. Ileus -resolved 


noted to had abdominal distention 


CT abdomen showed: Distended large and small bowel loops with occasional air-

fluid levels in the central abdominal small bowel.  The stomach is mildly 

distended in the interval in overall pattern suggests ileus rather than small 

large-bowel obstruction.


started on PO diet and tolerating 


TPN discontinued 


Start liquid diet today post EGD 





5. Coagulopathy sec to Liver dis from alcoholism


received Vit K, DDAVP, Tranexamic acid 


s/p 22 unit FFP transfusion 


Hematology on consult 


Transfusion support 








6. Cirrhosis with ascites , coagulopathy, thrombocytopenia sec to ETOH abuse 


s/p abdominal paracentesis by IR  4/30  with removal 900 ml ascitic fluid


With anasarca 


repeat CT abdomen showed small ascitic fluid 





7. Transfusion reaction 


with fever episode during transfusion on 5/5








8.  Alcohol Abuse 


was on Presedex drip for more than 2 weeks


at present out of acute  withdrawal period











9. Hepatic Encephalopathy


CT of the head : no bleed


ammonia level- low








10.Hypernatremia/ Hypokalemia/ hyperchloremia


secondary to diuretic use 


on  lasix  


monitor electrolytes daily








11. DVT proph


SCD


no anticoag sec to GIB and coagulopathy & low Platelet

## 2018-05-24 NOTE — CP.PCM.DIS
Provider





- Provider


Date of Admission: 


04/19/18 22:56





Attending physician: 


Fredrick Smalls MD





Consults: 





GI : DR Owen


Heme: Dr Killian


Surgery: Dr Bell


Pulm: Dr Faustin





Time Spent in preparation of Discharge (in minutes): 40





Diagnosis





- Discharge Diagnosis


(1) Acute blood loss anemia


Status: Acute   





(2) GI bleed


Status: Acute   





(3) Alcohol withdrawal


Status: Acute   





(4) Alcohol abuse


Status: Chronic   





(5) Coagulopathy


Status: Acute   





(6) Transaminitis


Status: Acute   





(7) Ileus


Status: Acute   





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


Status: Acute   





Hospital Course





- Lab Results


Lab Results: 


 Micro Results





05/21/18 16:52   Nose   MRSA Culture (Admit) - Final


                            MRSA NOT DETECTED


05/10/18 16:40   Blood   Blood Culture - Final


                            NO GROWTH AFTER 5 DAYS


05/10/18 16:40   Blood   Gram Stain - Final


                            TEST NOT PERFORMED


05/10/18 16:40   Blood   Blood Culture - Final


                            NO GROWTH AFTER 5 DAYS


05/10/18 16:40   Blood   Gram Stain - Final


                            TEST NOT PERFORMED


05/08/18 12:30   Blood-Venous   Blood Culture - Final


                            NO GROWTH AFTER 5 DAYS


05/10/18 06:00   Urine,Burger   Urine Culture - Final


                            No Growth (<1,000 CFU/ML)


05/08/18 12:40   Blood-Venous   S.aureus & Coag-Neg Staph PNA FISH - Final


05/08/18 12:40   Blood-Venous   Blood Culture - Final


                            Coagulase Neg Staphylococcus


05/08/18 12:40   Blood-Venous   Gram Stain - Final


05/05/18 14:00   Blood   Blood Culture - Final


                            NO GROWTH AFTER 5 DAYS


05/05/18 14:00   Blood   Gram Stain - Final


05/08/18 11:14   Sputum   Gram Stain - Final


05/08/18 11:14   Sputum   Sputum Culture - Final


                            Yeast Species


04/26/18 Unknown   Blood-Venous   Blood Culture - Final


                                NO GROWTH AFTER 5 DAYS


04/26/18 Unknown   Blood-Venous   Blood Culture - Final


                                NO GROWTH AFTER 5 DAYS


04/26/18 Unknown   Blood-Venous   Gram Stain - Final


                                TEST NOT PERFORMED


04/25/18 14:30   Trachasp   Gram Stain - Final


04/25/18 14:30   Trachasp   Sputum Culture - Final


                            NORMAL ORAL MORENITA


04/20/18 06:16   Nose   MRSA Culture (Admit) - Final


                            MRSA NOT DETECTED





 Most Recent Lab Values











WBC  5.1 K/uL (4.8-10.8)   05/24/18  04:55    


 


RBC  2.64 Mil/uL (4.40-5.90)  L  05/24/18  04:55    


 


Hgb  8.3 g/dL (12.0-18.0)  L  05/24/18  04:55    


 


Hct  25.2 % (35.0-51.0)  L  05/24/18  04:55    


 


MCV  95.7 fl (80.0-94.0)  H  05/24/18  04:55    


 


MCH  31.3 pg (27.0-31.0)  H  05/24/18  04:55    


 


MCHC  32.7 g/dL (33.0-37.0)  L  05/24/18  04:55    


 


RDW  20.4 % (11.5-14.5)  H  05/24/18  04:55    


 


Plt Count  73 K/uL (130-400)  L  05/24/18  04:55    


 


MPV  10.4 fl (7.2-11.7)   05/18/18  21:00    


 


Neut % (Auto)  64.0 % (50.0-75.0)   05/18/18  21:00    


 


Lymph % (Auto)  22.5 % (20.0-40.0)   05/18/18  21:00    


 


Mono % (Auto)  8.5 % (0.0-10.0)   05/18/18  21:00    


 


Eos % (Auto)  3.6 % (0.0-4.0)   05/18/18  21:00    


 


Baso % (Auto)  1.4 % (0.0-2.0)   05/18/18  21:00    


 


Neut # (Auto)  6.5 K/uL (1.8-7.0)   05/18/18  21:00    


 


Lymph # (Auto)  2.3 K/uL (1.0-4.3)   05/18/18  21:00    


 


Mono # (Auto)  0.9 K/uL (0.0-0.8)  H  05/18/18  21:00    


 


Eos # (Auto)  0.4 K/uL (0.0-0.7)   05/18/18  21:00    


 


Baso # (Auto)  0.1 K/uL (0.0-0.2)   05/18/18  21:00    


 


PT  16.4 Seconds (9.8-13.1)  H  05/19/18  04:49    


 


INR  1.5  (0.9-1.2)  H  05/19/18  04:49    


 


APTT  43.3 Seconds (25.6-37.1)  H D 05/19/18  04:49    


 


Fibrinogen  317 mg/dl (200-400)   05/16/18  04:20    


 


pCO2  40 mm/Hg (35-45)   05/17/18  05:12    


 


pO2  112 mm/Hg ()  H  05/17/18  05:12    


 


HCO3  25.0 mmol/L (21-28)   05/17/18  05:12    


 


ABG pH  7.40  (7.35-7.45)   05/17/18  05:12    


 


ABG Total CO2  26.0 mmol/L (22-28)   05/17/18  05:12    


 


ABG O2 Saturation  100.7 % (95-98)  H  05/17/18  05:12    


 


ABG O2 Content  10.9 ML/dL (15-23)  L  05/17/18  05:12    


 


ABG Base Excess  0 mmol/L (-2.0-3.0)   05/17/18  05:12    


 


ABG Hemoglobin  7.7 g/dL (11.7-17.4)  L  05/17/18  05:12    


 


ABG Carboxyhemoglobin  1.6 % (0.5-1.5)  H  05/17/18  05:12    


 


POC ABG HHb (Measured)  -0.7 % (0.0-5.0)  L  05/17/18  05:12    


 


ABG Methemoglobin  0.6 % (0.0-3.0)   05/17/18  05:12    


 


ABG O2 Capacity  10.8 mL/dL (16-24)  L  05/17/18  05:12    


 


Anthony Test  Yes   05/17/18  05:12    


 


ABG Potassium  3.3 mmol/L (3.6-5.2)  L  05/10/18  16:30    


 


VBG pH  7.33  (7.32-7.43)   04/27/18  15:21    


 


VBG pCO2  49 mmHg (40-60)   04/27/18  15:21    


 


VBG HCO3  23.2 mmol/L  04/27/18  15:21    


 


VBG Total CO2  27.3 mmol/L (22-28)   04/27/18  15:21    


 


VBG O2 Sat (Calc)  65.2 % (40-65)  H  04/27/18  15:21    


 


VBG Base Excess  -0.7 mmol/L (0.0-2.0)  L  04/27/18  15:21    


 


VBG Potassium  5.3 mmol/L (3.6-5.2)  H  04/27/18  15:21    


 


A-a O2 Difference  123.0 mm/Hg  05/17/18  05:12    


 


Hgb O2 Saturation  98.6 % (95.0-98.0)  H  05/17/18  05:12    


 


Sodium  144.0 mmol/L (132-148)   05/10/18  16:30    


 


Chloride  116.0 mmol/L ()  H  05/10/18  16:30    


 


Glucose  144 mg/dL ()  H  05/10/18  16:30    


 


Lactate  1.3 mmol/L (0.7-2.1)   05/10/18  16:30    


 


Vent Mode  Prvc ac   05/16/18  05:01    


 


Mechanical Rate  12   05/17/18  05:12    


 


FiO2  40.0 %  05/17/18  05:12    


 


Tidal Volume  500   05/17/18  05:12    


 


PEEP  5   05/17/18  05:12    


 


Sodium  138 mmol/l (132-148)   05/24/18  04:55    


 


Potassium  3.3 MMOL/L (3.6-5.0)  L  05/24/18  04:55    


 


Chloride  104 mmol/L ()   05/24/18  04:55    


 


Carbon Dioxide  23 mmol/L (22-30)   05/24/18  04:55    


 


Anion Gap  14  (10-20)   05/24/18  04:55    


 


BUN  6 mg/dl (9-20)  L  05/24/18  04:55    


 


Creatinine  0.7 mg/dl (0.8-1.5)  L  05/24/18  04:55    


 


Est GFR ( Amer)  > 60   05/24/18  04:55    


 


Est GFR (Non-Af Amer)  > 60   05/24/18  04:55    


 


POC Glucose (mg/dL)  156 mg/dL ()  H  05/17/18  04:19    


 


Random Glucose  87 mg/dL ()   05/24/18  04:55    


 


Lactic Acid  1.0 MMOL/L (0.7-2.1)   04/29/18  08:28    


 


Calcium  8.4 mg/dL (8.4-10.2)   05/24/18  04:55    


 


Ionized Calcium  4.9 mg/dL (4.80-5.60)   04/28/18  17:03    


 


Phosphorus  3.1 mg/dl (2.5-4.5)   05/20/18  04:39    


 


Magnesium  1.8 MG/DL (1.6-2.3)   05/20/18  04:39    


 


Iron  16 ug/dL ()  L  05/04/18  13:45    


 


TIBC  292 ug/dL (250-450)   05/04/18  13:45    


 


% Saturation  5 % (20-55)  L  05/04/18  13:45    


 


Ferritin  36.7 ng/Ml (17.9-464)   05/04/18  13:45    


 


Total Bilirubin  1.1 mg/dl (0.2-1.3)   05/19/18  04:49    


 


AST  113 U/L (17-59)  H D 05/19/18  04:49    


 


ALT  72 U/L (21-72)   05/19/18  04:49    


 


Alkaline Phosphatase  222 U/L ()  H  05/19/18  04:49    


 


Ammonia  15 umo/L (16-60)  L D 05/19/18  04:49    


 


Lactate Dehydrogenase  725 U/L (313-618)  H  04/30/18  15:34    


 


Troponin I  0.0200 ng/mL (0.00-0.120)   04/19/18  22:15    


 


Total Protein  6.1 G/DL (6.3-8.2)  L  05/19/18  04:49    


 


Albumin  2.5 g/dL (3.5-5.0)  L  05/19/18  04:49    


 


Globulin  3.6 gm/dL (2.2-3.9)   05/19/18  04:49    


 


Albumin/Globulin Ratio  0.7  (1.0-2.1)  L  05/19/18  04:49    


 


Triglycerides  82 mg/DL (0-149)   05/14/18  06:24    


 


Lipase  182 U/L ()   04/27/18  04:50    


 


Procalcitonin  4.92 NG/ML (0.19-0.49)  H  05/10/18  05:00    


 


Arterial Blood Potassium  3.3 mmol/L (3.6-5.2)  L  05/10/18  16:30    


 


Venous Blood Potassium  5.3 mmol/L (3.6-5.2)  H  04/27/18  15:21    


 


Urine Color  Yellow  (YELLOW)   04/19/18  23:58    


 


Urine Clarity  Slighty-cloudy  (Clear)   04/19/18  23:58    


 


Urine pH  5.0  (5.0-8.0)   04/19/18  23:58    


 


Ur Specific Gravity  1.020  (1.003-1.030)   04/19/18  23:58    


 


Urine Protein  Negative mg/dL (NEGATIVE)   04/19/18  23:58    


 


Urine Glucose (UA)  Neg mg/dL (Normal)   04/19/18  23:58    


 


Urine Ketones  Negative mg/dL (NEGATIVE)   04/19/18  23:58    


 


Urine Blood  Negative  (NEGATIVE)   04/19/18  23:58    


 


Urine Nitrate  Negative  (NEGATIVE)   04/19/18  23:58    


 


Urine Bilirubin  Negative  (NEGATIVE)   04/19/18  23:58    


 


Urine Urobilinogen  4.0 mg/dL (0.2-1.0)   04/19/18  23:58    


 


Ur Leukocyte Esterase  Neg Arnaud/uL (Negative)   04/19/18  23:58    


 


Urine RBC (Auto)  1 /hpf (0-3)   04/19/18  23:58    


 


Urine Microscopic WBC  1 /hpf (0-5)   04/19/18  23:58    


 


Urine Bacteria  Rare  (<OCC)   04/19/18  23:58    


 


Urine Opiates Screen  Negative  (NEGATIVE)   04/19/18  23:58    


 


Urine Methadone Screen  Negative  (NEGATIVE)   04/19/18  23:58    


 


Ur Barbiturates Screen  Negative  (NEGATIVE)   04/19/18  23:58    


 


Ur Phencyclidine Scrn  Negative  (NEGATIVE)   04/19/18  23:58    


 


Ur Amphetamines Screen  Negative  (NEGATIVE)   04/19/18  23:58    


 


U Benzodiazepines Scrn  Positive  (NEGATIVE)   04/19/18  23:58    


 


U Oth Cocaine Metabols  Negative  (NEGATIVE)   04/19/18  23:58    


 


U Cannabinoids Screen  Negative  (NEGATIVE)   04/19/18  23:58    


 


Alcohol, Quantitative  < 10 mg/dl (0-10)   04/19/18  22:15    


 


Serum Ketones  Negative  (NEGATIVE)   04/20/18  13:40    


 


Blood Type  O POSITIVE   05/13/18  10:20    


 


Antibody Screen  Negative   05/13/18  10:20    


 


Crossmatch  See Detail   05/13/18  10:20    


 


Reaction Clerical Check  No discrepancy  (NO DISCREPA)   05/04/18  21:01    


 


Pre-Trans Blood Type  O POSITIVE   05/04/18  21:01    


 


Pre-Trans Bld Appearanc  No hemolysis  (NO HEMOLYSI)   05/04/18  21:01    


 


Pre-Trans ELVIA  Negative  (NEGATIVE)   05/04/18  21:01    


 


Post-Trans Blood Type  O POSITIVE   05/04/18  21:01    


 


Post-Trans Spec Appear  No hemolysis  (NO HEMOLYSI)   05/04/18  21:01    


 


Post-Trans ELVIA  Negative  (NEGATIVE)   05/04/18  21:01    


 


BBK History Checked  Patient has bt   05/13/18  10:20    














- Hospital Course


Hospital Course: 





38 y/o male with  known history  of Alcoholism and recent GI bleed, was brought 

in because of hematemesis and alteration in mental status.


He was recently discharged from this hospital  after an episode of GI bleed .  

EGD done on 4/16 did not show any active bleed.  The patient was discharged 

home and again started drinking.


On day of admission, he  had hematemesis and was noted to be confused and 

agitated.  HGB=6.1.He was transfused with PRBC and FFP, intubated for airway 

protection. Underwent 2 EGD and 2  bleeding scan that failed to show an active 

source of bleeding .He was started on Levophed drip 4/27 for hypotension and 

underwent CTA of abdomen by IR that showed no active bleeding. He wasas 

intubated  sedated on Precedex drip , octreotide,tranexamic and   Levophed drip 


Had repeat EGD on  5/4 with banding of esophageal varices  and remained stable 

with no episodes of bleeding and stable Hgb . 


He was extubated , off levophed, octreotide and tranexamic drip  and was 

started on TPN and diet.


Antibiotics was started for staph coag negative in blood and possible pneumonia.


With large hematemesis and melena on  5/14 and drop in Hgb 6.7 requiring more 

transfusion and emergent EGD that failed to show an active source of bleeding 

but more banding was done .  To date s/p total 35 unit PRBC transfusion and 22 

unit FFP 


Intubated again 5/14 for airway protection and started on phenylephrine drip . 

  Extubated 5/17.


Stable at present with no more bleeding episodes and tolerating Po intake. 

Participating with PT 


Underwent rpt EGD and Banding on 4/23 , no signs of active bleeding.


Patient tolerates PO diet.  Cleared by GI for discharge home.  Needs repeat EGD 

in 1 wk.


Physical therapy consulted- stable for d/c home - Pt states he has 24 hour care 

at home. 











1. Acute blood loss anemia sec to GI Bleed secondary  to Bleeding  Esophageal 

Varices


Acute, no signs of bleed since 5/14 


s/p total 35 units PRBC and 22 unit FFP transfusion and 3 platelets


s/p 4 EGD and 2 CTA abdomen this admission 


With massive hematemesis and melena again 5/14 after remaining stable for 10 

days since prior EGD of 5/4 with banding of esophageal varices 


Repeat  EGD   5/14 failed to show active source of bleeding but more banding 

was placed. EGD repeated today that showed no active bleeding , 1 small varices 

that was banded 


Hgb stable 


GI, hematology and surgery  on board


ff up with Dr Owen - needs rpt EGD next wk as outpt








2.Hypovolemic shock  secondary to GI bleed-- resolved 


Off pressors


transfused total of 35 units PRBC








3. Sepsis / bacteremia and Pneumonia


Afebrile since 5/12 AM, was tachycardic, procalcitonin elevated 4.9


CT abdomen showed right middle lobe / upper and lower lobe infiltrate


CT chest showed possible right mid , right upper lobe  infiltrate vs 

atelectasis and right mid ground glass opacities1 blood Cx positive for Staph 

coag negative and sputum cx positive for yeast 


on IV Zosyn and Diflucan - completed treatment


Repeat blood cultures with no growth so far 


removed Right groin TLC 5/11 and PICC line placed to RUE 


Echo showed no vegetations


Repeat CXR showed right upper lobe atelectasis 








4. Ileus -resolved 


noted to had abdominal distention 


CT abdomen showed: Distended large and small bowel loops with occasional air-

fluid levels in the central abdominal small bowel.  The stomach is mildly 

distended in the interval in overall pattern suggests ileus rather than small 

large-bowel obstruction.


started on PO diet and tolerating 


TPN discontinued 








5. Coagulopathy sec to Liver dis from alcoholism


received Vit K, DDAVP, Tranexamic acid 


s/p 22 unit FFP transfusion 


Hematology on consult 


Transfusion support 








6. Cirrhosis with ascites , coagulopathy, thrombocytopenia sec to ETOH abuse 


s/p abdominal paracentesis by IR  4/30  with removal 900 ml ascitic fluid


With anasarca 


repeat CT abdomen showed small ascitic fluid 





7. Transfusion reaction 


with fever episode during transfusion on 5/5








8.  Alcohol Abuse 


was on Presedex drip for more than 2 weeks


at present out of acute  withdrawal period











9. Hepatic Encephalopathy


CT of the head : no bleed


ammonia level- low








10.Hypernatremia/ Hypokalemia/ hyperchloremia


secondary to diuretic use 


on  lasix  


monitor electrolytes daily








11. DVT proph


SCD


no anticoag sec to GIB and coagulopathy & low Platelet





Discharge Exam





- Head Exam


Head Exam: ATRAUMATIC, NORMAL INSPECTION, NORMOCEPHALIC





- Eye Exam


Eye Exam: EOMI, Normal appearance, PERRL


Pupil Exam: NORMAL ACCOMODATION





- ENT Exam


ENT Exam: Mucous Membranes Moist, Normal External Ear Exam





- Neck Exam


Neck exam: Full Rom





- Respiratory Exam


Respiratory Exam: Rhonchi, NORMAL BREATHING PATTERN.  absent: Respiratory 

Distress





- Cardiovascular Exam


Cardiovascular Exam: REGULAR RHYTHM, +S1, +S2





- GI/Abdominal Exam


GI & Abdominal Exam: Distended, Normal Bowel Sounds, Soft.  absent: Tenderness





- Extremities Exam


Extremities exam: full ROM, normal capillary refill, pedal edema, pedal pulses 

present





- Back Exam


Back exam: FULL ROM.  absent: CVA tenderness (L), CVA tenderness (R)





- Neurological Exam


Neurological exam: Alert, CN II-XII Intact, Oriented x3, Reflexes Normal





- Psychiatric Exam


Psychiatric exam: Normal Affect, Normal Mood





- Skin


Skin Exam: Dry, Normal Color, Warm





Discharge Plan





- Discharge Medications


Prescriptions: 


Ferrous Sulfate [Feosol] 325 mg PO BID #60 tab


Folic Acid 1 mg PO DAILY #30 tab


Furosemide [Lasix] 20 mg PO DAILY #30 tablet


Pantoprazole [Protonix EC Tab] 40 mg PO DAILY #30 ect


Thiamine [Vitamin B1 Tab] 100 mg PO DAILY #30 tab





- Follow Up Plan


Condition: IMPROVED


Disposition: HOME/ ROUTINE


Additional Instructions: 


ff up with Dr Owen on Monday - plan for repeat EGD next week.


ff up FP Clinic in 1 wk


Alcohol Rehab program referral


24 hour Care at home at all times  for now until pt's gait is more stable


Outpatient Physical Therapy


Referrals: 


[facility] [place] [Outside]


Dwight Owen MD [Staff Provider] -

## 2018-05-25 NOTE — PQF SEPSIS
Dr. Barroso Discharge Summary documented sepsis/bacteremia staphylococcus coag 
negative. After study did pt have a diagnosis of sepsis or bacteremia? 





***** This form is a permanent part of the medical record*****





   

Clarification of your documentation is requested to better reflect the severity 
of illness and intensity of treatment of your patient.  



Indicators present   



[] Temp < 96.8 or > 100.4            



[] WBC count > 12,000/mm3 or         

      <000/mm3 or 10% immature neutrophils               



[] Heart Rate  > 90               



[] Respiratory Rate > 20            



[] Fever or hypothermia            

 

[] Chills                  



[] Positive blood cultures            



[x] Hypotension               



[] Metabolic acidosis                

     (Elevated lactate level, anion gap or reduced blood pH)            



[] Acute confusion /Altered Mental Status      



[] Shock                  

 

[] Other: []         



Location in the medical record that reflects the above clinical findings: []





Treatment Provided: []



PHYSICIAN'S RESPONSE



Sepsis sec to Pneumonia  ( POA)



Based on your medical judgment of the clinical indicators outlined above, are 
you treating this patient for a known or suspected: 

 

[] Sepsis / Septicemia   Please specify organism if known []



[] SIRS (Systemic Inflammatory Response Syndrome)

 

[] Severe Sepsis (Sepsis with Associated Organ Dysfunction)



[] Fever of Unknown Origin



[] Other, please indicate: []     



[] If Unable to Determine, please check the box, sign and date.   



Present On Admission (POA) Indicator:



[] Present at the time of admission



[] Not present at the time of admission



[] Clinically Undetermined



In responding to this query, please exercise your independent professional 
judgment.  The fact that a question is asked does not imply that any particular 
answer is desired or expected.  Thank you for your clarification on this 
documentation.



If you have any questions please call:[ ]

* Thank you,

   [ ]Regi Krishna

   H[employer]oder
[place]

## 2018-09-17 ENCOUNTER — HOSPITAL ENCOUNTER (EMERGENCY)
Dept: HOSPITAL 14 - H.ER | Age: 38
Discharge: HOME | End: 2018-09-17
Payer: MEDICAID

## 2018-09-17 VITALS — DIASTOLIC BLOOD PRESSURE: 80 MMHG | HEART RATE: 80 BPM | SYSTOLIC BLOOD PRESSURE: 134 MMHG

## 2018-09-17 VITALS — RESPIRATION RATE: 16 BRPM

## 2018-09-17 VITALS — TEMPERATURE: 98.7 F

## 2018-09-17 VITALS — BODY MASS INDEX: 27.4 KG/M2

## 2018-09-17 DIAGNOSIS — Z79.899: ICD-10-CM

## 2018-09-17 DIAGNOSIS — Z88.0: ICD-10-CM

## 2018-09-17 DIAGNOSIS — R10.9: ICD-10-CM

## 2018-09-17 DIAGNOSIS — A08.4: Primary | ICD-10-CM

## 2018-09-17 LAB
ALBUMIN SERPL-MCNC: 4.3 G/DL (ref 3.5–5)
ALBUMIN/GLOB SERPL: 0.9 {RATIO} (ref 1–2.1)
ALT SERPL-CCNC: 35 U/L (ref 21–72)
AST SERPL-CCNC: 65 U/L (ref 17–59)
BASOPHILS # BLD AUTO: 0 K/UL (ref 0–0.2)
BASOPHILS NFR BLD: 0.8 % (ref 0–2)
BUN SERPL-MCNC: 10 MG/DL (ref 9–20)
CALCIUM SERPL-MCNC: 9.5 MG/DL (ref 8.4–10.2)
EOSINOPHIL # BLD AUTO: 0 K/UL (ref 0–0.7)
EOSINOPHIL NFR BLD: 1.4 % (ref 0–4)
ERYTHROCYTE [DISTWIDTH] IN BLOOD BY AUTOMATED COUNT: 15.1 % (ref 11.5–14.5)
GFR NON-AFRICAN AMERICAN: > 60
HGB BLD-MCNC: 13.2 G/DL (ref 12–18)
LIPASE SERPL-CCNC: 193 U/L (ref 23–300)
LYMPHOCYTES # BLD AUTO: 1.2 K/UL (ref 1–4.3)
LYMPHOCYTES NFR BLD AUTO: 35.7 % (ref 20–40)
MCH RBC QN AUTO: 29.5 PG (ref 27–31)
MCHC RBC AUTO-ENTMCNC: 33.6 G/DL (ref 33–37)
MCV RBC AUTO: 87.7 FL (ref 80–94)
MONOCYTES # BLD: 0.5 K/UL (ref 0–0.8)
MONOCYTES NFR BLD: 14.2 % (ref 0–10)
NEUTROPHILS # BLD: 1.6 K/UL (ref 1.8–7)
NEUTROPHILS NFR BLD AUTO: 47.9 % (ref 50–75)
NRBC BLD AUTO-RTO: 0.1 % (ref 0–0)
PLATELET # BLD: 83 K/UL (ref 130–400)
PMV BLD AUTO: 9.7 FL (ref 7.2–11.7)
RBC # BLD AUTO: 4.47 MIL/UL (ref 4.4–5.9)
WBC # BLD AUTO: 3.3 K/UL (ref 4.8–10.8)

## 2018-09-17 PROCEDURE — 82948 REAGENT STRIP/BLOOD GLUCOSE: CPT

## 2018-09-17 PROCEDURE — 87040 BLOOD CULTURE FOR BACTERIA: CPT

## 2018-09-17 PROCEDURE — 83690 ASSAY OF LIPASE: CPT

## 2018-09-17 PROCEDURE — 96374 THER/PROPH/DIAG INJ IV PUSH: CPT

## 2018-09-17 PROCEDURE — 85025 COMPLETE CBC W/AUTO DIFF WBC: CPT

## 2018-09-17 PROCEDURE — 96375 TX/PRO/DX INJ NEW DRUG ADDON: CPT

## 2018-09-17 PROCEDURE — 80053 COMPREHEN METABOLIC PANEL: CPT

## 2018-09-17 PROCEDURE — 99284 EMERGENCY DEPT VISIT MOD MDM: CPT

## 2018-09-17 NOTE — ED PDOC
HPI: Abdomen


Time Seen by Provider: 09/17/18 01:59


Chief Complaint (Nursing): Abdominal Pain


Chief Complaint (Provider): Abdominal Pain


History Per: Patient


History/Exam Limitations: no limitations


Outside of [place] travel?: No


Current Symptoms Are (Timing): Still Present


Pain Scale Rating Of: 6


Location Of Pain/Discomfort: Other (lower)


Quality Of Discomfort: Cramping


Associated Symptoms: Diarrhea.  denies: Fever, Chills, Nausea, Vomiting, Back 

Pain, Chest Pain, Urinary Symptoms


Additional Complaint(s): 


38 year old male presents to the ER for an evaluation of four days of loose 

bowel movements that turned into diarrhea tonight. Patient reports he has had 7 

episodes of non-bloody diarrhea since lunch time. He also complains of lower 

abdominal cramping which is intermittent and rated 6/10 when present. He did 

not take any medication PTA. Patient notes mother and daughter are sick with 

stomach bug and were evaluated here in ER for similar symptoms and sent home. 

Patient does not have any other complaints. Denies fever, chills, recent travel

, urinary symptoms, flank or back pain, shortness of breath, or chest pain. 


PMD: Dr. Kimball








Past Medical History


Reviewed: Historical Data, Nursing Documentation, Vital Signs


Vital Signs: 


 Last Vital Signs











Temp  98.7 F   09/17/18 04:52


 


Pulse  80   09/17/18 04:52


 


Resp  16   09/17/18 04:52


 


BP  134/80   09/17/18 04:52


 


Pulse Ox  100   09/17/18 04:52














- Medical History


PMH: Back Problems


Other PMH:  varices





- Surgical History


Other surgeries: procedure to hand





- Family History


Family History: States: Unknown Family Hx





- Social History


Current smoker - smoking cessation education provided: No (former, quit in may)


Alcohol: Other (former, quit in May)


Drugs: Denies





- Home Medications


Home Medications: 


 Ambulatory Orders











 Medication  Instructions  Recorded


 


Ferrous Sulfate [Feosol] 325 mg PO BID #60 tab 05/24/18


 


Folic Acid 1 mg PO DAILY #30 tab 05/24/18


 


Furosemide [Lasix] 20 mg PO DAILY #30 tablet 05/24/18


 


Pantoprazole [Protonix EC Tab] 40 mg PO DAILY #30 ect 05/24/18


 


Thiamine [Vitamin B1 Tab] 100 mg PO DAILY #30 tab 05/24/18


 


Dicyclomine [Bentyl] 20 mg PO Q6 PRN #12 tab 09/17/18


 


Ibuprofen [Motrin Tab] 400 mg PO Q6 PRN #12 tab 09/17/18


 


Naproxen/Esomeprazole Mag [Vimovo 1 each PO BID PRN #20 tab.ir. 09/17/18





 500-20 mg Tablet]  


 


Ranitidine HCl [Zantac] 150 mg PO BID #20 tablet 09/17/18














- Allergies


Allergies/Adverse Reactions: 


 Allergies











Allergy/AdvReac Type Severity Reaction Status Date / Time


 


Penicillins Allergy  RASH Verified 09/17/18 01:49


 


shellfish derived Allergy  ITCHING Verified 09/17/18 01:49














Review of Systems


ROS Statement: Except As Marked, All Systems Reviewed And Found Negative


Constitutional: Negative for: Fever, Chills


Cardiovascular: Negative for: Chest Pain


Respiratory: Negative for: Shortness of Breath


Gastrointestinal: Positive for: Abdominal Pain (cramping), Diarrhea, Other (

loose bowel movement).  Negative for: Nausea, Vomiting


Genitourinary Male: Negative for: Dysuria, Frequency, Incontinence, Hematuria


Musculoskeletal: Negative for: Back Pain


Psych: Negative for: Suicidal ideation (homicidal ideation)





Physical Exam





- Reviewed


Nursing Documentation Reviewed: Yes


Vital Signs Reviewed: Yes





- Physical Exam


Comments: 


GENERAL APPEARANCE: Patient is awake, alert, oriented x 3, in no distress. 

Resting comfortably. 


SKIN:  Warm, dry; (-) cyanosis.


ENMT:  Mucous membranes moist.


NECK: Supple, FROM


CHEST AND RESPIRATORY:  (-) rales, (-) rhonchi, (-) wheezes; breath sounds 

equal bilaterally. Respirations even and nonlabored.


HEART AND CARDIOVASCULAR:  (-) irregularity


ABDOMEN AND GI: Soft (-) distention.  Bowel sounds active x4; (+) mild diffuse 

abdominal tenderness. (-) guarding, (-) rebound, (-) palpable masses, (-) CVA 

tenderness.


EXTREMITIES:  (-) deformity, (-) edema


NEURO AND PSYCH:  Mental status as above; (-) focal findings. Gait steady, 

speech clear. (-) facial asymmetry (-) aphasia








- Laboratory Results


Result Diagrams: 


 09/17/18 02:47





 09/17/18 02:47


Urine dip results: Negative for: Leukocyte Esterase, Blood, Nitrate, Ketones, 

Glucose, Bilirubin, Protein





- ECG


O2 Sat by Pulse Oximetry: 97 (RA)


Pulse Ox Interpretation: Normal





Medical Decision Making


Medical Decision Making: 


Time: 0215


Initial Impression: diarrhea, abdominal pain probable gastroenteritis 


Initial Plan:


--CMP


--Lipase


--ED Urine Dipstick 


--CBC w/ Differential 


--Glucose POC


--Bentyl 20mg


--Normal Saline 999 mls/hr


--Pepcid 20mg


--Toradol 30mg 


--Zofran 4mg 


--Blood Culture 


--Cardiac Monitor 


--IV Insertion


--Glucose, Blood, POC 


--Reevaluation 





0310


Labs reviewed, grossly unremarkable/unchanged from prior studies.


Lipase 193


Accucheck: 111





0440


Udip reviewed and unremarkable.


On re-evaluation, patient reports resolution of symptoms. Tolerating PO intake. 

On exam, patient remains AAOx3, in no acute distress. Lungs clear to 

auscultation, cardiac RRR, abdomen soft, non-tender, repeat neuro exam shows no 

focal findings.  VSS, stable for discharge. Harmon diet and fluids encouraged.


Lab /Diagnostic results d/w the patient in great detail. Diagnosis of abdominal 

pain, diarrhea, probable viral gastroenteritis d/w the patient. 


Based on history, exam and diagnostic results, plan will be for outpatient 

follow up. 


Patient instructed to follow-up with pmd / referral provided / the clinic  in 1-

2 days without fail. Advised to take medication as prescribed. Return to the 

emergency room at any time for any new or worsening symptoms. Patient states he 

fully agrees with and understands discharge instructions. States that he agrees 

with the plan and disposition. Verbalized and repeated discharge instructions 

and plan. I have given the patient opportunity to ask any additional questions.


--------------------------------------------------------------------------------

-----------------


Scribe Attestation:   


Documented by Marin Alfaro, acting as a scribe for Nasreen Mcclendon PA-C. 





Provider Scribe Attestation:


All medical record entries made by the Scribe were at my direction and 

personally dictated by me. I have reviewed the chart and agree that the record 

accurately reflects my personal performance of the history, physical exam, 

medical decision making, and the department course for this patient. I have 

also personally directed, reviewed, and agree with the discharge instructions 

and disposition.








Disposition





- Clinical Impression


Clinical Impression: 


 Abdominal discomfort, Diarrhea, Viral gastroenteritis








- Patient ED Disposition


Is Patient to be Admitted: No


Counseled Patient/Family Regarding: Studies Performed, Diagnosis, Need For 

Followup, Rx Given





- Disposition


Referrals: 


Brionna Del Rosario MD [Primary Care Provider] - 


Disposition: Routine/Home


Disposition Time: 04:45


Condition: STABLE


Additional Instructions: 


The emergency medical care you received today was directed towards the acute 

presenting symptoms. If you were prescribed any medication, please fill it and 

give as directed. It may take several days for your symptoms to resolve. Return 

to the Emergency Department at any time if symptoms worsen, do not improve, or 

if any other problems arise.





Please contact your doctor in 2 days for re-evaluation and follow up / or call 

one of the physicians/clinics you have been referred to that are listed on the 

Patient Visit Information form that is included in your discharge packet. Bring 

any paperwork you were given at discharge with you along with any medications 

to your follow up visit. Our treatment cannot replace ongoing medical care by a 

primary care provider (PCP) outside of the emergency department.


Prescriptions: 


Dicyclomine [Bentyl] 20 mg PO Q6 PRN #12 tab


 PRN Reason: Diarrhea


Ibuprofen [Motrin Tab] 400 mg PO Q6 PRN #12 tab


 PRN Reason: Pain, Moderate (4-7)


Naproxen/Esomeprazole Mag [Vimovo Dr 500-20 mg Tablet] 1 each PO BID PRN #20 

tab.ir.dr


 PRN Reason: Pain, Moderate (4-7)


Ranitidine HCl [Zantac] 150 mg PO BID #20 tablet


Instructions:  Diarrhea in Adolescents and Adults, Viral Gastroenteritis, Harmon 

Diet, Acute Abdomen (Belly Pain)


Forms:  [employer] (English)


Print Language: ENGLISH





- POA


Present On Arrival: None





Results





- Lab Results


Lab Results: 














  09/17/18 09/17/18 09/17/18





  02:47 02:47 02:32


 


WBC   3.3 L 


 


RBC   4.47 


 


Hgb   13.2 


 


Hct   39.2 


 


MCV   87.7  D 


 


MCH   29.5 


 


MCHC   33.6 


 


RDW   15.1 H 


 


Plt Count   83 L D 


 


MPV   9.7 


 


Neut % (Auto)   47.9 L 


 


Lymph % (Auto)   35.7 


 


Mono % (Auto)   14.2 H 


 


Eos % (Auto)   1.4 


 


Baso % (Auto)   0.8 


 


Neut # (Auto)   1.6 L 


 


Lymph # (Auto)   1.2 


 


Mono # (Auto)   0.5 


 


Eos # (Auto)   0.0 


 


Baso # (Auto)   0.0 


 


Sodium  138  


 


Potassium  3.3 L  


 


Chloride  102  


 


Carbon Dioxide  24  


 


Anion Gap  15  


 


BUN  10  


 


Creatinine  0.6 L  


 


Est GFR ( Amer)  > 60  


 


Est GFR (Non-Af Amer)  > 60  


 


POC Glucose (mg/dL)    111 H


 


Random Glucose  120 H  


 


Calcium  9.5  


 


Total Bilirubin  1.4 H  


 


AST  65 H D  


 


ALT  35  


 


Alkaline Phosphatase  106  


 


Total Protein  9.2 H  


 


Albumin  4.3  


 


Globulin  4.9 H  


 


Albumin/Globulin Ratio  0.9 L  


 


Lipase  193

## 2018-09-19 VITALS — OXYGEN SATURATION: 97 %
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
